# Patient Record
Sex: FEMALE | Race: BLACK OR AFRICAN AMERICAN | Employment: OTHER | URBAN - METROPOLITAN AREA
[De-identification: names, ages, dates, MRNs, and addresses within clinical notes are randomized per-mention and may not be internally consistent; named-entity substitution may affect disease eponyms.]

---

## 2017-01-13 ENCOUNTER — HOSPITAL ENCOUNTER (OUTPATIENT)
Dept: LAB | Age: 72
Discharge: HOME | End: 2017-01-13
Attending: INTERNAL MEDICINE
Payer: MEDICARE

## 2017-01-13 DIAGNOSIS — Z79.899 ENCOUNTER FOR LONG-TERM (CURRENT) DRUG USE: ICD-10-CM

## 2017-01-13 DIAGNOSIS — M05.69 RHEUMATOID ARTHRITIS OF MULTIPLE SITES WITH INVOLVEMENT OF OTHER ORGANS AND SYSTEMS (HCC): ICD-10-CM

## 2017-01-13 LAB
ALBUMIN SERPL BCP-MCNC: 4 G/DL (ref 3.4–5)
ALBUMIN/GLOB SERPL: 1.1 {RATIO} (ref 0.8–1.7)
ALP SERPL-CCNC: 51 U/L (ref 45–117)
ALT SERPL-CCNC: 21 U/L (ref 13–56)
AST SERPL W P-5'-P-CCNC: 22 U/L (ref 15–37)
BASOPHILS # BLD AUTO: 0.1 K/UL (ref 0–0.06)
BASOPHILS # BLD: 1 % (ref 0–2)
BILIRUB DIRECT SERPL-MCNC: 0.1 MG/DL (ref 0–0.2)
BILIRUB SERPL-MCNC: 0.3 MG/DL (ref 0.2–1)
CREAT SERPL-MCNC: 0.62 MG/DL (ref 0.6–1.3)
CRP SERPL-MCNC: 1.7 MG/DL (ref 0–0.3)
DIFFERENTIAL METHOD BLD: ABNORMAL
EOSINOPHIL # BLD: 0.8 K/UL (ref 0–0.4)
EOSINOPHIL NFR BLD: 12 % (ref 0–5)
ERYTHROCYTE [DISTWIDTH] IN BLOOD BY AUTOMATED COUNT: 14.8 % (ref 11.6–14.5)
ERYTHROCYTE [SEDIMENTATION RATE] IN BLOOD: 47 MM/HR (ref 0–30)
GLOBULIN SER CALC-MCNC: 3.5 G/DL (ref 2–4)
HCT VFR BLD AUTO: 34 % (ref 35–45)
HGB BLD-MCNC: 10.5 G/DL (ref 12–16)
LYMPHOCYTES # BLD AUTO: 30 % (ref 21–52)
LYMPHOCYTES # BLD: 2 K/UL (ref 0.9–3.6)
MCH RBC QN AUTO: 21.8 PG (ref 24–34)
MCHC RBC AUTO-ENTMCNC: 30.9 G/DL (ref 31–37)
MCV RBC AUTO: 70.5 FL (ref 74–97)
MONOCYTES # BLD: 0.5 K/UL (ref 0.05–1.2)
MONOCYTES NFR BLD AUTO: 7 % (ref 3–10)
NEUTS SEG # BLD: 3.4 K/UL (ref 1.8–8)
NEUTS SEG NFR BLD AUTO: 50 % (ref 40–73)
PLATELET # BLD AUTO: 379 K/UL (ref 135–420)
PMV BLD AUTO: 9.4 FL (ref 9.2–11.8)
PROT SERPL-MCNC: 7.5 G/DL (ref 6.4–8.2)
RBC # BLD AUTO: 4.82 M/UL (ref 4.2–5.3)
WBC # BLD AUTO: 6.7 K/UL (ref 4.6–13.2)

## 2017-01-13 PROCEDURE — 80076 HEPATIC FUNCTION PANEL: CPT | Performed by: INTERNAL MEDICINE

## 2017-01-13 PROCEDURE — 86140 C-REACTIVE PROTEIN: CPT | Performed by: INTERNAL MEDICINE

## 2017-01-13 PROCEDURE — 85025 COMPLETE CBC W/AUTO DIFF WBC: CPT | Performed by: INTERNAL MEDICINE

## 2017-01-13 PROCEDURE — 85652 RBC SED RATE AUTOMATED: CPT | Performed by: INTERNAL MEDICINE

## 2017-01-13 PROCEDURE — 36415 COLL VENOUS BLD VENIPUNCTURE: CPT | Performed by: INTERNAL MEDICINE

## 2017-01-13 PROCEDURE — 82542 COL CHROMOTOGRAPHY QUAL/QUAN: CPT | Performed by: INTERNAL MEDICINE

## 2017-01-13 PROCEDURE — 82565 ASSAY OF CREATININE: CPT | Performed by: INTERNAL MEDICINE

## 2017-01-15 LAB
FAX TO INFO,FAXT: NORMAL
FAX TO NUMBER,FAXN: NORMAL

## 2017-01-18 LAB
INTERPRETATION:, 510752: NORMAL
REF LAB TEST METHOD: NORMAL
TPMT RBC-CCNC: 17.7 UNITS/ML RBC

## 2017-04-27 ENCOUNTER — HOSPITAL ENCOUNTER (OUTPATIENT)
Dept: LAB | Age: 72
Discharge: HOME | End: 2017-04-27
Attending: INTERNAL MEDICINE
Payer: MEDICARE

## 2017-04-27 LAB
ALBUMIN SERPL BCP-MCNC: 4.2 G/DL (ref 3.4–5)
ALBUMIN/GLOB SERPL: 1.2 {RATIO} (ref 0.8–1.7)
ALP SERPL-CCNC: 65 U/L (ref 45–117)
ALT SERPL-CCNC: 22 U/L (ref 13–56)
ANION GAP BLD CALC-SCNC: 8 MMOL/L (ref 3–18)
AST SERPL W P-5'-P-CCNC: 27 U/L (ref 15–37)
BASOPHILS # BLD AUTO: 0.1 K/UL (ref 0–0.06)
BASOPHILS # BLD: 1 % (ref 0–2)
BILIRUB SERPL-MCNC: 0.3 MG/DL (ref 0.2–1)
BUN SERPL-MCNC: 19 MG/DL (ref 7–18)
BUN/CREAT SERPL: 28 (ref 12–20)
CALCIUM SERPL-MCNC: 9.3 MG/DL (ref 8.5–10.1)
CHLORIDE SERPL-SCNC: 105 MMOL/L (ref 100–108)
CO2 SERPL-SCNC: 29 MMOL/L (ref 21–32)
CREAT SERPL-MCNC: 0.68 MG/DL (ref 0.6–1.3)
CRP SERPL-MCNC: 0.4 MG/DL (ref 0–0.3)
DIFFERENTIAL METHOD BLD: ABNORMAL
EOSINOPHIL # BLD: 1.1 K/UL (ref 0–0.4)
EOSINOPHIL NFR BLD: 12 % (ref 0–5)
ERYTHROCYTE [DISTWIDTH] IN BLOOD BY AUTOMATED COUNT: 15.4 % (ref 11.6–14.5)
ERYTHROCYTE [SEDIMENTATION RATE] IN BLOOD: 25 MM/HR (ref 0–30)
GLOBULIN SER CALC-MCNC: 3.6 G/DL (ref 2–4)
GLUCOSE SERPL-MCNC: 100 MG/DL (ref 74–99)
HCT VFR BLD AUTO: 35.1 % (ref 35–45)
HGB BLD-MCNC: 11.1 G/DL (ref 12–16)
LYMPHOCYTES # BLD AUTO: 20 % (ref 21–52)
LYMPHOCYTES # BLD: 1.8 K/UL (ref 0.9–3.6)
MCH RBC QN AUTO: 22.3 PG (ref 24–34)
MCHC RBC AUTO-ENTMCNC: 31.6 G/DL (ref 31–37)
MCV RBC AUTO: 70.6 FL (ref 74–97)
MONOCYTES # BLD: 0.5 K/UL (ref 0.05–1.2)
MONOCYTES NFR BLD AUTO: 5 % (ref 3–10)
NEUTS SEG # BLD: 5.8 K/UL (ref 1.8–8)
NEUTS SEG NFR BLD AUTO: 62 % (ref 40–73)
PLATELET # BLD AUTO: 351 K/UL (ref 135–420)
PMV BLD AUTO: 9.6 FL (ref 9.2–11.8)
POTASSIUM SERPL-SCNC: 4.3 MMOL/L (ref 3.5–5.5)
PROT SERPL-MCNC: 7.8 G/DL (ref 6.4–8.2)
RBC # BLD AUTO: 4.97 M/UL (ref 4.2–5.3)
SODIUM SERPL-SCNC: 142 MMOL/L (ref 136–145)
WBC # BLD AUTO: 9.3 K/UL (ref 4.6–13.2)

## 2017-04-27 PROCEDURE — 80053 COMPREHEN METABOLIC PANEL: CPT | Performed by: INTERNAL MEDICINE

## 2017-04-27 PROCEDURE — 85652 RBC SED RATE AUTOMATED: CPT | Performed by: INTERNAL MEDICINE

## 2017-04-27 PROCEDURE — 85025 COMPLETE CBC W/AUTO DIFF WBC: CPT | Performed by: INTERNAL MEDICINE

## 2017-04-27 PROCEDURE — 36415 COLL VENOUS BLD VENIPUNCTURE: CPT | Performed by: INTERNAL MEDICINE

## 2017-04-27 PROCEDURE — 86140 C-REACTIVE PROTEIN: CPT | Performed by: INTERNAL MEDICINE

## 2017-04-28 LAB
FAX TO INFO,FAXT: NORMAL
FAX TO NUMBER,FAXN: NORMAL

## 2017-05-04 ENCOUNTER — HOSPITAL ENCOUNTER (OUTPATIENT)
Dept: LAB | Age: 72
Discharge: HOME | End: 2017-05-04
Payer: MEDICARE

## 2017-05-04 PROCEDURE — 88304 TISSUE EXAM BY PATHOLOGIST: CPT | Performed by: PODIATRIST

## 2017-05-04 PROCEDURE — 88305 TISSUE EXAM BY PATHOLOGIST: CPT | Performed by: PODIATRIST

## 2017-05-04 PROCEDURE — 88311 DECALCIFY TISSUE: CPT | Performed by: PODIATRIST

## 2017-07-18 ENCOUNTER — HOSPITAL ENCOUNTER (OUTPATIENT)
Dept: LAB | Age: 72
Discharge: HOME | End: 2017-07-18
Payer: MEDICARE

## 2017-07-18 LAB
ALBUMIN SERPL BCP-MCNC: 3.9 G/DL (ref 3.4–5)
ALBUMIN/GLOB SERPL: 1 {RATIO} (ref 0.8–1.7)
ALP SERPL-CCNC: 49 U/L (ref 45–117)
ALT SERPL-CCNC: 19 U/L (ref 13–56)
AST SERPL W P-5'-P-CCNC: 21 U/L (ref 15–37)
BASOPHILS # BLD AUTO: 0.1 K/UL (ref 0–0.06)
BASOPHILS # BLD: 1 % (ref 0–2)
BILIRUB DIRECT SERPL-MCNC: 0.1 MG/DL (ref 0–0.2)
BILIRUB SERPL-MCNC: 0.4 MG/DL (ref 0.2–1)
CREAT SERPL-MCNC: 0.62 MG/DL (ref 0.6–1.3)
CRP SERPL-MCNC: 0.8 MG/DL (ref 0–0.3)
DIFFERENTIAL METHOD BLD: ABNORMAL
EOSINOPHIL # BLD: 0.8 K/UL (ref 0–0.4)
EOSINOPHIL NFR BLD: 12 % (ref 0–5)
ERYTHROCYTE [DISTWIDTH] IN BLOOD BY AUTOMATED COUNT: 15.9 % (ref 11.6–14.5)
ERYTHROCYTE [SEDIMENTATION RATE] IN BLOOD: 20 MM/HR (ref 0–30)
GLOBULIN SER CALC-MCNC: 3.8 G/DL (ref 2–4)
HCT VFR BLD AUTO: 33.1 % (ref 35–45)
HGB BLD-MCNC: 10.4 G/DL (ref 12–16)
LYMPHOCYTES # BLD AUTO: 31 % (ref 21–52)
LYMPHOCYTES # BLD: 2.1 K/UL (ref 0.9–3.6)
MCH RBC QN AUTO: 21.7 PG (ref 24–34)
MCHC RBC AUTO-ENTMCNC: 31.4 G/DL (ref 31–37)
MCV RBC AUTO: 69 FL (ref 74–97)
MONOCYTES # BLD: 0.4 K/UL (ref 0.05–1.2)
MONOCYTES NFR BLD AUTO: 5 % (ref 3–10)
NEUTS SEG # BLD: 3.4 K/UL (ref 1.8–8)
NEUTS SEG NFR BLD AUTO: 51 % (ref 40–73)
PLATELET # BLD AUTO: 377 K/UL (ref 135–420)
PMV BLD AUTO: 9.4 FL (ref 9.2–11.8)
PROT SERPL-MCNC: 7.7 G/DL (ref 6.4–8.2)
RBC # BLD AUTO: 4.8 M/UL (ref 4.2–5.3)
WBC # BLD AUTO: 6.8 K/UL (ref 4.6–13.2)

## 2017-07-18 PROCEDURE — 80076 HEPATIC FUNCTION PANEL: CPT | Performed by: INTERNAL MEDICINE

## 2017-07-18 PROCEDURE — 85652 RBC SED RATE AUTOMATED: CPT | Performed by: INTERNAL MEDICINE

## 2017-07-18 PROCEDURE — 82565 ASSAY OF CREATININE: CPT | Performed by: INTERNAL MEDICINE

## 2017-07-18 PROCEDURE — 36415 COLL VENOUS BLD VENIPUNCTURE: CPT | Performed by: INTERNAL MEDICINE

## 2017-07-18 PROCEDURE — 86140 C-REACTIVE PROTEIN: CPT | Performed by: INTERNAL MEDICINE

## 2017-07-18 PROCEDURE — 85025 COMPLETE CBC W/AUTO DIFF WBC: CPT | Performed by: INTERNAL MEDICINE

## 2017-07-19 LAB
FAX TO INFO,FAXT: NORMAL
FAX TO NUMBER,FAXN: NORMAL

## 2018-01-05 ENCOUNTER — HOSPITAL ENCOUNTER (EMERGENCY)
Age: 73
Discharge: HOME | End: 2018-01-05
Attending: EMERGENCY MEDICINE
Payer: MEDICARE

## 2018-01-05 ENCOUNTER — APPOINTMENT (OUTPATIENT)
Dept: GENERAL RADIOLOGY | Age: 73
End: 2018-01-05
Attending: EMERGENCY MEDICINE
Payer: MEDICARE

## 2018-01-05 VITALS
TEMPERATURE: 98.2 F | HEART RATE: 86 BPM | WEIGHT: 150 LBS | RESPIRATION RATE: 16 BRPM | BODY MASS INDEX: 23.54 KG/M2 | DIASTOLIC BLOOD PRESSURE: 78 MMHG | HEIGHT: 67 IN | OXYGEN SATURATION: 97 % | SYSTOLIC BLOOD PRESSURE: 164 MMHG

## 2018-01-05 DIAGNOSIS — J20.9 ACUTE BRONCHITIS WITH BRONCHOSPASM: Primary | ICD-10-CM

## 2018-01-05 PROCEDURE — 71046 X-RAY EXAM CHEST 2 VIEWS: CPT

## 2018-01-05 PROCEDURE — 99281 EMR DPT VST MAYX REQ PHY/QHP: CPT

## 2018-01-05 RX ORDER — CODEINE PHOSPHATE AND GUAIFENESIN 10; 100 MG/5ML; MG/5ML
5 SOLUTION ORAL
Qty: 160 ML | Refills: 0 | Status: SHIPPED | OUTPATIENT
Start: 2018-01-05 | End: 2018-03-24

## 2018-01-05 RX ORDER — TRAMADOL HYDROCHLORIDE 50 MG/1
50 TABLET ORAL
COMMUNITY
End: 2018-01-05

## 2018-01-05 RX ORDER — ALBUTEROL SULFATE 90 UG/1
2 AEROSOL, METERED RESPIRATORY (INHALATION)
Qty: 1 INHALER | Refills: 1 | Status: SHIPPED | OUTPATIENT
Start: 2018-01-05 | End: 2018-11-25

## 2018-01-05 RX ORDER — DOXYCYCLINE HYCLATE 100 MG
100 TABLET ORAL 2 TIMES DAILY
Qty: 20 TAB | Refills: 0 | Status: SHIPPED | OUTPATIENT
Start: 2018-01-05 | End: 2018-01-15

## 2018-01-05 NOTE — Clinical Note
Do not take the antibiotic directly with a multivitamin (wait 30 minutes). Do not lie down within 2 hours of taking the antibiotic.

## 2018-01-05 NOTE — ED PROVIDER NOTES
EMERGENCY DEPARTMENT HISTORY AND PHYSICAL EXAM    Date: 1/5/2018  Patient Name: Yohan Kelley    History of Presenting Illness     Chief Complaint   Patient presents with    Cough         History Provided By: Patient    Chief Complaint: cough  Duration: 1.5 week   Timing:  Recurrent and worsening  Location: respiratory  Quality: productive  Severity: N/A  Associated Symptoms: rhinorrhea and congestions    Additional History (Context):   2:19 PM   Yohan Kelley is a 74 y.o. female with PMHX of bronchitis and pneumonia who presents to the emergency department C/O recurrent and worsening productive cough onset originally 3 weeks ago, was treated, and is recurrent 1.5 weeks ago. Associated sxs include rhinorrhea, and congestions. No relief with home meds. Pt denies fever, chills, ear pain, myalgias, and any other sxs or complaints. PCP: Edward Pollard MD    Current Outpatient Prescriptions   Medication Sig Dispense Refill    doxycycline (VIBRA-TABS) 100 mg tablet Take 1 Tab by mouth two (2) times a day for 10 days. 20 Tab 0    albuterol (PROVENTIL HFA, VENTOLIN HFA, PROAIR HFA) 90 mcg/actuation inhaler Take 2 Puffs by inhalation every four (4) hours as needed for Wheezing or Shortness of Breath. 1 Inhaler 1    inhalational spacing device 1 Each by Does Not Apply route as needed. Please dispense one adult spacer to be used with albuterol HFA. 1 Device 0    guaiFENesin-codeine (ROBITUSSIN AC) 100-10 mg/5 mL solution Take 5 mL by mouth three (3) times daily as needed for Cough. Max Daily Amount: 15 mL. Please do not drive while taking this medication. 160 mL 0    LEFLUNOMIDE PO Take  by mouth.  atorvastatin (LIPITOR) 10 mg tablet Take  by mouth daily.  AMLODIPINE BESYLATE, BULK, by Does Not Apply route.  clopidogrel (PLAVIX) 75 mg tablet Take  by mouth daily.  ergocalciferol, vitamin d2, 1,000 unit cap Take  by mouth.  folic acid (FOLVITE) 1 mg tablet Take  by mouth daily.  multivitamin (ONE A DAY) tablet Take 1 Tab by mouth daily.  losartan (COZAAR) 50 mg tablet Take 50 mg by mouth daily. Past History     Past Medical History:  Past Medical History:   Diagnosis Date    Autoimmune [facility] [address]     RA    Diabetes ([facility] [address] [facility]     diet controlled    Diabetes mellitus ([facility] [address] [facility]     Hypertension     Macromastia     Menopause     Rheumatoid arthritis(714.0)     TIA (transient ischemic attack)     Ulcer of foot ([facility] [address]        Past Surgical History:  Past Surgical History:   Procedure Laterality Date    HX BREAST REDUCTION  9/2012    HX GYN      HX ORTHOPAEDIC  2009    Left hand surgery    HX OTHER SURGICAL      left shoulder abcess drained    HX PARTIAL HYSTERECTOMY         Family History:  History reviewed. No pertinent family history. Social History:  Social History   Substance Use Topics    Smoking status: Former Smoker     Quit date: 9/17/1997    Smokeless tobacco: Never Used    Alcohol use No       Allergies:  No Known Allergies      Review of Systems   Review of Systems   Constitutional: Negative for chills and fever. HENT: Positive for congestion and rhinorrhea. Negative for sore throat. Respiratory: Positive for cough (productive). Negative for shortness of breath. Cardiovascular: Negative for chest pain. Gastrointestinal: Negative for nausea and vomiting. Musculoskeletal: Negative for myalgias. Skin: Negative for rash. Neurological: Negative for headaches. Hematological: Negative for adenopathy. All other systems reviewed and are negative. Physical Exam     Vitals:    01/05/18 1216   BP: 164/78   Pulse: 86   Resp: 16   Temp: 98.2 °F (36.8 °C)   SpO2: 97%   Weight: 68 kg (150 lb)   Height: 5' 7\" (1.702 m)     Physical Exam   Constitutional: She is oriented to person, place, and time. She appears well-developed and well-nourished. No distress. Geriatric female in NAD. Alert. No resp distress or acc muscle ise.     HENT:   Head: Normocephalic and atraumatic. Right Ear: External ear normal. No swelling or tenderness. Tympanic membrane is not perforated, not erythematous and not bulging. Left Ear: External ear normal. No swelling or tenderness. Tympanic membrane is not perforated, not erythematous and not bulging. Nose: Nose normal. No mucosal edema or rhinorrhea. Right sinus exhibits no maxillary sinus tenderness and no frontal sinus tenderness. Left sinus exhibits no maxillary sinus tenderness and no frontal sinus tenderness. Mouth/Throat: Uvula is midline, oropharynx is clear and moist and mucous membranes are normal. No oral lesions. No trismus in the jaw. No dental abscesses or uvula swelling. No posterior oropharyngeal edema, posterior oropharyngeal erythema or tonsillar abscesses. Eyes: Conjunctivae are normal. Right eye exhibits no discharge. Left eye exhibits no discharge. Neck: Normal range of motion. Cardiovascular: Normal rate, regular rhythm, normal heart sounds and intact distal pulses. Exam reveals no gallop and no friction rub. No murmur heard. Pulmonary/Chest: Effort normal and breath sounds normal. No accessory muscle usage. No tachypnea. No respiratory distress. She has no decreased breath sounds. She has no wheezes. She has no rhonchi. She has no rales. Abdominal: Soft. Musculoskeletal: Normal range of motion. She exhibits no edema. Lymphadenopathy:     She has no cervical adenopathy. Neurological: She is alert and oriented to person, place, and time. Skin: Skin is warm and dry. No rash noted. She is not diaphoretic. No erythema. Psychiatric: She has a normal mood and affect. Judgment normal.   Nursing note and vitals reviewed. Diagnostic Study Results     Labs -   No results found for this or any previous visit (from the past 12 hour(s)).     Radiologic Studies -   XR CHEST PA LAT   Final Result        CT Results  (Last 48 hours)    None        CXR Results  (Last 48 hours) 01/05/18 1249  XR CHEST PA LAT Final result    Impression:  Impression:   Mild perihilar bronchial cuffing, most commonly seen with reactive airway   disease, bronchitis or viral infection. No lobar pneumonia. Narrative:  Chest, two views       Indication: Persistent cough and congestion       Comparison: 7/12/2014       Findings:  Upright PA and lateral views of the chest were obtained. The   cardiomediastinal silhouette is within normal limits. The pulmonary vasculature   is unremarkable. Mild perihilar bronchial cuffing. Right infrahilar atelectasis. Remaining lung parenchyma appears otherwise adequately aerated. No pleural   effusion nor pneumothorax. No acute osseous abnormality. Degenerative changes   of the thoracic spine. Medications given in the ED-  Medications - No data to display      Medical Decision Making   I am the first provider for this patient. I reviewed the vital signs, available nursing notes, past medical history, past surgical history, family history and social history. Vital Signs-Reviewed the patient's vital signs. Pulse Oximetry Analysis - 97% on room air     Cardiac Monitor:  Rate: 86 bpm  Rhythm: NSR      Records Reviewed: Nursing Notes and Old Medical Records      Procedures:  Procedures     MDM: URI, strep, sinusitis, mono, influenza, PNA, bronchitis, asthma/RAD, allergic. Doubt cardiac. ED Course:   2:19 PM    Initial assessment performed. The patients presenting problems have been discussed, and they are in agreement with the care plan formulated and outlined with them. I have encouraged them to ask questions as they arise throughout their visit. Diagnosis and Disposition     Afebrile. Lungs CTAB. No resp distress or acc muscle use. Will tx with doxy. Albuterol HFA. FU with PCP. Reasons to RTED discussed with pt. All questions answered. Pt feels comfortable going home at this time.  Pt expressed understanding and she agrees with plan.      DISCHARGE NOTE:  2:34 PM   Pierre Ralph's  results have been reviewed with her. She has been counseled regarding her diagnosis, treatment, and plan. She verbally conveys understanding and agreement of the signs, symptoms, diagnosis, treatment and prognosis and additionally agrees to follow up as discussed. She also agrees with the care-plan and conveys that all of her questions have been answered. I have also provided discharge instructions for her that include: educational information regarding their diagnosis and treatment, and list of reasons why they would want to return to the ED prior to their follow-up appointment, should her condition change. She has been provided with education for proper emergency department utilization. CLINICAL IMPRESSION:    1. Acute bronchitis with bronchospasm        PLAN:  1. D/C Home  2. Discharge Medication List as of 1/5/2018  2:25 PM      START taking these medications    Details   doxycycline (VIBRA-TABS) 100 mg tablet Take 1 Tab by mouth two (2) times a day for 10 days. , Normal, Disp-20 Tab, R-0      albuterol (PROVENTIL HFA, VENTOLIN HFA, PROAIR HFA) 90 mcg/actuation inhaler Take 2 Puffs by inhalation every four (4) hours as needed for Wheezing or Shortness of Breath., Normal, Disp-1 Inhaler, R-1      inhalational spacing device 1 Each by Does Not Apply route as needed. Please dispense one adult spacer to be used with albuterol HFA., Normal, Disp-1 Device, R-0      guaiFENesin-codeine (ROBITUSSIN AC) 100-10 mg/5 mL solution Take 5 mL by mouth three (3) times daily as needed for Cough. Max Daily Amount: 15 mL. Please do not drive while taking this medication. , Print, Disp-160 mL, R-0         CONTINUE these medications which have NOT CHANGED    Details   LEFLUNOMIDE PO Take  by mouth.  , Historical Med      atorvastatin (LIPITOR) 10 mg tablet Take  by mouth daily.   , Historical Med      AMLODIPINE BESYLATE, BULK, by Does Not Apply route.  , Historical Med clopidogrel (PLAVIX) 75 mg tablet Take  by mouth daily. , Historical Med      ergocalciferol, vitamin d2, 1,000 unit cap Take  by mouth.  , Historical Med      folic acid (FOLVITE) 1 mg tablet Take  by mouth daily. , Historical Med      multivitamin (ONE A DAY) tablet Take 1 Tab by mouth daily. , Historical Med      losartan (COZAAR) 50 mg tablet Take 50 mg by mouth daily. , Historical Med         STOP taking these medications       traMADol (ULTRAM) 50 mg tablet Comments:   Reason for Stopping:             3.   Follow-up Information     Follow up With Details Comments Contact Info    Dino Proctor MD Schedule an appointment as soon as possible for a visit in 2 days ED Follow-up [address]      [facility] Go to As needed, If symptoms worsen [address] [address]  [phone]        _______________________________    Attestations: This note is prepared by Castillo Lozano, acting as Scribe for Corene Olszewski, PA-C. Corene Olszewski, PA-C :  The scribe's documentation has been prepared under my direction and personally reviewed by me in its entirety.   I confirm that the note above accurately reflects all work, treatment, procedures, and medical decision making performed by me.  _______________________________

## 2018-01-05 NOTE — DISCHARGE INSTRUCTIONS
Bronchitis: Care Instructions  Your Care Instructions    Bronchitis is inflammation of the bronchial tubes, which carry air to the lungs. The tubes swell and produce mucus, or phlegm. The mucus and inflamed bronchial tubes make you cough. You may have trouble breathing. Most cases of bronchitis are caused by viruses like those that cause colds. Antibiotics usually do not help and they may be harmful. Bronchitis usually develops rapidly and lasts about 2 to 3 weeks in otherwise healthy people. Follow-up care is a key part of your treatment and safety. Be sure to make and go to all appointments, and call your doctor if you are having problems. It's also a good idea to know your test results and keep a list of the medicines you take. How can you care for yourself at home? · Take all medicines exactly as prescribed. Call your doctor if you think you are having a problem with your medicine. · Get some extra rest.  · Take an over-the-counter pain medicine, such as acetaminophen (Tylenol), ibuprofen (Advil, Motrin), or naproxen (Aleve) to reduce fever and relieve body aches. Read and follow all instructions on the label. · Do not take two or more pain medicines at the same time unless the doctor told you to. Many pain medicines have acetaminophen, which is Tylenol. Too much acetaminophen (Tylenol) can be harmful. · Take an over-the-counter cough medicine that contains dextromethorphan to help quiet a dry, hacking cough so that you can sleep. Avoid cough medicines that have more than one active ingredient. Read and follow all instructions on the label. · Breathe moist air from a humidifier, hot shower, or sink filled with hot water. The heat and moisture will thin mucus so you can cough it out. · Do not smoke. Smoking can make bronchitis worse. If you need help quitting, talk to your doctor about stop-smoking programs and medicines. These can increase your chances of quitting for good.   When should you call for help? Call 911 anytime you think you may need emergency care. For example, call if:  ? · You have severe trouble breathing. ?Call your doctor now or seek immediate medical care if:  ? · You have new or worse trouble breathing. ? · You cough up dark brown or bloody mucus (sputum). ? · You have a new or higher fever. ? · You have a new rash. ? Watch closely for changes in your health, and be sure to contact your doctor if:  ? · You cough more deeply or more often, especially if you notice more mucus or a change in the color of your mucus. ? · You are not getting better as expected. Where can you learn more? Go to http://sarina-ryan.info/. Enter H333 in the search box to learn more about \"Bronchitis: Care Instructions. \"  Current as of: May 12, 2017  Content Version: 11.4  © 6818-6367 [employer], [employer]. Care instructions adapted under license by [employer] (which disclaims liability or warranty for this information). If you have questions about a medical condition or this instruction, always ask your healthcare professional. [place] [address] any warranty or liability for your use of this information.

## 2018-03-24 ENCOUNTER — APPOINTMENT (OUTPATIENT)
Dept: GENERAL RADIOLOGY | Age: 73
End: 2018-03-24
Attending: INTERNAL MEDICINE
Payer: MEDICARE

## 2018-03-24 ENCOUNTER — HOSPITAL ENCOUNTER (EMERGENCY)
Age: 73
Discharge: HOME | End: 2018-03-24
Attending: INTERNAL MEDICINE
Payer: MEDICARE

## 2018-03-24 VITALS
TEMPERATURE: 98.1 F | HEART RATE: 81 BPM | WEIGHT: 150 LBS | HEIGHT: 66 IN | BODY MASS INDEX: 24.11 KG/M2 | OXYGEN SATURATION: 99 % | DIASTOLIC BLOOD PRESSURE: 66 MMHG | SYSTOLIC BLOOD PRESSURE: 162 MMHG | RESPIRATION RATE: 18 BRPM

## 2018-03-24 DIAGNOSIS — J20.9 ACUTE BRONCHITIS, UNSPECIFIED ORGANISM: Primary | ICD-10-CM

## 2018-03-24 PROCEDURE — 71046 X-RAY EXAM CHEST 2 VIEWS: CPT

## 2018-03-24 PROCEDURE — 99282 EMERGENCY DEPT VISIT SF MDM: CPT

## 2018-03-24 RX ORDER — CODEINE PHOSPHATE AND GUAIFENESIN 10; 100 MG/5ML; MG/5ML
5 SOLUTION ORAL
Qty: 120 ML | Refills: 0 | Status: SHIPPED | OUTPATIENT
Start: 2018-03-24 | End: 2018-11-25

## 2018-03-24 RX ORDER — AZITHROMYCIN 250 MG/1
TABLET, FILM COATED ORAL
Qty: 6 TAB | Refills: 0 | Status: SHIPPED | OUTPATIENT
Start: 2018-03-24 | End: 2018-11-25

## 2018-03-24 RX ORDER — PREDNISONE 20 MG/1
40 TABLET ORAL DAILY
Qty: 10 TAB | Refills: 0 | Status: SHIPPED | OUTPATIENT
Start: 2018-03-24 | End: 2018-03-29

## 2018-03-24 NOTE — ED TRIAGE NOTES
Triage: pt here in ED visiting a family member and states that since she was already here, she might as well get checked out. Pt reports chest congestion and cough x 10 days. Pt states that OTC medications are moderately effective. Sepsis Screening completed    (  )Patient meets SIRS criteria. (x  )Patient does not meet SIRS criteria.       SIRS Criteria is achieved when two or more of the following are present   Temperature < 96.8°F (36°C) or > 100.9°F (38.3°C)   Heart Rate > 90 beats per minute   Respiratory Rate > 20 breaths per minute   WBC count > 12,000 or <4,000 or > 10% bands

## 2018-03-24 NOTE — ED PROVIDER NOTES
EMERGENCY DEPARTMENT HISTORY AND PHYSICAL EXAM    Date: 3/24/2018  Patient Name: Aroldo Grajeda    History of Presenting Illness     Chief Complaint   Patient presents with    Chest Congestion    Cough         History Provided By: Patient    Chief Complaint: Cough  Duration: 10 days  Timing:  Worsening  Location: Chest  Severity: Moderate  Associated Symptoms: chest congestion and chest tightness with coughing    Additional History (Context):   2:58 PM   Aroldo Grajeda is a 74 y.o. female with PMHX HTN, RA, and TIA presents to the emergency department C/O gradually worsening cough productive of yellow phlegm, onset 10 days ago. Associated sxs include chest congestion and chest tightness with coughing. Pt states she had a cold 3 weeks ago, which improved with OTC medications. Pt was seen here on 1/05/18 for similar sxs, dx'ed with bronchitis, and rx'ed albuterol and doxycycline. Pt denies fever, chills, and any other sxs or complaints. PCP: Betty Albrecht MD    Current Outpatient Prescriptions   Medication Sig Dispense Refill    azithromycin (ZITHROMAX) 250 mg tablet Take 2 tablets today then 1 tablet daily x 4 days 6 Tab 0    guaiFENesin-codeine (ROBITUSSIN AC) 100-10 mg/5 mL solution Take 5 mL by mouth nightly as needed for Cough. Max Daily Amount: 5 mL. 120 mL 0    predniSONE (DELTASONE) 20 mg tablet Take 2 Tabs by mouth daily for 5 doses. With Breakfast 10 Tab 0    LEFLUNOMIDE PO Take  by mouth.  atorvastatin (LIPITOR) 10 mg tablet Take  by mouth daily.  AMLODIPINE BESYLATE, BULK, by Does Not Apply route.  clopidogrel (PLAVIX) 75 mg tablet Take  by mouth daily.  ergocalciferol, vitamin d2, 1,000 unit cap Take  by mouth.  folic acid (FOLVITE) 1 mg tablet Take  by mouth daily.  multivitamin (ONE A DAY) tablet Take 1 Tab by mouth daily.  losartan (COZAAR) 50 mg tablet Take 50 mg by mouth daily.         albuterol (PROVENTIL HFA, VENTOLIN HFA, PROAIR HFA) 90 mcg/actuation inhaler Take 2 Puffs by inhalation every four (4) hours as needed for Wheezing or Shortness of Breath. 1 Inhaler 1    inhalational spacing device 1 Each by Does Not Apply route as needed. Please dispense one adult spacer to be used with albuterol HFA. 1 Device 0       Past History     Past Medical History:  Past Medical History:   Diagnosis Date    Autoimmune disease ([address]     RA    Diabetes ([facility] [address]     diet controlled    Diabetes mellitus ([facility] [address]     Hypertension     Macromastia     Menopause     Rheumatoid arthritis(714.0)     TIA (transient ischemic attack)     Ulcer of foot ([address]        Past Surgical History:  Past Surgical History:   Procedure Laterality Date    HX BREAST REDUCTION  9/2012    HX GYN      HX ORTHOPAEDIC  2009    Left hand surgery    HX OTHER SURGICAL      left shoulder abcess drained    HX PARTIAL HYSTERECTOMY         Family History:  History reviewed. No pertinent family history. Social History:  Social History   Substance Use Topics    Smoking status: Former Smoker     Quit date: 9/17/1997    Smokeless tobacco: Never Used    Alcohol use No       Allergies:  No Known Allergies      Review of Systems   Review of Systems   Constitutional: Negative for chills and fever. HENT: Positive for congestion (chest). Respiratory: Positive for cough (productive of yellow phlegm). All other systems reviewed and are negative. Physical Exam     Vitals:    03/24/18 1408   BP: 162/66   Pulse: 81   Resp: 18   Temp: 98.1 °F (36.7 °C)   SpO2: 99%   Weight: 68 kg (150 lb)   Height: 5' 6\" (1.676 m)     Physical Exam   Constitutional: She is oriented to person, place, and time. She appears well-developed and well-nourished. HENT:   Head: Normocephalic and atraumatic. Right Ear: External ear normal.   Left Ear: External ear normal.   Nose: Mucosal edema present. Right sinus exhibits maxillary sinus tenderness.    Mouth/Throat: Oropharynx is clear and moist. She has dentures (upper and lower). Nose: Bilateral middle turbinates with edema, erythema, off-white mucous. Eyes: Conjunctivae and EOM are normal. Pupils are equal, round, and reactive to light. Right eye exhibits no discharge. Left eye exhibits no discharge. No scleral icterus. Neck: Normal range of motion. Neck supple. No JVD present. No tracheal deviation present. Cardiovascular: Normal rate, regular rhythm, normal heart sounds and intact distal pulses. Pulmonary/Chest: Effort normal and breath sounds normal. No respiratory distress. Crackles bilateral lower lobes   Abdominal: Soft. Bowel sounds are normal. She exhibits no distension. There is no tenderness. No HSM   Musculoskeletal: Normal range of motion. Neurological: She is alert and oriented to person, place, and time. She has normal reflexes. No focal motor weakness. Skin: Skin is warm and dry. No rash noted. Psychiatric: She has a normal mood and affect. Her behavior is normal.   Nursing note and vitals reviewed. Diagnostic Study Results     Labs -   No results found for this or any previous visit (from the past 12 hour(s)). Radiologic Studies -     3:05 PM  RADIOLOGY FINDINGS  Chest X-ray shows right lower lobe slight haziness. Pending review by Radiologist  Recorded by SHERRON Bettencourt, as dictated by Leonetta Mcardle, MD     XR CHEST PA LAT    (Results Pending)     CT Results  (Last 48 hours)    None        CXR Results  (Last 48 hours)    None          MEDICATIONS GIVEN:  Medications - No data to display     Medical Decision Making   I am the first provider for this patient. I reviewed the vital signs, available nursing notes, past medical history, past surgical history, family history and social history. Vital Signs-Reviewed the patient's vital signs.     Pulse Oximetry Analysis - 99% on RA     Records Reviewed: Nursing Notes    Provider Notes (Medical Decision Making):   DDX: bronchitis, pna    Procedures:  Procedures    ED Course:   2:58 PM Initial assessment performed. The patients presenting problems have been discussed, and they are in agreement with the care plan formulated and outlined with them. I have encouraged them to ask questions as they arise throughout their visit. Diagnosis and Disposition       DISCHARGE NOTE:  3:05 PM  Jayda Ralph's  results have been reviewed with her. She has been counseled regarding her diagnosis, treatment, and plan. She verbally conveys understanding and agreement of the signs, symptoms, diagnosis, treatment and prognosis and additionally agrees to follow up as discussed. She also agrees with the care-plan and conveys that all of her questions have been answered. I have also provided discharge instructions for her that include: educational information regarding their diagnosis and treatment, and list of reasons why they would want to return to the ED prior to their follow-up appointment, should her condition change. She has been provided with education for proper emergency department utilization. CLINICAL IMPRESSION:    1. Acute bronchitis, unspecified organism        PLAN:  1. D/C Home  2. Current Discharge Medication List      START taking these medications    Details   azithromycin (ZITHROMAX) 250 mg tablet Take 2 tablets today then 1 tablet daily x 4 days  Qty: 6 Tab, Refills: 0      predniSONE (DELTASONE) 20 mg tablet Take 2 Tabs by mouth daily for 5 doses. With Breakfast  Qty: 10 Tab, Refills: 0         CONTINUE these medications which have CHANGED    Details   guaiFENesin-codeine (ROBITUSSIN AC) 100-10 mg/5 mL solution Take 5 mL by mouth nightly as needed for Cough. Max Daily Amount: 5 mL. Qty: 120 mL, Refills: 0    Associated Diagnoses: Acute bronchitis, unspecified organism           3.    Follow-up Information     Follow up With Details Comments Contact Info    Molly Horner MD Schedule an appointment as soon as possible for a visit in 2 days  [address]      [facility]  As needed, if symptoms worsen [address] [facility] [address]  [phone]          _______________________________   Attestations:     SCRIBE ATTESTATION:  This note is prepared by Malini Coleman, acting as Scribe for Malcolm Louie MD.    PROVIDER ATTESTATION:  Malcolm Louie MD: The scribe's documentation has been prepared under my direction and personally reviewed by me in its entirety.  I confirm that the note above accurately reflects all work, treatment, procedures, and medical decision making performed by me.   _______________________________

## 2018-03-24 NOTE — DISCHARGE INSTRUCTIONS
Bronchitis: Care Instructions  Your Care Instructions    Bronchitis is inflammation of the bronchial tubes, which carry air to the lungs. The tubes swell and produce mucus, or phlegm. The mucus and inflamed bronchial tubes make you cough. You may have trouble breathing. Most cases of bronchitis are caused by viruses like those that cause colds. Antibiotics usually do not help and they may be harmful. Bronchitis usually develops rapidly and lasts about 2 to 3 weeks in otherwise healthy people. Follow-up care is a key part of your treatment and safety. Be sure to make and go to all appointments, and call your doctor if you are having problems. It's also a good idea to know your test results and keep a list of the medicines you take. How can you care for yourself at home? · Take all medicines exactly as prescribed. Call your doctor if you think you are having a problem with your medicine. · Get some extra rest.  · Take an over-the-counter pain medicine, such as acetaminophen (Tylenol), ibuprofen (Advil, Motrin), or naproxen (Aleve) to reduce fever and relieve body aches. Read and follow all instructions on the label. · Do not take two or more pain medicines at the same time unless the doctor told you to. Many pain medicines have acetaminophen, which is Tylenol. Too much acetaminophen (Tylenol) can be harmful. · Take an over-the-counter cough medicine that contains dextromethorphan to help quiet a dry, hacking cough so that you can sleep. Avoid cough medicines that have more than one active ingredient. Read and follow all instructions on the label. · Breathe moist air from a humidifier, hot shower, or sink filled with hot water. The heat and moisture will thin mucus so you can cough it out. · Do not smoke. Smoking can make bronchitis worse. If you need help quitting, talk to your doctor about stop-smoking programs and medicines. These can increase your chances of quitting for good.   When should you call for help? Call 911 anytime you think you may need emergency care. For example, call if:  ? · You have severe trouble breathing. ?Call your doctor now or seek immediate medical care if:  ? · You have new or worse trouble breathing. ? · You cough up dark brown or bloody mucus (sputum). ? · You have a new or higher fever. ? · You have a new rash. ? Watch closely for changes in your health, and be sure to contact your doctor if:  ? · You cough more deeply or more often, especially if you notice more mucus or a change in the color of your mucus. ? · You are not getting better as expected. Where can you learn more? Go to http://sarina-ryan.info/. Enter H333 in the search box to learn more about \"Bronchitis: Care Instructions. \"  Current as of: May 12, 2017  Content Version: 11.4  © 6310-5785 [facility]. Care instructions adapted under license by [employer] (which disclaims liability or warranty for this information). If you have questions about a medical condition or this instruction, always ask your healthcare professional. [address] any warranty or liability for your use of this information.

## 2018-05-04 ENCOUNTER — HOSPITAL ENCOUNTER (OUTPATIENT)
Dept: LAB | Age: 73
Discharge: HOME | End: 2018-05-04
Payer: MEDICARE

## 2018-05-04 LAB
ALBUMIN SERPL-MCNC: 4.2 G/DL (ref 3.4–5)
ALBUMIN/GLOB SERPL: 1.3 {RATIO} (ref 0.8–1.7)
ALP SERPL-CCNC: 61 U/L (ref 45–117)
ALT SERPL-CCNC: 23 U/L (ref 13–56)
ANION GAP SERPL CALC-SCNC: 11 MMOL/L (ref 3–18)
AST SERPL-CCNC: 24 U/L (ref 15–37)
BASOPHILS # BLD: 0.1 K/UL (ref 0–0.06)
BASOPHILS NFR BLD: 1 % (ref 0–2)
BILIRUB SERPL-MCNC: 0.4 MG/DL (ref 0.2–1)
BUN SERPL-MCNC: 13 MG/DL (ref 7–18)
BUN/CREAT SERPL: 17 (ref 12–20)
CALCIUM SERPL-MCNC: 9.3 MG/DL (ref 8.5–10.1)
CHLORIDE SERPL-SCNC: 106 MMOL/L (ref 100–108)
CO2 SERPL-SCNC: 29 MMOL/L (ref 21–32)
CREAT SERPL-MCNC: 0.76 MG/DL (ref 0.6–1.3)
CRP SERPL-MCNC: 0.7 MG/DL (ref 0–0.3)
DIFFERENTIAL METHOD BLD: ABNORMAL
EOSINOPHIL # BLD: 0.7 K/UL (ref 0–0.4)
EOSINOPHIL NFR BLD: 7 % (ref 0–5)
ERYTHROCYTE [DISTWIDTH] IN BLOOD BY AUTOMATED COUNT: 15.2 % (ref 11.6–14.5)
ERYTHROCYTE [SEDIMENTATION RATE] IN BLOOD: 21 MM/HR (ref 0–30)
GLOBULIN SER CALC-MCNC: 3.2 G/DL (ref 2–4)
GLUCOSE SERPL-MCNC: 115 MG/DL (ref 74–99)
HCT VFR BLD AUTO: 34 % (ref 35–45)
HGB BLD-MCNC: 10.5 G/DL (ref 12–16)
LYMPHOCYTES # BLD: 2.1 K/UL (ref 0.9–3.6)
LYMPHOCYTES NFR BLD: 20 % (ref 21–52)
MCH RBC QN AUTO: 21.8 PG (ref 24–34)
MCHC RBC AUTO-ENTMCNC: 30.9 G/DL (ref 31–37)
MCV RBC AUTO: 70.5 FL (ref 74–97)
MONOCYTES # BLD: 0.5 K/UL (ref 0.05–1.2)
MONOCYTES NFR BLD: 5 % (ref 3–10)
NEUTS SEG # BLD: 6.9 K/UL (ref 1.8–8)
NEUTS SEG NFR BLD: 67 % (ref 40–73)
PLATELET # BLD AUTO: 316 K/UL (ref 135–420)
PMV BLD AUTO: 10.5 FL (ref 9.2–11.8)
POTASSIUM SERPL-SCNC: 4 MMOL/L (ref 3.5–5.5)
PROT SERPL-MCNC: 7.4 G/DL (ref 6.4–8.2)
RBC # BLD AUTO: 4.82 M/UL (ref 4.2–5.3)
SODIUM SERPL-SCNC: 146 MMOL/L (ref 136–145)
WBC # BLD AUTO: 10.1 K/UL (ref 4.6–13.2)

## 2018-05-04 PROCEDURE — 80053 COMPREHEN METABOLIC PANEL: CPT | Performed by: INTERNAL MEDICINE

## 2018-05-04 PROCEDURE — 85652 RBC SED RATE AUTOMATED: CPT | Performed by: INTERNAL MEDICINE

## 2018-05-04 PROCEDURE — 36415 COLL VENOUS BLD VENIPUNCTURE: CPT | Performed by: INTERNAL MEDICINE

## 2018-05-04 PROCEDURE — 85025 COMPLETE CBC W/AUTO DIFF WBC: CPT | Performed by: INTERNAL MEDICINE

## 2018-05-04 PROCEDURE — 86140 C-REACTIVE PROTEIN: CPT | Performed by: INTERNAL MEDICINE

## 2018-09-18 ENCOUNTER — HOSPITAL ENCOUNTER (OUTPATIENT)
Dept: ULTRASOUND IMAGING | Age: 73
Discharge: HOME | End: 2018-09-18
Attending: PAIN MEDICINE
Payer: MEDICARE

## 2018-09-18 DIAGNOSIS — I72.9 ANEURYSM OF UNSPECIFIED SITE (HCC): ICD-10-CM

## 2018-09-18 PROCEDURE — 76700 US EXAM ABDOM COMPLETE: CPT

## 2018-11-13 ENCOUNTER — HOSPITAL ENCOUNTER (OUTPATIENT)
Dept: LAB | Age: 73
Discharge: HOME | End: 2018-11-13
Payer: MEDICARE

## 2018-11-13 LAB
ALBUMIN SERPL-MCNC: 3.8 G/DL (ref 3.4–5)
ALBUMIN/GLOB SERPL: 1.1 {RATIO} (ref 0.8–1.7)
ALP SERPL-CCNC: 68 U/L (ref 45–117)
ALT SERPL-CCNC: 23 U/L (ref 13–56)
ANION GAP SERPL CALC-SCNC: 7 MMOL/L (ref 3–18)
AST SERPL-CCNC: 23 U/L (ref 15–37)
BASOPHILS # BLD: 0.1 K/UL (ref 0–0.1)
BASOPHILS NFR BLD: 1 % (ref 0–2)
BILIRUB SERPL-MCNC: 0.3 MG/DL (ref 0.2–1)
BUN SERPL-MCNC: 13 MG/DL (ref 7–18)
BUN/CREAT SERPL: 22
CALCIUM SERPL-MCNC: 8.8 MG/DL (ref 8.5–10.1)
CHLORIDE SERPL-SCNC: 103 MMOL/L (ref 100–108)
CO2 SERPL-SCNC: 31 MMOL/L (ref 21–32)
CREAT SERPL-MCNC: 0.59 MG/DL (ref 0.6–1.3)
CRP SERPL-MCNC: 0.6 MG/DL (ref 0–0.3)
DIFFERENTIAL METHOD BLD: ABNORMAL
EOSINOPHIL # BLD: 0.8 K/UL (ref 0–0.4)
EOSINOPHIL NFR BLD: 9 % (ref 0–5)
ERYTHROCYTE [DISTWIDTH] IN BLOOD BY AUTOMATED COUNT: 15.1 % (ref 11.6–14.5)
ERYTHROCYTE [SEDIMENTATION RATE] IN BLOOD: 31 MM/HR (ref 0–30)
GLOBULIN SER CALC-MCNC: 3.6 G/DL (ref 2–4)
GLUCOSE SERPL-MCNC: 102 MG/DL (ref 74–99)
HCT VFR BLD AUTO: 33.3 % (ref 35–45)
HGB BLD-MCNC: 10.2 G/DL (ref 12–16)
LYMPHOCYTES # BLD: 1.6 K/UL (ref 0.9–3.6)
LYMPHOCYTES NFR BLD: 18 % (ref 21–52)
MCH RBC QN AUTO: 21.7 PG (ref 24–34)
MCHC RBC AUTO-ENTMCNC: 30.6 G/DL (ref 31–37)
MCV RBC AUTO: 71 FL (ref 74–97)
MONOCYTES # BLD: 0.4 K/UL (ref 0.05–1.2)
MONOCYTES NFR BLD: 5 % (ref 3–10)
NEUTS SEG # BLD: 5.9 K/UL (ref 1.8–8)
NEUTS SEG NFR BLD: 67 % (ref 40–73)
PLATELET # BLD AUTO: 380 K/UL (ref 135–420)
PMV BLD AUTO: 9.4 FL (ref 9.2–11.8)
POTASSIUM SERPL-SCNC: 4.4 MMOL/L (ref 3.5–5.5)
PROT SERPL-MCNC: 7.4 G/DL (ref 6.4–8.2)
RBC # BLD AUTO: 4.69 M/UL (ref 4.2–5.3)
RBC MORPH BLD: ABNORMAL
SODIUM SERPL-SCNC: 141 MMOL/L (ref 136–145)
WBC # BLD AUTO: 8.8 K/UL (ref 4.6–13.2)

## 2018-11-13 PROCEDURE — 36415 COLL VENOUS BLD VENIPUNCTURE: CPT

## 2018-11-13 PROCEDURE — 85025 COMPLETE CBC W/AUTO DIFF WBC: CPT

## 2018-11-13 PROCEDURE — 86140 C-REACTIVE PROTEIN: CPT

## 2018-11-13 PROCEDURE — 85652 RBC SED RATE AUTOMATED: CPT

## 2018-11-13 PROCEDURE — 80053 COMPREHEN METABOLIC PANEL: CPT

## 2018-11-14 LAB
FAX TO INFO,FAXT: NORMAL
FAX TO NUMBER,FAXN: NORMAL

## 2018-11-25 ENCOUNTER — HOSPITAL ENCOUNTER (EMERGENCY)
Age: 73
Discharge: HOME | End: 2018-11-25
Attending: EMERGENCY MEDICINE
Payer: MEDICARE

## 2018-11-25 ENCOUNTER — APPOINTMENT (OUTPATIENT)
Dept: CT IMAGING | Age: 73
End: 2018-11-25
Attending: NURSE PRACTITIONER
Payer: MEDICARE

## 2018-11-25 VITALS
WEIGHT: 148 LBS | HEART RATE: 76 BPM | OXYGEN SATURATION: 100 % | BODY MASS INDEX: 23.78 KG/M2 | DIASTOLIC BLOOD PRESSURE: 68 MMHG | SYSTOLIC BLOOD PRESSURE: 158 MMHG | HEIGHT: 66 IN | RESPIRATION RATE: 12 BRPM | TEMPERATURE: 98.1 F

## 2018-11-25 DIAGNOSIS — R90.89 ABNORMAL CT OF BRAIN: ICD-10-CM

## 2018-11-25 DIAGNOSIS — S16.1XXA STRAIN OF NECK MUSCLE, INITIAL ENCOUNTER: ICD-10-CM

## 2018-11-25 DIAGNOSIS — R51.9 NONINTRACTABLE HEADACHE, UNSPECIFIED CHRONICITY PATTERN, UNSPECIFIED HEADACHE TYPE: ICD-10-CM
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
## 2018-11-25 PROCEDURE — 70450 CT HEAD/BRAIN W/O DYE: CPT

## 2018-11-25 PROCEDURE — 72125 CT NECK SPINE W/O DYE: CPT

## 2018-11-25 PROCEDURE — 74011250637 HC RX REV CODE- 250/637: Performed by: NURSE PRACTITIONER

## 2018-11-25 PROCEDURE — 99284 EMERGENCY DEPT VISIT MOD MDM: CPT

## 2018-11-25 RX ORDER — TRAMADOL HYDROCHLORIDE 50 MG/1
50 TABLET ORAL
Status: COMPLETED | OUTPATIENT
Start: 2018-11-25 | End: 2018-11-25

## 2018-11-25 RX ADMIN — TRAMADOL HYDROCHLORIDE 50 MG: 50 TABLET, FILM COATED ORAL at 13:40
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
## 2019-02-16 ENCOUNTER — APPOINTMENT (OUTPATIENT)
Dept: GENERAL RADIOLOGY | Age: 74
DRG: 195 | End: 2019-02-16
Attending: EMERGENCY MEDICINE
Payer: MEDICARE

## 2019-02-16 ENCOUNTER — HOSPITAL ENCOUNTER (INPATIENT)
Age: 74
LOS: 2 days | Discharge: HOME HEALTH | DRG: 195 | End: 2019-02-18
Attending: EMERGENCY MEDICINE | Admitting: INTERNAL MEDICINE
Payer: MEDICARE

## 2019-02-16 DIAGNOSIS — J10.1 INFLUENZA A: Primary | ICD-10-CM

## 2019-02-16 DIAGNOSIS — D72.825 BANDEMIA: ICD-10-CM

## 2019-02-16 DIAGNOSIS — R09.02 HYPOXIA: ICD-10-CM

## 2019-02-16 DIAGNOSIS — J18.9 PNEUMONIA OF RIGHT LOWER LOBE DUE TO INFECTIOUS ORGANISM: ICD-10-CM

## 2019-02-16 LAB
ALBUMIN SERPL-MCNC: 3 G/DL (ref 3.4–5)
ALBUMIN/GLOB SERPL: 0.7 {RATIO} (ref 0.8–1.7)
ALP SERPL-CCNC: 56 U/L (ref 45–117)
ALT SERPL-CCNC: 30 U/L (ref 13–56)
ANION GAP BLD CALC-SCNC: 14 MMOL/L (ref 10–20)
ANION GAP SERPL CALC-SCNC: 9 MMOL/L (ref 3–18)
APPEARANCE UR: ABNORMAL
AST SERPL-CCNC: 77 U/L (ref 15–37)
BACTERIA URNS QL MICRO: NEGATIVE /HPF
BASOPHILS # BLD: 0 K/UL (ref 0–0.1)
BASOPHILS NFR BLD: 0 % (ref 0–3)
BILIRUB SERPL-MCNC: 0.5 MG/DL (ref 0.2–1)
BILIRUB UR QL: NEGATIVE
BNP SERPL-MCNC: 363 PG/ML (ref 0–1800)
BUN BLD-MCNC: 23 MG/DL (ref 7–18)
BUN SERPL-MCNC: 23 MG/DL (ref 7–18)
BUN/CREAT SERPL: 29 (ref 12–20)
CA-I BLD-MCNC: 1.07 MMOL/L (ref 1.12–1.32)
CALCIUM SERPL-MCNC: 8.5 MG/DL (ref 8.5–10.1)
CHLORIDE BLD-SCNC: 98 MMOL/L (ref 100–108)
CHLORIDE SERPL-SCNC: 99 MMOL/L (ref 100–108)
CK MB CFR SERPL CALC: 0.2 % (ref 0–4)
CK MB SERPL-MCNC: 2 NG/ML (ref 5–25)
CK SERPL-CCNC: 1130 U/L (ref 26–192)
CO2 BLD-SCNC: 27 MMOL/L (ref 19–24)
CO2 SERPL-SCNC: 26 MMOL/L (ref 21–32)
COLOR UR: ABNORMAL
CREAT SERPL-MCNC: 0.8 MG/DL (ref 0.6–1.3)
CREAT UR-MCNC: 0.7 MG/DL (ref 0.6–1.3)
DIFFERENTIAL METHOD BLD: ABNORMAL
EOSINOPHIL # BLD: 0 K/UL (ref 0–0.4)
EOSINOPHIL NFR BLD: 0 % (ref 0–5)
EPITH CASTS URNS QL MICRO: ABNORMAL /LPF (ref 0–5)
ERYTHROCYTE [DISTWIDTH] IN BLOOD BY AUTOMATED COUNT: 15.1 % (ref 11.6–14.5)
FLUAV AG NPH QL IA: POSITIVE
FLUBV AG NOSE QL IA: NEGATIVE
GLOBULIN SER CALC-MCNC: 4.5 G/DL (ref 2–4)
GLUCOSE BLD STRIP.AUTO-MCNC: 101 MG/DL (ref 70–110)
GLUCOSE BLD STRIP.AUTO-MCNC: 102 MG/DL (ref 70–110)
GLUCOSE BLD STRIP.AUTO-MCNC: 117 MG/DL (ref 74–106)
GLUCOSE BLD STRIP.AUTO-MCNC: 133 MG/DL (ref 70–110)
GLUCOSE BLD STRIP.AUTO-MCNC: 94 MG/DL (ref 70–110)
GLUCOSE SERPL-MCNC: 113 MG/DL (ref 74–99)
GLUCOSE UR STRIP.AUTO-MCNC: NEGATIVE MG/DL
GRAN CASTS URNS QL MICRO: ABNORMAL /LPF
HCT VFR BLD AUTO: 30.7 % (ref 35–45)
HCT VFR BLD CALC: 34 % (ref 36–49)
HGB BLD-MCNC: 11.6 G/DL (ref 12–16)
HGB BLD-MCNC: 9.7 G/DL (ref 12–16)
HGB UR QL STRIP: ABNORMAL
KETONES UR QL STRIP.AUTO: NEGATIVE MG/DL
LACTATE BLD-SCNC: 1.26 MMOL/L (ref 0.4–2)
LEUKOCYTE ESTERASE UR QL STRIP.AUTO: NEGATIVE
LYMPHOCYTES # BLD: 3.5 K/UL (ref 0.8–3.5)
LYMPHOCYTES NFR BLD: 28 % (ref 20–51)
MAGNESIUM SERPL-MCNC: 2.1 MG/DL (ref 1.6–2.6)
MCH RBC QN AUTO: 21.3 PG (ref 24–34)
MCHC RBC AUTO-ENTMCNC: 31.6 G/DL (ref 31–37)
MCV RBC AUTO: 67.5 FL (ref 74–97)
MONOCYTES # BLD: 1.3 K/UL (ref 0–1)
MONOCYTES NFR BLD: 10 % (ref 2–9)
MUCOUS THREADS URNS QL MICRO: ABNORMAL /LPF
NEUTS BAND NFR BLD MANUAL: 13 % (ref 0–5)
NEUTS SEG # BLD: 6.2 K/UL (ref 1.8–8)
NEUTS SEG NFR BLD: 49 % (ref 42–75)
NITRITE UR QL STRIP.AUTO: NEGATIVE
PH UR STRIP: 5 [PH] (ref 5–8)
PLATELET # BLD AUTO: 238 K/UL (ref 135–420)
POTASSIUM BLD-SCNC: 4.3 MMOL/L (ref 3.5–5.5)
POTASSIUM SERPL-SCNC: 4.2 MMOL/L (ref 3.5–5.5)
PROT SERPL-MCNC: 7.5 G/DL (ref 6.4–8.2)
PROT UR STRIP-MCNC: >1000 MG/DL
RBC # BLD AUTO: 4.55 M/UL (ref 4.2–5.3)
RBC #/AREA URNS HPF: ABNORMAL /HPF (ref 0–5)
RBC MORPH BLD: ABNORMAL
SODIUM BLD-SCNC: 134 MMOL/L (ref 136–145)
SODIUM SERPL-SCNC: 134 MMOL/L (ref 136–145)
SP GR UR REFRACTOMETRY: 1.02 (ref 1–1.03)
TROPONIN I SERPL-MCNC: 0.02 NG/ML (ref 0–0.04)
UROBILINOGEN UR QL STRIP.AUTO: 1 EU/DL (ref 0.2–1)
WBC # BLD AUTO: 12.6 K/UL (ref 4.6–13.2)
WBC URNS QL MICRO: ABNORMAL /HPF (ref 0–5)

## 2019-02-16 PROCEDURE — 65660000000 HC RM CCU STEPDOWN

## 2019-02-16 PROCEDURE — 82550 ASSAY OF CK (CPK): CPT

## 2019-02-16 PROCEDURE — 83735 ASSAY OF MAGNESIUM: CPT

## 2019-02-16 PROCEDURE — 87040 BLOOD CULTURE FOR BACTERIA: CPT

## 2019-02-16 PROCEDURE — 83605 ASSAY OF LACTIC ACID: CPT

## 2019-02-16 PROCEDURE — 74011250636 HC RX REV CODE- 250/636: Performed by: INTERNAL MEDICINE

## 2019-02-16 PROCEDURE — 85025 COMPLETE CBC W/AUTO DIFF WBC: CPT

## 2019-02-16 PROCEDURE — 71045 X-RAY EXAM CHEST 1 VIEW: CPT

## 2019-02-16 PROCEDURE — 73630 X-RAY EXAM OF FOOT: CPT

## 2019-02-16 PROCEDURE — 73620 X-RAY EXAM OF FOOT: CPT

## 2019-02-16 PROCEDURE — 83880 ASSAY OF NATRIURETIC PEPTIDE: CPT

## 2019-02-16 PROCEDURE — 96375 TX/PRO/DX INJ NEW DRUG ADDON: CPT

## 2019-02-16 PROCEDURE — 87804 INFLUENZA ASSAY W/OPTIC: CPT

## 2019-02-16 PROCEDURE — 96374 THER/PROPH/DIAG INJ IV PUSH: CPT

## 2019-02-16 PROCEDURE — 77010033678 HC OXYGEN DAILY

## 2019-02-16 PROCEDURE — 74011250637 HC RX REV CODE- 250/637: Performed by: INTERNAL MEDICINE

## 2019-02-16 PROCEDURE — 93005 ELECTROCARDIOGRAM TRACING: CPT

## 2019-02-16 PROCEDURE — 74011250637 HC RX REV CODE- 250/637: Performed by: PHYSICIAN ASSISTANT

## 2019-02-16 PROCEDURE — 74011000250 HC RX REV CODE- 250: Performed by: EMERGENCY MEDICINE

## 2019-02-16 PROCEDURE — 77030011943

## 2019-02-16 PROCEDURE — 99285 EMERGENCY DEPT VISIT HI MDM: CPT

## 2019-02-16 PROCEDURE — 74011250637 HC RX REV CODE- 250/637: Performed by: HOSPITALIST

## 2019-02-16 PROCEDURE — 80047 BASIC METABLC PNL IONIZED CA: CPT

## 2019-02-16 PROCEDURE — 81001 URINALYSIS AUTO W/SCOPE: CPT

## 2019-02-16 PROCEDURE — 80053 COMPREHEN METABOLIC PANEL: CPT

## 2019-02-16 PROCEDURE — 74011250636 HC RX REV CODE- 250/636: Performed by: EMERGENCY MEDICINE

## 2019-02-16 PROCEDURE — 74011250637 HC RX REV CODE- 250/637: Performed by: EMERGENCY MEDICINE

## 2019-02-16 PROCEDURE — 82962 GLUCOSE BLOOD TEST: CPT

## 2019-02-16 RX ORDER — SODIUM CHLORIDE 0.9 % (FLUSH) 0.9 %
5-10 SYRINGE (ML) INJECTION AS NEEDED
Status: DISCONTINUED | OUTPATIENT
Start: 2019-02-16 | End: 2019-02-18 | Stop reason: HOSPADM

## 2019-02-16 RX ORDER — ACETAMINOPHEN 325 MG/1
650 TABLET ORAL
Status: COMPLETED | OUTPATIENT
Start: 2019-02-16 | End: 2019-02-16

## 2019-02-16 RX ORDER — AMLODIPINE BESYLATE 5 MG/1
5 TABLET ORAL DAILY
Status: DISCONTINUED | OUTPATIENT
Start: 2019-02-17 | End: 2019-02-18 | Stop reason: HOSPADM

## 2019-02-16 RX ORDER — ATORVASTATIN CALCIUM 10 MG/1
10 TABLET, FILM COATED ORAL DAILY
Status: DISCONTINUED | OUTPATIENT
Start: 2019-02-16 | End: 2019-02-18 | Stop reason: HOSPADM

## 2019-02-16 RX ORDER — ENOXAPARIN SODIUM 100 MG/ML
40 INJECTION SUBCUTANEOUS EVERY 24 HOURS
Status: DISCONTINUED | OUTPATIENT
Start: 2019-02-16 | End: 2019-02-18 | Stop reason: HOSPADM

## 2019-02-16 RX ORDER — SODIUM CHLORIDE 0.9 % (FLUSH) 0.9 %
5-40 SYRINGE (ML) INJECTION AS NEEDED
Status: DISCONTINUED | OUTPATIENT
Start: 2019-02-16 | End: 2019-02-18 | Stop reason: HOSPADM

## 2019-02-16 RX ORDER — ACETAMINOPHEN 325 MG/1
650 TABLET ORAL
Status: DISCONTINUED | OUTPATIENT
Start: 2019-02-16 | End: 2019-02-18 | Stop reason: HOSPADM

## 2019-02-16 RX ORDER — LOSARTAN POTASSIUM 50 MG/1
50 TABLET ORAL DAILY
Status: DISCONTINUED | OUTPATIENT
Start: 2019-02-16 | End: 2019-02-18 | Stop reason: HOSPADM

## 2019-02-16 RX ORDER — AMLODIPINE BESYLATE 5 MG/1
10 TABLET ORAL DAILY
Status: DISCONTINUED | OUTPATIENT
Start: 2019-02-16 | End: 2019-02-16

## 2019-02-16 RX ORDER — CLOPIDOGREL BISULFATE 75 MG/1
75 TABLET ORAL DAILY
Status: DISCONTINUED | OUTPATIENT
Start: 2019-02-16 | End: 2019-02-18 | Stop reason: HOSPADM

## 2019-02-16 RX ORDER — OSELTAMIVIR PHOSPHATE 75 MG/1
75 CAPSULE ORAL
Status: COMPLETED | OUTPATIENT
Start: 2019-02-16 | End: 2019-02-16

## 2019-02-16 RX ORDER — SODIUM CHLORIDE 0.9 % (FLUSH) 0.9 %
5-40 SYRINGE (ML) INJECTION EVERY 8 HOURS
Status: DISCONTINUED | OUTPATIENT
Start: 2019-02-16 | End: 2019-02-18 | Stop reason: HOSPADM

## 2019-02-16 RX ADMIN — SODIUM CHLORIDE 1000 MG: 900 INJECTION, SOLUTION INTRAVENOUS at 03:05

## 2019-02-16 RX ADMIN — OSELTAMIVIR PHOSPHATE 75 MG: 75 CAPSULE ORAL at 02:35

## 2019-02-16 RX ADMIN — ATORVASTATIN CALCIUM 10 MG: 10 TABLET, FILM COATED ORAL at 08:41

## 2019-02-16 RX ADMIN — AMLODIPINE BESYLATE 10 MG: 5 TABLET ORAL at 08:41

## 2019-02-16 RX ADMIN — Medication 10 ML: at 21:47

## 2019-02-16 RX ADMIN — WATER 2 G: 1 INJECTION INTRAMUSCULAR; INTRAVENOUS; SUBCUTANEOUS at 02:41

## 2019-02-16 RX ADMIN — CEFEPIME 2 G: 2 INJECTION, POWDER, FOR SOLUTION INTRAVENOUS at 01:52

## 2019-02-16 RX ADMIN — LOSARTAN POTASSIUM 50 MG: 50 TABLET ORAL at 08:41

## 2019-02-16 RX ADMIN — BENZOCAINE AND MENTHOL 1 LOZENGE: 15; 3.6 LOZENGE ORAL at 21:45

## 2019-02-16 RX ADMIN — Medication 10 ML: at 05:44

## 2019-02-16 RX ADMIN — Medication 10 ML: at 13:48

## 2019-02-16 RX ADMIN — ENOXAPARIN SODIUM 40 MG: 40 INJECTION SUBCUTANEOUS at 05:43

## 2019-02-16 RX ADMIN — CLOPIDOGREL BISULFATE 75 MG: 75 TABLET ORAL at 08:41

## 2019-02-16 RX ADMIN — ACETAMINOPHEN 650 MG: 325 TABLET ORAL at 02:35

## 2019-02-16 RX ADMIN — ACETAMINOPHEN 650 MG: 325 TABLET ORAL at 09:28

## 2019-02-16 RX ADMIN — SODIUM CHLORIDE 1887 ML: 900 INJECTION, SOLUTION INTRAVENOUS at 01:52

## 2019-02-16 RX ADMIN — SODIUM CHLORIDE 500 MG: 900 INJECTION, SOLUTION INTRAVENOUS at 02:44

## 2019-02-16 NOTE — ED TRIAGE NOTES
Pt arrives to ED with c\o increased weakness in BLE, confusion, fall last night, pt sts \"I just have a bad cold\", pt family members at bedside sts pt has had coughnasal congestion, pt denies any pain

## 2019-02-16 NOTE — H&P
History & Physical 
Patient: Boom Moreland MRN: [id number]  CSN: [id number] YOB: 1945  Age: 74 y.o. Sex: female DOA: 2019 Primary Care Provider:  Herminia Calles MD 
 
 
Assessment/Plan Active Problems: 
  Rheumatoid arthritis ([facility] [address] () Overview: RA Influenza A (2019) Hypoxia (2019) Bandemia (2019) She is immunosupressed and has flu A and pneumonia. I am worried she may develop sepsis although she Does not meet criteria currently for severe spesis Will treat with Ceftriaxone and zithromax  And watch closely. Blood count looks good Will continue home medicines for TIA and blood pressure but hold immunosupressive drug while sick CC: Shortness of breath HPI:  
 
Boom Moreland is a 74 y.o. female who came to the ER with increasing shortness of breath and chest discomfort for past 5 days  . She had a cold 1 months ago but was slightly improving on antibiotics. DShe didn't have a fever or productive sputum Past Medical History:  
Diagnosis Date  Autoimmune disease ([address] RA  Diabetes ([facility] [address] diet controlled  Diabetes mellitus ([address]  Hypertension  Macromastia  Menopause  Rheumatoid arthritis(714.0)  TIA (transient ischemic attack)  Ulcer of foot ([address] Past Surgical History:  
Procedure Laterality Date  HX BREAST REDUCTION  2012  HX GYN    
 HX ORTHOPAEDIC  2009 Left hand surgery  HX OTHER SURGICAL    
 left shoulder abcess drained  HX PARTIAL HYSTERECTOMY History reviewed. No pertinent family history. Social History Socioeconomic History  Marital status:  Spouse name: Not on file  Number of children: Not on file  Years of education: Not on file  Highest education level: Not on file Tobacco Use  Smoking status: Former Smoker Last attempt to quit: 1997 Years since quittin.4  Smokeless tobacco: Never Used Substance and Sexual Activity  Alcohol use: No  
 Drug use: No  
 
 
Prior to Admission medications Medication Sig Start Date End Date Taking? Authorizing Provider  
atorvastatin (LIPITOR) 10 mg tablet Take  by mouth daily. Macarena Chatterjee MD  
AMLODIPINE BESYLATE, BULK, by Does Not Apply route. Macarena Chatterjee MD  
clopidogrel (PLAVIX) 75 mg tablet Take  by mouth daily. Macarena Chatterjee MD  
losartan (COZAAR) 50 mg tablet Take 50 mg by mouth daily. Macarena Chatterjee MD  
 
 
No Known Allergies Review of Systems Gen: No fever, chills, malaise, weight loss/gain. Heent: No headache, rhinorrhea, epistaxis, ear pain, hearing loss, sinus pain, neck pain/stiffness, sore throat. Heart: No chest pain, palpitations, JIMENEZ, pnd, or orthopnea. Resp: No cough, hemoptysis, wheezing and shortness of breath. GI: No nausea, vomiting, diarrhea, constipation, melena or hematochezia. : No urinary obstruction, dysuria or hematuria. Derm: No rash, new skin lesion or pruritis. Musc/skeletal: no bone or joint complains. Vasc: No edema, cyanosis or claudication. Endo: No heat/cold intolerance, no polyuria,polydipsia or polyphagia. Neuro: No unilateral weakness, numbness, tingling. No seizures. Heme: No easy bruising or bleeding. Physical Exam:  
 
Physical Exam: 
Visit Vitals /45 (BP 1 Location: Right arm, BP Patient Position: At rest) Pulse 96 Temp (!) 100.5 °F (38.1 °C) Resp 20 Wt 64.6 kg (142 lb 8 oz) SpO2 95% BMI 23.00 kg/m² O2 Flow Rate (L/min): 4 l/min O2 Device: Nasal cannula Temp (24hrs), Av.9 °F (38.3 °C), Min:100 °F (37.8 °C), Max:101.5 °F (38.6 °C) 
  02/15 1901 -  0700 In: 240 [P.O.:240] Out: 0    No intake/output data recorded. General:  Awake, cooperative, no distress. Head:  Normocephalic, without obvious abnormality, atraumatic. Eyes:  Conjunctivae/corneas clear, sclera anicteric, PERRL, EOMs intact. Nose: Nares normal. No drainage or sinus tenderness. Throat: Lips, mucosa, and tongue normal. .  
Neck: Supple, symmetrical, trachea midline, no adenopathy. Lungs:   Clear to auscultation bilaterally. Heart:  Regular rate and rhythm, S1, S2 normal, no murmur, click, rub or gallop. Abdomen: Soft, non-tender. Bowel sounds normal. No masses,  No organomegaly. Extremities: Extremities normal, atraumatic, no cyanosis or edema. Pulses: 2+ and symmetric all extremities. Skin: Skin color, texture, turgor normal. No rashes or lesions Neurologic: CNII-XII intact. No focal motor or sensory deficit. Labs Reviewed: 
Recent Results (from the past 24 hour(s)) METABOLIC PANEL, COMPREHENSIVE Collection Time: 02/16/19  1:30 AM  
Result Value Ref Range Sodium 134 (L) 136 - 145 mmol/L Potassium 4.2 3.5 - 5.5 mmol/L Chloride 99 (L) 100 - 108 mmol/L  
 CO2 26 21 - 32 mmol/L Anion gap 9 3.0 - 18 mmol/L Glucose 113 (H) 74 - 99 mg/dL BUN 23 (H) 7.0 - 18 MG/DL Creatinine 0.80 0.6 - 1.3 MG/DL  
 BUN/Creatinine ratio 29 (H) 12 - 20 GFR est AA >60 >60 ml/min/1.73m2 GFR est non-AA >60 >60 ml/min/1.73m2 Calcium 8.5 8.5 - 10.1 MG/DL Bilirubin, total 0.5 0.2 - 1.0 MG/DL  
 ALT (SGPT) 30 13 - 56 U/L  
 AST (SGOT) 77 (H) 15 - 37 U/L Alk. phosphatase 56 45 - 117 U/L Protein, total 7.5 6.4 - 8.2 g/dL Albumin 3.0 (L) 3.4 - 5.0 g/dL Globulin 4.5 (H) 2.0 - 4.0 g/dL A-G Ratio 0.7 (L) 0.8 - 1.7    
CBC WITH AUTOMATED DIFF Collection Time: 02/16/19  1:30 AM  
Result Value Ref Range WBC 12.6 4.6 - 13.2 K/uL  
 RBC 4.55 4.20 - 5.30 M/uL HGB 9.7 (L) 12.0 - 16.0 g/dL HCT 30.7 (L) 35.0 - 45.0 % MCV 67.5 (L) 74.0 - 97.0 FL  
 MCH 21.3 (L) 24.0 - 34.0 PG  
 MCHC 31.6 31.0 - 37.0 g/dL  
 RDW 15.1 (H) 11.6 - 14.5 % PLATELET 460 937 - 620 K/uL NEUTROPHILS 49 42 - 75 % BAND NEUTROPHILS 13 (H) 0 - 5 % LYMPHOCYTES 28 20 - 51 % MONOCYTES 10 (H) 2 - 9 % EOSINOPHILS 0 0 - 5 % BASOPHILS 0 0 - 3 %  
 ABS. NEUTROPHILS 6.2 1.8 - 8.0 K/UL  
 ABS. LYMPHOCYTES 3.5 0.8 - 3.5 K/UL  
 ABS. MONOCYTES 1.3 (H) 0 - 1.0 K/UL  
 ABS. EOSINOPHILS 0.0 0.0 - 0.4 K/UL  
 ABS. BASOPHILS 0.0 0.0 - 0.1 K/UL  
 RBC COMMENTS ANISOCYTOSIS 1+ 
    
 RBC COMMENTS HYPOCHROMIA 1+ 
    
 RBC COMMENTS TEARDROP CELLS 1+ RBC COMMENTS OVALOCYTES 1+ 
    
 DF MANUAL    
NT-PRO BNP Collection Time: 02/16/19  1:30 AM  
Result Value Ref Range NT pro- 0 - 1,800 PG/ML  
MAGNESIUM Collection Time: 02/16/19  1:30 AM  
Result Value Ref Range Magnesium 2.1 1.6 - 2.6 mg/dL CARDIAC PANEL,(CK, CKMB & TROPONIN) Collection Time: 02/16/19  1:30 AM  
Result Value Ref Range CK 1,130 (H) 26 - 192 U/L  
 CK - MB 2.0 <3.6 ng/ml CK-MB Index 0.2 0.0 - 4.0 % Troponin-I, QT 0.02 0.0 - 0.045 NG/ML  
GLUCOSE, POC Collection Time: 02/16/19  1:32 AM  
Result Value Ref Range Glucose (POC) 133 (H) 70 - 110 mg/dL POC LACTIC ACID Collection Time: 02/16/19  1:40 AM  
Result Value Ref Range Lactic Acid (POC) 1.26 0.40 - 2.00 mmol/L  
POC CHEM8 Collection Time: 02/16/19  1:40 AM  
Result Value Ref Range CO2, POC 27 (H) 19 - 24 MMOL/L Glucose,  (H) 74 - 106 MG/DL  
 BUN, POC 23 (H) 7 - 18 MG/DL Creatinine, POC 0.7 0.6 - 1.3 MG/DL  
 GFRAA, POC >60 >60 ml/min/1.73m2 GFRNA, POC >60 >60 ml/min/1.73m2 Sodium,  (L) 136 - 145 MMOL/L Potassium, POC 4.3 3.5 - 5.5 MMOL/L Calcium, ionized (POC) 1.07 (L) 1.12 - 1.32 mmol/L Chloride, POC 98 (L) 100 - 108 MMOL/L Anion gap, POC 14 10 - 20 Hematocrit, POC 34 (L) 36 - 49 % Hemoglobin, POC 11.6 (L) 12 - 16 G/DL URINALYSIS W/ RFLX MICROSCOPIC Collection Time: 02/16/19  1:44 AM  
Result Value Ref Range Color DARK YELLOW Appearance CLOUDY Specific gravity 1.025 1.005 - 1.030    
 pH (UA) 5.0 5.0 - 8.0 Protein >1,000 (A) NEG mg/dL Glucose NEGATIVE  NEG mg/dL Ketone NEGATIVE  NEG mg/dL Bilirubin NEGATIVE  NEG Blood MODERATE (A) NEG Urobilinogen 1.0 0.2 - 1.0 EU/dL Nitrites NEGATIVE  NEG Leukocyte Esterase NEGATIVE  NEG    
URINE MICROSCOPIC ONLY Collection Time: 02/16/19  1:44 AM  
Result Value Ref Range WBC 2 to 3 0 - 5 /hpf  
 RBC 2 to 4 0 - 5 /hpf Epithelial cells 2 to 3 0 - 5 /lpf Bacteria NEGATIVE  NEG /hpf Mucus 1+ (A) NEG /lpf Granular cast 4 to 6 NEG /lpf INFLUENZA A & B AG (RAPID TEST) Collection Time: 02/16/19  1:56 AM  
Result Value Ref Range Influenza A Antigen POSITIVE (A) NEG Influenza B Antigen NEGATIVE  NEG Procedures/imaging: see electronic medical records for all procedures/Xrays and details which were not copied into this note but were reviewed prior to creation of Plan CC: Clara Diaz MD

## 2019-02-16 NOTE — PROGRESS NOTES
Chart reviewed as CM on call. Pt admitted for influenza. CM will follow for transition of care needs. Reason for Admission:   Per H&P, pt \"is a 74 y.o. female who came to the ER with increasing shortness of breath and chest discomfort for past 5 days  . She had a cold 1 months ago but was slightly improving on antibiotics. DShe didn't have a fever or productive sputum\" 
  
RRAT Score:     9 low Plan for utilizing home health:      TBD Likelihood of Readmission:  low Transition of Care Plan:    TBD Care Management Interventions Transition of Care Consult (CM Consult): Discharge Planning

## 2019-02-16 NOTE — ED PROVIDER NOTES
EMERGENCY DEPARTMENT HISTORY AND PHYSICAL EXAM 
 
Date: 2/16/2019 Patient Name: Areli Lewis History of Presenting Illness Chief Complaint Patient presents with  Extremity Weakness  Change in Status History Provided By: Patient & Family Chief Complaint: AMS and confusion Duration: ~1 Day Timing:  Gradual 
Location: generalized Associated Symptoms: generalized weakness, gait abnormalities, fever/chills, and neck pain; cough; urinary incontinence Additional History (Context):  
 
1:35 AM 
 
Areli Lewis is a 74 y.o. female with pertinent PMHx of DM, HTN, TIA, and RA presenting with family members to the ED c/o gradually worsening generalized AMS and confusion x yesterday morning. Pt's family states that the pt was last normal yesterday morning and \"seemed confused\" on the phone with her daughter late yesterday morning. Pt's family notes associated symptoms of generalized weakness, gait abnormalities, fever/chills, and neck pain. Pt has also had recent cough and cold sxs. She told family that she fell last night and they found her incontinent of urine today. Pt does not use baseline oxygen at home. Pt states that this is November 2018 and she is aware that she is in the hospital. Pt's family/the pt specifically denies any N/V, CP, SOB, dysuria, or abd pain. PCP: Ruben Quiroz MD 
Pt does not smoke tobacco (former smoker), drink EtOH excessively, or do illicit drugs. There are no other complaints, changes, or physical findings at this time. Current Facility-Administered Medications Medication Dose Route Frequency Provider Last Rate Last Dose  sodium chloride (NS) flush 5-10 mL  5-10 mL IntraVENous PRN Ginny Santana DO      
 cefepime (MAXIPIME) 2 g in sterile water (preservative free) 10 mL IV syringe  2 g IntraVENous Q8H Hollie Santana DO   2 g at 02/16/19 9062  oseltamivir (TAMIFLU) capsule 75 mg  75 mg Oral NOW Ginny Santana, DO      
 acetaminophen (TYLENOL) tablet 650 mg  650 mg Oral NOW Ginny Santana, DO      
 cefTRIAXone (ROCEPHIN) 2 g in sterile water (preservative free) 20 mL IV syringe  2 g IntraVENous Q24H Hollie Santana DO      
 azithromycin (ZITHROMAX) 500 mg in 0.9% sodium chloride 250 mL IVPB  500 mg IntraVENous Q24H Ginny Santana, DO      
 
Current Outpatient Medications Medication Sig Dispense Refill  LEFLUNOMIDE PO Take  by mouth.  atorvastatin (LIPITOR) 10 mg tablet Take  by mouth daily.  AMLODIPINE BESYLATE, BULK, by Does Not Apply route.  clopidogrel (PLAVIX) 75 mg tablet Take  by mouth daily.  ergocalciferol, vitamin d2, 1,000 unit cap Take  by mouth.  folic acid (FOLVITE) 1 mg tablet Take  by mouth daily.  multivitamin (ONE A DAY) tablet Take 1 Tab by mouth daily.  losartan (COZAAR) 50 mg tablet Take 50 mg by mouth daily. Past History Past Medical History: 
Past Medical History:  
Diagnosis Date  Autoimmune disease ([facility] RA  Diabetes ([facility] diet controlled  Diabetes mellitus ([facility]  Hypertension  Macromastia  Menopause  Rheumatoid arthritis(714.0)  TIA (transient ischemic attack)  Ulcer of foot ([facility] Past Surgical History: 
Past Surgical History:  
Procedure Laterality Date  HX BREAST REDUCTION  2012  HX GYN    
 HX ORTHOPAEDIC  2009 Left hand surgery  HX OTHER SURGICAL    
 left shoulder abcess drained  HX PARTIAL HYSTERECTOMY Family History: 
History reviewed. No pertinent family history. Social History: 
Social History Tobacco Use  Smoking status: Former Smoker Last attempt to quit: 1997 Years since quittin.4  Smokeless tobacco: Never Used Substance Use Topics  Alcohol use: No  
 Drug use:  No  
 
 
 Allergies: 
No Known Allergies Review of Systems Review of Systems Constitutional: Positive for chills and fever. HENT: Negative for ear pain, sinus pain and sore throat. Respiratory: Positive for cough. Negative for shortness of breath. Cardiovascular: Negative for chest pain and leg swelling. Gastrointestinal: Negative for abdominal pain, constipation, diarrhea, nausea and vomiting. Genitourinary: Negative for dysuria, hematuria, vaginal bleeding and vaginal discharge. Musculoskeletal: Positive for gait problem and neck pain. Neurological: Positive for weakness (generalized). Positive for urinary incontinence and AMS Psychiatric/Behavioral: Positive for confusion. All other systems reviewed and are negative. Physical Exam  
 
Vitals:  
 02/16/19 0130 02/16/19 8978 BP: 160/57 122/55 Pulse: (!) 114 (!) 109 Resp: 23 13 Temp: (!) 101.5 °F (38.6 °C) (!) 101.5 °F (38.6 °C) SpO2: (!) 80% 92% Weight: 62.9 kg (138 lb 11.2 oz) Physical Exam  
Nursing note and vitals reviewed. Constitutional: Elderly  Female. Awake, alert, but confused. Head: Normocephalic, Atraumatic Eyes: Pupils are equal, round, and reactive to light, EOMI, no scleral icterus, no conjunctival pallor ENT: moist mucous membranes, hearing intact Neck: Supple, non-tender Cardiovascular: Regular rhythm, no murmurs, rubs, or gallops. Tachycardic. Chest: Normal work of breathing and chest excursion bilaterally Lungs: Clear to ausculation bilaterally, no wheezes, no rhonchi Abdomen: Soft, non tender, non distended, normoactive bowel sounds Back: No evidence of trauma or deformity Extremities: No evidence of trauma or deformity, no LE edema Skin: Warm and dry, normal cap refill Neuro: Awake, alert, oriented x 3, but confused. Romberg negative. Moving all 4 extremities freely and symmetrically Psychiatric: Cooperative, appropriate mood Diagnostic Study Results Labs - Recent Results (from the past 12 hour(s)) METABOLIC PANEL, COMPREHENSIVE Collection Time: 02/16/19  1:30 AM  
Result Value Ref Range Sodium 134 (L) 136 - 145 mmol/L Potassium 4.2 3.5 - 5.5 mmol/L Chloride 99 (L) 100 - 108 mmol/L  
 CO2 26 21 - 32 mmol/L Anion gap 9 3.0 - 18 mmol/L Glucose 113 (H) 74 - 99 mg/dL BUN 23 (H) 7.0 - 18 MG/DL Creatinine 0.80 0.6 - 1.3 MG/DL  
 BUN/Creatinine ratio 29 (H) 12 - 20 GFR est AA >60 >60 ml/min/1.73m2 GFR est non-AA >60 >60 ml/min/1.73m2 Calcium 8.5 8.5 - 10.1 MG/DL Bilirubin, total 0.5 0.2 - 1.0 MG/DL  
 ALT (SGPT) 30 13 - 56 U/L  
 AST (SGOT) 77 (H) 15 - 37 U/L Alk. phosphatase 56 45 - 117 U/L Protein, total 7.5 6.4 - 8.2 g/dL Albumin 3.0 (L) 3.4 - 5.0 g/dL Globulin 4.5 (H) 2.0 - 4.0 g/dL A-G Ratio 0.7 (L) 0.8 - 1.7    
CBC WITH AUTOMATED DIFF Collection Time: 02/16/19  1:30 AM  
Result Value Ref Range WBC 12.6 4.6 - 13.2 K/uL  
 RBC 4.55 4.20 - 5.30 M/uL HGB 9.7 (L) 12.0 - 16.0 g/dL HCT 30.7 (L) 35.0 - 45.0 % MCV 67.5 (L) 74.0 - 97.0 FL  
 MCH 21.3 (L) 24.0 - 34.0 PG  
 MCHC 31.6 31.0 - 37.0 g/dL  
 RDW 15.1 (H) 11.6 - 14.5 % PLATELET 125 382 - 408 K/uL NEUTROPHILS 49 42 - 75 % BAND NEUTROPHILS 13 (H) 0 - 5 % LYMPHOCYTES 28 20 - 51 % MONOCYTES 10 (H) 2 - 9 % EOSINOPHILS 0 0 - 5 % BASOPHILS 0 0 - 3 %  
 ABS. NEUTROPHILS 6.2 1.8 - 8.0 K/UL  
 ABS. LYMPHOCYTES 3.5 0.8 - 3.5 K/UL  
 ABS. MONOCYTES 1.3 (H) 0 - 1.0 K/UL  
 ABS. EOSINOPHILS 0.0 0.0 - 0.4 K/UL  
 ABS. BASOPHILS 0.0 0.0 - 0.1 K/UL  
 RBC COMMENTS ANISOCYTOSIS 1+ 
    
 RBC COMMENTS HYPOCHROMIA 1+ 
    
 RBC COMMENTS TEARDROP CELLS 1+ RBC COMMENTS OVALOCYTES 1+ 
    
 DF MANUAL    
NT-PRO BNP Collection Time: 02/16/19  1:30 AM  
Result Value Ref Range NT pro- 0 - 1,800 PG/ML  
MAGNESIUM Collection Time: 02/16/19  1:30 AM  
Result Value Ref Range Magnesium 2.1 1.6 - 2.6 mg/dL GLUCOSE, POC Collection Time: 02/16/19  1:32 AM  
Result Value Ref Range Glucose (POC) 133 (H) 70 - 110 mg/dL POC LACTIC ACID Collection Time: 02/16/19  1:40 AM  
Result Value Ref Range Lactic Acid (POC) 1.26 0.40 - 2.00 mmol/L  
POC CHEM8 Collection Time: 02/16/19  1:40 AM  
Result Value Ref Range CO2, POC 27 (H) 19 - 24 MMOL/L Glucose,  (H) 74 - 106 MG/DL  
 BUN, POC 23 (H) 7 - 18 MG/DL Creatinine, POC 0.7 0.6 - 1.3 MG/DL  
 GFRAA, POC >60 >60 ml/min/1.73m2 GFRNA, POC >60 >60 ml/min/1.73m2 Sodium,  (L) 136 - 145 MMOL/L Potassium, POC 4.3 3.5 - 5.5 MMOL/L Calcium, ionized (POC) 1.07 (L) 1.12 - 1.32 mmol/L Chloride, POC 98 (L) 100 - 108 MMOL/L Anion gap, POC 14 10 - 20 Hematocrit, POC 34 (L) 36 - 49 % Hemoglobin, POC 11.6 (L) 12 - 16 G/DL URINALYSIS W/ RFLX MICROSCOPIC Collection Time: 02/16/19  1:44 AM  
Result Value Ref Range Color DARK YELLOW Appearance CLOUDY Specific gravity 1.025 1.005 - 1.030    
 pH (UA) 5.0 5.0 - 8.0 Protein >1,000 (A) NEG mg/dL Glucose NEGATIVE  NEG mg/dL Ketone NEGATIVE  NEG mg/dL Bilirubin NEGATIVE  NEG Blood MODERATE (A) NEG Urobilinogen 1.0 0.2 - 1.0 EU/dL Nitrites NEGATIVE  NEG Leukocyte Esterase NEGATIVE  NEG    
URINE MICROSCOPIC ONLY Collection Time: 02/16/19  1:44 AM  
Result Value Ref Range WBC 2 to 3 0 - 5 /hpf  
 RBC 2 to 4 0 - 5 /hpf Epithelial cells 2 to 3 0 - 5 /lpf Bacteria NEGATIVE  NEG /hpf Mucus 1+ (A) NEG /lpf Granular cast 4 to 6 NEG /lpf INFLUENZA A & B AG (RAPID TEST) Collection Time: 02/16/19  1:56 AM  
Result Value Ref Range Influenza A Antigen POSITIVE (A) NEG Influenza B Antigen NEGATIVE  NEG Radiologic Studies -  
XR CHEST PORT    (Results Pending) CT HEAD WO CONT    (Results Pending) XR FOOT RT AP/LAT    (Results Pending) CT Results  (Last 48 hours) None CXR Results  (Last 48 hours) None  
  
 
 
2:34 AM 
RADIOLOGY FINDINGS Chest X-ray shows right lower lobe infiltrate Pending review by Radiologist 
Recorded by Stacey Capps ED Scribe, as dictated by General Norman DO. Medical Decision Making I am the first provider for this patient. I reviewed the vital signs, available nursing notes, past medical history, past surgical history, family history and social history. Vital Signs-Reviewed the patient's vital signs. Pulse Oximetry Analysis - 92% on 5lpm via NC Cardiac Monitor: 
Rate: 108 bpm 
Rhythm: sinus tachycardia EKG interpretation: (Preliminary) NSR at 98 bpm. QTc is 454 ms. No MARCELA. 
EKG read by General Norman DO at 2:31 AM  
 
Records Reviewed: Nursing Notes and Old Medical Records Provider Notes (Medical Decision Making): 74 y.o. female presenting with AMS and confusion. On exam she is awake and A&O, but slow to answer and appearing confused. She has a nonfocal exam with negative Romberg and no obvious facial droop or slurring of speech. She is noted to be tachycardic and febrile as well as hypoxic. Meeting SIRS criteria. Will initiate sepsis work up, start IV fluids, tx for UTI due to  hx of urinary incontinence PROCEDURES: 
Procedures MEDICATIONS GIVEN IN THE ED: 
Medications  
sodium chloride (NS) flush 5-10 mL (not administered)  
cefepime (MAXIPIME) 2 g in sterile water (preservative free) 10 mL IV syringe (2 g IntraVENous Given 2/16/19 0152) oseltamivir (TAMIFLU) capsule 75 mg (not administered)  
acetaminophen (TYLENOL) tablet 650 mg (not administered)  
cefTRIAXone (ROCEPHIN) 2 g in sterile water (preservative free) 20 mL IV syringe (not administered) azithromycin (ZITHROMAX) 500 mg in 0.9% sodium chloride 250 mL IVPB (not administered)  
sodium chloride 0.9 % bolus infusion 1,887 mL (1,887 mL IntraVENous New Bag 2/16/19 0152) ED COURSE:  
1:35 AM  
Initial assessment performed.  
 
PROGRESS NOTE: 
2:41 AM 
 Flu +. CXR concerning for RLL PNA. Will cover for MRSA. Labwork showing bandemia. Lactate WNL. UA reassuring. Hgb 9.7. Pt and/or family have been updated on their results. Pt and/or pt's family are aware of the plan of care and are in agreement. Written by Keyona Yanes, ED Scribe, as dictated by Jacy Irwin DO.   
 
CONSULT NOTE:  
2:51 AM 
Jacy Irwin DO spoke with Jeffery Wu MD, Specialty: Hospitalist 
Discussed pt's hx, disposition, and available diagnostic and imaging results. Reviewed care plans. Consultant will admit the pt to [address] [place] [address]. Written by Tien Wall, ED Scribe, as dictated by Jacy Irwin DO. 
  
Diagnosis and Disposition  
  
Critical Care Time: 2:51 AM 
I have spent 35 minutes of critical care time involved in lab review, consultations with specialist, family decision-making, and documentation. During this entire length of time I was immediately available to the patient. 
  
Critical Care: The reason for providing this level of medical care for this critically ill patient was due a critical illness that impaired one or more vital organ systems such that there was a high probability of imminent or life threatening deterioration in the patients condition. This care involved high complexity decision making to assess, manipulate, and support vital system functions, to treat this degree vital organ system failure and to prevent further life threatening deterioration of the patients condition.  
  
Core Measures: 
For Hospitalized Patients: 
  
1. Hospitalization Decision Time: The decision to hospitalize the patient was made by Jacy Irwin DO at 1:35 AM on 2/16/2019 
  
2.  Aspirin: Aspirin was not given because the patient did not present with a stroke at the time of their Emergency Department evaluation 
  
2:51 AM  Patient is being admitted to the hospital by Jeffery Wu MD. The results of their tests and reasons for their admission have been discussed with them and/or available family. They convey agreement and understanding for the need to be admitted and for their admission diagnosis.   
  
CONDITIONS ON ADMISSION: 
Sepsis is not present at the time of admission. Deep Vein Thrombosis is not present at the time of admission. Thrombosis is not present at the time of admission. Urinary Tract Infection is not present at the time of admission. Pneumonia is present at the time of admission. MRSA is not present at the time of admission. Wound infection is not present at the time of admission. Pressure Ulcer is not present at the time of admission.   
  
CLINICAL IMPRESSION: 
1. Influenza A 2. Hypoxia 3. Bandemia 4. Pneumonia of right lower lobe due to infectious organism [facility])   
  
  
PLAN: 
1. ADMIT TO TELE 
_______________________________ 
  
Attestations: This note is prepared by Veronica Pierre, acting as Scribe for General Norman DO. 
  
General Norman DO:  The scribe's documentation has been prepared under my direction and personally reviewed by me in its entirety.   I confirm that the note above accurately reflects all work, treatment, procedures, and medical decision making performed by me. 
_______________________________

## 2019-02-16 NOTE — ROUTINE PROCESS
TRANSFER - OUT REPORT: 
 
Verbal report given to Camilla Eisenberg RN(name) on [facility] Stable  being transferred to [facility](unit) for routine progression of care Report consisted of patients Situation, Background, Assessment and  
Recommendations(SBAR). Information from the following report(s) SBAR, ED Summary and MAR was reviewed with the receiving nurse. Lines:  
Peripheral IV 02/16/19 Left Forearm (Active) Site Assessment Clean, dry, & intact 2/16/2019  1:40 AM  
Phlebitis Assessment 0 2/16/2019  1:40 AM  
Infiltration Assessment 0 2/16/2019  1:40 AM  
Dressing Status Clean, dry, & intact 2/16/2019  1:40 AM  
Dressing Type Transparent 2/16/2019  1:40 AM  
Hub Color/Line Status Pink 2/16/2019  1:40 AM  
Action Taken Blood drawn 2/16/2019  1:40 AM  
  
 
Opportunity for questions and clarification was provided. Patient transported with: 
 Monitor O2 @ 4 liters Tech

## 2019-02-16 NOTE — PROGRESS NOTES
Problem: Falls - Risk of 
Goal: *Absence of Falls Document Newton Bustos Fall Risk and appropriate interventions in the flowsheet. Outcome: Progressing Towards Goal 
Fall Risk Interventions: 
  
 
  
 
Medication Interventions: Teach patient to arise slowly

## 2019-02-16 NOTE — ROUTINE PROCESS
Report received from PATIENCE THORPE [facility] rn. Care assumed at this time.

## 2019-02-16 NOTE — PROGRESS NOTES
Patient sleepin gcomfortably. Feeling a little better Visit Vitals /45 (BP 1 Location: Right arm, BP Patient Position: At rest) Pulse 96 Temp (!) 100.5 °F (38.1 °C) Resp 20 Wt 64.6 kg (142 lb 8 oz) SpO2 95% BMI 23.00 kg/m² Christiano Rod MD

## 2019-02-16 NOTE — PROGRESS NOTES
0755-Bag of Vancomycin, in room on IV pole, full, not infusing, was previously documented as being given, restarted 755. 
 
0800 - Patient in bed at this time. A/O x 3. IV to left wrist  intact and patent. Pedal pulses palpable. Lungs with rhonchi bilateral. Bowel sounds present to all quadrants. Patient able to get to 1000 on the incentive spirometer. Chest Pain 8/10, pt said its her whole chest are, area of lungs not specifically at the heart. Menu given. Also had sputum in tissue was brown and thick consistency. Nasal cannula 4L/min humidification added. Pt encouraged to drink water. Approximately 915-pts reported daughter called for check up- told she was \"fine\" and that she could call and speak to pt directly. 929-Pain 8/10. PRN Tylenol 650mg pain medication administered at this time. Patient has been educated on side effects. 1345-Family called said pt wasn't answering phone. Pt was found asleep and 02 was off, 02stat was in 80s, 02 reapplied at 4L/min and 02 stats adriana to 94%. Pt called daughter. Heart rhythm reported to be Sinus 1445-Required documents completed, pt said she takes 5mg of Norvasc not 10. 
 
1450-Spoke to MD Magalie Nava that pt reported she takes 5mg of Norvasc instead of 10 and can her order be updated, said order could be updated and inquired about current BP readings.

## 2019-02-17 PROBLEM — J09.X1 INFLUENZA A WITH PNEUMONIA: Status: ACTIVE | Noted: 2019-02-17

## 2019-02-17 PROBLEM — J96.01 ACUTE RESPIRATORY FAILURE WITH HYPOXIA (HCC): Status: ACTIVE | Noted: 2019-02-17

## 2019-02-17 LAB — GLUCOSE BLD STRIP.AUTO-MCNC: 86 MG/DL (ref 70–110)

## 2019-02-17 PROCEDURE — 82962 GLUCOSE BLOOD TEST: CPT

## 2019-02-17 PROCEDURE — 74011250637 HC RX REV CODE- 250/637: Performed by: FAMILY MEDICINE

## 2019-02-17 PROCEDURE — 74011250637 HC RX REV CODE- 250/637: Performed by: INTERNAL MEDICINE

## 2019-02-17 PROCEDURE — 74011250637 HC RX REV CODE- 250/637: Performed by: HOSPITALIST

## 2019-02-17 PROCEDURE — 74011250636 HC RX REV CODE- 250/636: Performed by: INTERNAL MEDICINE

## 2019-02-17 PROCEDURE — 77010033678 HC OXYGEN DAILY

## 2019-02-17 PROCEDURE — 74011250636 HC RX REV CODE- 250/636: Performed by: EMERGENCY MEDICINE

## 2019-02-17 PROCEDURE — 65660000000 HC RM CCU STEPDOWN

## 2019-02-17 PROCEDURE — 74011000250 HC RX REV CODE- 250: Performed by: INTERNAL MEDICINE

## 2019-02-17 RX ORDER — SENNOSIDES 8.6 MG/1
1 TABLET ORAL DAILY
Status: DISCONTINUED | OUTPATIENT
Start: 2019-02-17 | End: 2019-02-18 | Stop reason: HOSPADM

## 2019-02-17 RX ADMIN — BENZOCAINE AND MENTHOL 1 LOZENGE: 15; 3.6 LOZENGE ORAL at 04:09

## 2019-02-17 RX ADMIN — SODIUM CHLORIDE 500 MG: 900 INJECTION, SOLUTION INTRAVENOUS at 03:01

## 2019-02-17 RX ADMIN — Medication 10 ML: at 14:47

## 2019-02-17 RX ADMIN — CLOPIDOGREL BISULFATE 75 MG: 75 TABLET ORAL at 08:48

## 2019-02-17 RX ADMIN — AMLODIPINE BESYLATE 5 MG: 5 TABLET ORAL at 08:48

## 2019-02-17 RX ADMIN — BENZOCAINE AND MENTHOL 1 LOZENGE: 15; 3.6 LOZENGE ORAL at 22:34

## 2019-02-17 RX ADMIN — SENNOSIDES 8.6 MG: 8.6 TABLET, FILM COATED ORAL at 15:28

## 2019-02-17 RX ADMIN — ACETAMINOPHEN 650 MG: 325 TABLET ORAL at 22:37

## 2019-02-17 RX ADMIN — ENOXAPARIN SODIUM 40 MG: 40 INJECTION SUBCUTANEOUS at 04:02

## 2019-02-17 RX ADMIN — LOSARTAN POTASSIUM 50 MG: 50 TABLET ORAL at 08:48

## 2019-02-17 RX ADMIN — ACETAMINOPHEN 650 MG: 325 TABLET ORAL at 08:48

## 2019-02-17 RX ADMIN — ATORVASTATIN CALCIUM 10 MG: 10 TABLET, FILM COATED ORAL at 08:48

## 2019-02-17 RX ADMIN — WATER 2 G: 1 INJECTION INTRAMUSCULAR; INTRAVENOUS; SUBCUTANEOUS at 04:00

## 2019-02-17 RX ADMIN — Medication 10 ML: at 06:11

## 2019-02-17 NOTE — PROGRESS NOTES
Hospitalist Progress Note Patient: Micheline Antonio MRN: [id number]  CSN: [id number] YOB: 1945  Age: 74 y.o. Sex: female DOA: 2/16/2019 LOS:  LOS: 1 day Assessment/Plan Principal Problem: 
  Influenza A (2/16/2019) Active Problems: 
  Rheumatoid arthritis ([address] () Overview: RA Hypoxia (2/16/2019) Bandemia (2/16/2019) Influenza A with pneumonia (2/17/2019) Great toe amputation status, right ([address] (2/17/2019) Acute respiratory failure with hypoxia ([address] (2/17/2019) Acute respiratory failure with hypoxia: Improving. Taper NC O2 from 4 L to 2 L today. Immunosupressed Pt with flu A and pneumonia: On Ceftriaxone and zithromax. F/U Blood count and blood Cx HTN: Controlled with losartan and norvasc Hx of TIA: on statin and Plavix 
  
RAt hold immunosupressive drug while sick S/P R. Toe amputation:  
 
Full code Supportive care Dispo: Likely home in 2 days 
  
CC: Shortness of breath, cough, RA Subjective: Pt was seen and examined with the nurse in the morning round. Feeling better. Less cough/sob. On NC O2 4 l/min Review of systems General: No fevers or chills. Cardiovascular: No chest pain or pressure. No palpitations. Pulmonary: + cough, SOB Gastrointestinal: No nausea, vomiting. Objective:  
  
Visit Vitals /52 (BP 1 Location: Left arm, BP Patient Position: At rest) Pulse 79 Temp 97.9 °F (36.6 °C) Resp 18 Wt 65.3 kg (143 lb 15.4 oz) SpO2 95% BMI 23.24 kg/m² Physical Exam: 
 
Gen: NAD, on NC O2 Heent:  MMM, NC, AT. Cor: s1s2 RRR. No MRG. PMI mid 5th intercostal space. Resp:  Coarse BS with rales to LL. Abd:  NT ND.  BS positive. No rebound or guarding. No masses. Ext: No edema. + RA Intake and Output: 
Current Shift:  No intake/output data recorded. Last three shifts:  02/15 1901 - 02/17 0700 In: 660 [P.O.:660] Out: 150 [Urine:150] Labs: Results: Chemistry Recent Labs  
  02/16/19 0130 * * K 4.2 CL 99* CO2 26 BUN 23* CREA 0.80 CA 8.5 AGAP 9  
BUCR 29* AP 56  
TP 7.5 ALB 3.0*  
GLOB 4.5* AGRAT 0.7* CBC w/Diff Recent Labs  
  02/16/19 0130 WBC 12.6 RBC 4.55 HGB 9.7* HCT 30.7*  GRANS 49 LYMPH 28 EOS 0 Cardiac Enzymes Recent Labs  
  02/16/19 0130 CPK 1,130* CKND1 0.2 Coagulation No results for input(s): PTP, INR, APTT in the last 72 hours. No lab exists for component: INREXT, INREXT Lipid Panel No results found for: CHOL, CHOLPOCT, CHOLX, CHLST, CHOLV, 164264, HDL, LDL, LDLC, DLDLP, 012117, VLDLC, VLDL, TGLX, TRIGL, TRIGP, TGLPOCT, CHHD, CHHDX  
BNP No results for input(s): BNPP in the last 72 hours. Liver Enzymes Recent Labs  
  02/16/19 0130 TP 7.5 ALB 3.0* AP 56 SGOT 77* Thyroid Studies No results found for: T4, T3U, TSH, TSHEXT, TSHEXT Procedures/imaging: see electronic medical records for all procedures/Xrays and details which were not copied into this note but were reviewed prior to creation of Plan Medications Reviewed Tu Fang MD

## 2019-02-17 NOTE — PROGRESS NOTES
0730:Received verbal bedside report from off going nurse PEGGY Fields R.N.. Patient care received. Patient alert and oriented x 4. Patient resting in bed denies pain. Patient stable. Call light with in reach bed in lowest position. 1121: Patient taken down from 4 liters to 2 liter Nasal canula.

## 2019-02-17 NOTE — PROGRESS NOTES
Problem: Falls - Risk of 
Goal: *Absence of Falls Document Ashley Ledbetter Fall Risk and appropriate interventions in the flowsheet. Outcome: Progressing Towards Goal 
Fall Risk Interventions: 
Mobility Interventions: Bed/chair exit alarm, PT Consult for mobility concerns, PT Consult for assist device competence, Strengthening exercises (ROM-active/passive), Utilize walker, cane, or other assistive device Medication Interventions: Evaluate medications/consider consulting pharmacy, Patient to call before getting OOB, Teach patient to arise slowly Elimination Interventions: Call light in reach, Patient to call for help with toileting needs, Toilet paper/wipes in reach, Toileting schedule/hourly rounds

## 2019-02-17 NOTE — PROGRESS NOTES
Received report from [facility]. Pt had no issues through out the night. Medication given as prescribed. No other issues on this shift. Bedside and Verbal shift change report given to Nola (oncoming nurse) by Aracelis Friedman (offgoing nurse). Report included the following information SBAR, Kardex, ED Summary, MAR and Cardiac Rhythm sr.

## 2019-02-17 NOTE — PROGRESS NOTES
Problem: Falls - Risk of 
Goal: *Absence of Falls Document Christal Tracey Fall Risk and appropriate interventions in the flowsheet. Outcome: Progressing Towards Goal 
Fall Risk Interventions: 
Mobility Interventions: Assess mobility with egress test, Communicate number of staff needed for ambulation/transfer, Patient to call before getting OOB, PT Consult for mobility concerns, Utilize walker, cane, or other assistive device Medication Interventions: Patient to call before getting OOB, Teach patient to arise slowly Elimination Interventions: Call light in reach, Patient to call for help with toileting needs

## 2019-02-17 NOTE — ROUTINE PROCESS
Bedside and Verbal shift change report given to [facility] by Miguelangel Rosales RN. Report included the following information SBAR, Kardex, Intake/Output and MAR.

## 2019-02-18 ENCOUNTER — HOME HEALTH ADMISSION (OUTPATIENT)
Dept: HOME HEALTH SERVICES | Facility: HOME HEALTH | Age: 74
End: 2019-02-18

## 2019-02-18 VITALS
DIASTOLIC BLOOD PRESSURE: 61 MMHG | HEIGHT: 66 IN | SYSTOLIC BLOOD PRESSURE: 129 MMHG | RESPIRATION RATE: 14 BRPM | WEIGHT: 138.89 LBS | BODY MASS INDEX: 22.32 KG/M2 | HEART RATE: 85 BPM | TEMPERATURE: 98 F | OXYGEN SATURATION: 100 %

## 2019-02-18 LAB — GLUCOSE BLD STRIP.AUTO-MCNC: 101 MG/DL (ref 70–110)

## 2019-02-18 PROCEDURE — 74011250637 HC RX REV CODE- 250/637: Performed by: PHYSICIAN ASSISTANT

## 2019-02-18 PROCEDURE — 74011250636 HC RX REV CODE- 250/636: Performed by: EMERGENCY MEDICINE

## 2019-02-18 PROCEDURE — 74011250637 HC RX REV CODE- 250/637: Performed by: INTERNAL MEDICINE

## 2019-02-18 PROCEDURE — 82962 GLUCOSE BLOOD TEST: CPT

## 2019-02-18 PROCEDURE — 74011250636 HC RX REV CODE- 250/636: Performed by: INTERNAL MEDICINE

## 2019-02-18 PROCEDURE — 74011000250 HC RX REV CODE- 250: Performed by: INTERNAL MEDICINE

## 2019-02-18 PROCEDURE — 74011250637 HC RX REV CODE- 250/637: Performed by: HOSPITALIST

## 2019-02-18 PROCEDURE — 74011250637 HC RX REV CODE- 250/637: Performed by: FAMILY MEDICINE

## 2019-02-18 RX ORDER — AMOXICILLIN AND CLAVULANATE POTASSIUM 875; 125 MG/1; MG/1
1 TABLET, FILM COATED ORAL 2 TIMES DAILY
Qty: 14 TAB | Refills: 0 | Status: SHIPPED | OUTPATIENT
Start: 2019-02-18 | End: 2019-03-15

## 2019-02-18 RX ADMIN — Medication 10 ML: at 03:13

## 2019-02-18 RX ADMIN — ENOXAPARIN SODIUM 40 MG: 40 INJECTION SUBCUTANEOUS at 05:43

## 2019-02-18 RX ADMIN — LOSARTAN POTASSIUM 50 MG: 50 TABLET ORAL at 09:07

## 2019-02-18 RX ADMIN — BENZOCAINE AND MENTHOL 1 LOZENGE: 15; 3.6 LOZENGE ORAL at 03:10

## 2019-02-18 RX ADMIN — WATER 2 G: 1 INJECTION INTRAMUSCULAR; INTRAVENOUS; SUBCUTANEOUS at 03:10

## 2019-02-18 RX ADMIN — BENZOCAINE AND MENTHOL 1 LOZENGE: 15; 3.6 LOZENGE ORAL at 05:43

## 2019-02-18 RX ADMIN — SENNOSIDES 8.6 MG: 8.6 TABLET, FILM COATED ORAL at 09:07

## 2019-02-18 RX ADMIN — ATORVASTATIN CALCIUM 10 MG: 10 TABLET, FILM COATED ORAL at 09:07

## 2019-02-18 RX ADMIN — ACETAMINOPHEN 650 MG: 325 TABLET ORAL at 05:44

## 2019-02-18 RX ADMIN — Medication 10 ML: at 00:31

## 2019-02-18 RX ADMIN — AMLODIPINE BESYLATE 5 MG: 5 TABLET ORAL at 09:07

## 2019-02-18 RX ADMIN — SODIUM CHLORIDE 500 MG: 900 INJECTION, SOLUTION INTRAVENOUS at 02:01

## 2019-02-18 RX ADMIN — BENZOCAINE AND MENTHOL 1 LOZENGE: 15; 3.6 LOZENGE ORAL at 00:29

## 2019-02-18 RX ADMIN — CLOPIDOGREL BISULFATE 75 MG: 75 TABLET ORAL at 09:07

## 2019-02-18 RX ADMIN — Medication 10 ML: at 06:00

## 2019-02-18 NOTE — PROGRESS NOTES
Transition of care: anticipate home today Met with patient at bedside. Patient states she lives with her son states she wants to go home. . She reports she is independent. She is off of oxygen. Recommend H2. Will place order. States her son will pick her up she denies other needs from cm she has pcp and has medicare for insurance. Care Management Interventions PCP Verified by CM: Yes Transition of Care Consult (CM Consult): Home Health([facility]) [address]: Yes Current Support Network: Relative's Home Confirm Follow Up Transport: Family Plan discussed with Pt/Family/Caregiver: Yes Freedom of Choice Offered: Yes Discharge Location Discharge Placement: Home with home health([facility])

## 2019-02-18 NOTE — DISCHARGE INSTRUCTIONS
Patient Education        Pneumonia: Care Instructions  Your Care Instructions    Pneumonia is an infection of the lungs. Most cases are caused by infections from bacteria or viruses. Pneumonia may be mild or very severe. If it is caused by bacteria, you will be treated with antibiotics. It may take a few weeks to a few months to recover fully from pneumonia, depending on how sick you were and whether your overall health is good. Follow-up care is a key part of your treatment and safety. Be sure to make and go to all appointments, and call your doctor if you are having problems. It's also a good idea to know your test results and keep a list of the medicines you take. How can you care for yourself at home? · Take your antibiotics exactly as directed. Do not stop taking the medicine just because you are feeling better. You need to take the full course of antibiotics. · Take your medicines exactly as prescribed. Call your doctor if you think you are having a problem with your medicine. · Get plenty of rest and sleep. You may feel weak and tired for a while, but your energy level will improve with time. · To prevent dehydration, drink plenty of fluids, enough so that your urine is light yellow or clear like water. Choose water and other caffeine-free clear liquids until you feel better. If you have kidney, heart, or liver disease and have to limit fluids, talk with your doctor before you increase the amount of fluids you drink. · Take care of your cough so you can rest. A cough that brings up mucus from your lungs is common with pneumonia. It is one way your body gets rid of the infection. But if coughing keeps you from resting or causes severe fatigue and chest-wall pain, talk to your doctor. He or she may suggest that you take a medicine to reduce the cough. · Use a vaporizer or humidifier to add moisture to your bedroom. Follow the directions for cleaning the machine.   · Do not smoke or allow others to smoke around you. Smoke will make your cough last longer. If you need help quitting, talk to your doctor about stop-smoking programs and medicines. These can increase your chances of quitting for good. · Take an over-the-counter pain medicine, such as acetaminophen (Tylenol), ibuprofen (Advil, Motrin), or naproxen (Aleve). Read and follow all instructions on the label. · Do not take two or more pain medicines at the same time unless the doctor told you to. Many pain medicines have acetaminophen, which is Tylenol. Too much acetaminophen (Tylenol) can be harmful. · If you were given a spirometer to measure how well your lungs are working, use it as instructed. This can help your doctor tell how your recovery is going. · To prevent pneumonia in the future, talk to your doctor about getting a flu vaccine (once a year) and a pneumococcal vaccine (one time only for most people). When should you call for help? Call 911 anytime you think you may need emergency care. For example, call if:    · You have severe trouble breathing.    Call your doctor now or seek immediate medical care if:    · You cough up dark brown or bloody mucus (sputum).     · You have new or worse trouble breathing.     · You are dizzy or lightheaded, or you feel like you may faint.    Watch closely for changes in your health, and be sure to contact your doctor if:    · You have a new or higher fever.     · You are coughing more deeply or more often.     · You are not getting better after 2 days (48 hours).     · You do not get better as expected. Where can you learn more? Go to http://sarina-ryan.info/. Enter [id number] in the search box to learn more about \"Pneumonia: Care Instructions. \"  Current as of: September 5, 2018  Content Version: 11.9  © 7906-6352 [employer]. Care instructions adapted under license by [employer] (which disclaims liability or warranty for this information).  If you have questions about a medical condition or this instruction, always ask your healthcare professional. [place] [address] any warranty or liability for your use of this information. Patient Education        Influenza (Flu): Care Instructions  Your Care Instructions    Influenza (flu) is an infection in the lungs and breathing passages. It is caused by the influenza virus. There are different strains, or types, of the flu virus from year to year. Unlike the common cold, the flu comes on suddenly and the symptoms, such as a cough, congestion, fever, chills, fatigue, aches, and pains, are more severe. These symptoms may last up to 10 days. Although the flu can make you feel very sick, it usually doesn't cause serious health problems. Home treatment is usually all you need for flu symptoms. But your doctor may prescribe antiviral medicine to prevent other health problems, such as pneumonia, from developing. Older people and those who have a long-term health condition, such as lung disease, are most at risk for having pneumonia or other health problems. Follow-up care is a key part of your treatment and safety. Be sure to make and go to all appointments, and call your doctor if you are having problems. It's also a good idea to know your test results and keep a list of the medicines you take. How can you care for yourself at home? · Get plenty of rest.  · Drink plenty of fluids, enough so that your urine is light yellow or clear like water. If you have kidney, heart, or liver disease and have to limit fluids, talk with your doctor before you increase the amount of fluids you drink. · Take an over-the-counter pain medicine if needed, such as acetaminophen (Tylenol), ibuprofen (Advil, Motrin), or naproxen (Aleve), to relieve fever, headache, and muscle aches. Read and follow all instructions on the label. No one younger than 20 should take aspirin. It has been linked to Reye syndrome, a serious illness.   · Do not smoke. Smoking can make the flu worse. If you need help quitting, talk to your doctor about stop-smoking programs and medicines. These can increase your chances of quitting for good. · Breathe moist air from a hot shower or from a sink filled with hot water to help clear a stuffy nose. · Before you use cough and cold medicines, check the label. These medicines may not be safe for young children or for people with certain health problems. · If the skin around your nose and lips becomes sore, put some petroleum jelly on the area. · To ease coughing:  ? Drink fluids to soothe a scratchy throat. ? Suck on cough drops or plain hard candy. ? Take an over-the-counter cough medicine that contains dextromethorphan to help you get some sleep. Read and follow all instructions on the label. ? Raise your head at night with an extra pillow. This may help you rest if coughing keeps you awake. · Take any prescribed medicine exactly as directed. Call your doctor if you think you are having a problem with your medicine. To avoid spreading the flu  · Wash your hands regularly, and keep your hands away from your face. · Stay home from school, work, and other public places until you are feeling better and your fever has been gone for at least 24 hours. The fever needs to have gone away on its own without the help of medicine. · Ask people living with you to talk to their doctors about preventing the flu. They may get antiviral medicine to keep from getting the flu from you. · To prevent the flu in the future, get a flu vaccine every fall. Encourage people living with you to get the vaccine. · Cover your mouth when you cough or sneeze. When should you call for help? Call 911 anytime you think you may need emergency care.  For example, call if:    · You have severe trouble breathing.    Call your doctor now or seek immediate medical care if:    · You have new or worse trouble breathing.     · You seem to be getting much sicker.     · You feel very sleepy or confused.     · You have a new or higher fever.     · You get a new rash.    Watch closely for changes in your health, and be sure to contact your doctor if:    · You begin to get better and then get worse.     · You are not getting better after 1 week. Where can you learn more? Go to http://sarina-ryan.info/. Enter L959 in the search box to learn more about \"Influenza (Flu): Care Instructions. \"  Current as of: September 5, 2018  Content Version: 11.9  © 6534-0006 [employer]. Care instructions adapted under license by [employer] (which disclaims liability or warranty for this information). If you have questions about a medical condition or this instruction, always ask your healthcare professional. [address] any warranty or liability for your use of this information.

## 2019-02-18 NOTE — PROGRESS NOTES
1920 Pt received from offgoing nurse without any signs or symptoms of distress. Pt vitals are stable and within normal limits. Pt bed in low position with wheels locked and call bell within reach. Purposeful rounding completed. Pt resting quietly. No further needs voiced at this time. 2014 Assessment completed and documented in flow sheet. Pt denies any further needs at this time. Pt in NAD with bed in low position, wheels locked and call bell within reach. Purposeful rounding completed. Pt resting quietly. No further needs voiced at this time. 2237 Pain medication administered as per PRN order for c/o pain. See flow sheets for follow up documentation. Purposeful rounding completed. Pt resting quietly. No further needs voiced at this time.

## 2019-02-18 NOTE — CDMP QUERY
A diagnosis of Acute Respiratory failure w/ hypoxia is documented on 2/17/19. Please provide additional information to support this diagnosis of indicate if this has been ruled out. For your reference:  
Acute Respiratory Failure indicators include: - Respirations <12 or >25 - [employer] - Use of accessory muscles of respiration  
- Inability to speak in full sentences - Cyanosis AND 
- Pulse ox <90% RA or <95% on O2  
- pH <7.35 or >7.45  
- pO2 < 60 mm Hg (or 10mm below COPD patient's baseline) - pCO2 >50mm Hg (or 10mm above COPD patient's baseline) The medical record reflects the following: 
 
Risk: RA, HTN, DM Clinical Indicators:  
ER exam: Respiratory: Positive for cough. Negative for shortness of breath. VS: T 101.5 P 114 RR 23 O2 sat 80% on RA improved to No ABGs obtained H&P exam:   Clear to auscultation bilaterally. Surjit Hernandez She is immunosuppressed and has flu A and pneumonia. Hypoxia 2/17/19 Progress notes: Acute respiratory failure with hypoxia: Improving. Taper NC O2 from 4 L to 2 L today Treatment: O2 @ 6L, wean as tolerated Please clarify if this patient is being treated/managed for: 
 
=>Acute hypoxic respiratory failure as evidenced by (please provide clinical support) =>Acute respiratory distress with hypoxia 
=>Other Explanation of clinical findings =>Unable to Determine (no explanation of clinical findings) Please clarify and document your clinical opinion in the progress notes and discharge summary including the definitive and/or presumptive diagnosis, (suspected or probable), related to the above clinical findings. Please include clinical findings supporting your diagnosis. If you DECLINE this query or would like to communicate with [facility], please utilize the \"[facility] message box\" at the TOP of the Progress Note on the right. Thank you, Leilani Cutler RN, BSN, [address] [place] [address] [phone]

## 2019-02-18 NOTE — ROUTINE PROCESS
Dual AVS reviewed with Corrinne Haus, RN. All medications reviewed individually with patient. Opportunities for questions and concerns provided. Patient discharged via (mode of transport ie. Car, ambulance or air transport) car. Patient's arm band appropriately discarded.

## 2019-02-18 NOTE — PROGRESS NOTES
INITIAL NUTRITION ASSESSMENT  
RECOMMENDATIONS/PLAN:  
Continue w/ POC Monitor labs/lytes, PO intakes, skin integrity, wt, fluid status, BM 
 
REASON FOR ASSESSMENT:  
 
[]  RN Referral:  
 [x] MST score >/=2 Malnutrition Screening Tool (MST): 
Recently Lost Weight Without Trying: Yes If Yes, How Much Weight Loss: 14 - 23 lbs Eating Poorly Due to Decreased Appetite: No 
MST Score: 2  
  
[] ICU admission NUTRITION ASSESSMENT:  
Client History: 74 yrs old Female admitted with influenza A, acute resp w/ hypoxia, bandemia, s/p great toe amputation PMHx: RA, DM, HTN, macromastia, menopause, TIA, ulcer of foot [employer] Food Preferences: None Identified FOOD/NUTRITION HISTORY Diet History: unable to obtain at this time Food Allergies:  [x] NKFA Pertinent PTA Medications:  [x] Reviewed NUTRITION INTAKE Diet Order:  Regular Average PO Intake:      
Patient Vitals for the past 100 hrs: 
 % Diet Eaten 02/16/19 1819 60 % 02/16/19 1608 10 % 02/16/19 0347 0 % Pertinent Medications:  [x] Reviewed; Electrolyte Replacement Protocol: []K  []Mg  []PO4 Insulin:  [] SSI  [] Pre-meal   []  Basal   [] Drip  [] None Pt expected to meet estimated nutrient needs through next review:          [x]  Yes     [] No;  ANTHROPOMETRICS Height:    5'6     Weight: 63 kg (138 lb 14.2 oz) BMI:  22.4 kg/m^2  -  normal weight (18.5%-24.9% BMI) Weight change: wt has been up and down in last months averaging around where pt is now Comparison to Reference Standards: IBW: 130 lbs      %IBW: 106%      AdjBW: n/a NUTRITION-FOCUSED PHYSICAL ASSESSMENT Skin: No PU. GI: No BM 
 
BIOCHEMICAL DATA & MEDICAL TESTS Pertinent Labs:  [x] Reviewed; Na-134 NUTRITION PRESCRIPTION Calories: 1992 kcal/day based on [place] x 1.8 Protein:  g/day based on 1.5-2.0 g/kg CHO: 249 g/day based on 50% of total energy Fluid: 1992 ml/day based on 1 kcal/ml NUTRITION DIAGNOSES:  
1. At risk for inadequate oral intake related to poor appetite as evidence by average PO intakes in last 100 hours. NUTRITION INTERVENTIONS:  
INTERVENTIONS:        GOALS: 
1. Other: Continue w/ POC 1. Encourage PO intake >50% at most meals by next review 7 days LEARNING NEEDS (Diet, Supplementation, Food/Nutrient-Drug Interaction):  
[x] None Identified 
[] Inpatient education provided/documented   
[] Identified and patient:  [] Declined     [] Was not appropriate/indicated NUTRITION MONITORING /EVALUATION:  
Follow PO intake Monitor wt Monitor renal labs, electrolytes, fluid status 
 
[] Participated in Interdisciplinary Rounds 
[x] 372 [facility] Reviewed/Documented DISCHARGE NUTRITION RECOMMENDATIONS ADDRESSED:  
  
  [x] To be determined closer to discharge NUTRITION RISK:     []  At risk                     [x]  Not currently at risk Will follow-up per policy. Dana Blunt 9

## 2019-02-18 NOTE — PROGRESS NOTES
Shift summary: Pt awake most of shift watching TV. No distress noted, up ad faraz to bathroom to void. no complaint of pain except when coughing. Throat lozenges administered as needed prn. Tylenol given near end of shift for very mild chest pain secondary to coughing. Shift uneventful

## 2019-02-18 NOTE — PROGRESS NOTES
Problem: Falls - Risk of 
Goal: *Absence of Falls Document [place] Rank Fall Risk and appropriate interventions in the flowsheet. Outcome: Progressing Towards Goal 
Fall Risk Interventions: 
Mobility Interventions: Communicate number of staff needed for ambulation/transfer, Patient to call before getting OOB, PT Consult for mobility concerns, PT Consult for assist device competence, Strengthening exercises (ROM-active/passive) Medication Interventions: Bed/chair exit alarm, Patient to call before getting OOB, Teach patient to arise slowly Elimination Interventions: Call light in reach, Patient to call for help with toileting needs

## 2019-02-18 NOTE — PROGRESS NOTES
0755:Received verbal bedside report from off going nurse ALESHIA Almazan R.N. Patient care received. Patient alert and oriented x 4. Patient resting in bed denies pain. Patient stable. Call light with in reach bed in lowest position.

## 2019-02-18 NOTE — ROUTINE PROCESS
Bedside and Verbal shift change report given to CARMINA Laird R.N. (oncoming nurse) by ASCENCION Downs R.N. (offgoing nurse). Report included the following information SBAR, Kardex, Intake/Output, MAR, Accordion, Recent Results and Med Rec Status.

## 2019-02-18 NOTE — ROUTINE PROCESS
Bedside and Verbal shift change report given to Letty Hernandez RN (oncoming nurse) by Misha Shah RN 
 (offgoing nurse). Report included the following information SBAR, Kardex, Intake/Output, MAR and Recent Results and Cardiac Rhythm - NSR .

## 2019-02-18 NOTE — PROGRESS NOTES
Problem: Falls - Risk of 
Goal: *Absence of Falls Document Teresa Walter Fall Risk and appropriate interventions in the flowsheet. Outcome: Progressing Towards Goal 
Fall Risk Interventions: 
Mobility Interventions: Patient to call before getting OOB Medication Interventions: Patient to call before getting OOB Elimination Interventions: Patient to call for help with toileting needs, Call light in reach

## 2019-02-18 NOTE — DISCHARGE SUMMARY
Discharge Summary    Patient: Renae Gage MRN: [id number]  CSN: [id number]    YOB: 1945  Age: 74 y.o. Sex: female    DOA: 2/16/2019 LOS:  LOS: 2 days   Discharge Date:      Primary Care Provider:  Sommer Rojas MD    Admission Diagnoses: Bandemia [R03.705]  Influenza A [J10.1]  Hypoxia [R09.02]    Discharge Diagnoses:    Problem List as of 2/18/2019 Date Reviewed: 2/17/2019          Codes Class Noted - Resolved    Influenza A with pneumonia ICD-10-CM: J09. X1  ICD-9-CM: 487.0  2/17/2019 - Present        Great toe amputation status, right ([facility] ICD-10-CM: Z89.411  ICD-9-CM: V49.71  2/17/2019 - Present        Acute respiratory failure with hypoxia ([facility]) ICD-10-CM: J96.01  ICD-9-CM: 518.81  2/17/2019 - Present        * (Principal) Influenza A ICD-10-CM: J10.1  ICD-9-CM: 487.1  2/16/2019 - Present        Hypoxia ICD-10-CM: R09.02  ICD-9-CM: 799.02  2/16/2019 - Present        Bandemia ICD-10-CM: H21.480  ICD-9-CM: 288.66  2/16/2019 - Present        Rheumatoid arthritis ([facility] ICD-10-CM: M06.9  ICD-9-CM: 714.0  Unknown - Present    Overview Signed 9/21/2012  7:31 AM by Lloyd Aguilar MD     RA             Hypertension ICD-10-CM: I10  ICD-9-CM: 401.9  Unknown - Present        Diabetes mellitus ([facility] ICD-10-CM: E11.9  ICD-9-CM: 250.00  Unknown - Present        RESOLVED: Macromastia ICD-10-CM: N62  ICD-9-CM: 611.1  Unknown - 9/21/2012              Discharge Medications:     Current Discharge Medication List      START taking these medications    Details   amoxicillin-clavulanate (AUGMENTIN) 875-125 mg per tablet Take 1 Tab by mouth two (2) times a day. Qty: 14 Tab, Refills: 0         CONTINUE these medications which have NOT CHANGED    Details   atorvastatin (LIPITOR) 10 mg tablet Take  by mouth daily. AMLODIPINE BESYLATE, BULK, by Does Not Apply route. clopidogrel (PLAVIX) 75 mg tablet Take  by mouth daily. losartan (COZAAR) 50 mg tablet Take 50 mg by mouth daily. STOP taking these medications       LEFLUNOMIDE PO Comments:   Reason for Stopping:         ergocalciferol, vitamin d2, 1,000 unit cap Comments:   Reason for Stopping:         folic acid (FOLVITE) 1 mg tablet Comments:   Reason for Stopping:         multivitamin (ONE A DAY) tablet Comments:   Reason for Stopping:               Discharge Condition: Stable    Procedures : None    Consults: None      PHYSICAL EXAM    Visit Vitals  /61 (BP 1 Location: Left arm, BP Patient Position: Sitting)   Pulse 85   Temp 98 °F (36.7 °C)   Resp 14   Ht 5' 5.98\" (1.676 m)   Wt 63 kg (138 lb 14.2 oz)   SpO2 100%   BMI 22.43 kg/m²     General: Elderly AA female. Awake, cooperative, no acute distress    HEENT: NC, Atraumatic. PERRLA, EOMI. Anicteric sclerae. Lungs:  Coarse rhonchi bilaterally. No focal wheezing. Heart:  Regular  rhythm,  No murmur, No Rubs, No Gallops  Abdomen: Soft, Non distended, Non tender. +Bowel sounds,   Extremities: No c/c/e  Psych:   Not anxious or agitated. Neurologic:  No acute neurological deficits. Admission HPI : Saloni Ryan is a 74 y.o. female who came to the ER with increasing shortness of breath and chest discomfort for past 5 days  . She had a cold 1 months ago but was slightly improving on antibiotics. DShe didn't have a fever or productive sputum    Hospital Course : This patient was admitted to medical services on February 16, 2019 for influenza and pneumonia. Other admission diagnoses include rheumatoid arthritis. The patient was admitted to the telemetry unit for further care. She was placed on IV Zithromax and IV Rocephin. She was given Tamiflu as well. She remained on IV abx throughout the duration of her hospitalization. She initially required supplemental oxygen via nasal cannula, but this was weaned, and prior to discharge she is on room air breathing without difficulty.  Over the course of her hospitalization, the patient reports subjective improvement in her symptoms. She states she is coughing still, but it is nonproductive. She will be given a continued course of antibiotics, Augmentin 875mg BID x 7 days to complete a course of 10 total days of antibiotics. She was advised to follow up closely with her PCP should she not continue to improve. As for her rheumatoid arthritis, she was advised to follow up with her PCP to resume her usual medications after this acute illness. Activity: Activity as tolerated    Diet: Regular Diet    Follow-up: PCP    Disposition: Home    Minutes spent on discharge: 31       Labs: Results:       Chemistry Recent Labs     02/16/19 0130   *   *   K 4.2   CL 99*   CO2 26   BUN 23*   CREA 0.80   CA 8.5   AGAP 9   BUCR 29*   AP 56   TP 7.5   ALB 3.0*   GLOB 4.5*   AGRAT 0.7*      CBC w/Diff Recent Labs     02/16/19 0130   WBC 12.6   RBC 4.55   HGB 9.7*   HCT 30.7*      GRANS 49   LYMPH 28   EOS 0      Cardiac Enzymes Recent Labs     02/16/19 0130   CPK 1,130*   CKND1 0.2      Coagulation No results for input(s): PTP, INR, APTT in the last 72 hours. No lab exists for component: INREXT    Lipid Panel No results found for: CHOL, CHOLPOCT, CHOLX, CHLST, CHOLV, 684570, HDL, LDL, LDLC, DLDLP, 824223, VLDLC, VLDL, TGLX, TRIGL, TRIGP, TGLPOCT, CHHD, CHHDX   BNP No results for input(s): BNPP in the last 72 hours. Liver Enzymes Recent Labs     02/16/19 0130   TP 7.5   ALB 3.0*   AP 56   SGOT 77*      Thyroid Studies No results found for: T4, T3U, TSH, TSHEXT         Significant Diagnostic Studies: Xr Foot Rt Ap/lat    Result Date: 2/16/2019  INDICATION: Injury, pain. COMPARISON EXAMINATIONS: December 18, 2014 EXAM: Three views of the right foot FINDINGS: Since the previous study, there has been amputation of the distal phalanx of the great toe. No cortical erosion or osseous abnormality is seen. A small calcaneal spur is present. There are no fractures or dislocations.  The soft tissues are unremarkable in appearance. IMPRESSION: First distal phalanx amputation. No significant radiographic abnormality    Xr Chest Port    Result Date: 2/16/2019  EXAM: AP radiograph of the chest INDICATION: Chest pain, hypoxia COMPARISON: March 24, 2018, January 5, 2018 _______________ FINDINGS: Normal heart size and mediastinal contour. There are opacities in the lung bases. No pneumothorax or pleural effusion. No acute osseous abnormalities. _______________     IMPRESSION: Bilateral lower lobe opacities favoring pneumonia. Bibasilar atelectasis can also have this appearance, therefore clinical correlation is recommended. No results found for this or any previous visit.         CC: Herminia Calles MD

## 2019-02-19 ENCOUNTER — HOME CARE VISIT (OUTPATIENT)
Dept: SCHEDULING | Facility: HOME HEALTH | Age: 74
End: 2019-02-19

## 2019-02-19 PROCEDURE — G0299 HHS/HOSPICE OF RN EA 15 MIN: HCPCS

## 2019-02-20 ENCOUNTER — PATIENT OUTREACH (OUTPATIENT)
Dept: CASE MANAGEMENT | Age: 74
End: 2019-02-20

## 2019-02-20 NOTE — PROGRESS NOTES
Hospital Discharge Follow-Up      Date/Time:  2019 11:08 AM    Patient was admitted to [facility] on 19 and discharged on 19 for Influenza A, pneumonia. The physician discharge summary was available at the time of outreach. Patient was contacted within 2 business days of discharge. Inpatient RRAT score: 12  Was this a readmission? no   Patient stated reason for the readmission: NA    Nurse Navigator (NN) contacted the patient by telephone to perform post hospital discharge assessment. Verified name and  with patient as identifiers. Provided introduction to self, and explanation of the Nurse Navigator role. Reviewed discharge instructions and red flags with patient who verbalized understanding. Patient given an opportunity to ask questions and does not have any further questions or concerns at this time. The patient agrees to contact the PCP office for questions related to their healthcare. NN provided contact information for future reference. Disease Specific:   N/A    Summary of patient's top problems:  1. Influenza A/pneumonia - patient reports that she continues to have occasional non-productive cough. States she had pneumonia vaccine \"about 8 or 9 years ago\", states she has not had flu vaccine \"in several years because I was always winding up sick and in bed for several days afterwards\". NN instructed patient to discuss receiving vaccines when she attends PCP appointment, verbalizes understanding. Home Health orders at discharge: [address] [place] [address] 114: [facility]  Date of initial visit: 19    Durable Medical Equipment ordered/company: NA  Durable Medical Equipment received: NA    Barriers to care? None noted at this time.      Advance Care Planning:   Does patient have an Advance Directive:  not on file     Medication(s):   New Medications at Discharge: yes  Changed Medications at Discharge: no  Discontinued Medications at Discharge: yes - vit D, multivitamin, folvite,leflunomide    Medication reconciliation was performed with patient, who verbalizes understanding of administration of home medications. States she started Augmentin as ordered. Questioned why other medications were discontinued and states that prior to hospitalization she was on Losartan 100 mg. NN instructed patient to take medications and discharge instructions with her to appointment and discuss with PCP, verbalized understanding. There were no barriers to obtaining medications identified at this time. Referral to Pharm D needed: no     Current Outpatient Medications   Medication Sig    amoxicillin-clavulanate (AUGMENTIN) 875-125 mg per tablet Take 1 Tab by mouth two (2) times a day.  atorvastatin (LIPITOR) 10 mg tablet Take  by mouth daily.  AMLODIPINE BESYLATE, BULK, by Does Not Apply route.  clopidogrel (PLAVIX) 75 mg tablet Take  by mouth daily.  losartan (COZAAR) 50 mg tablet Take 50 mg by mouth daily. No current facility-administered medications for this visit. There are no discontinued medications. [facility] follow up appointment(s): No future appointments.    Non-[facility] follow up appointment(s): Dr. Sarah Royal on 2/20/19  [facility]:  out of service area

## 2019-02-22 LAB
BACTERIA SPEC CULT: NORMAL
BACTERIA SPEC CULT: NORMAL
SERVICE CMNT-IMP: NORMAL
SERVICE CMNT-IMP: NORMAL

## 2019-02-24 LAB
ATRIAL RATE: 98 BPM
CALCULATED P AXIS, ECG09: -14 DEGREES
CALCULATED R AXIS, ECG10: -10 DEGREES
CALCULATED T AXIS, ECG11: 20 DEGREES
DIAGNOSIS, 93000: NORMAL
P-R INTERVAL, ECG05: 154 MS
Q-T INTERVAL, ECG07: 356 MS
QRS DURATION, ECG06: 82 MS
QTC CALCULATION (BEZET), ECG08: 454 MS
VENTRICULAR RATE, ECG03: 98 BPM

## 2019-03-15 RX ORDER — LANOLIN ALCOHOL/MO/W.PET/CERES
325 CREAM (GRAM) TOPICAL
COMMUNITY
End: 2022-02-04

## 2019-03-15 NOTE — PROGRESS NOTES
PT aware of NPO status. Will take meds with sip of water  PT aware of need to hold anticoagulants per protocol. Told to hold plavix day prior to procedure by [facility]. PT aware of potential for sedation administration and need for  at discharge. PT aware of arrival time pre procedure. Will arrive at 1100 for 1300 procedure. Pt states no questions at this time.

## 2019-03-18 ENCOUNTER — HOSPITAL ENCOUNTER (OUTPATIENT)
Dept: VASCULAR SURGERY | Age: 74
Discharge: HOME | End: 2019-03-18
Attending: PODIATRIST
Payer: MEDICARE

## 2019-03-18 DIAGNOSIS — I70.203 ATHEROSCLEROSIS OF ARTERY OF BOTH LOWER EXTREMITIES (HCC): ICD-10-CM

## 2019-03-18 PROCEDURE — 93923 UPR/LXTR ART STDY 3+ LVLS: CPT

## 2019-03-19 LAB
LEFT ARM BP: 130 MMHG
LEFT TBI: 0.53
LEFT TOE PRESSURE: 72 MMHG
RIGHT ARM BP: 137 MMHG
RIGHT TBI: 0.15
RIGHT TOE PRESSURE: 20 MMHG

## 2019-03-27 ENCOUNTER — HOSPITAL ENCOUNTER (OUTPATIENT)
Dept: LAB | Age: 74
Discharge: HOME | End: 2019-03-27
Payer: MEDICARE

## 2019-03-27 LAB
ERYTHROCYTE [DISTWIDTH] IN BLOOD BY AUTOMATED COUNT: 16.7 % (ref 11.6–14.5)
HCT VFR BLD AUTO: 30.9 % (ref 35–45)
HGB BLD-MCNC: 9.6 G/DL (ref 12–16)
MCH RBC QN AUTO: 21.1 PG (ref 24–34)
MCHC RBC AUTO-ENTMCNC: 31.1 G/DL (ref 31–37)
MCV RBC AUTO: 67.9 FL (ref 74–97)
PLATELET # BLD AUTO: 437 K/UL (ref 135–420)
PMV BLD AUTO: 9.2 FL (ref 9.2–11.8)
RBC # BLD AUTO: 4.55 M/UL (ref 4.2–5.3)
WBC # BLD AUTO: 11.1 K/UL (ref 4.6–13.2)

## 2019-03-27 PROCEDURE — 36415 COLL VENOUS BLD VENIPUNCTURE: CPT

## 2019-03-27 PROCEDURE — 85027 COMPLETE CBC AUTOMATED: CPT

## 2019-03-28 LAB
FAX TO INFO,FAXT: NORMAL
FAX TO NUMBER,FAXN: NORMAL

## 2019-04-03 ENCOUNTER — HOSPITAL ENCOUNTER (OUTPATIENT)
Dept: VASCULAR SURGERY | Age: 74
Discharge: HOME | End: 2019-04-03
Attending: SURGERY
Payer: MEDICARE

## 2019-04-03 DIAGNOSIS — I70.90 OCCLUSIVE DISEASE, ARTERIAL: ICD-10-CM

## 2019-04-03 PROCEDURE — 93880 EXTRACRANIAL BILAT STUDY: CPT

## 2019-04-03 PROCEDURE — 93978 VASCULAR STUDY: CPT

## 2019-04-04 LAB
ABDOMINAL DIST AORTA VEL: 113 CM/S
ABDOMINAL LT COM ILIAC AP: 1.1 CM
ABDOMINAL LT COM ILIAC TRANS: 0.84 CM
ABDOMINAL MID AORTA VEL: 92.9 CM/S
ABDOMINAL PROX AORTA VEL: 54.7 CM/S
ABDOMINAL RT COM ILIAC AP: 1.03 CM
ABDOMINAL RT COM ILIAC TRANS: 0.95 CM
DIST AORTIC AP: 2.24 CM
DIST AORTIC TRANS: 2.62 CM
LEFT CCA DIST DIAS: 13.9 CM/S
LEFT CCA DIST SYS: 99.5 CM/S
LEFT CCA PROX DIAS: 18.7 CM/S
LEFT CCA PROX SYS: 109.2 CM/S
LEFT COM ILIAC MID VEL: 205.4 CM/S
LEFT COM ILIAC PROX VEL: 309.4 CM/S
LEFT ECA DIAS: 3.96 CM/S
LEFT ECA SYS: 65.5 CM/S
LEFT EXT ILIAC DIST VEL: 228.3 CM/S
LEFT EXT ILIAC PROX VEL: 131.1 CM/S
LEFT ICA DIST DIAS: 15 CM/S
LEFT ICA DIST SYS: 61.1 CM/S
LEFT ICA MID DIAS: 36.5 CM/S
LEFT ICA MID SYS: 131.8 CM/S
LEFT ICA PROX DIAS: 8.3 CM/S
LEFT ICA PROX SYS: 58.1 CM/S
LEFT ICA/CCA SYS: 1.32
LEFT SUBCLAVIAN DIAS: 0 CM/S
LEFT SUBCLAVIAN SYS: 235.8 CM/S
LEFT VERTEBRAL DIAS: 14.95 CM/S
LEFT VERTEBRAL SYS: 74.3 CM/S
MID AORTIC AP: 2.04 CM
MID AORTIC TRANS: 2.59 CM
PROX AORTIC AP: 2.34 CM
PROX AORTIC TRANS: 2.45 CM
RIGHT CCA DIST DIAS: 16.9 CM/S
RIGHT CCA DIST SYS: 107.6 CM/S
RIGHT CCA MID DIAS: 17.74 CM/S
RIGHT CCA MID SYS: 98.6 CM/S
RIGHT CCA PROX DIAS: 17.7 CM/S
RIGHT CCA PROX SYS: 85.6 CM/S
RIGHT COM ILIAC MID VEL: 152.6 CM/S
RIGHT COM ILIAC PROX VEL: 133.1 CM/S
RIGHT ECA DIAS: 18.1 CM/S
RIGHT ECA SYS: 250.9 CM/S
RIGHT EXT ILIAC DIST VEL RATIO: 1
RIGHT EXT ILIAC DIST VEL: 330.7 CM/S
RIGHT EXT ILIAC MID VEL: 318.7 CM/S
RIGHT ICA DIST DIAS: 23.7 CM/S
RIGHT ICA DIST SYS: 80.9 CM/S
RIGHT ICA MID DIAS: 36.7 CM/S
RIGHT ICA MID SYS: 135.2 CM/S
RIGHT ICA PROX DIAS: 37.5 CM/S
RIGHT ICA PROX SYS: 160.4 CM/S
RIGHT ICA/CCA SYS: 1.5
RIGHT SUBCLAVIAN DIAS: 0 CM/S
RIGHT SUBCLAVIAN SYS: 305.3 CM/S
RIGHT VERTEBRAL DIAS: 12.74 CM/S
RIGHT VERTEBRAL SYS: 62.5 CM/S

## 2019-04-10 ENCOUNTER — HOSPITAL ENCOUNTER (OUTPATIENT)
Dept: INTERVENTIONAL RADIOLOGY/VASCULAR | Age: 74
Discharge: HOME | End: 2019-04-10
Attending: SURGERY | Admitting: SURGERY
Payer: MEDICARE

## 2019-04-10 VITALS
DIASTOLIC BLOOD PRESSURE: 50 MMHG | RESPIRATION RATE: 20 BRPM | SYSTOLIC BLOOD PRESSURE: 139 MMHG | BODY MASS INDEX: 22.87 KG/M2 | WEIGHT: 142.31 LBS | HEART RATE: 79 BPM | HEIGHT: 66 IN | OXYGEN SATURATION: 99 % | TEMPERATURE: 97.7 F

## 2019-04-10 DIAGNOSIS — I73.9 PAD (PERIPHERAL ARTERY DISEASE) (HCC): ICD-10-CM

## 2019-04-10 LAB
ANION GAP SERPL CALC-SCNC: 7 MMOL/L (ref 3–18)
APTT PPP: 26.9 SEC (ref 23–36.4)
BASOPHILS # BLD: 0.1 K/UL (ref 0–0.1)
BASOPHILS NFR BLD: 1 % (ref 0–2)
BUN SERPL-MCNC: 12 MG/DL (ref 7–18)
BUN/CREAT SERPL: 24 (ref 12–20)
CALCIUM SERPL-MCNC: 9.5 MG/DL (ref 8.5–10.1)
CHLORIDE SERPL-SCNC: 104 MMOL/L (ref 100–108)
CO2 SERPL-SCNC: 29 MMOL/L (ref 21–32)
CREAT SERPL-MCNC: 0.51 MG/DL (ref 0.6–1.3)
DIFFERENTIAL METHOD BLD: ABNORMAL
EOSINOPHIL # BLD: 0.5 K/UL (ref 0–0.4)
EOSINOPHIL NFR BLD: 6 % (ref 0–5)
ERYTHROCYTE [DISTWIDTH] IN BLOOD BY AUTOMATED COUNT: 16.6 % (ref 11.6–14.5)
GLUCOSE SERPL-MCNC: 101 MG/DL (ref 74–99)
HCT VFR BLD AUTO: 30.8 % (ref 35–45)
HGB BLD-MCNC: 9.5 G/DL (ref 12–16)
INR PPP: 0.9 (ref 0.8–1.2)
LYMPHOCYTES # BLD: 2.1 K/UL (ref 0.9–3.6)
LYMPHOCYTES NFR BLD: 24 % (ref 21–52)
MCH RBC QN AUTO: 20.8 PG (ref 24–34)
MCHC RBC AUTO-ENTMCNC: 30.8 G/DL (ref 31–37)
MCV RBC AUTO: 67.5 FL (ref 74–97)
MONOCYTES # BLD: 0.4 K/UL (ref 0.05–1.2)
MONOCYTES NFR BLD: 5 % (ref 3–10)
NEUTS SEG # BLD: 5.7 K/UL (ref 1.8–8)
NEUTS SEG NFR BLD: 64 % (ref 40–73)
PLATELET # BLD AUTO: 358 K/UL (ref 135–420)
PMV BLD AUTO: 8.5 FL (ref 9.2–11.8)
POTASSIUM SERPL-SCNC: 3.9 MMOL/L (ref 3.5–5.5)
PROTHROMBIN TIME: 12.3 SEC (ref 11.5–15.2)
RBC # BLD AUTO: 4.56 M/UL (ref 4.2–5.3)
SODIUM SERPL-SCNC: 140 MMOL/L (ref 136–145)
WBC # BLD AUTO: 8.7 K/UL (ref 4.6–13.2)

## 2019-04-10 PROCEDURE — 85025 COMPLETE CBC W/AUTO DIFF WBC: CPT

## 2019-04-10 PROCEDURE — 74011250636 HC RX REV CODE- 250/636: Performed by: SURGERY

## 2019-04-10 PROCEDURE — 75716 ARTERY X-RAYS ARMS/LEGS: CPT

## 2019-04-10 PROCEDURE — 74011636320 HC RX REV CODE- 636/320: Performed by: SURGERY

## 2019-04-10 PROCEDURE — 99153 MOD SED SAME PHYS/QHP EA: CPT

## 2019-04-10 PROCEDURE — 85610 PROTHROMBIN TIME: CPT

## 2019-04-10 PROCEDURE — 76937 US GUIDE VASCULAR ACCESS: CPT

## 2019-04-10 PROCEDURE — 85730 THROMBOPLASTIN TIME PARTIAL: CPT

## 2019-04-10 PROCEDURE — 74011000250 HC RX REV CODE- 250

## 2019-04-10 PROCEDURE — 74011250636 HC RX REV CODE- 250/636

## 2019-04-10 PROCEDURE — 99152 MOD SED SAME PHYS/QHP 5/>YRS: CPT

## 2019-04-10 PROCEDURE — 80048 BASIC METABOLIC PNL TOTAL CA: CPT

## 2019-04-10 RX ORDER — LABETALOL HCL 20 MG/4 ML
10 SYRINGE (ML) INTRAVENOUS ONCE
Status: COMPLETED | OUTPATIENT
Start: 2019-04-10 | End: 2019-04-10

## 2019-04-10 RX ORDER — FENTANYL CITRATE 50 UG/ML
INJECTION, SOLUTION INTRAMUSCULAR; INTRAVENOUS
Status: COMPLETED
Start: 2019-04-10 | End: 2019-04-10

## 2019-04-10 RX ORDER — NALOXONE HYDROCHLORIDE 0.4 MG/ML
0.1 INJECTION, SOLUTION INTRAMUSCULAR; INTRAVENOUS; SUBCUTANEOUS AS NEEDED
Status: DISCONTINUED | OUTPATIENT
Start: 2019-04-10 | End: 2019-04-10 | Stop reason: HOSPADM

## 2019-04-10 RX ORDER — LIDOCAINE HYDROCHLORIDE 10 MG/ML
INJECTION INFILTRATION; PERINEURAL
Status: COMPLETED
Start: 2019-04-10 | End: 2019-04-10

## 2019-04-10 RX ORDER — FENTANYL CITRATE 50 UG/ML
25-200 INJECTION, SOLUTION INTRAMUSCULAR; INTRAVENOUS
Status: DISCONTINUED | OUTPATIENT
Start: 2019-04-10 | End: 2019-04-10 | Stop reason: HOSPADM

## 2019-04-10 RX ORDER — LABETALOL HCL 20 MG/4 ML
SYRINGE (ML) INTRAVENOUS
Status: COMPLETED
Start: 2019-04-10 | End: 2019-04-10

## 2019-04-10 RX ORDER — HEPARIN SODIUM 1000 [USP'U]/ML
INJECTION, SOLUTION INTRAVENOUS; SUBCUTANEOUS
Status: COMPLETED
Start: 2019-04-10 | End: 2019-04-10

## 2019-04-10 RX ORDER — ASCORBIC ACID 250 MG
TABLET ORAL DAILY
COMMUNITY

## 2019-04-10 RX ORDER — FLUMAZENIL 0.1 MG/ML
INJECTION INTRAVENOUS
Status: DISCONTINUED
Start: 2019-04-10 | End: 2019-04-10 | Stop reason: WASHOUT

## 2019-04-10 RX ORDER — THERA TABS 400 MCG
1 TAB ORAL DAILY
COMMUNITY
End: 2021-01-09

## 2019-04-10 RX ORDER — CEFAZOLIN SODIUM 2 G/50ML
2 SOLUTION INTRAVENOUS ONCE
Status: COMPLETED | OUTPATIENT
Start: 2019-04-10 | End: 2019-04-10

## 2019-04-10 RX ORDER — HEPARIN SODIUM 200 [USP'U]/100ML
INJECTION, SOLUTION INTRAVENOUS
Status: COMPLETED
Start: 2019-04-10 | End: 2019-04-10

## 2019-04-10 RX ORDER — FLUMAZENIL 0.1 MG/ML
0.2 INJECTION INTRAVENOUS
Status: DISCONTINUED | OUTPATIENT
Start: 2019-04-10 | End: 2019-04-10 | Stop reason: HOSPADM

## 2019-04-10 RX ORDER — HEPARIN SODIUM 200 [USP'U]/100ML
1000 INJECTION, SOLUTION INTRAVENOUS
Status: COMPLETED | OUTPATIENT
Start: 2019-04-10 | End: 2019-04-10

## 2019-04-10 RX ORDER — NITROGLYCERIN 10 MG/100ML
0-20 INJECTION INTRAVENOUS
Status: DISCONTINUED | OUTPATIENT
Start: 2019-04-10 | End: 2019-04-10 | Stop reason: HOSPADM

## 2019-04-10 RX ORDER — HEPARIN SODIUM 1000 [USP'U]/ML
10000 INJECTION, SOLUTION INTRAVENOUS; SUBCUTANEOUS
Status: DISCONTINUED | OUTPATIENT
Start: 2019-04-10 | End: 2019-04-10 | Stop reason: HOSPADM

## 2019-04-10 RX ORDER — MIDAZOLAM HYDROCHLORIDE 1 MG/ML
INJECTION, SOLUTION INTRAMUSCULAR; INTRAVENOUS
Status: COMPLETED
Start: 2019-04-10 | End: 2019-04-10

## 2019-04-10 RX ORDER — NALOXONE HYDROCHLORIDE 0.4 MG/ML
INJECTION, SOLUTION INTRAMUSCULAR; INTRAVENOUS; SUBCUTANEOUS
Status: DISCONTINUED
Start: 2019-04-10 | End: 2019-04-10 | Stop reason: WASHOUT

## 2019-04-10 RX ORDER — HYDRALAZINE HYDROCHLORIDE 20 MG/ML
20 INJECTION INTRAMUSCULAR; INTRAVENOUS ONCE
Status: COMPLETED | OUTPATIENT
Start: 2019-04-10 | End: 2019-04-10

## 2019-04-10 RX ORDER — HYDRALAZINE HYDROCHLORIDE 20 MG/ML
INJECTION INTRAMUSCULAR; INTRAVENOUS
Status: COMPLETED
Start: 2019-04-10 | End: 2019-04-10

## 2019-04-10 RX ORDER — SODIUM CHLORIDE 9 MG/ML
25 INJECTION, SOLUTION INTRAVENOUS CONTINUOUS
Status: DISCONTINUED | OUTPATIENT
Start: 2019-04-10 | End: 2019-04-10 | Stop reason: HOSPADM

## 2019-04-10 RX ORDER — MIDAZOLAM HYDROCHLORIDE 1 MG/ML
.5-4 INJECTION, SOLUTION INTRAMUSCULAR; INTRAVENOUS
Status: DISCONTINUED | OUTPATIENT
Start: 2019-04-10 | End: 2019-04-10 | Stop reason: HOSPADM

## 2019-04-10 RX ORDER — LIDOCAINE HYDROCHLORIDE 10 MG/ML
1-20 INJECTION INFILTRATION; PERINEURAL
Status: COMPLETED | OUTPATIENT
Start: 2019-04-10 | End: 2019-04-10

## 2019-04-10 RX ORDER — NITROGLYCERIN 10 MG/100ML
INJECTION INTRAVENOUS
Status: COMPLETED
Start: 2019-04-10 | End: 2019-04-10

## 2019-04-10 RX ADMIN — FENTANYL CITRATE 25 MCG: 50 INJECTION, SOLUTION INTRAMUSCULAR; INTRAVENOUS at 14:33

## 2019-04-10 RX ADMIN — MIDAZOLAM HYDROCHLORIDE 0.5 MG: 1 INJECTION, SOLUTION INTRAMUSCULAR; INTRAVENOUS at 15:07

## 2019-04-10 RX ADMIN — FENTANYL CITRATE 25 MCG: 50 INJECTION, SOLUTION INTRAMUSCULAR; INTRAVENOUS at 14:49

## 2019-04-10 RX ADMIN — FENTANYL CITRATE 25 MCG: 50 INJECTION, SOLUTION INTRAMUSCULAR; INTRAVENOUS at 14:55

## 2019-04-10 RX ADMIN — CEFAZOLIN SODIUM 2 G: 2 SOLUTION INTRAVENOUS at 14:00

## 2019-04-10 RX ADMIN — MIDAZOLAM HYDROCHLORIDE 0.5 MG: 1 INJECTION, SOLUTION INTRAMUSCULAR; INTRAVENOUS at 14:23

## 2019-04-10 RX ADMIN — HEPARIN SODIUM 2000 UNITS: 200 INJECTION, SOLUTION INTRAVENOUS at 14:45

## 2019-04-10 RX ADMIN — HYDRALAZINE HYDROCHLORIDE 20 MG: 20 INJECTION INTRAMUSCULAR; INTRAVENOUS at 14:57

## 2019-04-10 RX ADMIN — MIDAZOLAM HYDROCHLORIDE 0.5 MG: 1 INJECTION, SOLUTION INTRAMUSCULAR; INTRAVENOUS at 14:33

## 2019-04-10 RX ADMIN — LIDOCAINE HYDROCHLORIDE 2 ML: 10 INJECTION INFILTRATION; PERINEURAL at 15:58

## 2019-04-10 RX ADMIN — MIDAZOLAM HYDROCHLORIDE 0.5 MG: 1 INJECTION, SOLUTION INTRAMUSCULAR; INTRAVENOUS at 14:55

## 2019-04-10 RX ADMIN — FENTANYL CITRATE 50 MCG: 50 INJECTION, SOLUTION INTRAMUSCULAR; INTRAVENOUS at 14:13

## 2019-04-10 RX ADMIN — MIDAZOLAM HYDROCHLORIDE 1 MG: 1 INJECTION, SOLUTION INTRAMUSCULAR; INTRAVENOUS at 15:36

## 2019-04-10 RX ADMIN — LIDOCAINE HYDROCHLORIDE 2 ML: 10 INJECTION, SOLUTION INFILTRATION; PERINEURAL at 15:58

## 2019-04-10 RX ADMIN — HEPARIN SODIUM 6000 UNITS: 1000 INJECTION, SOLUTION INTRAVENOUS; SUBCUTANEOUS at 14:23

## 2019-04-10 RX ADMIN — MIDAZOLAM HYDROCHLORIDE 0.5 MG: 1 INJECTION, SOLUTION INTRAMUSCULAR; INTRAVENOUS at 14:39

## 2019-04-10 RX ADMIN — FENTANYL CITRATE 25 MCG: 50 INJECTION, SOLUTION INTRAMUSCULAR; INTRAVENOUS at 14:23

## 2019-04-10 RX ADMIN — NITROGLYCERIN 100 MCG/MIN: 10 INJECTION INTRAVENOUS at 15:39

## 2019-04-10 RX ADMIN — FENTANYL CITRATE 50 MCG: 50 INJECTION, SOLUTION INTRAMUSCULAR; INTRAVENOUS at 15:12

## 2019-04-10 RX ADMIN — LABETALOL HYDROCHLORIDE 10 MG: 5 INJECTION, SOLUTION INTRAVENOUS at 14:30

## 2019-04-10 RX ADMIN — IOPAMIDOL 280 ML: 510 INJECTION, SOLUTION INTRAVASCULAR at 15:58

## 2019-04-10 RX ADMIN — LABETALOL HYDROCHLORIDE 10 MG: 5 INJECTION, SOLUTION INTRAVENOUS at 14:40

## 2019-04-10 RX ADMIN — FENTANYL CITRATE 25 MCG: 50 INJECTION, SOLUTION INTRAMUSCULAR; INTRAVENOUS at 14:39

## 2019-04-10 RX ADMIN — MIDAZOLAM HYDROCHLORIDE 0.5 MG: 1 INJECTION, SOLUTION INTRAMUSCULAR; INTRAVENOUS at 15:31

## 2019-04-10 RX ADMIN — SODIUM CHLORIDE 25 ML/HR: 900 INJECTION, SOLUTION INTRAVENOUS at 11:22

## 2019-04-10 RX ADMIN — FENTANYL CITRATE 25 MCG: 50 INJECTION, SOLUTION INTRAMUSCULAR; INTRAVENOUS at 14:37

## 2019-04-10 RX ADMIN — MIDAZOLAM HYDROCHLORIDE 0.5 MG: 1 INJECTION, SOLUTION INTRAMUSCULAR; INTRAVENOUS at 14:49

## 2019-04-10 RX ADMIN — MIDAZOLAM HYDROCHLORIDE 1 MG: 1 INJECTION, SOLUTION INTRAMUSCULAR; INTRAVENOUS at 15:20

## 2019-04-10 RX ADMIN — MIDAZOLAM HYDROCHLORIDE 0.5 MG: 1 INJECTION, SOLUTION INTRAMUSCULAR; INTRAVENOUS at 14:37

## 2019-04-10 RX ADMIN — Medication 10 MG: at 14:30

## 2019-04-10 RX ADMIN — SODIUM CHLORIDE 25 ML/HR: 900 INJECTION, SOLUTION INTRAVENOUS at 15:30

## 2019-04-10 RX ADMIN — MIDAZOLAM HYDROCHLORIDE 1 MG: 1 INJECTION, SOLUTION INTRAMUSCULAR; INTRAVENOUS at 14:13

## 2019-04-10 RX ADMIN — FENTANYL CITRATE 25 MCG: 50 INJECTION, SOLUTION INTRAMUSCULAR; INTRAVENOUS at 15:22

## 2019-04-10 RX ADMIN — FENTANYL CITRATE 50 MCG: 50 INJECTION, SOLUTION INTRAMUSCULAR; INTRAVENOUS at 15:36

## 2019-04-10 RX ADMIN — HEPARIN SODIUM 6000 UNITS: 1000 INJECTION INTRAVENOUS; SUBCUTANEOUS at 14:23

## 2019-04-10 RX ADMIN — FENTANYL CITRATE 25 MCG: 50 INJECTION, SOLUTION INTRAMUSCULAR; INTRAVENOUS at 15:07

## 2019-04-10 NOTE — H&P
ASSESSMENT:  As detailed in the progress note below, the patient was assessed for:  (I65.23) Bilateral carotid artery stenosis - Plan: PVL CAROTID ARTERY BILATERAL     (I71.4) AAA (abdominal aortic aneurysm) without rupture (HCC) - Plan: PVL AORTA ANEURYSM DUPLEX     (I73.9) PAD (peripheral artery disease) ([facility])     PLAN:  Discussed evaluation, treatment and usual course. Patient verbalizes understanding and agrees with plan of care. All questions were answered. Orders:        Orders Placed This Encounter   Procedures    PVL CAROTID ARTERY BILATERAL    PVL AORTA ANEURYSM DUPLEX         Follow AAA and Carotids - CTAs reviewed - 70% right ICA stenosis noted which is c/w PVL - continue duplex monitoring at current time  AAA < 5 cm and will watch that as well for now - infrarenal and amenable to stent graft placement if needed in the future  Has new tip gangrene right foot 2nd digit and will need angiogram. Has non palpable pedal pulse        SUBJECTIVE:       Chief Complaint   Patient presents with    CAROTID ARTERY STENOSIS       Follow up with CTA   HPI     Came to review CTA - 70% right ICA stenosis and AAA (infrarenal < 3 cm in max diameter  2nd toe right foot with tip gangrene  CTA showed:  Changes from left carotid endarterectomy without evidence of significant stenosis of the left carotid bifurcation/ICA. 2. Diffuse heavy burden of atherosclerotic calcification of the right carotid system. Multifocal luminal encroachment of the right mid and distal common carotid artery, especially distally with greater than 50% stenosis. In this region there is severe burden of calcification encroaching on the origin of the right ICA with approximately 70% stenosis (severe). The plaque continues distal to this for approximately 2 cm with less pronounce stenosis. 3. Multifocal high-grade stenoses of the diminutive right vertebral artery, with possible short segment occlusions including intracranially.    4. No significant stenosis of the dominant left vertebral artery except for at least mild stenosis at its origin. 5. No significant focal stenosis of the anterior circulation intracranially. 6. Fetal type posterior cerebral arteries noted. 7. Poorly evaluated origins of left common carotid artery left subclavian artery secondary to streak artifact. Atherosclerotic disease of the right subclavian artery diffusely. This vessel is not well evaluated lateral to the rib level. 8. Encephalomalacia in the high left cerebral hemisphere.      Vascular:   1. Calcified and noncalcified atheromatous plaque throughout the abdominal aorta with ectasia of the infrarenal abdominal aorta measuring up to 2.8 cm.       2. At least two areas of suspected penetrating atheromatous ulcer involving the infrarenal abdominal aorta without evidence of rupture.       3. Extensive calcifications involving the bilateral iliofemoral vessels which still remain patent.           Abdomen/Pelvis:    1. No abdominopelvic lymphadenopathy by CT size criteria.       2. Status post hysterectomy.            She has no claudication or rest pain otherwise. She is asymptomatic  is on Plavix. Neurologically she has no symptoms.     2nd toe right foot tip gangrene - with non palpable pedal pulse would benefit from angiogram                           Old records reviewed.         Past Medical History:   Diagnosis Date    Arthritis, rheumatoid ([facility] [address]      Blood transfusion 9/09    Diabetes       diet controlled    Elevated cholesterol      Gastric ulcer \"years ago\"     \"with bleed, from ibuprofen use\"    Heart murmur      Hx-TIA (transient ischemic attack)      Hypertension      Joint replacement       left fingers    Loose, teeth       Upper and lower dentures    MRSA (methicillin resistant Staphylococcus aureus) 9/09     left shoulder blade-treated and no further problems    Osteopenia      Piercing       ears    Pulmonary fibrosis (HCC)       Pulmonary Fibrosis due to RA med states patient    Rheumatoid arthritis(714.0)      Toe ulcer ([address] 5/5/2017     RIGHT GREAT TOE    Unspecified deficiency anemia      Wears dentures              Past Surgical History:   Procedure Laterality Date    ABDOMINOPLASTY   1999    AMPUTATION Right 5/5/2017     RIGHT GREAT TOE    BREAST REDUCTION   9/21/2012    CAROTID ENDARTERECTOMY   2/4/2013     Procedure:Left CAROTID ENDARTERECTOMY; Surgeon:ILA PALOMARES; Location[facility];  Service:Vascular; Laterality:Left; left carotid endarterectomy  cpt 83701    CATARACT REMOVAL WITH IOL INSERTION Right 4/10/2017     Procedure: EXTRACAPSULAR CATARACT EXTRACTION WITH INSERTION INTRAOCULAR PROSTHESIS NON-COMPLEX;  Surgeon: Sean Godfrey MD    CATARACT REMOVAL WITH IOL INSERTION Left 6/5/2017     Procedure: EXTRACAPSULAR CATARACT EXTRACTION WITH INSERTION INTRAOCULAR PROSTHESIS NON-COMPLEX;  Surgeon: Sean Godfrey MD    EXTRACTION TEETH        HYSTERECTOMY         age 30    JOINT REPLACEMENT         joints on left hand    ORTHOPEDIC SURGERY   9/09     left shoulder blade drained    ROTATOR CUFF REPAIR Right 2004     right     TONSILLECTOMY                Family History   Problem Relation Age of Onset    Hypertension Mother      Diabetes Mother      Other Family History Father           osteoarthritis      Social History               Socioeconomic History    Marital status: Single       Spouse name: Not on file    Number of children: Not on file    Years of education: Not on file    Highest education level: Not on file   Occupational History    Not on file   Social Needs    Financial resource strain: Not on file    Food insecurity:       Worry: Not on file       Inability: Not on file    Transportation needs:       Medical: Not on file       Non-medical: Not on file   Tobacco Use    Smoking status: Former Smoker       Packs/day: 1.00       Years: 20.00       Pack years: 20.00       Types: Cigarettes       Last attempt to quit: 1995       Years since quittin.1    Smokeless tobacco: Never Used   Substance and Sexual Activity    Alcohol use: No    Drug use: No    Sexual activity: Not on file   Lifestyle    Physical activity:       Days per week: Not on file       Minutes per session: Not on file    Stress: Not on file   Relationships    Social connections:       Talks on phone: Not on file       Gets together: Not on file       Attends Shinto service: Not on file       Active member of club or organization: Not on file       Attends meetings of clubs or organizations: Not on file       Relationship status: Not on file    Intimate partner violence:       Fear of current or ex partner: Not on file       Emotionally abused: Not on file       Physically abused: Not on file       Forced sexual activity: Not on file   Other Topics Concern    Not on file   Social History Narrative    Not on file                Current Outpatient Medications   Medication Sig Dispense Refill    amlodipine (NORVASC) 10 mg PO TABS Take 10 mg by Mouth Once a Day.        atorvastatin (LIPITOR) 20 mg PO TABS Take 20 mg by Mouth Every Night at Bedtime.        clobetasol (TEMOVATE) 0.05 % Top OINT WILLIE BID PRN UP TO 2 WEEKS THEN TK AT LEAST 1 WEEK BREAK   2    clopidogrel (PLAVIX) 75 mg PO TABS Take 75 mg by Mouth Once a Day.        ergocalciferol (ERGOCALCIFEROL) 50,000 unit PO CAPS Take 23919 Units by Mouth Every 7 Days. Monday        ferrous sulfate (FEOSOL) 325 mg (65 mg Iron) PO TABS TK 1 T PO BID OES   2    HYDROxychloroquine (PLAQUENIL) 200 mg PO TABS TK 2 TS PO QD   0    Losartan 100 mg PO TABS Take 100 mg by Mouth Once a Day.        MULTIVITAMIN & MINERAL FORMULA PO Take 1 Tab by Mouth Once a Day.          traMADol (ULTRAM) 50 mg PO TABS TK 2 TS PO TID PRN   2      No current facility-administered medications for this visit.             Allergies   Allergen Reactions    Enbrel [Etanercept] other/intolerance       \"SOME TYPE OF NODULE OR GROWTH ON MY LUNG\"    Seasonal cough         Review of Systems   Constitutional: Negative for fever, chills and weight loss. HENT: Negative for hearing loss and nosebleeds. Eyes:        No sudden loss of vision. Respiratory: Negative for cough, hemoptysis, shortness of breath and wheezing. Cardiovascular: Negative for chest pain, palpitations, claudication and leg swelling. No rest pain. Gastrointestinal: Negative for nausea, vomiting, abdominal pain, blood in stool and melena. Musculoskeletal: Positive for joint pain. Negative for back pain. Skin: Negative for itching and rash. No cellulitis, ulcers, or sores. Neurological: Negative for speech change, focal weakness and headaches. Endo/Heme/Allergies: Does not bruise/bleed easily. Psychiatric/Behavioral: Negative for depression. The patient is not nervous/anxious.          OBJECTIVE:  /70   Ht 5' 6\" (1.676 m)   Wt 64.4 kg (142 lb)   BMI 22.92 kg/m²  BMI: 23.49     Physical Exam   Nursing note and vitals reviewed. Constitutional: She is oriented to person, place, and time and well-developed, well-nourished, and in no distress. HENT:   Head: Normocephalic. Mouth/Throat: Mucous membranes are normal.   Eyes: Conjunctivae and lids are normal.   Neck: Normal range of motion. Neck supple. Normal carotid pulses and no JVD present. Carotid bruit is present. Cardiovascular: Normal rate, regular rhythm, normal heart sounds and intact distal pulses. Pulses:       Carotid pulses are 2+ on the right side, and 2+ on the left side with bruit. Radial pulses are 2+ on the right side, and 2+ on the left side. Femoral pulses are 2+ on the right side, and 2+ on the left side. Popliteal pulses are 1+ on the right side, and 2+ on the left side. Dorsalis pedis pulses are 1+ on the right side, and 2+ on the left side.         Posterior tibial pulses are 0 on the right side, and 2+ on the left side. Normal PMI, no palpable thrills; lifts or palpable S3 or S4   Pulmonary/Chest: Effort normal and breath sounds normal. She has no wheezes. She has no rales. Abdominal: Soft. She exhibits no distension. There is no hepatosplenomegaly. There is no tenderness. There is no guarding. Musculoskeletal: Normal range of motion. She exhibits no edema and no tenderness. Right shoulder: She exhibits no swelling. Lymphadenopathy:     She has no cervical adenopathy. Neurological: She is alert and oriented to person, place, and time. She displays no weakness, facial symmetry and normal speech. Gait normal. Gait normal.   Skin: Skin is warm and dry. No lesion and no rash noted. No cyanosis or erythema. No pallor.    Psychiatric: Mood and affect normal.                   Ever Abhishek CONTRERAS [facility]  [facility]  [phone]/  [phone]  Pager [phone]

## 2019-04-10 NOTE — DISCHARGE INSTRUCTIONS
Patient Education        Angiogram: What to Expect at Home  Your Recovery  An angiogram is an X-ray test that uses dye and a camera to take pictures of the blood flow in an artery or a vein. The doctor inserted a thin, flexible tube (catheter) into a blood vessel in your groin. In some cases, the catheter is placed in a blood vessel in the arm. An angiogram is done for many reasons. For example, you may have an angiogram to find the source of bleeding, such as an ulcer. Or it may be done to look for blocked blood vessels in your lungs. After an angiogram, your groin or arm may have a bruise and feel sore for a day or two. You can do light activities around the house but nothing strenuous for several days. Your doctor may give you specific instructions on when you can do your normal activities again, such as driving and going back to work. This care sheet gives you a general idea about how long it will take for you to recover. But each person recovers at a different pace. Follow the steps below to feel better as quickly as possible. How can you care for yourself at home? Activity    · Do not do strenuous exercise and do not lift, pull, or push anything heavy until your doctor says it is okay. This may be for a day or two. You can walk around the house and do light activity, such as cooking.     · You may shower 24 to 48 hours after the procedure, if your doctor okays it. Pat the incision dry. Do not take a bath for 1 week, or until your doctor tells you it is okay.     · If the catheter was placed in your groin, try not to walk up stairs for the first couple of days.     · If the catheter was placed in your arm near your wrist, do not bend your wrist deeply for the first couple of days. Be careful using your hand to get into and out of a chair or bed.     · If your doctor recommends it, get more exercise. Walking is a good choice. Bit by bit, increase the amount you walk every day.  Try for at least 30 minutes on most days of the week. Diet    · Drink plenty of fluids to help your body flush out the dye. If you have kidney, heart, or liver disease and have to limit fluids, talk with your doctor before you increase the amount of fluids you drink.     · You can eat your normal diet. If your stomach is upset, try bland, low-fat foods like plain rice, broiled chicken, toast, and yogurt. Medicines    · Be safe with medicines. Read and follow all instructions on the label. ? If the doctor gave you a prescription medicine for pain, take it as prescribed. ? If you are not taking a prescription pain medicine, ask your doctor if you can take an over-the-counter medicine.     · If you take blood thinners, such as warfarin (Coumadin), clopidogrel (Plavix), or aspirin, be sure to talk to your doctor. He or she will tell you if and when to start taking those medicines again. Make sure that you understand exactly what your doctor wants you to do.     · Your doctor will tell you if and when you can restart your medicines. He or she will also give you instructions about taking any new medicines.    Care of the catheter site    · You will have a dressing over the cut (incision). A dressing helps the incision heal and protects it. Your doctor will tell you how to take care of this.     · Put ice or a cold pack on the area for 10 to 20 minutes at a time to help with soreness or swelling. Put a thin cloth between the ice and your skin. Follow-up care is a key part of your treatment and safety. Be sure to make and go to all appointments, and call your doctor if you are having problems. It's also a good idea to know your test results and keep a list of the medicines you take. When should you call for help? Call 911 anytime you think you may need emergency care.  For example, call if:    · You passed out (lost consciousness).     · You have severe trouble breathing.     · You have sudden chest pain and shortness of breath, or you cough up blood.    Call your doctor now or seek immediate medical care if:    · You are bleeding from the area where the catheter was put in your artery.     · You have a fast-growing, painful lump at the catheter site.     · You have signs of infection, such as:  ? Increased pain, swelling, warmth, or redness. ? Red streaks leading from the incision. ? Pus draining from the incision. ? A fever.    Watch closely for any changes in your health, and be sure to contact your doctor if:    · You don't get better as expected. Where can you learn more? Go to http://sarina-ryan.info/. Enter V157 in the search box to learn more about \"Angiogram: What to Expect at Home. \"  Current as of: June 25, 2018  Content Version: 11.9  © 2696-2168 [employer]. Care instructions adapted under license by [employer] (which disclaims liability or warranty for this information). If you have questions about a medical condition or this instruction, always ask your healthcare professional. [place] [address] any warranty or liability for your use of this information. DISCHARGE SUMMARY from Nurse    PATIENT INSTRUCTIONS:    After general anesthesia or intravenous sedation, for 24 hours or while taking prescription Narcotics:  · Limit your activities  · Do not drive and operate hazardous machinery  · Do not make important personal or business decisions  · Do  not drink alcoholic beverages  · If you have not urinated within 8 hours after discharge, please contact your surgeon on call.     Report the following to your surgeon:  · Excessive pain, swelling, redness or odor of or around the surgical area  · Temperature over 100.5  · Nausea and vomiting lasting longer than 4 hours or if unable to take medications  · Any signs of decreased circulation or nerve impairment to extremity: change in color, persistent  numbness, tingling, coldness or increase pain  · Any questions    What to do at Home:  Recommended activity: Activity as tolerated,     . *  Please give a list of your current medications to your Primary Care Provider. *  Please update this list whenever your medications are discontinued, doses are      changed, or new medications (including over-the-counter products) are added. *  Please carry medication information at all times in case of emergency situations. These are general instructions for a healthy lifestyle:    No smoking/ No tobacco products/ Avoid exposure to second hand smoke  Surgeon General's Warning:  Quitting smoking now greatly reduces serious risk to your health. Obesity, smoking, and sedentary lifestyle greatly increases your risk for illness    A healthy diet, regular physical exercise & weight monitoring are important for maintaining a healthy lifestyle    You may be retaining fluid if you have a history of heart failure or if you experience any of the following symptoms:  Weight gain of 3 pounds or more overnight or 5 pounds in a week, increased swelling in our hands or feet or shortness of breath while lying flat in bed. Please call your doctor as soon as you notice any of these symptoms; do not wait until your next office visit. Recognize signs and symptoms of STROKE:    F-face looks uneven    A-arms unable to move or move unevenly    S-speech slurred or non-existent    T-time-call 911 as soon as signs and symptoms begin-DO NOT go       Back to bed or wait to see if you get better-TIME IS BRAIN. Warning Signs of HEART ATTACK     Call 911 if you have these symptoms:   Chest discomfort. Most heart attacks involve discomfort in the center of the chest that lasts more than a few minutes, or that goes away and comes back. It can feel like uncomfortable pressure, squeezing, fullness, or pain.  Discomfort in other areas of the upper body. Symptoms can include pain or discomfort in one or both arms, the back, neck, jaw, or stomach.    Shortness of breath with or without chest discomfort.  Other signs may include breaking out in a cold sweat, nausea, or lightheadedness. Don't wait more than five minutes to call 911 - MINUTES MATTER! Fast action can save your life. Calling 911 is almost always the fastest way to get lifesaving treatment. Emergency Medical Services staff can begin treatment when they arrive -- up to an hour sooner than if someone gets to the hospital by car. The discharge information has been reviewed with the patient and caregiver. The patient and caregiver verbalized understanding. Discharge medications reviewed with the patient and caregiver and appropriate educational materials and side effects teaching were provided.       Patient armband removed and shredded    ___________________________________________________________________________________________________________________________________

## 2019-04-10 NOTE — PROGRESS NOTES
Pt is all prepped and ready for procedure. 1400 Pt back top care unit S/P angiogram. Dressing to lt groin with Tegaderm dry and intact. Pt awake and alert. Offered no complains. Bedrest reinforced with lt leg precautions. 1800 Discharge instructions reviewed with pt and son and they verbalized all understandings. Pt up to bathroom and tolerated well. Pt escorted to car and left with son instable condition with no complaints.

## 2019-04-10 NOTE — PROGRESS NOTES
TRANSFER - OUT REPORT:    Verbal report given to Ofelia Gaona Mc 201, RN(name) on Jo Jones  being transferred to Care Unit(unit) for routine progression of care       Report consisted of patients Situation, Background, Assessment and   Recommendations(SBAR). Information from the following report(s) SBAR, Kardex and MAR was reviewed with the receiving nurse. Lines:   Peripheral IV 04/10/19 Anterior;Right Forearm (Active)   Site Assessment Clean, dry, & intact 4/10/2019 11:21 AM   Phlebitis Assessment 0 4/10/2019 11:21 AM   Infiltration Assessment 0 4/10/2019 11:21 AM   Dressing Status Clean, dry, & intact 4/10/2019 11:21 AM   Dressing Type Tape;Transparent 4/10/2019 11:21 AM   Hub Color/Line Status Flushed; Infusing;Blue;Capped 4/10/2019 11:21 AM   Action Taken Open ports on tubing capped 4/10/2019 11:21 AM   Alcohol Cap Used Yes 4/10/2019 11:21 AM        Opportunity for questions and clarification was provided. Pt's bathed/linens changed x 3 RNs upon arrival to care unit; pt voided on table during procedure.     Patient transported with:   Registered Nurse

## 2019-04-10 NOTE — PROCEDURES
[facility]  PROCEDURE NOTE  [address]         Date: 4/10/2019,4:07 PM  Case Tracking Events     Event Time In    Procedure Start 04/08/2019 1108    Procedure End 04/08/2019 1509          Pre-Op Dx : PAD with non healing toe ulcer right foot    Post-Op Dx: same    Procedure : Aortoiliac angiogram with bilateral Lower extremity runoff, Atherectomy and angioplasty right superficial femoral artery with selective catheterization right anterior tibial artery with angioplasty right anterior tibial artery, ultrasound guided sheath access left common femoral artery    Surgeon : Robbie Velasco    Assistant: None    Anesthesia : Moderate Sedation and lidocaine    Findings : The aortoiliac segment was patent. The bilateral common femoral arteries and profunda femoris arteries were patent. On the right the superficial femoral artery had diffuse disease throughout and a significant area of stenosis in its distal portion. It was difficult to pass and atherectomy device through this. The popliteal artery vessels had moderate calcification but were otherwise patent on the right leg. There was three-vessel runoff. The anterior tibial artery was a dominant runoff and patent all the way to the ankle with an area of severe stenosis just above the ankle mortise. The dorsalis pedis artery was otherwise patent and gave off digital vessels. The posterior tibial artery had disease more distally throughout its length but gave off a lateral and medial plantar artery. The peroneal artery was patent but small. On the left the common femoral artery, profunda femoris artery and superficial femoral artery were patent with diffuse disease throughout the superficial femoral artery. The popliteal artery was patent throughout its length with moderate degree of calcification.   There was three-vessel runoff with a smaller posterior tibial artery with disease distally and an anterior tibial artery on the left that was patent all the way down to the foot with reasonable pedal vessel filling. I could not cross the distal anterior tibial artery lesion with a balloon. I injected nitroglycerin distally to dilate the pedal vessels. There was no distal embolization during the atherectomy procedure. Comp: none    EBL : Minimal    Contrast : approximately 100 cc ml. Visipaque    Access : Left femoral artery    Implants : NO     Specimens: None    Blood Product Administered : NO     Sheath Size : 7 Fr    Heparin : YES 7 thousand units    Closure Device : YES Mynx    US Guidance : YES     Special Devices : Silverhawk    Sedation :    Moderate sedation was administered. The patient was constantly monitored by Ricardo Miller  from 156 pm  until 400 pm  hours. Fentanyl 300 mcg, Versed 6 mg. INDICATION FOR PROCEDURE:  49-year-old -American female with a nonhealing right second toe amputation site distally. There was tip gangrene. She is brought for angiogram due to nonpalpable pedal pulses and poor circulation and healing. Risks, benefits and potential complications of the procedure were explained to the patient prior to proceeding and she signed a consent allowing me to proceed. TECHNICAL DETAILS OF PROCEDURE:    The patient was correctly identified brought to the interventional suite and placed in supine position. Timeout was taken and marked sedation is administered. Left groin was prepped and draped in a sterile fashion. 5 Thai sheath was inserted into the left common femoral artery with ultrasound guidance and Seldinger technique after numbing the skin. Universal flush catheter was passed into the aorta over a Bentson wire and an aortoiliac angiogram was performed demonstrating a patent aortoiliac segment with patent bilateral renal arteries and superior mesenteric artery.   The catheter was then pulled down to the aortic bifurcation and a bilateral lower extremity runoff was performed with the findings as described above. With intention to treat I passed a wire through my universal flush catheter into the right common femoral artery over the bifurcation. I then upsized to a 7 Western Shabana sheath. A Glidewire and catheter were passed together through the superficial femoral artery to the below-the-knee popliteal artery. The wire was removed and tibial vessel angiogram was performed. I then passed a 7 mm spider filter on a 0.14 wire into the below-the-knee popliteal artery. I removed the catheter. I attempted to do [facility] atherectomy using a 7 Western Shabana L as device but was having difficulty passing it beyond a certain point in the superficial femoral artery. Angiogram demonstrated severe disease here. I then used a 5 mm chocolate balloon to angioplasty and multiple overlapping inflations the entire superficial femoral artery. Each time I held inflation for 2 minutes each time up to 6 xiomara of pressure. After doing this there was improved luminal caliber and decent flow through the superficial femoral artery. I then repassed a smaller [place] atherectomy device which was successful in removing some plaque in the more diseased portions of the superficial femoral artery. After doing this I angioplastied the entire superficial femoral artery with a 5 mm x 150 mm drug-coated balloon. This was an impact Admiral drug-coated balloon. I held inflation for 3 minutes up to 10 xiomara of pressure. After I removed the balloon there was excellent flow through the entire femoral-popliteal segment. I then removed my filter through my trailblazer catheter. And had some debris within it. I did another tibial vessel angiogram and showed with magnified views the distal anterior tibial artery occlusion. I passed a 0.14 wire through an angled trailblazer catheter as far distally as I could and was able to actually cross with the wire the area of near occlusion in the distal anterior tibial artery.   I removed the catheter. I tried to pass a 1.5 mm balloon beyond it but I was unsuccessful. I still angioplastied proximal to this area for 2 minutes up to 4 xiomara of pressure. I deflated the balloon and removed it and then I did another angiogram still showing the area of severe stenosis. I tried to pass a 2 mm balloon and it would not pass. I tried to pass a 0.14 compatible catheter and it would not pass. At this point I simply stopped. I injected nitroglycerin through a catheter in the more proximal anterior tibial artery. A total of 400 mcg of nitroglycerin were injected repeated by another 400 mcg. There was decent dilation of the arteries in the distal anterior tibial artery and dorsalis pedis artery as well as the foot. At this point I removed all catheters balloons and wires pulled back my sheath and exchanged for a short 7 Western Shabana sheath which I then removed and closed the arteriotomy with a minx  closure device. Stasis was good. A sterile dressing was applied. The patient tolerated the procedure well and went to the recovery in stable condition. I was present and scrubbed throughout.       [address] [place] [address] [facility]  [phone]  Pager [phone]

## 2019-06-03 ENCOUNTER — HOSPITAL ENCOUNTER (OUTPATIENT)
Dept: GENERAL RADIOLOGY | Age: 74
Discharge: HOME | End: 2019-06-03
Payer: MEDICARE

## 2019-06-03 DIAGNOSIS — R63.4 LOSS OF WEIGHT: ICD-10-CM

## 2019-06-03 PROCEDURE — 71046 X-RAY EXAM CHEST 2 VIEWS: CPT

## 2019-06-06 ENCOUNTER — HOSPITAL ENCOUNTER (OUTPATIENT)
Dept: PREADMISSION TESTING | Age: 74
Discharge: HOME | End: 2019-06-06
Payer: MEDICARE

## 2019-06-06 DIAGNOSIS — Z01.818 PREOPERATIVE EXAMINATION, UNSPECIFIED: ICD-10-CM

## 2019-06-06 LAB
ANION GAP SERPL CALC-SCNC: 5 MMOL/L (ref 3–18)
ATRIAL RATE: 75 BPM
BUN SERPL-MCNC: 21 MG/DL (ref 7–18)
BUN/CREAT SERPL: 35 (ref 12–20)
CALCIUM SERPL-MCNC: 9.1 MG/DL (ref 8.5–10.1)
CALCULATED P AXIS, ECG09: 30 DEGREES
CALCULATED R AXIS, ECG10: 36 DEGREES
CALCULATED T AXIS, ECG11: 41 DEGREES
CHLORIDE SERPL-SCNC: 105 MMOL/L (ref 100–108)
CO2 SERPL-SCNC: 31 MMOL/L (ref 21–32)
CREAT SERPL-MCNC: 0.6 MG/DL (ref 0.6–1.3)
DIAGNOSIS, 93000: NORMAL
GLUCOSE SERPL-MCNC: 80 MG/DL (ref 74–99)
HCT VFR BLD AUTO: 30.6 % (ref 35–45)
HGB BLD-MCNC: 9.3 G/DL (ref 12–16)
P-R INTERVAL, ECG05: 144 MS
POTASSIUM SERPL-SCNC: 4.2 MMOL/L (ref 3.5–5.5)
Q-T INTERVAL, ECG07: 380 MS
QRS DURATION, ECG06: 86 MS
QTC CALCULATION (BEZET), ECG08: 424 MS
SODIUM SERPL-SCNC: 141 MMOL/L (ref 136–145)
VENTRICULAR RATE, ECG03: 75 BPM

## 2019-06-06 PROCEDURE — 36415 COLL VENOUS BLD VENIPUNCTURE: CPT

## 2019-06-06 PROCEDURE — 85018 HEMOGLOBIN: CPT

## 2019-06-06 PROCEDURE — 80048 BASIC METABOLIC PNL TOTAL CA: CPT

## 2019-06-06 PROCEDURE — 93005 ELECTROCARDIOGRAM TRACING: CPT

## 2019-06-10 LAB
FAX TO INFO,FAXT: NORMAL
FAX TO NUMBER,FAXN: NORMAL

## 2019-06-20 ENCOUNTER — HOSPITAL ENCOUNTER (OUTPATIENT)
Dept: LAB | Age: 74
Discharge: HOME | End: 2019-06-20
Payer: MEDICARE

## 2019-06-20 PROCEDURE — 88311 DECALCIFY TISSUE: CPT

## 2019-06-20 PROCEDURE — 88307 TISSUE EXAM BY PATHOLOGIST: CPT

## 2019-06-20 PROCEDURE — 88305 TISSUE EXAM BY PATHOLOGIST: CPT

## 2019-08-28 ENCOUNTER — HOSPITAL ENCOUNTER (OUTPATIENT)
Dept: LAB | Age: 74
Discharge: HOME | End: 2019-08-28
Attending: PODIATRIST
Payer: MEDICARE

## 2019-08-28 LAB
ANION GAP SERPL CALC-SCNC: 6 MMOL/L (ref 3–18)
BUN SERPL-MCNC: 16 MG/DL (ref 7–18)
BUN/CREAT SERPL: 28 (ref 12–20)
CALCIUM SERPL-MCNC: 8.8 MG/DL (ref 8.5–10.1)
CHLORIDE SERPL-SCNC: 105 MMOL/L (ref 100–111)
CO2 SERPL-SCNC: 29 MMOL/L (ref 21–32)
CREAT SERPL-MCNC: 0.58 MG/DL (ref 0.6–1.3)
GLUCOSE SERPL-MCNC: 107 MG/DL (ref 74–99)
HCT VFR BLD AUTO: 28.5 % (ref 35–45)
HGB BLD-MCNC: 8.8 G/DL (ref 12–16)
POTASSIUM SERPL-SCNC: 4 MMOL/L (ref 3.5–5.5)
SODIUM SERPL-SCNC: 140 MMOL/L (ref 136–145)

## 2019-08-28 PROCEDURE — 85018 HEMOGLOBIN: CPT

## 2019-08-28 PROCEDURE — 36415 COLL VENOUS BLD VENIPUNCTURE: CPT

## 2019-08-28 PROCEDURE — 80048 BASIC METABOLIC PNL TOTAL CA: CPT

## 2019-10-12 ENCOUNTER — HOSPITAL ENCOUNTER (EMERGENCY)
Age: 74
Discharge: HOME | End: 2019-10-12
Attending: EMERGENCY MEDICINE
Payer: MEDICARE

## 2019-10-12 VITALS
SYSTOLIC BLOOD PRESSURE: 146 MMHG | HEIGHT: 66 IN | WEIGHT: 143 LBS | TEMPERATURE: 97.9 F | DIASTOLIC BLOOD PRESSURE: 56 MMHG | OXYGEN SATURATION: 99 % | RESPIRATION RATE: 20 BRPM | HEART RATE: 64 BPM | BODY MASS INDEX: 22.98 KG/M2

## 2019-10-12 DIAGNOSIS — R42 LIGHT HEADED: Primary | ICD-10-CM

## 2019-10-12 LAB
ALBUMIN SERPL-MCNC: 3.8 G/DL (ref 3.4–5)
ALBUMIN/GLOB SERPL: 1 {RATIO} (ref 0.8–1.7)
ALP SERPL-CCNC: 92 U/L (ref 45–117)
ALT SERPL-CCNC: 17 U/L (ref 13–56)
ANION GAP SERPL CALC-SCNC: 5 MMOL/L (ref 3–18)
AST SERPL-CCNC: 20 U/L (ref 10–38)
BASOPHILS # BLD: 0 K/UL (ref 0–0.1)
BASOPHILS NFR BLD: 0 % (ref 0–2)
BILIRUB SERPL-MCNC: 0.3 MG/DL (ref 0.2–1)
BUN SERPL-MCNC: 22 MG/DL (ref 7–18)
BUN/CREAT SERPL: 32 (ref 12–20)
CALCIUM SERPL-MCNC: 9.3 MG/DL (ref 8.5–10.1)
CHLORIDE SERPL-SCNC: 102 MMOL/L (ref 100–111)
CK MB CFR SERPL CALC: 0.9 % (ref 0–4)
CK MB SERPL-MCNC: 1.8 NG/ML (ref 5–25)
CK SERPL-CCNC: 191 U/L (ref 26–192)
CO2 SERPL-SCNC: 32 MMOL/L (ref 21–32)
CREAT SERPL-MCNC: 0.69 MG/DL (ref 0.6–1.3)
DIFFERENTIAL METHOD BLD: ABNORMAL
EOSINOPHIL # BLD: 0.6 K/UL (ref 0–0.4)
EOSINOPHIL NFR BLD: 8 % (ref 0–5)
ERYTHROCYTE [DISTWIDTH] IN BLOOD BY AUTOMATED COUNT: 16.4 % (ref 11.6–14.5)
GLOBULIN SER CALC-MCNC: 3.8 G/DL (ref 2–4)
GLUCOSE SERPL-MCNC: 83 MG/DL (ref 74–99)
HCT VFR BLD AUTO: 32.1 % (ref 35–45)
HGB BLD-MCNC: 9.8 G/DL (ref 12–16)
LYMPHOCYTES # BLD: 2.4 K/UL (ref 0.9–3.6)
LYMPHOCYTES NFR BLD: 29 % (ref 21–52)
MAGNESIUM SERPL-MCNC: 2.3 MG/DL (ref 1.6–2.6)
MCH RBC QN AUTO: 21.5 PG (ref 24–34)
MCHC RBC AUTO-ENTMCNC: 30.5 G/DL (ref 31–37)
MCV RBC AUTO: 70.5 FL (ref 74–97)
MONOCYTES # BLD: 0.7 K/UL (ref 0.05–1.2)
MONOCYTES NFR BLD: 9 % (ref 3–10)
NEUTS SEG # BLD: 4.4 K/UL (ref 1.8–8)
NEUTS SEG NFR BLD: 54 % (ref 40–73)
PLATELET # BLD AUTO: 370 K/UL (ref 135–420)
PMV BLD AUTO: 9.7 FL (ref 9.2–11.8)
POTASSIUM SERPL-SCNC: 4 MMOL/L (ref 3.5–5.5)
PROT SERPL-MCNC: 7.6 G/DL (ref 6.4–8.2)
RBC # BLD AUTO: 4.55 M/UL (ref 4.2–5.3)
RBC MORPH BLD: ABNORMAL
SODIUM SERPL-SCNC: 139 MMOL/L (ref 136–145)
TROPONIN I SERPL-MCNC: <0.02 NG/ML (ref 0–0.04)
WBC # BLD AUTO: 8.1 K/UL (ref 4.6–13.2)

## 2019-10-12 PROCEDURE — 36415 COLL VENOUS BLD VENIPUNCTURE: CPT

## 2019-10-12 PROCEDURE — 99283 EMERGENCY DEPT VISIT LOW MDM: CPT

## 2019-10-12 PROCEDURE — 82550 ASSAY OF CK (CPK): CPT

## 2019-10-12 PROCEDURE — 93005 ELECTROCARDIOGRAM TRACING: CPT

## 2019-10-12 PROCEDURE — 80053 COMPREHEN METABOLIC PANEL: CPT

## 2019-10-12 PROCEDURE — 85025 COMPLETE CBC W/AUTO DIFF WBC: CPT

## 2019-10-12 PROCEDURE — 83735 ASSAY OF MAGNESIUM: CPT

## 2019-10-12 NOTE — DISCHARGE INSTRUCTIONS
Patient Education        Lightheadedness or Faintness: Care Instructions  Your Care Instructions  Lightheadedness is a feeling that you are about to faint or \"pass out. \" You do not feel as if you or your surroundings are moving. It is different from vertigo, which is the feeling that you or things around you are spinning or tilting. Lightheadedness usually goes away or gets better when you lie down. If lightheadedness gets worse, it can lead to a fainting spell. It is common to feel lightheaded from time to time. Lightheadedness usually is not caused by a serious problem. It often is caused by a short-lasting drop in blood pressure and blood flow to your head that occurs when you get up too quickly from a seated or lying position. Follow-up care is a key part of your treatment and safety. Be sure to make and go to all appointments, and call your doctor if you are having problems. It's also a good idea to know your test results and keep a list of the medicines you take. How can you care for yourself at home? · Lie down for 1 or 2 minutes when you feel lightheaded. After lying down, sit up slowly and remain sitting for 1 to 2 minutes before slowly standing up. · Avoid movements, positions, or activities that have made you lightheaded in the past.  · Get plenty of rest, especially if you have a cold or flu, which can cause lightheadedness. · Make sure you drink plenty of fluids, especially if you have a fever or have been sweating. · Do not drive or put yourself and others in danger while you feel lightheaded. When should you call for help? Call 911 anytime you think you may need emergency care. For example, call if:    · You have symptoms of a stroke. These may include:  ? Sudden numbness, tingling, weakness, or loss of movement in your face, arm, or leg, especially on only one side of your body. ? Sudden vision changes. ? Sudden trouble speaking.   ? Sudden confusion or trouble understanding simple statements. ? Sudden problems with walking or balance. ? A sudden, severe headache that is different from past headaches.     · You have symptoms of a heart attack. These may include:  ? Chest pain or pressure, or a strange feeling in the chest.  ? Sweating. ? Shortness of breath. ? Nausea or vomiting. ? Pain, pressure, or a strange feeling in the back, neck, jaw, or upper belly or in one or both shoulders or arms. ? Lightheadedness or sudden weakness. ? A fast or irregular heartbeat. After you call 911, the  may tell you to chew 1 adult-strength or 2 to 4 low-dose aspirin. Wait for an ambulance. Do not try to drive yourself.    Watch closely for changes in your health, and be sure to contact your doctor if:    · Your lightheadedness gets worse or does not get better with home care. Where can you learn more? Go to http://sarina-ryan.info/. Enter N940 in the search box to learn more about \"Lightheadedness or Faintness: Care Instructions. \"  Current as of: June 26, 2019  Content Version: 12.2  © 7482-6059 [employer]. Care instructions adapted under license by [email or web address] (which disclaims liability or warranty for this information). If you have questions about a medical condition or this instruction, always ask your healthcare professional. [address] any warranty or liability for your use of this information.

## 2019-10-12 NOTE — ED TRIAGE NOTES
Pt arrives ambulatory alert and oriented reports she checks her blood pressure at home and hers was low. Pt denies any symptoms.

## 2019-10-12 NOTE — ED PROVIDER NOTES
EMERGENCY DEPARTMENT HISTORY AND PHYSICAL EXAM    Date: 10/12/2019  Patient Name: Daniel Vogel    History of Presenting Illness     Chief Complaint   Patient presents with    Other         History Provided By: Patient    2:56 PM  Daniel Vogel is a 66 y.o. female with PMHX of hypertension, rheumatoid arthritis, TIA on Plavix who presents to the emergency department C/O feeling unsteady on her feet. Patient states her doctor added HCTZ to her high blood pressure regimen last week for tighter control. She states today her blood pressures have been lower than normal when she measured them at home with a systolic of 842 at one point. She states she has felt unsteady on her feet all day and is concerned that she might of actually taken 2 of her new blood pressure medications. She denies any chest pain, headache, shortness of breath, focal numbness or weakness, other complaints. PCP: Scott Mullen MD    Current Outpatient Medications   Medication Sig Dispense Refill    ferrous sulfate (IRON) 325 mg (65 mg iron) tablet Take 325 mg by mouth Daily (before breakfast).  atorvastatin (LIPITOR) 10 mg tablet Take  by mouth daily.  AMLODIPINE BESYLATE, BULK, by Does Not Apply route.  clopidogrel (PLAVIX) 75 mg tablet Take  by mouth daily.  ascorbic acid, vitamin C, (VITAMIN C) 250 mg tablet Take  by mouth.  therapeutic multivitamin (THERAGRAN) tablet Take 1 Tab by mouth daily.  niacin/vit B2/vits A,C,E/min (VIT A-VIT Q8-N2-P-E-MINERALS PO) Take  by mouth. Indications: Vit B3      TURMERIC-HERBAL COMPLEX NO.278 PO Take  by mouth.  losartan (COZAAR) 50 mg tablet Take 50 mg by mouth daily.            Past History     Past Medical History:  Past Medical History:   Diagnosis Date    Autoimmune disease ([address]     RA    Diabetes ([facility] [address]     diet controlled    Diabetes mellitus ([facility]     Hypertension     Macromastia     Menopause     Rheumatoid arthritis(714[address]0)     TIA (transient ischemic attack)     Ulcer of foot [facility])        Past Surgical History:  Past Surgical History:   Procedure Laterality Date    HX BREAST REDUCTION  2012    HX GYN      HX ORTHOPAEDIC  2009    Left hand surgery    HX OTHER SURGICAL      left shoulder abcess drained    HX PARTIAL HYSTERECTOMY         Family History:  History reviewed. No pertinent family history. Social History:  Social History     Tobacco Use    Smoking status: Former Smoker     Last attempt to quit: 1997     Years since quittin.0    Smokeless tobacco: Never Used   Substance Use Topics    Alcohol use: No    Drug use: No       Allergies:  No Known Allergies      Review of Systems   Review of Systems   Constitutional: Negative for fever. Respiratory: Negative for shortness of breath. Cardiovascular: Negative for chest pain. Gastrointestinal: Negative for abdominal pain. Neurological: Positive for dizziness. All other systems reviewed and are negative.         Physical Exam     Vitals:    10/12/19 1548 10/12/19 1549 10/12/19 1550 10/12/19 1600   BP: 129/49 136/45 157/49 146/56   Pulse: 62 60 75 64   Resp: 20 20 20    Temp:       SpO2:       Weight:       Height:         Physical Exam    Nursing notes and vital signs reviewed    Constitutional: Non toxic appearing, elderly, no acute distress  Head: Normocephalic, Atraumatic  Eyes: Pupils are equal, round, and reactive to light, EOMI  Neck: Supple  Cardiovascular: Regular rate and rhythm, no murmurs, rubs, or gallops  Chest: Normal work of breathing and chest excursion bilaterally  Lungs: Clear to ausculation bilaterally  Back: No evidence of trauma or deformity  Extremities: No evidence of trauma or deformity, no LE edema  Skin: Warm and dry, normal cap refill  Neuro: Alert and appropriate, CN intact, normal speech, strength and sensation full and symmetric bilaterally, normal coordination  Psychiatric: Normal mood and affect      Diagnostic Study Results Labs -     Recent Results (from the past 12 hour(s))   EKG, 12 LEAD, INITIAL    Collection Time: 10/12/19  3:29 PM   Result Value Ref Range    Ventricular Rate 65 BPM    Atrial Rate 65 BPM    P-R Interval 168 ms    QRS Duration 88 ms    Q-T Interval 402 ms    QTC Calculation (Bezet) 418 ms    Calculated P Axis -12 degrees    Calculated R Axis 10 degrees    Calculated T Axis 31 degrees    Diagnosis       Normal sinus rhythm  Normal ECG  When compared with ECG of 06-JUN-2019 13:04,  No significant change was found     CBC WITH AUTOMATED DIFF    Collection Time: 10/12/19  4:20 PM   Result Value Ref Range    WBC 8.1 4.6 - 13.2 K/uL    RBC 4.55 4.20 - 5.30 M/uL    HGB 9.8 (L) 12.0 - 16.0 g/dL    HCT 32.1 (L) 35.0 - 45.0 %    MCV 70.5 (L) 74.0 - 97.0 FL    MCH 21.5 (L) 24.0 - 34.0 PG    MCHC 30.5 (L) 31.0 - 37.0 g/dL    RDW 16.4 (H) 11.6 - 14.5 %    PLATELET 863 503 - 448 K/uL    MPV 9.7 9.2 - 11.8 FL    NEUTROPHILS 54 40 - 73 %    LYMPHOCYTES 29 21 - 52 %    MONOCYTES 9 3 - 10 %    EOSINOPHILS 8 (H) 0 - 5 %    BASOPHILS 0 0 - 2 %    ABS. NEUTROPHILS 4.4 1.8 - 8.0 K/UL    ABS. LYMPHOCYTES 2.4 0.9 - 3.6 K/UL    ABS. MONOCYTES 0.7 0.05 - 1.2 K/UL    ABS. EOSINOPHILS 0.6 (H) 0.0 - 0.4 K/UL    ABS.  BASOPHILS 0.0 0.0 - 0.1 K/UL    RBC COMMENTS ANISOCYTOSIS  1+        RBC COMMENTS MICROCYTOSIS  1+        RBC COMMENTS HYPOCHROMIA  1+        RBC COMMENTS POIKILOCYTOSIS  2+        RBC COMMENTS OVALOCYTES  1+        RBC COMMENTS TARGET CELLS  OCCASIONAL        DF AUTOMATED     METABOLIC PANEL, COMPREHENSIVE    Collection Time: 10/12/19  4:20 PM   Result Value Ref Range    Sodium 139 136 - 145 mmol/L    Potassium 4.0 3.5 - 5.5 mmol/L    Chloride 102 100 - 111 mmol/L    CO2 32 21 - 32 mmol/L    Anion gap 5 3.0 - 18 mmol/L    Glucose 83 74 - 99 mg/dL    BUN 22 (H) 7.0 - 18 MG/DL    Creatinine 0.69 0.6 - 1.3 MG/DL    BUN/Creatinine ratio 32 (H) 12 - 20      GFR est AA >60 >60 ml/min/1.73m2    GFR est non-AA >60 >60 ml/min/1.73m2 Calcium 9.3 8.5 - 10.1 MG/DL    Bilirubin, total 0.3 0.2 - 1.0 MG/DL    ALT (SGPT) 17 13 - 56 U/L    AST (SGOT) 20 10 - 38 U/L    Alk. phosphatase 92 45 - 117 U/L    Protein, total 7.6 6.4 - 8.2 g/dL    Albumin 3.8 3.4 - 5.0 g/dL    Globulin 3.8 2.0 - 4.0 g/dL    A-G Ratio 1.0 0.8 - 1.7     MAGNESIUM    Collection Time: 10/12/19  4:20 PM   Result Value Ref Range    Magnesium 2.3 1.6 - 2.6 mg/dL   CARDIAC PANEL,(CK, CKMB & TROPONIN)    Collection Time: 10/12/19  4:20 PM   Result Value Ref Range     26 - 192 U/L    CK - MB 1.8 <3.6 ng/ml    CK-MB Index 0.9 0.0 - 4.0 %    Troponin-I, QT <0.02 0.0 - 0.045 NG/ML       Radiologic Studies -   No orders to display     CT Results  (Last 48 hours)    None        CXR Results  (Last 48 hours)    None          Medications given in the ED-  Medications - No data to display      Medical Decision Making   I am the first provider for this patient. I reviewed the vital signs, available nursing notes, past medical history, past surgical history, family history and social history. Vital Signs-Reviewed the patient's vital signs. Pulse Oximetry Analysis -100 % on room air    Cardiac Monitor:  Rate: 62 bpm  Rhythm: Normal sinus    EKG interpretation: (Preliminary)  EKG read by Dr. Mary Beth Sosa at 3:46 PM  Normal sinus rhythm at a rate of 65 bpm, IA interval 160 ms, QRS duration 80 ms    Records Reviewed: Nursing Notes, Old Medical Records and Previous electrocardiograms    Provider Notes (Medical Decision Making): Mary Graff is a 66 y.o. female presenting for lightheadedness and concern that she may have accidentally taken 2 of her blood pressure medications as she had a low blood pressure reading at home. Patient is hemodynamically stable here with out acute abnormalities on exam or labs. Plan for discharge with early primary care follow-up and return precautions. Patient understands and agrees with this plan.     Procedures:  Procedures    ED Course:   5:38 PM  Updated on all results and plan. All questions answered. Diagnosis and Disposition     Critical Care: None    DISCHARGE NOTE:    Román Ralph's  results have been reviewed with her. She has been counseled regarding her diagnosis, treatment, and plan. She verbally conveys understanding and agreement of the signs, symptoms, diagnosis, treatment and prognosis and additionally agrees to follow up as discussed. She also agrees with the care-plan and conveys that all of her questions have been answered. I have also provided discharge instructions for her that include: educational information regarding their diagnosis and treatment, and list of reasons why they would want to return to the ED prior to their follow-up appointment, should her condition change. She has been provided with education for proper emergency department utilization. CLINICAL IMPRESSION:    1. Light headed        PLAN:  1. D/C Home  2. Current Discharge Medication List        3. Follow-up Information     Follow up With Specialties Details Why Contact Info    Christiano Guevara MD [facility] Schedule an appointment as soon as possible for a visit  [address] [facility] [id number] [phone]      [facility] EMERGENCY DEPT Emergency Medicine  If symptoms worsen [address] [id number] [phone]        _______________________________      Please note that this dictation was completed with [employer], the computer voice recognition software. Quite often unanticipated grammatical, syntax, homophones, and other interpretive errors are inadvertently transcribed by the computer software. Please disregard these errors. Please excuse any errors that have escaped final proofreading.

## 2019-10-13 LAB
ATRIAL RATE: 65 BPM
CALCULATED P AXIS, ECG09: -12 DEGREES
CALCULATED R AXIS, ECG10: 10 DEGREES
CALCULATED T AXIS, ECG11: 31 DEGREES
DIAGNOSIS, 93000: NORMAL
P-R INTERVAL, ECG05: 168 MS
Q-T INTERVAL, ECG07: 402 MS
QRS DURATION, ECG06: 88 MS
QTC CALCULATION (BEZET), ECG08: 418 MS
VENTRICULAR RATE, ECG03: 65 BPM

## 2019-11-27 ENCOUNTER — HOSPITAL ENCOUNTER (OUTPATIENT)
Dept: LAB | Age: 74
Discharge: HOME | End: 2019-11-27
Payer: MEDICARE

## 2019-11-27 LAB
ANION GAP SERPL CALC-SCNC: 5 MMOL/L (ref 3–18)
BASOPHILS # BLD: 0.1 K/UL (ref 0–0.1)
BASOPHILS NFR BLD: 1 % (ref 0–2)
BUN SERPL-MCNC: 21 MG/DL (ref 7–18)
BUN/CREAT SERPL: 28 (ref 12–20)
CALCIUM SERPL-MCNC: 9.4 MG/DL (ref 8.5–10.1)
CHLORIDE SERPL-SCNC: 106 MMOL/L (ref 100–111)
CO2 SERPL-SCNC: 30 MMOL/L (ref 21–32)
CREAT SERPL-MCNC: 0.74 MG/DL (ref 0.6–1.3)
CRP SERPL-MCNC: <0.3 MG/DL (ref 0–0.3)
DIFFERENTIAL METHOD BLD: ABNORMAL
EOSINOPHIL # BLD: 0.4 K/UL (ref 0–0.4)
EOSINOPHIL NFR BLD: 5 % (ref 0–5)
ERYTHROCYTE [DISTWIDTH] IN BLOOD BY AUTOMATED COUNT: 15.9 % (ref 11.6–14.5)
GLUCOSE SERPL-MCNC: 101 MG/DL (ref 74–99)
HCT VFR BLD AUTO: 34.4 % (ref 35–45)
HGB BLD-MCNC: 10.8 G/DL (ref 12–16)
LYMPHOCYTES # BLD: 2.2 K/UL (ref 0.9–3.6)
LYMPHOCYTES NFR BLD: 28 % (ref 21–52)
MCH RBC QN AUTO: 22.1 PG (ref 24–34)
MCHC RBC AUTO-ENTMCNC: 31.4 G/DL (ref 31–37)
MCV RBC AUTO: 70.3 FL (ref 74–97)
MONOCYTES # BLD: 0.4 K/UL (ref 0.05–1.2)
MONOCYTES NFR BLD: 6 % (ref 3–10)
NEUTS SEG # BLD: 4.7 K/UL (ref 1.8–8)
NEUTS SEG NFR BLD: 60 % (ref 40–73)
PLATELET # BLD AUTO: 302 K/UL (ref 135–420)
PMV BLD AUTO: 9.6 FL (ref 9.2–11.8)
POTASSIUM SERPL-SCNC: 3.9 MMOL/L (ref 3.5–5.5)
RBC # BLD AUTO: 4.89 M/UL (ref 4.2–5.3)
SODIUM SERPL-SCNC: 141 MMOL/L (ref 136–145)
WBC # BLD AUTO: 7.8 K/UL (ref 4.6–13.2)

## 2019-11-27 PROCEDURE — 80048 BASIC METABOLIC PNL TOTAL CA: CPT

## 2019-11-27 PROCEDURE — 86140 C-REACTIVE PROTEIN: CPT

## 2019-11-27 PROCEDURE — 36415 COLL VENOUS BLD VENIPUNCTURE: CPT

## 2019-11-27 PROCEDURE — 85025 COMPLETE CBC W/AUTO DIFF WBC: CPT

## 2019-11-29 ENCOUNTER — HOSPITAL ENCOUNTER (OUTPATIENT)
Dept: LAB | Age: 74
Discharge: HOME | End: 2019-11-29
Payer: MEDICARE

## 2019-11-29 LAB
ERYTHROCYTE [SEDIMENTATION RATE] IN BLOOD: 18 MM/HR (ref 0–30)
FAX TO INFO,FAXT: NORMAL
FAX TO NUMBER,FAXN: NORMAL

## 2019-11-29 PROCEDURE — 85652 RBC SED RATE AUTOMATED: CPT

## 2019-11-29 PROCEDURE — 36415 COLL VENOUS BLD VENIPUNCTURE: CPT

## 2020-11-30 NOTE — PROGRESS NOTES
[facility] CARDIOLOGY CLINIC  Formerly [facility], [address]  PHONE: [phone]  FAX: [phone]  Stephanie Rodriges NP       STANDING CHF ORDERS FOR NURSING HOME PATIENTS    We are following: Ramses Mohan  : 1931 for Congestive Heart Failure.  Our goal is to closely monitor this patient's heart failure condition, and to prevent hospital readmissions within 30 days after discharge.      Please send medication list and weight/vitals log to each appointment. Encourage family members to attend all appointments if possible.    PLAN OF CARE  1. Daily weights. Please weigh in AM after morning urination and before fluids or food is consumed. Keep a record of weights. Use the same scale daily. Patient's goal weight will be determined.     2.   Daily blood pressures and heart rate monitoring.    3.   Assess daily for shortness of breath, lower extremity edema and unusual increased girth. Call office or fax assessment.        If patient is above goal weight or displays any congestive heart failure symptoms, please fax reports of weight, blood pressure, heart and respiratory rate and auscultation of lung sounds as soon as symptoms are noted. Fax this full (CHF) assessment to [phone].   Please note:  · Cough that is not related to other illness or side-effect from an ace inhibitor  · Fatigue or an increase in fatigue from patient's baseline  · Abdominal distention and/or an increase from patient's baseline  · Lower leg/foot/pedal edema and/or change in baseline of edema    4.  Restrict fluids to:64 ounces     5.  Pressure stockings during the daytime (Jobst stockings, thigh-high preferred).20-30 mmHg    6.  Low Sodium Diet. Dietary consult at nursing home if available. Less than 2 grams.  Please advise family members of dietary restrictions as well.    7.  Exercise on a daily basis at least once or twice a day, if possible, according to age and physical ability.    8.  Notify Nursing Home provider  TRANSFER - IN REPORT: 
 
Verbal report received from [address] [place] [address] on Arelen Sanabria  being received from Emergency Dept. for routine progression of care Report consisted of patients Situation, Background, Assessment and  
Recommendations(SBAR). Information from the following report(s) SBAR, Kardex, ED Summary, Procedure Summary, Intake/Output, MAR, Recent Results and Cardiac Rhythm Sinus Tach was reviewed with the receiving nurse. Opportunity for questions and clarification was provided. Assessment completed upon patients arrival to unit and care assumed. 0345: Assumed patient care from [address] [place] [address]. Patient is alert and oriented to person, place, time and situation. Respiratory status is stable on 4L via nasal cannula. Vital signs are stable. MEWS score is a one. Patient denies any pain, discomfort, nausea vomiting dizziness or anxiety. White board and fall card is updated. Bed is locked and in lowest position. Call bell, water and personal belongings are within reach. Patient has no questions, comments or concerns after bedside shift report. 0700: Patient had an uneventful shift. Respiratory status, vital signs and MEWS score remained stable. Patient was resting quietly with no signs of distress noted. Bed locked and in lowest position. Call bell water and personal belongings were within reach. Patient had no questions, comments or concerns after bedside shift report.  Bedside report given to [facility] R.NRandi 
 
 
 
 
 
 
 
 
 
 that patient is being seen and followed for Heart Failure by Stephanie GALO.        Other Orders: Please, fax full current (CHF) assessment every Monday morning by 10:00 AM along with CURRENT PHYSICIAN ORDERS and MEDICATION LIST with each fax.   Please fax all assessment information that has been gather from the last faxed assessment (from the last date the assessment was sent to the current assessment date).       PLEASE INFORM THE CLINIC IF THE PATIENT IS DISCHARGED FROM YOUR FACILITY    Provider: Stephanie Rodriges NP  Date: 11/30/20

## 2020-12-24 ENCOUNTER — HOSPITAL ENCOUNTER (INPATIENT)
Age: 75
LOS: 1 days | Discharge: HOME | DRG: 812 | End: 2020-12-25
Attending: EMERGENCY MEDICINE | Admitting: HOSPITALIST
Payer: MEDICARE

## 2020-12-24 ENCOUNTER — APPOINTMENT (OUTPATIENT)
Dept: GENERAL RADIOLOGY | Age: 75
DRG: 812 | End: 2020-12-24
Attending: PHYSICIAN ASSISTANT
Payer: MEDICARE

## 2020-12-24 DIAGNOSIS — D64.9 SEVERE ANEMIA: Primary | ICD-10-CM

## 2020-12-24 PROBLEM — I73.9 PVD (PERIPHERAL VASCULAR DISEASE) (HCC): Status: ACTIVE | Noted: 2020-12-24

## 2020-12-24 LAB
ALBUMIN SERPL-MCNC: 2.9 G/DL (ref 3.4–5)
ALBUMIN/GLOB SERPL: 0.6 {RATIO} (ref 0.8–1.7)
ALP SERPL-CCNC: 67 U/L (ref 45–117)
ALT SERPL-CCNC: 16 U/L (ref 13–56)
ANION GAP SERPL CALC-SCNC: 6 MMOL/L (ref 3–18)
AST SERPL-CCNC: 35 U/L (ref 10–38)
BASOPHILS # BLD: 0.1 K/UL (ref 0–0.1)
BASOPHILS NFR BLD: 1 % (ref 0–3)
BILIRUB SERPL-MCNC: 0.2 MG/DL (ref 0.2–1)
BUN SERPL-MCNC: 10 MG/DL (ref 7–18)
BUN/CREAT SERPL: 18 (ref 12–20)
CALCIUM SERPL-MCNC: 9.1 MG/DL (ref 8.5–10.1)
CHLORIDE SERPL-SCNC: 106 MMOL/L (ref 100–111)
CK MB CFR SERPL CALC: 0.7 % (ref 0–4)
CK MB SERPL-MCNC: 1.6 NG/ML (ref 5–25)
CK SERPL-CCNC: 241 U/L (ref 26–192)
CO2 SERPL-SCNC: 28 MMOL/L (ref 21–32)
CREAT SERPL-MCNC: 0.57 MG/DL (ref 0.6–1.3)
DIFFERENTIAL METHOD BLD: ABNORMAL
EOSINOPHIL # BLD: 1 K/UL (ref 0–0.4)
EOSINOPHIL NFR BLD: 10 % (ref 0–5)
ERYTHROCYTE [DISTWIDTH] IN BLOOD BY AUTOMATED COUNT: 22.5 % (ref 11.6–14.5)
FERRITIN SERPL-MCNC: 7 NG/ML (ref 8–388)
FOLATE SERPL-MCNC: >20 NG/ML (ref 3.1–17.5)
GLOBULIN SER CALC-MCNC: 4.6 G/DL (ref 2–4)
GLUCOSE BLD STRIP.AUTO-MCNC: 137 MG/DL (ref 70–110)
GLUCOSE SERPL-MCNC: 106 MG/DL (ref 74–99)
HCT VFR BLD AUTO: 18.4 % (ref 35–45)
HEMOCCULT STL QL: NEGATIVE
HGB BLD-MCNC: 5.3 G/DL (ref 12–16)
HISTORY CHECKED?,CKHIST: NORMAL
IRON SATN MFR SERPL: 4 % (ref 20–50)
IRON SERPL-MCNC: 16 UG/DL (ref 50–175)
LYMPHOCYTES # BLD: 1.5 K/UL (ref 0.8–3.5)
LYMPHOCYTES NFR BLD: 16 % (ref 20–51)
MCH RBC QN AUTO: 16 PG (ref 24–34)
MCHC RBC AUTO-ENTMCNC: 28.8 G/DL (ref 31–37)
MCV RBC AUTO: 55.6 FL (ref 74–97)
MONOCYTES # BLD: 0.3 K/UL (ref 0–1)
MONOCYTES NFR BLD: 3 % (ref 2–9)
NEUTS SEG # BLD: 6.7 K/UL (ref 1.8–8)
NEUTS SEG NFR BLD: 70 % (ref 42–75)
NRBC BLD-RTO: 2 PER 100 WBC
PLATELET # BLD AUTO: 454 K/UL (ref 135–420)
PMV BLD AUTO: 8.7 FL (ref 9.2–11.8)
POTASSIUM SERPL-SCNC: 4.1 MMOL/L (ref 3.5–5.5)
PROT SERPL-MCNC: 7.5 G/DL (ref 6.4–8.2)
RBC # BLD AUTO: 3.31 M/UL (ref 4.2–5.3)
RBC MORPH BLD: ABNORMAL
RETICS/RBC NFR AUTO: 0.8 % (ref 0.5–2.3)
SODIUM SERPL-SCNC: 140 MMOL/L (ref 136–145)
TIBC SERPL-MCNC: 403 UG/DL (ref 250–450)
TROPONIN I SERPL-MCNC: <0.02 NG/ML (ref 0–0.04)
VIT B12 SERPL-MCNC: 1205 PG/ML (ref 211–911)
WBC # BLD AUTO: 9.6 K/UL (ref 4.6–13.2)

## 2020-12-24 PROCEDURE — 86920 COMPATIBILITY TEST SPIN: CPT

## 2020-12-24 PROCEDURE — 93005 ELECTROCARDIOGRAM TRACING: CPT

## 2020-12-24 PROCEDURE — 74011250637 HC RX REV CODE- 250/637: Performed by: HOSPITALIST

## 2020-12-24 PROCEDURE — 80053 COMPREHEN METABOLIC PANEL: CPT

## 2020-12-24 PROCEDURE — P9016 RBC LEUKOCYTES REDUCED: HCPCS

## 2020-12-24 PROCEDURE — 85045 AUTOMATED RETICULOCYTE COUNT: CPT

## 2020-12-24 PROCEDURE — 82550 ASSAY OF CK (CPK): CPT

## 2020-12-24 PROCEDURE — 65270000029 HC RM PRIVATE

## 2020-12-24 PROCEDURE — 82272 OCCULT BLD FECES 1-3 TESTS: CPT

## 2020-12-24 PROCEDURE — 85025 COMPLETE CBC W/AUTO DIFF WBC: CPT

## 2020-12-24 PROCEDURE — 82607 VITAMIN B-12: CPT

## 2020-12-24 PROCEDURE — 83540 ASSAY OF IRON: CPT

## 2020-12-24 PROCEDURE — 99285 EMERGENCY DEPT VISIT HI MDM: CPT

## 2020-12-24 PROCEDURE — 82728 ASSAY OF FERRITIN: CPT

## 2020-12-24 PROCEDURE — 30233N1 TRANSFUSION OF NONAUTOLOGOUS RED BLOOD CELLS INTO PERIPHERAL VEIN, PERCUTANEOUS APPROACH: ICD-10-PCS | Performed by: HOSPITALIST

## 2020-12-24 PROCEDURE — 71045 X-RAY EXAM CHEST 1 VIEW: CPT

## 2020-12-24 PROCEDURE — 82962 GLUCOSE BLOOD TEST: CPT

## 2020-12-24 PROCEDURE — 36430 TRANSFUSION BLD/BLD COMPNT: CPT

## 2020-12-24 PROCEDURE — 86900 BLOOD TYPING SEROLOGIC ABO: CPT

## 2020-12-24 RX ORDER — INSULIN LISPRO 100 [IU]/ML
INJECTION, SOLUTION INTRAVENOUS; SUBCUTANEOUS
Status: DISCONTINUED | OUTPATIENT
Start: 2020-12-24 | End: 2020-12-25 | Stop reason: HOSPADM

## 2020-12-24 RX ORDER — LANOLIN ALCOHOL/MO/W.PET/CERES
325 CREAM (GRAM) TOPICAL
Status: CANCELLED | OUTPATIENT
Start: 2020-12-25

## 2020-12-24 RX ORDER — DEXTROSE MONOHYDRATE 100 MG/ML
125-250 INJECTION, SOLUTION INTRAVENOUS AS NEEDED
Status: DISCONTINUED | OUTPATIENT
Start: 2020-12-24 | End: 2020-12-25 | Stop reason: HOSPADM

## 2020-12-24 RX ORDER — MAGNESIUM SULFATE 100 %
16 CRYSTALS MISCELLANEOUS AS NEEDED
Status: DISCONTINUED | OUTPATIENT
Start: 2020-12-24 | End: 2020-12-25 | Stop reason: HOSPADM

## 2020-12-24 RX ORDER — TRAMADOL HYDROCHLORIDE 50 MG/1
50 TABLET ORAL
Status: DISCONTINUED | OUTPATIENT
Start: 2020-12-24 | End: 2020-12-25 | Stop reason: HOSPADM

## 2020-12-24 RX ORDER — SODIUM CHLORIDE 9 MG/ML
250 INJECTION, SOLUTION INTRAVENOUS AS NEEDED
Status: DISCONTINUED | OUTPATIENT
Start: 2020-12-24 | End: 2020-12-25 | Stop reason: HOSPADM

## 2020-12-24 RX ORDER — ATORVASTATIN CALCIUM 10 MG/1
10 TABLET, FILM COATED ORAL DAILY
Status: CANCELLED | OUTPATIENT
Start: 2020-12-25

## 2020-12-24 RX ORDER — LOSARTAN POTASSIUM 50 MG/1
50 TABLET ORAL DAILY
Status: CANCELLED | OUTPATIENT
Start: 2020-12-25

## 2020-12-24 RX ADMIN — TRAMADOL HYDROCHLORIDE 50 MG: 50 TABLET, COATED ORAL at 19:17

## 2020-12-24 NOTE — ED NOTES
2nd attempt to call report to receiving RN made. Advised receiving RN on another call and would return call to ED to get report.

## 2020-12-24 NOTE — PROGRESS NOTES
Patient presents compliant to current plan of care as directed.    Jose Zendejas  12/24/2020  3:36 PM

## 2020-12-24 NOTE — ED TRIAGE NOTES
Pt w/ c/o low HGB of 5 per PCP after blood work done yesterday and called this morning to come to ED. Pt endorses generalized weakness w/ some dizziness but denies SOB or chest pain. Pt denies any bloody stool or urine.

## 2020-12-24 NOTE — PROGRESS NOTES
Patient will be assisted with compliance to current plan of care as directed.    Jose Zendejas  12/24/2020  3:30 PM

## 2020-12-24 NOTE — ED PROVIDER NOTES
EMERGENCY DEPARTMENT HISTORY AND PHYSICAL EXAM    Date: 12/24/2020  Patient Name: Panfilo Colon    History of Presenting Illness     Chief Complaint   Patient presents with    Abnormal Lab Results         History Provided By: Patient    11:47 AM  Panfilo Colon is a 66 y.o. female with PMHX of hypertension, diabetes, rheumatoid arthritis, hyperlipidemia, peripheral vascular disease on Plavix who presents to the emergency department C/O hemoglobin of 5. Patient states she had her 3-month routine follow-up appointment with PCP Dr. Patrick Villalba yesterday and was called today to go to the ER for a hemoglobin of 5. She has been experiencing generalized fatigue, and feeling sluggish, son states she has been a little more forgetful than usual.  Patient reports history of anemia, but never that low, has received a blood transfusion many years ago for unknown reason. She is also under the care of vascular surgeon Dr. Stephanie Bowers and podiatrist Dr. Favian Khan, history of right first and second toe amputations 1 to 2 years ago. Last was seen by podiatry 1 to 2 weeks ago. Pt denies chest pain, shortness of breath, abdominal pain, bloody or black stools, and any other sxs or complaints. PCP: Carlos Enrique Stanford MD    Current Facility-Administered Medications   Medication Dose Route Frequency Provider Last Rate Last Admin    0.9% sodium chloride infusion 250 mL  250 mL IntraVENous PRN KIMBERLY Brand         Current Outpatient Medications   Medication Sig Dispense Refill    ascorbic acid, vitamin C, (VITAMIN C) 250 mg tablet Take  by mouth.  therapeutic multivitamin (THERAGRAN) tablet Take 1 Tab by mouth daily.  niacin/vit B2/vits A,C,E/min (VIT A-VIT F7-F7-T-E-MINERALS PO) Take  by mouth. Indications: Vit B3      TURMERIC-HERBAL COMPLEX NO.278 PO Take  by mouth.  ferrous sulfate (IRON) 325 mg (65 mg iron) tablet Take 325 mg by mouth Daily (before breakfast).       atorvastatin (LIPITOR) 10 mg tablet Take  by mouth daily.  AMLODIPINE BESYLATE, BULK, by Does Not Apply route.  clopidogrel (PLAVIX) 75 mg tablet Take  by mouth daily.  losartan (COZAAR) 50 mg tablet Take 50 mg by mouth daily. Past History     Past Medical History:  Past Medical History:   Diagnosis Date    [facility]immune disease ([facility] [address] [address]     RA    Diabetes ([facility] [address]     diet controlled    Diabetes mellitus ([facility]     Hypertension     Macromastia     Menopause     Rheumatoid arthritis(714.0)     TIA (transient ischemic attack)     Ulcer of foot ([facility] [address] [facility]        Past Surgical History:  Past Surgical History:   Procedure Laterality Date    HX BREAST REDUCTION  2012    HX GYN      HX ORTHOPAEDIC  2009    Left hand surgery    HX OTHER SURGICAL      left shoulder abcess drained    HX PARTIAL HYSTERECTOMY         Family History:  History reviewed. No pertinent family history. Social History:  Social History     Tobacco Use    Smoking status: Former Smoker     Quit date: 1997     Years since quittin.2    Smokeless tobacco: Never Used   Substance Use Topics    Alcohol use: No    Drug use: No       Allergies:  No Known Allergies      Review of Systems   Review of Systems   Constitutional: Positive for fatigue. Respiratory: Negative for shortness of breath. Cardiovascular: Negative for chest pain. Gastrointestinal: Negative for abdominal pain and blood in stool. Neurological: Positive for weakness (generalized). Psychiatric/Behavioral: Positive for confusion (forgetful). All other systems reviewed and are negative. Physical Exam     Vitals:    20 1343 20 1345 20 1350 20 1355   BP:  (!) 161/54 (!) 151/55 (!) 156/54   Pulse:  83 88 85   Resp:  10 13 11   Temp:       SpO2: 100% 100% 100% 100%   Weight:       Height:         Physical Exam    Vital signs and nursing notes reviewed. CONSTITUTIONAL: Alert. Well-appearing; well-nourished; in no apparent distress.   HEAD: Normocephalic; atraumatic. EYES: PERRL; Conjunctiva clear. CV: Normal S1, S2; no murmurs, rubs, or gallops. No chest wall tenderness. RESPIRATORY: Normal chest excursion with respiration; breath sounds clear and equal bilaterally; no wheezes, rhonchi, or rales. GI: Normal bowel sounds; non-distended; non-tender; no guarding or rigidity; no palpable organomegaly. No CVA tenderness. BACK:  No evidence of trauma or deformity. Non-tender to palpation. FROM without difficulty. EXT: Normal ROM in all four extremities; RLE: s/p healed 1st and 2nd toe amputations; 3rd toe is necrotic, 4th and 5th toes mildly swollen and TTP; no palpable DP pulse. SKIN: Normal for age and race; warm; dry; good turgor; no apparent lesions or exudate. NEURO: A & O x3. Cranial nerves 2-12 intact. Motor 5/5 bilaterally. Sensation intact. PSYCH:  Mood and affect appropriate. Diagnostic Study Results     Labs -     Recent Results (from the past 12 hour(s))   CBC WITH AUTOMATED DIFF    Collection Time: 12/24/20 11:40 AM   Result Value Ref Range    WBC 9.6 4.6 - 13.2 K/uL    RBC 3.31 (L) 4.20 - 5.30 M/uL    HGB 5.3 (LL) 12.0 - 16.0 g/dL    HCT 18.4 (L) 35.0 - 45.0 %    MCV 55.6 (L) 74.0 - 97.0 FL    MCH 16.0 (L) 24.0 - 34.0 PG    MCHC 28.8 (L) 31.0 - 37.0 g/dL    RDW 22.5 (H) 11.6 - 14.5 %    PLATELET 640 (H) 014 - 420 K/uL    MPV 8.7 (L) 9.2 - 11.8 FL    NEUTROPHILS 70 42 - 75 %    LYMPHOCYTES 16 (L) 20 - 51 %    MONOCYTES 3 2 - 9 %    EOSINOPHILS 10 (H) 0 - 5 %    BASOPHILS 1 0 - 3 %    NRBC 2.0 (H) 0  WBC    ABS. NEUTROPHILS 6.7 1.8 - 8.0 K/UL    ABS. LYMPHOCYTES 1.5 0.8 - 3.5 K/UL    ABS. MONOCYTES 0.3 0 - 1.0 K/UL    ABS. EOSINOPHILS 1.0 (H) 0.0 - 0.4 K/UL    ABS.  BASOPHILS 0.1 0.0 - 0.1 K/UL    RBC COMMENTS ANISOCYTOSIS  3+        RBC COMMENTS MICROCYTOSIS  2+        RBC COMMENTS MACROCYTOSIS  1+        RBC COMMENTS HYPOCHROMIA  2+        RBC COMMENTS POLYCHROMASIA  SLIGHT  POIKILOCYTOSIS  2+        RBC COMMENTS TARGET CELLS  3+        RBC COMMENTS OVALOCYTES  1+        RBC COMMENTS SCHISTOCYTES  FEW  TEARDROP CELLS  OCCASIONAL        DF MANUAL     METABOLIC PANEL, COMPREHENSIVE    Collection Time: 12/24/20 11:40 AM   Result Value Ref Range    Sodium 140 136 - 145 mmol/L    Potassium 4.1 3.5 - 5.5 mmol/L    Chloride 106 100 - 111 mmol/L    CO2 28 21 - 32 mmol/L    Anion gap 6 3.0 - 18 mmol/L    Glucose 106 (H) 74 - 99 mg/dL    BUN 10 7.0 - 18 MG/DL    Creatinine 0.57 (L) 0.6 - 1.3 MG/DL    BUN/Creatinine ratio 18 12 - 20      GFR est AA >60 >60 ml/min/1.73m2    GFR est non-AA >60 >60 ml/min/1.73m2    Calcium 9.1 8.5 - 10.1 MG/DL    Bilirubin, total 0.2 0.2 - 1.0 MG/DL    ALT (SGPT) 16 13 - 56 U/L    AST (SGOT) 35 10 - 38 U/L    Alk.  phosphatase 67 45 - 117 U/L    Protein, total 7.5 6.4 - 8.2 g/dL    Albumin 2.9 (L) 3.4 - 5.0 g/dL    Globulin 4.6 (H) 2.0 - 4.0 g/dL    A-G Ratio 0.6 (L) 0.8 - 1.7     TYPE & SCREEN    Collection Time: 12/24/20 11:40 AM   Result Value Ref Range    Crossmatch Expiration 12/27/2020,2359     ABO/Rh(D) A POSITIVE     Antibody screen NEG     CALLED TO: GRACE BORJA, ED, ON 12/24/20 AT 1257 BY TSH     Unit number [id number]     Blood component type  LR     Unit division 00     Status of unit ISSUED     Crossmatch result Compatible    CARDIAC PANEL,(CK, CKMB & TROPONIN)    Collection Time: 12/24/20 11:40 AM   Result Value Ref Range    CK - MB 1.6 <3.6 ng/ml    CK-MB Index 0.7 0.0 - 4.0 %     (H) 26 - 192 U/L    Troponin-I, QT <0.02 0.0 - 0.045 NG/ML   RETICULOCYTE COUNT    Collection Time: 12/24/20 11:40 AM   Result Value Ref Range    Reticulocyte count 0.8 0.5 - 2.3 %   EKG, 12 LEAD, INITIAL    Collection Time: 12/24/20 11:41 AM   Result Value Ref Range    Ventricular Rate 95 BPM    Atrial Rate 95 BPM    P-R Interval 160 ms    QRS Duration 90 ms    Q-T Interval 368 ms    QTC Calculation (Bezet) 462 ms    Calculated P Axis 65 degrees    Calculated R Axis 51 degrees    Calculated T Axis 49 degrees Diagnosis       Normal sinus rhythm  Septal infarct , age undetermined  Abnormal ECG  When compared with ECG of 12-OCT-2019 15:29,  Septal infarct is now present     OCCULT BLOOD, STOOL    Collection Time: 12/24/20 12:30 PM   Result Value Ref Range    Occult blood, stool Negative NEG     RBC, ALLOCATE    Collection Time: 12/24/20 12:45 PM   Result Value Ref Range    HISTORY CHECKED? Historical check performed        Radiologic Studies -   XR CHEST PORT   Final Result   Impression:      No plain film evidence of acute cardiopulmonary disease, allowing for technique. CT Results  (Last 48 hours)    None        CXR Results  (Last 48 hours)               12/24/20 1157  XR CHEST PORT Final result    Impression:  Impression:       No plain film evidence of acute cardiopulmonary disease, allowing for technique. Narrative:  HISTORY:    -Provided with order: anemia, fatigue   -Additional: None       Technique : AP PORTABLE CHEST       Comparison : 06/03/19        FINDINGS:       HEART AND MEDIASTINUM: Aortic vascular calcification. Otherwise unremarkable. LUNGS AND PLEURAL SPACES: No consolidation, congestive heart failure, pleural   effusion or pneumothorax. BONY THORAX AND SOFT TISSUES: No acute osseous abnormality. Medications given in the ED-  Medications   0.9% sodium chloride infusion 250 mL (has no administration in time range)         Medical Decision Making   I am the first provider for this patient. I reviewed the vital signs, available nursing notes, past medical history, past surgical history, family history and social history. Vital Signs-Reviewed the patient's vital signs.     Pulse Oximetry Analysis - 100% on RA     Cardiac Monitor:  Rate: 88 bpm  Rhythm: NSR    EKG interpretation: (Preliminary)  Normal sinus rhythm, rate 95, no STEMI or acute changes    Records Reviewed: Nursing Notes, Old Medical Records and Previous Laboratory Studies      Procedures:  Procedures    ED Course:  11:47 AM   Initial assessment performed. The patients presenting problems have been discussed, and they are in agreement with the care plan formulated and outlined with them. I have encouraged them to ask questions as they arise throughout their visit. 12:25 PM progress note  Patient's hemoglobin is 5.3. Risk and benefits for blood transfusion discussed with patient and son at bedside. Patient gives both verbal and written consent for blood transfusion. Rectal exam performed, scant brownish-yellow stool. Hemoccult card sent to lab. Patient states she ate beet chips and had an episode of dark red stool 3 days ago, no history of GI bleed. 1230 PM consult note  Case discussed with ED attending Dr. Justin Trujillo, recommends blood transfusion and admission to hospitalist.    1:15 PM Consult Note  Case discussed with hospitalist, Dr. Holly Ryan, who will admit to medical floor. Diagnosis and Disposition       12:33 PM  I have spent 32 minutes of critical care time involved in lab review, consultations with specialist, family decision-making, and documentation. During this entire length of time I was immediately available to the patient. Critical Care: The reason for providing this level of medical care for this critically ill patient was due a critical illness that impaired one or more vital organ systems such that there was a high probability of imminent or life threatening deterioration in the patients condition. This care involved high complexity decision making to assess, manipulate, and support vital system functions, to treat this degreee vital organ system failure and to prevent further life threatening deterioration of the patients condition. ADMISSION NOTE:  13:15 PM  Patient is being admitted to the hospital by Dr. Holly Ryan.  The results of their tests and reasons for their admission have been discussed with them and/or available family. They convey agreement and understanding for the need to be admitted and for their admission diagnosis. CONDITIONS ON ADMISSION:  Deep Vein Thrombosis is not present at the time of admission. Thrombosis is not present at the time of admission. Urinary Tract Infection is not present at the time of admission. Pneumonia is not present at the time of admission. MRSA is not present at the time of admission. Wound infection is not present at the time of admission. Pressure Ulcer is not present at the time of admission. CLINICAL IMPRESSION:    1. Severe anemia        PLAN:    1. Admit to hospitalist        Please note that this dictation was completed with [employer], the computer voice recognition software. Quite often unanticipated grammatical, syntax, homophones, and other interpretive errors are inadvertently transcribed by the computer software. Please disregard these errors. Please excuse any errors that have escaped final proofreading.

## 2020-12-24 NOTE — H&P
History & Physical    Patient: Ismael Cortes MRN: [id number]  CSN: [id number]    YOB: 1945  Age: 66 y.o. Sex: female      DOA: 12/24/2020  Primary Care Provider:  Yanira Obregon MD      Assessment/Plan     Patient Active Problem List   Diagnosis Code    Rheumatoid arthritis ([facility] M06.9    Hypertension I10    Diabetes mellitus ([facility] E11.9    Great toe amputation status, right [id number]    Severe anemia D64.9    PVD (peripheral vascular disease) ([facility]) I73.9       Admit to medical floor    Severe anemia -  Blood transfusion ordered in ER  Will work up anemia by getting the following labs-   1. Iron levels   2. TIBC   3. Iron saturation   4. Serum ferritin   5. Vitamin B12   6. Folate level   7. Hemoccult stools done in ER negative. Severe PVD -  Continue with plavix  Followed by [facility]    Right second toe ulcer -  Local wound care, followed by podiatry as outpatient. DM -   start on SSI    HTN -  Continue with home meds    RA -      SCDs        Estimated length of stay : 1-2 days    CC: anemia       HPI:     Ismael Cortes is a 66 y.o. female with diabetes, hypertension, rheumatoid arthritis, peripheral vascular disease, carotid artery stenosis, right great toe amputation presented to ER after advice from PCP. Patient reports that she was seen by her PCP yesterday and blood work done showed low hemoglobin and her PCP advised her to go to ER. She reports that she has been feeling fatigued for about 2 months. Denies any other symptoms including shortness of breath, dizziness, chest pain, dark or maroon stools. She reports that she had low hemoglobin in the past and she received blood transfusion about 2 years ago. She is not sure why she was anemic. She reports that she was on iron supplements and stopped taking the supplements about 3 months ago  Due to constipation. She has history of significant peripheral vascular disease, followed by Dr. Tisha Gauthier and Dr. Dianelys Rawls.   She is also followed by podiatry by Dr. Carleen Cole. In ER she is noted to have hemoglobin and hematocrit of 5.3/18. 4. Past Medical History:   Diagnosis Date    Autoimmune disease ([facility]     RA    Diabetes ([facility]     diet controlled    Diabetes mellitus ([facility]     Hypertension     Macromastia     Menopause     Rheumatoid arthritis(714.0)     TIA (transient ischemic attack)     Ulcer of foot ([facility]        Past Surgical History:   Procedure Laterality Date    HX BREAST REDUCTION  2012    HX GYN      HX ORTHOPAEDIC  2009    Left hand surgery    HX OTHER SURGICAL      left shoulder abcess drained    HX PARTIAL HYSTERECTOMY         History reviewed. No pertinent family history. Social History     Socioeconomic History    Marital status:      Spouse name: Not on file    Number of children: Not on file    Years of education: Not on file    Highest education level: Not on file   Tobacco Use    Smoking status: Former Smoker     Quit date: 1997     Years since quittin.2    Smokeless tobacco: Never Used   Substance and Sexual Activity    Alcohol use: No    Drug use: No       Prior to Admission medications    Medication Sig Start Date End Date Taking? Authorizing Provider   ascorbic acid, vitamin C, (VITAMIN C) 250 mg tablet Take  by mouth daily. Yes Provider, Historical   therapeutic multivitamin (THERAGRAN) tablet Take 1 Tab by mouth daily. Yes Provider, Historical   TURMERIC-HERBAL COMPLEX NO.278 PO Take  by mouth. Yes Provider, Historical   atorvastatin (LIPITOR) 10 mg tablet Take  by mouth daily. Yes Other, MD Macarena   AMLODIPINE BESYLATE, BULK, Take 5 mg by mouth daily. Yes Other, MD Macarena   clopidogrel (PLAVIX) 75 mg tablet Take  by mouth daily. Yes Other, MD Macarena   losartan (COZAAR) 50 mg tablet Take 50 mg by mouth daily. Yes Other, MD Macarena   niacin/vit B2/vits A,C,E/min (VIT A-VIT E4-B2-F-E-MINERALS PO) Take 50 mg by mouth daily.  Indications: Vit B3    Provider, Historical   ferrous sulfate (IRON) 325 mg (65 mg iron) tablet Take 325 mg by mouth Daily (before breakfast). Provider, Historical       No Known Allergies    Review of Systems  Gen: No fever, chills, malaise, weight loss/gain. Heent: No headache, rhinorrhea, epistaxis, ear pain, hearing loss, sinus pain, neck pain/stiffness, sore throat. Heart: No chest pain, palpitations, JIMENEZ, pnd, or orthopnea. Resp: No cough, hemoptysis, wheezing and shortness of breath. GI: No nausea, vomiting, diarrhea, constipation, melena or hematochezia. : No urinary obstruction, dysuria or hematuria. Derm: No rash, new skin lesion or pruritis. Musc/skeletal: see above. Vasc: No edema, cyanosis or claudication. Endo: No heat/cold intolerance, no polyuria,polydipsia or polyphagia. Neuro: No unilateral weakness, numbness, tingling. No seizures. Heme: No easy bruising or bleeding. Physical Exam:     Physical Exam:  Visit Vitals  BP (!) 167/53 (BP 1 Location: Right arm, BP Patient Position: At rest)   Pulse 79   Temp 98.7 °F (37.1 °C)   Resp 16   Ht 5' 5.98\" (1.676 m)   Wt 28.4 kg (62 lb 9.6 oz)   SpO2 100%   BMI 10.11 kg/m²           Temp (24hrs), Av.1 °F (36.7 °C), Min:97.3 °F (36.3 °C), Max:98.7 °F (37.1 °C)    No intake/output data recorded. No intake/output data recorded. General:  Awake, cooperative, no distress. Head:  Normocephalic, without obvious abnormality, atraumatic. Eyes:  Conjunctivae/corneas clear, sclera anicteric, PERRL, EOMs intact. Nose: Nares normal. No drainage or sinus tenderness. Throat: Lips, mucosa, and tongue normal.    Neck: Supple, symmetrical, trachea midline, no adenopathy. Lungs:   Clear to auscultation bilaterally. Heart:   S1, S2, no murmur, click, rub or gallop. Abdomen: Soft, non-tender. Bowel sounds normal. No masses,  No organomegaly.    Extremities: Edema bilaterally, right great toe amputation, left hand RA changes in MCP joints   Pulses: Not able to palpate distal pulses. Skin: Skin color pink, turgor normal. Right second toe ulcer   Neurologic: CNII-XII intact. No focal motor or sensory deficit. Labs Reviewed:    CMP:   Lab Results   Component Value Date/Time     12/24/2020 11:40 AM    K 4.1 12/24/2020 11:40 AM     12/24/2020 11:40 AM    CO2 28 12/24/2020 11:40 AM    AGAP 6 12/24/2020 11:40 AM     (H) 12/24/2020 11:40 AM    BUN 10 12/24/2020 11:40 AM    CREA 0.57 (L) 12/24/2020 11:40 AM    GFRAA >60 12/24/2020 11:40 AM    GFRNA >60 12/24/2020 11:40 AM    CA 9.1 12/24/2020 11:40 AM    ALB 2.9 (L) 12/24/2020 11:40 AM    TP 7.5 12/24/2020 11:40 AM    GLOB 4.6 (H) 12/24/2020 11:40 AM    AGRAT 0.6 (L) 12/24/2020 11:40 AM    ALT 16 12/24/2020 11:40 AM     CBC:   Lab Results   Component Value Date/Time    WBC 9.6 12/24/2020 11:40 AM    HGB 5.3 (LL) 12/24/2020 11:40 AM    HCT 18.4 (L) 12/24/2020 11:40 AM     (H) 12/24/2020 11:40 AM         Procedures/imaging: see electronic medical records for all procedures/Xrays and details which were not copied into this note but were reviewed prior to creation of Plan    Please note that this dictation was completed with [email or web address], the [email or web address] voice recognition software. Quite often unanticipated grammatical, syntax, homophones, and other interpretive errors are inadvertently transcribed by the computer software. Please disregard these errors. Please excuse any errors that have escaped final proofreading.         CC: Charo Ruth MD

## 2020-12-24 NOTE — ED NOTES
TRANSFER - OUT REPORT:    Verbal report given to Brodnax on Nikhil Factor  being transferred to [facility] [id number] for routine progression of care       Report consisted of patients Situation, Background, Assessment and   Recommendations(SBAR). Information from the following report(s) SBAR, ED Summary and MAR was reviewed with the receiving nurse. Lines:   Peripheral IV 12/24/20 Right Forearm (Active)   Site Assessment Clean, dry, & intact 12/24/20 1140   Hub Color/Line Status Pink;Flushed;Patent 12/24/20 1140        Opportunity for questions and clarification was provided.       Patient transported with:   Registered Nurse

## 2020-12-24 NOTE — ED NOTES
1st attempt to call report to receiving RN made. Placed in hold for 5 minutes then line picked up and call ended. Attempted to return call to receiving unit phone rang continuously w/no answer.

## 2020-12-24 NOTE — PROGRESS NOTES
12/24/20 1441   Vital Signs   Temp 98 °F (36.7 °C)   Temp Source Oral   Pulse (Heart Rate) 83   Heart Rate Source Monitor   Resp Rate 16   O2 Sat (%) 100 %   BP (!) 168/65   MAP (Calculated) 99   BP 1 Method Automatic   BP 1 Location Right arm   BP Patient Position At rest   Pain 1   Pain Scale 1 Numeric (0 - 10)   Pain Intensity 1 0   Patient Stated Pain Goal 0   Height/Weight   Height 5' 5.98\" (1.676 m)   Weight 28.4 kg (62 lb 9.6 oz)   Weight Source Bed   BSA (calculated - sq m) 1.15 sq meters   BMI (calculated) 10.1       S: Patient admission to unit. B: ED report was received from ED nurse Lilly Beebe who verbalized that patient was being admitted by primary care physician for hemoglobin level of  5.3 & Hematocrit level of 18.4. Nurse Raj Samuels verbalized that patient was receiving 1 units PRBCs in ED for low hemoglobin and hematocrit levels. She verbalized that patient presented awake, alert and responsive and with right foot 1st and 2nd metatarsal amputation open to air. Nurse Raj Samuels stated that patient recently had an amputation prior to coming to facility. Nurse Raj Samuels stated that consent for blood transfusion was received prior to starting transfusion in ED at 1317 pm. Additionally, she verbalized that patient could ambulate without difficulty and with stand by assistance of 1 person. She also stated that her last bowel movement was 12/23/2020. Understanding and acceptance of handoff report was verbalized. A: Patient presented to unit awake, alert and responsive with the following most recent vital signs listed above. IV blood transfusion handoff was conducted with ED Nurse Lilly Beebe in which patient's rate was verified at 150 cc/hr. Patient's admission profile and assessment were completed as directed and floor nurse Soraya Garcia RN assisted with completing skin dual assessment.  Patient presented with redness and swelling at incision site of patient's right foot where the 1st and 2nd metatarsals were amputated. Wound consult was inputted. On call admission provider Dr. Az Scott was paged about patient's presentation to floor and necessity of admission orders around 1500 pm. Patient was placed on bilateral lower extremity sequential compression devices and patient's bed alarm was activated and bed side rails were advanced prior to leaving room. R: will continue with prescribed plan of care as directed.    Jose Zendejas  12/24/2020  3:45 PM

## 2020-12-25 VITALS
WEIGHT: 132.9 LBS | BODY MASS INDEX: 21.36 KG/M2 | DIASTOLIC BLOOD PRESSURE: 79 MMHG | TEMPERATURE: 98.5 F | SYSTOLIC BLOOD PRESSURE: 185 MMHG | HEIGHT: 66 IN | OXYGEN SATURATION: 100 % | RESPIRATION RATE: 18 BRPM | HEART RATE: 98 BPM

## 2020-12-25 LAB
ABO + RH BLD: NORMAL
ANION GAP SERPL CALC-SCNC: 5 MMOL/L (ref 3–18)
BLD PROD TYP BPU: NORMAL
BLOOD GROUP ANTIBODIES SERPL: NORMAL
BPU ID: NORMAL
BUN SERPL-MCNC: 8 MG/DL (ref 7–18)
BUN/CREAT SERPL: 25 (ref 12–20)
CALCIUM SERPL-MCNC: 8.8 MG/DL (ref 8.5–10.1)
CALLED TO:,BCALL1: NORMAL
CHLORIDE SERPL-SCNC: 105 MMOL/L (ref 100–111)
CO2 SERPL-SCNC: 29 MMOL/L (ref 21–32)
CREAT SERPL-MCNC: 0.32 MG/DL (ref 0.6–1.3)
CROSSMATCH RESULT,%XM: NORMAL
ERYTHROCYTE [DISTWIDTH] IN BLOOD BY AUTOMATED COUNT: 28.4 % (ref 11.6–14.5)
GLUCOSE BLD STRIP.AUTO-MCNC: 108 MG/DL (ref 70–110)
GLUCOSE BLD STRIP.AUTO-MCNC: 139 MG/DL (ref 70–110)
GLUCOSE SERPL-MCNC: 77 MG/DL (ref 74–99)
HCT VFR BLD AUTO: 23.1 % (ref 35–45)
HGB BLD-MCNC: 7.2 G/DL (ref 12–16)
INR PPP: 1.1 (ref 0.8–1.2)
MCH RBC QN AUTO: 18.7 PG (ref 24–34)
MCHC RBC AUTO-ENTMCNC: 31.2 G/DL (ref 31–37)
MCV RBC AUTO: 59.8 FL (ref 74–97)
PLATELET # BLD AUTO: 413 K/UL (ref 135–420)
POTASSIUM SERPL-SCNC: 4 MMOL/L (ref 3.5–5.5)
PROTHROMBIN TIME: 14.3 SEC (ref 11.5–15.2)
RBC # BLD AUTO: 3.86 M/UL (ref 4.2–5.3)
SODIUM SERPL-SCNC: 139 MMOL/L (ref 136–145)
SPECIMEN EXP DATE BLD: NORMAL
STATUS OF UNIT,%ST: NORMAL
UNIT DIVISION, %UDIV: 0
WBC # BLD AUTO: 8.9 K/UL (ref 4.6–13.2)

## 2020-12-25 PROCEDURE — 82962 GLUCOSE BLOOD TEST: CPT

## 2020-12-25 PROCEDURE — 80048 BASIC METABOLIC PNL TOTAL CA: CPT

## 2020-12-25 PROCEDURE — 85027 COMPLETE CBC AUTOMATED: CPT

## 2020-12-25 PROCEDURE — 85610 PROTHROMBIN TIME: CPT

## 2020-12-25 PROCEDURE — 74011250637 HC RX REV CODE- 250/637: Performed by: HOSPITALIST

## 2020-12-25 PROCEDURE — 36415 COLL VENOUS BLD VENIPUNCTURE: CPT

## 2020-12-25 PROCEDURE — 74011250636 HC RX REV CODE- 250/636: Performed by: HOSPITALIST

## 2020-12-25 RX ADMIN — TRAMADOL HYDROCHLORIDE 50 MG: 50 TABLET, COATED ORAL at 01:30

## 2020-12-25 RX ADMIN — IRON SUCROSE 200 MG: 20 INJECTION, SOLUTION INTRAVENOUS at 13:25

## 2020-12-25 RX ADMIN — TRAMADOL HYDROCHLORIDE 50 MG: 50 TABLET, COATED ORAL at 11:15

## 2020-12-25 NOTE — PROGRESS NOTES
Bedside and Verbal shift change report given to Renetta Pedroza  RN  (oncoming nurse) by Jesse LEMOS, RN  (offgoing nurse). Report included the following information SBAR, Kardex and MAR. SHIFT UPDATES: Patient presented as admission from ED around 1441 pm for a diagnosis of severe anemia due to HGB: 5.3 & HCT: 18.4. Patient presented to unit receiving 1 unit PRBCs which was verified at a rate of 150 cc/hr. Patient signed blood transfusion consent prior to coming to floor in ED. Patient verbalized that she had advanced directive but was not sure of its location. Patient was inquired as to whether her family could retrieve information and patient verbalized that her family did not the location of the documentation either. Nightshift RN staff will be notified to inform dayshift RN Staff on 12/25/2020 to notify case management or  services to complete Advanced Directive information with patient. Patient had right foot wound with 1st and 2nd metatarsal amputation with redness and swelling so wound consult was inputted for analysis. At [facility], Dr. Sanjeev Padilla was notified of patient's severe left foot pain.  verbalized that medication would ordered. Tramadol 50 mg every 6 hours PRN for severe pain was ordered and given as directed. ABNORMAL LAB:   Results for Karmen Ordoñez (MRN [id number]) as of 12/24/2020 15:49   Ref.  Range 12/24/2020 11:40   WBC Latest Ref Range: 4.6 - 13.2 K/uL 9.6   NRBC Latest Ref Range: 0  WBC 2.0 (H)   RBC Latest Ref Range: 4.20 - 5.30 M/uL 3.31 (L)   HGB Latest Ref Range: 12.0 - 16.0 g/dL 5.3 (LL)   HCT Latest Ref Range: 35.0 - 45.0 % 18.4 (L)   MCV Latest Ref Range: 74.0 - 97.0 FL 55.6 (L)   MCH Latest Ref Range: 24.0 - 34.0 PG 16.0 (L)   MCHC Latest Ref Range: 31.0 - 37.0 g/dL 28.8 (L)   RDW Latest Ref Range: 11.6 - 14.5 % 22.5 (H)   PLATELET Latest Ref Range: 135 - 420 K/uL 454 (H)   MPV Latest Ref Range: 9.2 - 11.8 FL 8.7 (L)   NEUTROPHILS Latest Ref Range: 42 - 75 % 70   LYMPHOCYTES Latest Ref Range: 20 - 51 % 16 (L)   MONOCYTES Latest Ref Range: 2 - 9 % 3   EOSINOPHILS Latest Ref Range: 0 - 5 % 10 (H)   BASOPHILS Latest Ref Range: 0 - 3 % 1   DF Latest Units:   MANUAL   ABS. NEUTROPHILS Latest Ref Range: 1.8 - 8.0 K/UL 6.7   ABS. LYMPHOCYTES Latest Ref Range: 0.8 - 3.5 K/UL 1.5   ABS. MONOCYTES Latest Ref Range: 0 - 1.0 K/UL 0.3   ABS. EOSINOPHILS Latest Ref Range: 0.0 - 0.4 K/UL 1.0 (H)   ABS. BASOPHILS Latest Ref Range: 0.0 - 0.1 K/UL 0.1   Reticulocyte count Latest Ref Range: 0.5 - 2.3 % 0.8   Results for Sara Del Valle (MRN [id number]) as of 12/24/2020 15:49   Ref. Range 12/24/2020 11:40   Sodium Latest Ref Range: 136 - 145 mmol/L 140   Potassium Latest Ref Range: 3.5 - 5.5 mmol/L 4.1   Chloride Latest Ref Range: 100 - 111 mmol/L 106   CO2 Latest Ref Range: 21 - 32 mmol/L 28   Anion gap Latest Ref Range: 3.0 - 18 mmol/L 6   Glucose Latest Ref Range: 74 - 99 mg/dL 106 (H)   BUN Latest Ref Range: 7.0 - 18 MG/DL 10   Creatinine Latest Ref Range: 0.6 - 1.3 MG/DL 0.57 (L)   BUN/Creatinine ratio Latest Ref Range: 12 - 20   18   Calcium Latest Ref Range: 8.5 - 10.1 MG/DL 9.1   GFR est non-AA Latest Ref Range: >60 ml/min/1.73m2 >60   GFR est AA Latest Ref Range: >60 ml/min/1.73m2 >60   Bilirubin, total Latest Ref Range: 0.2 - 1.0 MG/DL 0.2   Protein, total Latest Ref Range: 6.4 - 8.2 g/dL 7.5   Albumin Latest Ref Range: 3.4 - 5.0 g/dL 2.9 (L)   Globulin Latest Ref Range: 2.0 - 4.0 g/dL 4.6 (H)   A-G Ratio Latest Ref Range: 0.8 - 1.7   0.6 (L)   ALT Latest Ref Range: 13 - 56 U/L 16   AST Latest Ref Range: 10 - 38 U/L 35   Alk. phosphatase Latest Ref Range: 45 - 117 U/L 67   CK Latest Ref Range: 26 - 192 U/L 241 (H)   CK-MB Index Latest Ref Range: 0.0 - 4.0 % 0.7   CK - MB Latest Ref Range: <3.6 ng/ml 1.6   Troponin-I, Qt. Latest Ref Range: 0.0 - 0.045 NG/ML <0.02     Wounds? Right foot 1st and 2nd metatarsals (Redness and Swelling.  Wound consult inputted)     Central Lines? None. Last BM:  12/23/2020. Pending Labs for AM Draw: At 4 am on 12/25/2020 CBC with diff and BMP. Discharge plan:  Patient was admitted today and presented without any case management evaluation for discharge/transition needs. Nightshift RN staff will be notified to inform dayshift RN staff on 12/25/2020 of pending case management assessment for discharge needs/location determination.          Jose Zendejas  12/24/2020  7:34 PM

## 2020-12-25 NOTE — DISCHARGE SUMMARY
Discharge Summary    Patient: Kusum Horton MRN: [id number]  CSN: [id number]    YOB: 1945  Age: 66 y.o. Sex: female    DOA: 12/24/2020 LOS:  LOS: 1 day   Discharge Date: 12/25/2020     Primary Care Provider:  Kaylee Hua MD    Admission Diagnoses: Severe anemia [D64.9]    Discharge Diagnoses:    Problem List as of 12/25/2020 Date Reviewed: 2/17/2019          Codes Class Noted - Resolved    Severe anemia ICD-10-CM: D64.9  ICD-9-CM: 285.9  12/24/2020 - Present        PVD (peripheral vascular disease) ([facility] ICD-10-CM: I73.9  ICD-9-CM: 443.9  12/24/2020 - Present        Great toe amputation status, right ICD-10-CM: [id number]  ICD-9-CM: 895.0  2/17/2019 - Present        Rheumatoid arthritis ([facility] ICD-10-CM: M06.9  ICD-9-CM: 714.0  Unknown - Present    Overview Signed 9/21/2012  7:31 AM by Lili Mead MD     RA             Hypertension ICD-10-CM: I10  ICD-9-CM: 401.9  Unknown - Present        Diabetes mellitus ([facility] ICD-10-CM: E11.9  ICD-9-CM: 250.00  Unknown - Present        RESOLVED: Macromastia ICD-10-CM: N62  ICD-9-CM: 611.1  Unknown - 9/21/2012              Discharge Medications:     Discharge Medication List as of 12/25/2020  1:29 PM      CONTINUE these medications which have NOT CHANGED    Details   ascorbic acid, vitamin C, (VITAMIN C) 250 mg tablet Take  by mouth daily. , Historical Med      therapeutic multivitamin (THERAGRAN) tablet Take 1 Tab by mouth daily. , Historical Med      TURMERIC-HERBAL COMPLEX NO.278 PO Take  by mouth., Historical Med      atorvastatin (LIPITOR) 10 mg tablet Take  by mouth daily. , Historical Med      AMLODIPINE BESYLATE, BULK, Take 5 mg by mouth daily. , Historical Med      clopidogrel (PLAVIX) 75 mg tablet Take  by mouth daily. , Historical Med      losartan (COZAAR) 50 mg tablet Take 50 mg by mouth daily. , Historical Med      niacin/vit B2/vits A,C,E/min (VIT A-VIT U8-N9-G-E-MINERALS PO) Take 50 mg by mouth daily.  Indications: Vit B3, Historical Med      ferrous sulfate (IRON) 325 mg (65 mg iron) tablet Take 325 mg by mouth Daily (before breakfast). , Historical Med             Discharge Condition: Good    Procedures : None    Consults: None      PHYSICAL EXAM   Visit Vitals  BP (!) 185/79 (BP 1 Location: Right arm, BP Patient Position: At rest)   Pulse 98   Temp 98.5 °F (36.9 °C)   Resp 18   Ht 5' 5.98\" (1.676 m)   Wt 60.3 kg (132 lb 14.4 oz)   SpO2 100%   BMI 21.46 kg/m²     General: Awake, cooperative, no acute distress    HEENT: NC, Atraumatic. PERRLA, EOMI. Anicteric sclerae. Lungs:  CTA Bilaterally. No Wheezing/Rhonchi/Rales. Heart:  Regular  rhythm,  No murmur, No Rubs, No Gallops  Abdomen: Soft, Non distended, Non tender. +Bowel sounds,   Extremities: No c/c/e  Psych:   Not anxious or agitated. Neurologic:  No acute neurological deficits. Admission HPI :   Ivory Rosas is a 66 y.o. female with diabetes, hypertension, rheumatoid arthritis, peripheral vascular disease, carotid artery stenosis, right great toe amputation presented to ER after advice from PCP. Patient reports that she was seen by her PCP yesterday and blood work done showed low hemoglobin and her PCP advised her to go to ER. She reports that she has been feeling fatigued for about 2 months. Denies any other symptoms including shortness of breath, dizziness, chest pain, dark or maroon stools. She reports that she had low hemoglobin in the past and she received blood transfusion about 2 years ago. She is not sure why she was anemic. She had colonoscopy about 3 years ago and was told it was negative. She reports that she was on iron supplements and stopped taking the supplements about 3 months ago  Due to constipation. She has history of significant peripheral vascular disease, followed by Dr. Lou Field and Dr. Sandra Hurst. She is also followed by podiatry by Dr. Rola Curtis. In ER she is noted to have hemoglobin and hematocrit of 5.3/18. 4. her stool for occult blood negative. Hospital Course :   Ms. Jd Middleton was admitted to medical floor. She did not had any acute events during hospitalization. Severe iron deficiency anemia -  Her iron level very low with low ferritin. She received blood transfusion with improvement in her H/H. She also received IV venofer. She is advised to be compliant on her iron supplements and take fiber supplements with it. Have also advised to follow up with PCP to see if she can get scheduled IV iron transfusion if she is not able to take oral secondary to constipation. Activity: Activity as tolerated    Diet: Regular Diet    Follow-up: PCP    Disposition: home    Minutes spent on discharge: 40       Labs: Results:       Chemistry Recent Labs     12/25/20  0000 12/24/20  1140   GLU 77 106*    140   K 4.0 4.1    106   CO2 29 28   BUN 8 10   CREA 0.32* 0.57*   CA 8.8 9.1   AGAP 5 6   BUCR 25* 18   AP  --  67   TP  --  7.5   ALB  --  2.9*   GLOB  --  4.6*   AGRAT  --  0.6*      CBC w/Diff Recent Labs     12/25/20  0000 12/24/20  1140   WBC 8.9 9.6   RBC 3.86* 3.31*   HGB 7.2* 5.3*   HCT 23.1* 18.4*    454*   GRANS  --  70   LYMPH  --  16*   EOS  --  10*      Cardiac Enzymes Recent Labs     12/24/20  1140   *   CKND1 0.7      Coagulation Recent Labs     12/25/20  0000   PTP 14.3   INR 1.1       Lipid Panel No results found for: CHOL, CHOLPOCT, CHOLX, CHLST, CHOLV, 236500, HDL, HDLP, LDL, LDLC, DLDLP, 654029, VLDLC, VLDL, TGLX, TRIGL, TRIGP, TGLPOCT, CHHD, CHHDX   BNP No results for input(s): BNPP in the last 72 hours.    Liver Enzymes Recent Labs     12/24/20  1140   TP 7.5   ALB 2.9*   AP 67      Thyroid Studies No results found for: T4, T3U, TSH, TSHEXT         Significant Diagnostic Studies: Xr Chest Port    Result Date: 12/24/2020  HISTORY: -Provided with order: anemia, fatigue -Additional: None Technique : AP PORTABLE CHEST Comparison : 06/03/19 FINDINGS: HEART AND MEDIASTINUM: Aortic vascular calcification. Otherwise unremarkable. LUNGS AND PLEURAL SPACES: No consolidation, congestive heart failure, pleural effusion or pneumothorax. BONY THORAX AND SOFT TISSUES: No acute osseous abnormality. Impression: No plain film evidence of acute cardiopulmonary disease, allowing for technique. No results found for this or any previous visit. Please note that this dictation was completed with [employer], the computer voice recognition software. Quite often unanticipated grammatical, syntax, homophones, and other interpretive errors are inadvertently transcribed by the computer software. Please disregard these errors. Please excuse any errors that have escaped final proofreading.      CC: [place] MD

## 2020-12-25 NOTE — ROUTINE PROCESS
1927 Bedside and Verbal shift change report given to Oswaldo Humphries RN (oncoming nurse) by A. Shelvia Severin RN (offgoing nurse). Report included the following information SBAR, Kardex, Intake/Output, MAR and Recent Results. 0000 H&H to follow up on blood administration resulted at 7.2.  
 
 
0131 Pt C/O 10/10 right foot pain unrelieved by repositioning, PRN Tramadol administered will follow up on effectiveness. Pt right foot observed to be red, shiny with taut skin around 3-5th metatarsal. Pt currently has a consult in for wound care. 0226 Pt pain re-accessed, pt observed resting in bed with chest rise and fall noted and RR greater than than 10.  
 
0638 Pt slept through remainder of shift currently denies any pain or distress at this time 
 
0700 Bedside and Verbal shift change report given to Virginia Krause RN (oncoming nurse) by Oswaldo Humphries RN (offgoing nurse). Report included the following information SBAR, Kardex, Intake/Output, MAR and Recent Results.

## 2020-12-25 NOTE — PROGRESS NOTES
Problem: Falls - Risk of  Goal: *Absence of Falls  Description: Document Dandre Camargo Fall Risk and appropriate interventions in the flowsheet.   Outcome: Progressing Towards Goal  Note: Fall Risk Interventions:  Mobility Interventions: Assess mobility with egress test, Patient to call before getting OOB

## 2020-12-25 NOTE — PROGRESS NOTES
Chart reviewed as CM on call. Pt admitted for anemia. Provider please consider PT/OT eval orders to assist with safe dc planning. CM will follow for transition of care needs. Reason for Admission:   Per H&P pt is \"is a 66 y.o. female with diabetes, hypertension, rheumatoid arthritis, peripheral vascular disease, carotid artery stenosis, right great toe amputation presented to ER after advice from PCP. Patient reports that she was seen by her PCP yesterday and blood work done showed low hemoglobin and her PCP advised her to go to ER. She reports that she has been feeling fatigued for about 2 months. Denies any other symptoms including shortness of breath, dizziness, chest pain, dark or maroon stools. She reports that she had low hemoglobin in the past and she received blood transfusion about 2 years ago. She is not sure why she was anemic. She reports that she was on iron supplements and stopped taking the supplements about 3 months ago  Due to constipation. She has history of significant peripheral vascular disease, followed by Dr. Noelle Harris and Dr. Francine Chaidez. She is also followed by podiatry by Dr. Daniella John. \"                   RUR Score:   10%                  Plan for utilizing home health:   TBD Provider if [place] needed please place order       PCP: First and Last name:  Listed as MD Anuj Mcmillan   Name of Practice:    Are you a current patient: Yes/No:    Approximate date of last visit:    Can you participate in a virtual visit with your PCP:                     Current Advanced Directive/Advance Care Plan: on file                         Transition of Care Plan:      TBD                Care Management Interventions  Mode of Transport at Discharge: ALS  Transition of Care Consult (CM Consult): Discharge Planning

## 2020-12-25 NOTE — PROGRESS NOTES
0700- Received report which included SBAR, Kardex and MAR. Report received from [place] [id number]- Reviewed discharge instructions with patient. Patient verbalized understanding of instructions and follow up appt.

## 2020-12-26 LAB
ATRIAL RATE: 95 BPM
CALCULATED P AXIS, ECG09: 65 DEGREES
CALCULATED R AXIS, ECG10: 51 DEGREES
CALCULATED T AXIS, ECG11: 49 DEGREES
DIAGNOSIS, 93000: NORMAL
P-R INTERVAL, ECG05: 160 MS
Q-T INTERVAL, ECG07: 368 MS
QRS DURATION, ECG06: 90 MS
QTC CALCULATION (BEZET), ECG08: 462 MS
VENTRICULAR RATE, ECG03: 95 BPM

## 2021-01-09 ENCOUNTER — HOSPITAL ENCOUNTER (EMERGENCY)
Age: 76
Discharge: LEFT BEFORE DISCHARGE | End: 2021-01-09
Attending: STUDENT IN AN ORGANIZED HEALTH CARE EDUCATION/TRAINING PROGRAM
Payer: MEDICARE

## 2021-01-09 VITALS
WEIGHT: 140 LBS | RESPIRATION RATE: 18 BRPM | TEMPERATURE: 97.3 F | HEART RATE: 92 BPM | HEIGHT: 66 IN | DIASTOLIC BLOOD PRESSURE: 56 MMHG | OXYGEN SATURATION: 100 % | SYSTOLIC BLOOD PRESSURE: 140 MMHG | BODY MASS INDEX: 22.5 KG/M2

## 2021-01-09 PROCEDURE — 75810000275 HC EMERGENCY DEPT VISIT NO LEVEL OF CARE

## 2021-01-09 RX ORDER — OXYBUTYNIN CHLORIDE 5 MG/1
5 TABLET ORAL 2 TIMES DAILY
COMMUNITY

## 2021-01-09 RX ORDER — TRAMADOL HYDROCHLORIDE 50 MG/1
50 TABLET ORAL
COMMUNITY
End: 2021-02-13

## 2021-01-09 RX ORDER — PREGABALIN 75 MG/1
CAPSULE ORAL
COMMUNITY

## 2021-01-09 RX ORDER — ATORVASTATIN CALCIUM 20 MG/1
TABLET, FILM COATED ORAL
COMMUNITY
End: 2021-02-13

## 2021-01-09 RX ORDER — CETIRIZINE HCL 10 MG
TABLET ORAL
COMMUNITY

## 2021-01-09 RX ORDER — POLYETHYLENE GLYCOL, POTASSIUM CHLORIDE, PROPYLENE GLYCOL, SODIUM CHLORIDE 15%-0.17%
0.25 GEL NASAL
COMMUNITY
End: 2021-02-13

## 2021-01-09 RX ORDER — LEFLUNOMIDE 10 MG/1
TABLET ORAL
COMMUNITY

## 2021-01-09 RX ORDER — GLUCOSAMINE SULFATE 1500 MG
POWDER IN PACKET (EA) ORAL
COMMUNITY

## 2021-01-09 RX ORDER — BUDESONIDE AND FORMOTEROL FUMARATE DIHYDRATE 160; 4.5 UG/1; UG/1
1 AEROSOL RESPIRATORY (INHALATION) AS NEEDED
COMMUNITY

## 2021-01-09 RX ORDER — CLOPIDOGREL BISULFATE 75 MG/1
75 TABLET ORAL DAILY
COMMUNITY
End: 2022-02-04

## 2021-01-09 RX ORDER — AMLODIPINE BESYLATE 5 MG/1
5 TABLET ORAL
COMMUNITY

## 2021-01-09 RX ORDER — IPRATROPIUM BROMIDE 42 UG/1
SPRAY, METERED NASAL AS NEEDED
COMMUNITY

## 2021-01-09 RX ORDER — HYDROXYCHLOROQUINE SULFATE 200 MG/1
200 TABLET, FILM COATED ORAL DAILY
COMMUNITY

## 2021-01-09 RX ORDER — FOLIC ACID 1 MG/1
TABLET ORAL
COMMUNITY

## 2021-01-09 RX ORDER — FLUTICASONE PROPIONATE 50 MCG
SPRAY, SUSPENSION (ML) NASAL
COMMUNITY
End: 2021-02-13

## 2021-01-09 RX ORDER — HYDROCHLOROTHIAZIDE 25 MG/1
TABLET ORAL
COMMUNITY

## 2021-01-09 RX ORDER — GABAPENTIN 100 MG/1
300 CAPSULE ORAL 3 TIMES DAILY
COMMUNITY
Start: 2020-12-08

## 2021-01-09 RX ORDER — NYSTATIN 100000 [USP'U]/ML
SUSPENSION ORAL
COMMUNITY
End: 2021-03-15 | Stop reason: CLARIF

## 2021-01-09 RX ORDER — LOSARTAN POTASSIUM 100 MG/1
100 TABLET ORAL
COMMUNITY

## 2021-01-09 NOTE — ED NOTES
Attempted to locate pt for room placement. Pt not in lobby, registration states  did not want to wait any longer.  This nurse not notified that pt was leaving

## 2021-01-09 NOTE — ED TRIAGE NOTES
Pt presents to ED with intermittent shooting pain from post surgical right foot that radiates up her leg.  When the pain strikes pt feels \"off balance, like I better hold still\"

## 2021-01-19 ENCOUNTER — HOSPITAL ENCOUNTER (EMERGENCY)
Age: 76
Discharge: HOME | End: 2021-01-19
Attending: EMERGENCY MEDICINE
Payer: MEDICARE

## 2021-01-19 ENCOUNTER — APPOINTMENT (OUTPATIENT)
Dept: GENERAL RADIOLOGY | Age: 76
End: 2021-01-19
Attending: EMERGENCY MEDICINE
Payer: MEDICARE

## 2021-01-19 VITALS
SYSTOLIC BLOOD PRESSURE: 156 MMHG | HEIGHT: 66 IN | OXYGEN SATURATION: 98 % | BODY MASS INDEX: 22.5 KG/M2 | WEIGHT: 140 LBS | DIASTOLIC BLOOD PRESSURE: 59 MMHG | TEMPERATURE: 97.3 F | RESPIRATION RATE: 16 BRPM | HEART RATE: 90 BPM

## 2021-01-19 DIAGNOSIS — E08.621 DIABETIC ULCER OF HEEL ASSOCIATED WITH DIABETES MELLITUS DUE TO UNDERLYING CONDITION, LIMITED TO BREAKDOWN OF SKIN, UNSPECIFIED LATERALITY (HCC): Primary | ICD-10-CM

## 2021-01-19 DIAGNOSIS — L97.401 DIABETIC ULCER OF HEEL ASSOCIATED WITH DIABETES MELLITUS DUE TO UNDERLYING CONDITION, LIMITED TO BREAKDOWN OF SKIN, UNSPECIFIED LATERALITY (HCC): Primary | ICD-10-CM

## 2021-01-19 LAB
ANION GAP SERPL CALC-SCNC: 4 MMOL/L (ref 3–18)
BASOPHILS # BLD: 0 K/UL (ref 0–0.1)
BASOPHILS NFR BLD: 0 % (ref 0–2)
BUN SERPL-MCNC: 13 MG/DL (ref 7–18)
BUN/CREAT SERPL: 24 (ref 12–20)
CALCIUM SERPL-MCNC: 9.5 MG/DL (ref 8.5–10.1)
CHLORIDE SERPL-SCNC: 110 MMOL/L (ref 100–111)
CO2 SERPL-SCNC: 28 MMOL/L (ref 21–32)
CREAT SERPL-MCNC: 0.54 MG/DL (ref 0.6–1.3)
DIFFERENTIAL METHOD BLD: ABNORMAL
EOSINOPHIL # BLD: 0.3 K/UL (ref 0–0.4)
EOSINOPHIL NFR BLD: 3 % (ref 0–5)
ERYTHROCYTE [DISTWIDTH] IN BLOOD BY AUTOMATED COUNT: 29.8 % (ref 11.6–14.5)
GLUCOSE SERPL-MCNC: 104 MG/DL (ref 74–99)
HCT VFR BLD AUTO: 26.9 % (ref 35–45)
HGB BLD-MCNC: 7.8 G/DL (ref 12–16)
LYMPHOCYTES # BLD: 1.2 K/UL (ref 0.9–3.6)
LYMPHOCYTES NFR BLD: 12 % (ref 21–52)
MCH RBC QN AUTO: 19.2 PG (ref 24–34)
MCHC RBC AUTO-ENTMCNC: 29 G/DL (ref 31–37)
MCV RBC AUTO: 66.3 FL (ref 74–97)
MONOCYTES # BLD: 0.5 K/UL (ref 0.05–1.2)
MONOCYTES NFR BLD: 5 % (ref 3–10)
NEUTS SEG # BLD: 7.7 K/UL (ref 1.8–8)
NEUTS SEG NFR BLD: 80 % (ref 40–73)
PLATELET # BLD AUTO: 548 K/UL (ref 135–420)
PLATELET COMMENTS,PCOM: ABNORMAL
PMV BLD AUTO: 8.5 FL (ref 9.2–11.8)
POTASSIUM SERPL-SCNC: 3.8 MMOL/L (ref 3.5–5.5)
RBC # BLD AUTO: 4.06 M/UL (ref 4.2–5.3)
RBC MORPH BLD: ABNORMAL
SODIUM SERPL-SCNC: 142 MMOL/L (ref 136–145)
WBC # BLD AUTO: 9.7 K/UL (ref 4.6–13.2)

## 2021-01-19 PROCEDURE — 87040 BLOOD CULTURE FOR BACTERIA: CPT

## 2021-01-19 PROCEDURE — 73630 X-RAY EXAM OF FOOT: CPT

## 2021-01-19 PROCEDURE — 74011250637 HC RX REV CODE- 250/637: Performed by: EMERGENCY MEDICINE

## 2021-01-19 PROCEDURE — 85025 COMPLETE CBC W/AUTO DIFF WBC: CPT

## 2021-01-19 PROCEDURE — 80048 BASIC METABOLIC PNL TOTAL CA: CPT

## 2021-01-19 PROCEDURE — 99283 EMERGENCY DEPT VISIT LOW MDM: CPT

## 2021-01-19 RX ORDER — AMOXICILLIN AND CLAVULANATE POTASSIUM 875; 125 MG/1; MG/1
1 TABLET, FILM COATED ORAL 2 TIMES DAILY
Qty: 14 TAB | Refills: 0 | Status: SHIPPED | OUTPATIENT
Start: 2021-01-19 | End: 2021-01-26

## 2021-01-19 RX ORDER — HYDROCODONE BITARTRATE AND ACETAMINOPHEN 5; 325 MG/1; MG/1
1 TABLET ORAL
Status: COMPLETED | OUTPATIENT
Start: 2021-01-19 | End: 2021-01-19

## 2021-01-19 RX ADMIN — HYDROCODONE BITARTRATE AND ACETAMINOPHEN 1 TABLET: 5; 325 TABLET ORAL at 14:57

## 2021-01-19 NOTE — ED PROVIDER NOTES
EMERGENCY DEPARTMENT HISTORY AND PHYSICAL EXAM    Date: 1/19/2021  Patient Name: Adithya Jackson    History of Presenting Illness     Chief Complaint   Patient presents with    Foot Pain     right         History Provided By: Patient    2:45 PM  Adithya Jackson is a 66 y.o. female with PMHX of diabetes, hypertension, chronic foot ulcers, peripheral vascular disease who presents to the emergency department C/O worsening right heel pain where her diabetic ulcer is located. Patient reports she has had the ulcer for some time but over the last few days she has had increasing pain in this area. She denies any fever, chest pain, shortness of breath, abdominal pain, injuries, drainage from the site. She is followed by Dr. Madie Horta with podiatry who she last saw a month ago and is scheduled to see this week. Pain is worse with palpation or ambulation. No relieving factors identified. PCP: Taya High MD    Current Outpatient Medications   Medication Sig Dispense Refill    amoxicillin-clavulanate (Augmentin) 875-125 mg per tablet Take 1 Tab by mouth two (2) times a day for 7 days. 14 Tab 0    multivitamin with minerals (MULTIVITAMIN & MINERAL FORMULA PO) Take 1 Tab by mouth daily.  peg-potas chl-prop gly-sod chl (Rhinase) 15-0.17-20-0.22 % gel 0.25 g.  budesonide-formoteroL (SYMBICORT) 160-4.5 mcg/actuation HFAA budesonide-formoterol  mcg-4.5 mcg/actuation aerosol inhaler      cetirizine (ZYRTEC) 10 mg tablet cetirizine 10 mg tablet   TK 1 T PO QD UTD FOR 30 DAYS      cholecalciferol (VITAMIN D3) 25 mcg (1,000 unit) cap Take  by mouth.  fluticasone propionate (FLONASE) 50 mcg/actuation nasal spray fluticasone propionate 50 mcg/actuation nasal spray,suspension      folic acid (FOLVITE) 1 mg tablet Take  by mouth.       gabapentin (NEURONTIN) 100 mg capsule gabapentin 100 mg capsule   TAKE 3 CAPSULES BY MOUTH THREE TIMES DAILY      hydroCHLOROthiazide (HYDRODIURIL) 25 mg tablet hydrochlorothiazide 25 mg tablet      hydrOXYchloroQUINE (PLAQUENIL) 200 mg tablet hydroxychloroquine 200 mg tablet      ipratropium (ATROVENT) 42 mcg (0.06 %) nasal spray ipratropium bromide 42 mcg (0.06 %) nasal spray      leflunomide (ARAVA) 10 mg tablet Take  by mouth.  nystatin (MYCOSTATIN) 100,000 unit/mL suspension nystatin 100,000 unit/mL oral suspension   SAS 5 ML PO QID      oxybutynin (DITROPAN) 5 mg tablet oxybutynin chloride 5 mg tablet      pregabalin (LYRICA) 75 mg capsule pregabalin 75 mg capsule      sodium chloride 2.65 % spra [place] [facility] 2.65 % nasal spray aerosol   By nasal inhalation apply two sprays to each nostril three times per day as directed.  traMADoL (ULTRAM) 50 mg tablet Take 50 mg by mouth every six (6) hours as needed.  amLODIPine (NORVASC) 5 mg tablet amlodipine 5 mg tablet      atorvastatin (LIPITOR) 20 mg tablet atorvastatin 20 mg tablet      clopidogreL (PLAVIX) 75 mg tab Take 75 mg by mouth daily.  losartan (COZAAR) 100 mg tablet losartan 100 mg tablet      ascorbic acid, vitamin C, (VITAMIN C) 250 mg tablet Take  by mouth daily.  niacin/vit B2/vits A,C,E/min (VIT A-VIT G7-E0-X-E-MINERALS PO) Take 50 mg by mouth daily. Indications: Vit B3      TURMERIC-HERBAL COMPLEX NO.278 PO Take  by mouth.  ferrous sulfate (IRON) 325 mg (65 mg iron) tablet Take 325 mg by mouth Daily (before breakfast).          Past History     Past Medical History:  Past Medical History:   Diagnosis Date    Autoimmune disease ([address]     RA    Diabetes ([facility] [address]     diet controlled    Diabetes mellitus ([address]     Hypertension     Macromastia     Menopause     Rheumatoid arthritis(714.0)     TIA (transient ischemic attack)     Ulcer of foot ([address]        Past Surgical History:  Past Surgical History:   Procedure Laterality Date    HX BREAST REDUCTION  9/2012    HX GYN      HX ORTHOPAEDIC  2009    Left hand surgery    HX OTHER SURGICAL      left shoulder abcess drained    HX PARTIAL HYSTERECTOMY         Family History:  History reviewed. No pertinent family history. Social History:  Social History     Tobacco Use    Smoking status: Former Smoker     Quit date: 1997     Years since quittin.3    Smokeless tobacco: Never Used   Substance Use Topics    Alcohol use: No    Drug use: No       Allergies:  No Known Allergies      Review of Systems   Review of Systems   Constitutional: Negative for fever. Respiratory: Negative for shortness of breath. Cardiovascular: Negative for chest pain. Gastrointestinal: Negative for abdominal pain. Musculoskeletal: Positive for arthralgias. Skin: Positive for wound. All other systems reviewed and are negative.         Physical Exam     Vitals:    21 1445   BP: (!) 156/59   Pulse: 90   Resp: 16   Temp: 97.3 °F (36.3 °C)   SpO2: 98%   Weight: 63.5 kg (140 lb)   Height: 5' 6\" (1.676 m)     Physical Exam    Nursing notes and vital signs reviewed    Constitutional: Non toxic appearing, moderate distress  Head: Normocephalic, Atraumatic  Eyes: EOMI  Neck: Supple  Cardiovascular: Regular rate and rhythm, no murmurs, rubs, or gallops  Chest: Normal work of breathing and chest excursion bilaterally  Lungs: Clear to ausculation bilaterally  Back: No evidence of trauma or deformity  Extremities: Wound over the plantar surface of the right heel with a necrotic center that is diffusely tender to palpation, no active drainage, slight erythema and edema of the whole right foot, prior toe amputation sites are clean dry and intact, one remaining toe is also necrotic but nontender  Skin: Warm and dry, normal cap refill  Neuro: Alert and appropriate  Psychiatric: Normal mood and affect      Diagnostic Study Results     Labs -     Recent Results (from the past 12 hour(s))   CBC WITH AUTOMATED DIFF    Collection Time: 21  3:00 PM   Result Value Ref Range    WBC 9.7 4.6 - 13.2 K/uL    RBC 4.06 (L) 4.20 - 5.30 M/uL HGB 7.8 (L) 12.0 - 16.0 g/dL    HCT 26.9 (L) 35.0 - 45.0 %    MCV 66.3 (L) 74.0 - 97.0 FL    MCH 19.2 (L) 24.0 - 34.0 PG    MCHC 29.0 (L) 31.0 - 37.0 g/dL    RDW 29.8 (H) 11.6 - 14.5 %    PLATELET 974 (H) 197 - 420 K/uL    MPV 8.5 (L) 9.2 - 11.8 FL    NEUTROPHILS 80 (H) 40 - 73 %    LYMPHOCYTES 12 (L) 21 - 52 %    MONOCYTES 5 3 - 10 %    EOSINOPHILS 3 0 - 5 %    BASOPHILS 0 0 - 2 %    ABS. NEUTROPHILS 7.7 1.8 - 8.0 K/UL    ABS. LYMPHOCYTES 1.2 0.9 - 3.6 K/UL    ABS. MONOCYTES 0.5 0.05 - 1.2 K/UL    ABS. EOSINOPHILS 0.3 0.0 - 0.4 K/UL    ABS. BASOPHILS 0.0 0.0 - 0.1 K/UL    PLATELET COMMENTS Increased Platelets      RBC COMMENTS ANISOCYTOSIS  2+        RBC COMMENTS MICROCYTOSIS  1+        RBC COMMENTS HYPOCHROMIA  2+        RBC COMMENTS TARGET CELLS  2+        DF AUTOMATED     METABOLIC PANEL, BASIC    Collection Time: 01/19/21  3:00 PM   Result Value Ref Range    Sodium 142 136 - 145 mmol/L    Potassium 3.8 3.5 - 5.5 mmol/L    Chloride 110 100 - 111 mmol/L    CO2 28 21 - 32 mmol/L    Anion gap 4 3.0 - 18 mmol/L    Glucose 104 (H) 74 - 99 mg/dL    BUN 13 7.0 - 18 MG/DL    Creatinine 0.54 (L) 0.6 - 1.3 MG/DL    BUN/Creatinine ratio 24 (H) 12 - 20      GFR est AA >60 >60 ml/min/1.73m2    GFR est non-AA >60 >60 ml/min/1.73m2    Calcium 9.5 8.5 - 10.1 MG/DL       Radiologic Studies -   XR FOOT RT MIN 3 V   Final Result      1. Postsurgical changes in keeping with amputation of the first and second   digits. There are erosive changes present at the second metatarsal head   suggestive of acute osteomyelitis. This could be confirmed with MRI. CT Results  (Last 48 hours)    None        CXR Results  (Last 48 hours)    None          Medications given in the ED-  Medications   HYDROcodone-acetaminophen (NORCO) 5-325 mg per tablet 1 Tab (1 Tab Oral Given 1/19/21 9324)         Medical Decision Making   I am the first provider for this patient.     I reviewed the vital signs, available nursing notes, past medical history, past surgical history, family history and social history. Vital Signs-Reviewed the patient's vital signs. Pulse Oximetry Analysis - 98% on room air, not hypoxic     Records Reviewed: Nursing Notes and Old Medical Records    Provider Notes (Medical Decision Making): Zandra Malagon is a 66 y.o. female presenting for worsening pain at site of chronic diabetic foot ulcer. Patient nontoxic-appearing and afebrile. White count normal and anemia is at her baseline. X-ray with questionable acute osteo though not in the area where patient's pain predominates. Discussed with patient's podiatrist and will start oral antibiotics with plan for follow-up in podiatry office tomorrow for recheck. Patient understands and agrees with this plan. Provided with strict return precautions. Procedures:  Procedures    ED Course:   CONSULT NOTE:   4:16 PM  Dr. Jimi Martinez spoke with Dr. Tiffanie Dietrich   Specialty: Podiatry  Discussed pt's hx, disposition, and available diagnostic and imaging results over the telephone. Reviewed care plans. Recommends discharge on Augmentin and follow-up in his office tomorrow at the [place] office before 1 PM.     4:19 PM  Updated patient on all results and plan. All questions answered. Diagnosis and Disposition     Critical Care: None    DISCHARGE NOTE:    Oanh Ralph's  results have been reviewed with her. She has been counseled regarding her diagnosis, treatment, and plan. She verbally conveys understanding and agreement of the signs, symptoms, diagnosis, treatment and prognosis and additionally agrees to follow up as discussed. She also agrees with the care-plan and conveys that all of her questions have been answered. I have also provided discharge instructions for her that include: educational information regarding their diagnosis and treatment, and list of reasons why they would want to return to the ED prior to their follow-up appointment, should her condition change.  She has been provided with education for proper emergency department utilization. CLINICAL IMPRESSION:    1. Diabetic ulcer of heel associated with diabetes mellitus due to underlying condition, limited to breakdown of skin, unspecified laterality ([facility] [address]        PLAN:  1. D/C Home  2. Current Discharge Medication List      START taking these medications    Details   amoxicillin-clavulanate (Augmentin) 875-125 mg per tablet Take 1 Tab by mouth two (2) times a day for 7 days. Qty: 14 Tab, Refills: 0           3. Follow-up Information     Follow up With Specialties Details Why Contact Info    Siobhan Singh MD Podiatry  Tomorrow before 1pm [address] [facility] [address]  [phone]      [facility] EMERGENCY DEPT Emergency Medicine  If symptoms worsen [address] [address]  [phone]        _______________________________      Please note that this dictation was completed with [employer], the computer voice recognition software. Quite often unanticipated grammatical, syntax, homophones, and other interpretive errors are inadvertently transcribed by the computer software. Please disregard these errors. Please excuse any errors that have escaped final proofreading.

## 2021-01-19 NOTE — ED TRIAGE NOTES
Pt in for c/o right foot pain that has been going on for 2-3 mos but has worsened over the past couple of days

## 2021-02-12 ENCOUNTER — APPOINTMENT (OUTPATIENT)
Dept: CT IMAGING | Age: 76
DRG: 065 | End: 2021-02-12
Attending: PHYSICIAN ASSISTANT
Payer: MEDICARE

## 2021-02-12 ENCOUNTER — APPOINTMENT (OUTPATIENT)
Dept: GENERAL RADIOLOGY | Age: 76
DRG: 065 | End: 2021-02-12
Attending: PHYSICIAN ASSISTANT
Payer: MEDICARE

## 2021-02-12 ENCOUNTER — HOSPITAL ENCOUNTER (INPATIENT)
Age: 76
LOS: 1 days | Discharge: HOME | DRG: 065 | End: 2021-02-13
Attending: EMERGENCY MEDICINE | Admitting: FAMILY MEDICINE
Payer: MEDICARE

## 2021-02-12 DIAGNOSIS — I63.9 CEREBROVASCULAR ACCIDENT (CVA), UNSPECIFIED MECHANISM (HCC): Primary | ICD-10-CM

## 2021-02-12 PROBLEM — D75.839 THROMBOCYTOSIS: Status: ACTIVE | Noted: 2021-02-12

## 2021-02-12 PROBLEM — Z91.81 HISTORY OF RECENT FALL: Status: ACTIVE | Noted: 2021-02-12

## 2021-02-12 PROBLEM — R68.89 FORGETFULNESS: Status: ACTIVE | Noted: 2021-02-12

## 2021-02-12 PROBLEM — Z91.14 HISTORY OF MEDICATION NONCOMPLIANCE: Status: ACTIVE | Noted: 2021-02-12

## 2021-02-12 LAB
ALBUMIN SERPL-MCNC: 3.2 G/DL (ref 3.4–5)
ALBUMIN/GLOB SERPL: 0.6 {RATIO} (ref 0.8–1.7)
ALP SERPL-CCNC: 94 U/L (ref 45–117)
ALT SERPL-CCNC: 16 U/L (ref 13–56)
AMPHET UR QL SCN: NEGATIVE
ANION GAP SERPL CALC-SCNC: 4 MMOL/L (ref 3–18)
APPEARANCE UR: CLEAR
APTT PPP: 31.7 SEC (ref 23–36.4)
AST SERPL-CCNC: 20 U/L (ref 10–38)
BARBITURATES UR QL SCN: NEGATIVE
BASOPHILS # BLD: 0.1 K/UL (ref 0–0.1)
BASOPHILS NFR BLD: 1 % (ref 0–2)
BENZODIAZ UR QL: NEGATIVE
BILIRUB SERPL-MCNC: 0.2 MG/DL (ref 0.2–1)
BILIRUB UR QL: NEGATIVE
BUN SERPL-MCNC: 12 MG/DL (ref 7–18)
BUN/CREAT SERPL: 22 (ref 12–20)
CALCIUM SERPL-MCNC: 9.8 MG/DL (ref 8.5–10.1)
CANNABINOIDS UR QL SCN: NEGATIVE
CHLORIDE SERPL-SCNC: 103 MMOL/L (ref 100–111)
CK MB CFR SERPL CALC: 1.4 % (ref 0–4)
CK MB CFR SERPL CALC: 1.8 % (ref 0–4)
CK MB SERPL-MCNC: 1.2 NG/ML (ref 5–25)
CK MB SERPL-MCNC: 1.5 NG/ML (ref 5–25)
CK SERPL-CCNC: 85 U/L (ref 26–192)
CK SERPL-CCNC: 88 U/L (ref 26–192)
CO2 SERPL-SCNC: 32 MMOL/L (ref 21–32)
COCAINE UR QL SCN: NEGATIVE
COLOR UR: YELLOW
CREAT SERPL-MCNC: 0.54 MG/DL (ref 0.6–1.3)
DIFFERENTIAL METHOD BLD: ABNORMAL
EOSINOPHIL # BLD: 0.9 K/UL (ref 0–0.4)
EOSINOPHIL NFR BLD: 8 % (ref 0–5)
ERYTHROCYTE [DISTWIDTH] IN BLOOD BY AUTOMATED COUNT: 26.7 % (ref 11.6–14.5)
GLOBULIN SER CALC-MCNC: 5.1 G/DL (ref 2–4)
GLUCOSE SERPL-MCNC: 81 MG/DL (ref 74–99)
GLUCOSE UR STRIP.AUTO-MCNC: NEGATIVE MG/DL
HCT VFR BLD AUTO: 28.2 % (ref 35–45)
HDSCOM,HDSCOM: NORMAL
HGB BLD-MCNC: 8.3 G/DL (ref 12–16)
HGB UR QL STRIP: NEGATIVE
INR PPP: 1.2 (ref 0.8–1.2)
KETONES UR QL STRIP.AUTO: NEGATIVE MG/DL
LEUKOCYTE ESTERASE UR QL STRIP.AUTO: NEGATIVE
LYMPHOCYTES # BLD: 2 K/UL (ref 0.9–3.6)
LYMPHOCYTES NFR BLD: 18 % (ref 21–52)
MCH RBC QN AUTO: 19.3 PG (ref 24–34)
MCHC RBC AUTO-ENTMCNC: 29.4 G/DL (ref 31–37)
MCV RBC AUTO: 65.6 FL (ref 74–97)
METHADONE UR QL: NEGATIVE
MONOCYTES # BLD: 0.8 K/UL (ref 0.05–1.2)
MONOCYTES NFR BLD: 7 % (ref 3–10)
NEUTS SEG # BLD: 7.1 K/UL (ref 1.8–8)
NEUTS SEG NFR BLD: 66 % (ref 40–73)
NITRITE UR QL STRIP.AUTO: NEGATIVE
OPIATES UR QL: NEGATIVE
PCP UR QL: NEGATIVE
PH UR STRIP: 7.5 [PH] (ref 5–8)
PLATELET # BLD AUTO: 614 K/UL (ref 135–420)
PLATELET COMMENTS,PCOM: ABNORMAL
PMV BLD AUTO: 8.6 FL (ref 9.2–11.8)
POTASSIUM SERPL-SCNC: 4.1 MMOL/L (ref 3.5–5.5)
PROT SERPL-MCNC: 8.3 G/DL (ref 6.4–8.2)
PROT UR STRIP-MCNC: NEGATIVE MG/DL
PROTHROMBIN TIME: 14.6 SEC (ref 11.5–15.2)
RBC # BLD AUTO: 4.3 M/UL (ref 4.2–5.3)
RBC MORPH BLD: ABNORMAL
SODIUM SERPL-SCNC: 139 MMOL/L (ref 136–145)
SP GR UR REFRACTOMETRY: 1.02 (ref 1–1.03)
TROPONIN I SERPL-MCNC: <0.02 NG/ML (ref 0–0.04)
TROPONIN I SERPL-MCNC: <0.02 NG/ML (ref 0–0.04)
UROBILINOGEN UR QL STRIP.AUTO: 0.2 EU/DL (ref 0.2–1)
WBC # BLD AUTO: 10.9 K/UL (ref 4.6–13.2)

## 2021-02-12 PROCEDURE — 80307 DRUG TEST PRSMV CHEM ANLYZR: CPT

## 2021-02-12 PROCEDURE — 93005 ELECTROCARDIOGRAM TRACING: CPT

## 2021-02-12 PROCEDURE — 65660000000 HC RM CCU STEPDOWN

## 2021-02-12 PROCEDURE — 85025 COMPLETE CBC W/AUTO DIFF WBC: CPT

## 2021-02-12 PROCEDURE — 80053 COMPREHEN METABOLIC PANEL: CPT

## 2021-02-12 PROCEDURE — 81003 URINALYSIS AUTO W/O SCOPE: CPT

## 2021-02-12 PROCEDURE — 85610 PROTHROMBIN TIME: CPT

## 2021-02-12 PROCEDURE — 70496 CT ANGIOGRAPHY HEAD: CPT

## 2021-02-12 PROCEDURE — 83036 HEMOGLOBIN GLYCOSYLATED A1C: CPT

## 2021-02-12 PROCEDURE — 71045 X-RAY EXAM CHEST 1 VIEW: CPT

## 2021-02-12 PROCEDURE — 99285 EMERGENCY DEPT VISIT HI MDM: CPT

## 2021-02-12 PROCEDURE — 36415 COLL VENOUS BLD VENIPUNCTURE: CPT

## 2021-02-12 PROCEDURE — 82553 CREATINE MB FRACTION: CPT

## 2021-02-12 PROCEDURE — 74011250637 HC RX REV CODE- 250/637: Performed by: FAMILY MEDICINE

## 2021-02-12 PROCEDURE — 74011000636 HC RX REV CODE- 636: Performed by: EMERGENCY MEDICINE

## 2021-02-12 PROCEDURE — 85730 THROMBOPLASTIN TIME PARTIAL: CPT

## 2021-02-12 RX ORDER — LEFLUNOMIDE 10 MG/1
10 TABLET ORAL DAILY
Status: DISCONTINUED | OUTPATIENT
Start: 2021-02-13 | End: 2021-02-13 | Stop reason: HOSPADM

## 2021-02-12 RX ORDER — ASPIRIN 325 MG
325 TABLET ORAL ONCE
Status: COMPLETED | OUTPATIENT
Start: 2021-02-12 | End: 2021-02-12

## 2021-02-12 RX ORDER — MELATONIN
1000 DAILY
Status: DISCONTINUED | OUTPATIENT
Start: 2021-02-13 | End: 2021-02-13 | Stop reason: HOSPADM

## 2021-02-12 RX ORDER — GUAIFENESIN 100 MG/5ML
81 LIQUID (ML) ORAL DAILY
Status: DISCONTINUED | OUTPATIENT
Start: 2021-02-13 | End: 2021-02-13 | Stop reason: HOSPADM

## 2021-02-12 RX ORDER — GABAPENTIN 300 MG/1
300 CAPSULE ORAL 3 TIMES DAILY
Status: DISCONTINUED | OUTPATIENT
Start: 2021-02-12 | End: 2021-02-13 | Stop reason: HOSPADM

## 2021-02-12 RX ORDER — MAGNESIUM SULFATE 100 %
16 CRYSTALS MISCELLANEOUS AS NEEDED
Status: DISCONTINUED | OUTPATIENT
Start: 2021-02-12 | End: 2021-02-13 | Stop reason: HOSPADM

## 2021-02-12 RX ORDER — FAMOTIDINE 20 MG/1
20 TABLET, FILM COATED ORAL
Status: DISCONTINUED | OUTPATIENT
Start: 2021-02-12 | End: 2021-02-13 | Stop reason: HOSPADM

## 2021-02-12 RX ORDER — TRAMADOL HYDROCHLORIDE 50 MG/1
50 TABLET ORAL
Status: DISCONTINUED | OUTPATIENT
Start: 2021-02-12 | End: 2021-02-13 | Stop reason: HOSPADM

## 2021-02-12 RX ORDER — DEXTROSE MONOHYDRATE 100 MG/ML
125-250 INJECTION, SOLUTION INTRAVENOUS AS NEEDED
Status: DISCONTINUED | OUTPATIENT
Start: 2021-02-12 | End: 2021-02-13 | Stop reason: HOSPADM

## 2021-02-12 RX ORDER — CEPHALEXIN 500 MG/1
500 CAPSULE ORAL 3 TIMES DAILY
COMMUNITY
End: 2021-02-13

## 2021-02-12 RX ORDER — ATORVASTATIN CALCIUM 20 MG/1
20 TABLET, FILM COATED ORAL
Status: DISCONTINUED | OUTPATIENT
Start: 2021-02-12 | End: 2021-02-13 | Stop reason: HOSPADM

## 2021-02-12 RX ORDER — LANOLIN ALCOHOL/MO/W.PET/CERES
325 CREAM (GRAM) TOPICAL
Status: DISCONTINUED | OUTPATIENT
Start: 2021-02-13 | End: 2021-02-13 | Stop reason: HOSPADM

## 2021-02-12 RX ORDER — INSULIN LISPRO 100 [IU]/ML
INJECTION, SOLUTION INTRAVENOUS; SUBCUTANEOUS
Status: DISCONTINUED | OUTPATIENT
Start: 2021-02-13 | End: 2021-02-13 | Stop reason: HOSPADM

## 2021-02-12 RX ORDER — CLOPIDOGREL BISULFATE 75 MG/1
75 TABLET ORAL DAILY
Status: DISCONTINUED | OUTPATIENT
Start: 2021-02-12 | End: 2021-02-13 | Stop reason: HOSPADM

## 2021-02-12 RX ORDER — HYDROXYCHLOROQUINE SULFATE 200 MG/1
200 TABLET, FILM COATED ORAL
Status: DISCONTINUED | OUTPATIENT
Start: 2021-02-13 | End: 2021-02-13 | Stop reason: HOSPADM

## 2021-02-12 RX ORDER — CLOPIDOGREL BISULFATE 75 MG/1
75 TABLET ORAL DAILY
Status: DISCONTINUED | OUTPATIENT
Start: 2021-02-13 | End: 2021-02-12

## 2021-02-12 RX ORDER — FOLIC ACID 1 MG/1
1 TABLET ORAL DAILY
Status: DISCONTINUED | OUTPATIENT
Start: 2021-02-13 | End: 2021-02-13 | Stop reason: HOSPADM

## 2021-02-12 RX ORDER — ASCORBIC ACID 250 MG
250 TABLET ORAL DAILY
Status: DISCONTINUED | OUTPATIENT
Start: 2021-02-13 | End: 2021-02-13 | Stop reason: HOSPADM

## 2021-02-12 RX ADMIN — ASPIRIN 325 MG ORAL TABLET 325 MG: 325 PILL ORAL at 22:14

## 2021-02-12 RX ADMIN — IOPAMIDOL 80 ML: 755 INJECTION, SOLUTION INTRAVENOUS at 19:22

## 2021-02-12 RX ADMIN — TRAMADOL HYDROCHLORIDE 50 MG: 50 TABLET, FILM COATED ORAL at 22:14

## 2021-02-12 RX ADMIN — CLOPIDOGREL BISULFATE 75 MG: 75 TABLET ORAL at 22:14

## 2021-02-12 RX ADMIN — FAMOTIDINE 20 MG: 20 TABLET, FILM COATED ORAL at 22:24

## 2021-02-12 RX ADMIN — GABAPENTIN 300 MG: 300 CAPSULE ORAL at 22:13

## 2021-02-12 RX ADMIN — ATORVASTATIN CALCIUM 20 MG: 20 TABLET, FILM COATED ORAL at 22:14

## 2021-02-12 NOTE — ED PROVIDER NOTES
EMERGENCY DEPARTMENT HISTORY AND PHYSICAL EXAM    Date: 2/12/2021  Patient Name: Jhon Clarke    History of Presenting Illness     Chief Complaint   Patient presents with    Other     abnormal MRI     FACE-TO-FACE PROGRESS NOTE:  The patient presents with normal MRI that was obtained today. Patient with history of diabetes, hypertension who has had intermittent confusion over the last 2 weeks after a fall. My exam shows well-appearing elderly -American female with no focal neurological deficits. She is normotensive, afebrile. MRI today with acute/subacute infarct. Patient with no current residual deficits. Imp/plan: We will consult neurology for admission and stroke work-up. I have personally seen and examined the patient, reviewed the WILLIE's note and agree with findings and plan. Shirley Pearce,     History Provided By: Patient and son     Chief Complaint: abnormal MRI      Additional History (Context):   6:09 PM  Jhon Clarke is a 66 y.o. female with PMHX diabetes, hypertension, RA, CVA, ulcer of foot, anemia presents to the emergency department C/O abnormal MRI result. Practitioner from [facility] called ER to inform of patient. States patient had head injury on January 14 and had a normal CT at that time but has continued to have off-and-on confusion/forgetfullness since that time. She did have an MRI scheduled and performed today. Doctor on call was contacted and informed that there were 2 areas that appear consistent with acute infarct. She advised patient son to bring the patient to the ER for further evaluation. Patient does take Plavix. Patient denies any slurred speech, one-sided weakness, vision change, chest pain or shortness of breath. She did have surgery on the right foot, surgical debridement and toe amputation by Dr. Janeth Brenner recently. She has been taking antibiotics but unsure of the name.   Denies having stopped any    PCP: Jorge Guevara, MD    Current Facility-Administered Medications   Medication Dose Route Frequency Provider Last Rate Last Admin    atorvastatin (LIPITOR) tablet 20 mg  20 mg Oral QHS Kali Maya MD        [START ON 2021] clopidogreL (PLAVIX) tablet 75 mg  75 mg Oral DAILY Kali Maya MD           Past History     Past Medical History:  Past Medical History:   Diagnosis Date    Autoimmune disease ([address]     RA    Diabetes ([address]     diet controlled    Diabetes mellitus ([address]     Hypertension     Macromastia     Menopause     Rheumatoid arthritis(714.0)     TIA (transient ischemic attack)     Ulcer of foot ([address]        Past Surgical History:  Past Surgical History:   Procedure Laterality Date    HX BREAST REDUCTION  2012    HX GYN      HX ORTHOPAEDIC  2009    Left hand surgery    HX OTHER SURGICAL      left shoulder abcess drained    HX PARTIAL HYSTERECTOMY         Family History:  History reviewed. No pertinent family history. Social History:  Social History     Tobacco Use    Smoking status: Former Smoker     Quit date: 1997     Years since quittin.4    Smokeless tobacco: Never Used   Substance Use Topics    Alcohol use: No    Drug use: No       Allergies:  No Known Allergies    Review of Systems   Review of Systems   Constitutional: Negative for chills and fever. Respiratory: Negative for shortness of breath. Cardiovascular: Negative for chest pain. Gastrointestinal: Negative for abdominal pain, diarrhea, nausea and vomiting. Psychiatric/Behavioral: Positive for confusion. All other systems reviewed and are negative. Physical Exam     Vitals:    21 1909 21 1920 21 1930 21   BP:  (!) 151/61 (!) 151/55 (!) 161/64   Pulse:  81 81 78   Resp:  15 13 10   Temp:       SpO2: 100% 96% 99% 98%     Physical Exam  Vitals signs and nursing note reviewed. Constitutional:       Appearance: She is well-developed.       Comments: Alert, well-appearing, nontoxic, oriented x4, no acute distress   HENT:      Head: Normocephalic and atraumatic. Jaw: No trismus. Right Ear: Tympanic membrane, ear canal and external ear normal. No mastoid tenderness. No hemotympanum. Tympanic membrane is not perforated, erythematous, retracted or bulging. Left Ear: Tympanic membrane, ear canal and external ear normal. No mastoid tenderness. No hemotympanum. Tympanic membrane is not perforated, erythematous, retracted or bulging. Nose: Nose normal.      Mouth/Throat:      Pharynx: Uvula midline. No oropharyngeal exudate, posterior oropharyngeal erythema or uvula swelling. Tonsils: No tonsillar abscesses. Eyes:      Extraocular Movements: Extraocular movements intact. Pupils: Pupils are equal, round, and reactive to light. Neck:      Musculoskeletal: Normal range of motion and neck supple. Normal range of motion. No neck rigidity, spinous process tenderness or muscular tenderness. Meningeal: Brudzinski's sign and Kernig's sign absent. Comments: No meningismus   No lymphadenopathy   Cardiovascular:      Rate and Rhythm: Normal rate and regular rhythm. Heart sounds: Normal heart sounds. No murmur. Pulmonary:      Effort: Pulmonary effort is normal. No respiratory distress. Breath sounds: Normal breath sounds. No wheezing or rales. Abdominal:      General: Bowel sounds are normal.      Palpations: Abdomen is soft. Tenderness: There is no abdominal tenderness. Musculoskeletal: Normal range of motion. Skin:     General: Skin is warm and dry. Neurological:      Mental Status: She is alert and oriented to person, place, and time. GCS: GCS eye subscore is 4. GCS verbal subscore is 5. GCS motor subscore is 6. Cranial Nerves: No cranial nerve deficit. Sensory: No sensory deficit. Motor: No tremor, atrophy or abnormal muscle tone.       Coordination: Coordination normal.      Gait: Gait normal. Comments: No neuro deficit   No pronator drift  Normal finger nose finger  N/V intact distally   Strength 5/5 and equal in all extremities   No slurred speech   No facial droop    Psychiatric:         Judgment: Judgment normal.           Diagnostic Study Results     Labs:     Recent Results (from the past 12 hour(s))   EKG, 12 LEAD, INITIAL    Collection Time: 02/12/21  6:18 PM   Result Value Ref Range    Ventricular Rate 80 BPM    Atrial Rate 80 BPM    P-R Interval 146 ms    QRS Duration 90 ms    Q-T Interval 386 ms    QTC Calculation (Bezet) 445 ms    Calculated P Axis -25 degrees    Calculated R Axis 13 degrees    Calculated T Axis 45 degrees    Diagnosis       Normal sinus rhythm  Normal ECG  When compared with ECG of 24-DEC-2020 11:41,  Criteria for Septal infarct are no longer present     CBC WITH AUTOMATED DIFF    Collection Time: 02/12/21  6:25 PM   Result Value Ref Range    WBC 10.9 4.6 - 13.2 K/uL    RBC 4.30 4.20 - 5.30 M/uL    HGB 8.3 (L) 12.0 - 16.0 g/dL    HCT 28.2 (L) 35.0 - 45.0 %    MCV 65.6 (L) 74.0 - 97.0 FL    MCH 19.3 (L) 24.0 - 34.0 PG    MCHC 29.4 (L) 31.0 - 37.0 g/dL    RDW 26.7 (H) 11.6 - 14.5 %    PLATELET 681 (H) 899 - 420 K/uL    MPV 8.6 (L) 9.2 - 11.8 FL    NEUTROPHILS 66 40 - 73 %    LYMPHOCYTES 18 (L) 21 - 52 %    MONOCYTES 7 3 - 10 %    EOSINOPHILS 8 (H) 0 - 5 %    BASOPHILS 1 0 - 2 %    ABS. NEUTROPHILS 7.1 1.8 - 8.0 K/UL    ABS. LYMPHOCYTES 2.0 0.9 - 3.6 K/UL    ABS. MONOCYTES 0.8 0.05 - 1.2 K/UL    ABS. EOSINOPHILS 0.9 (H) 0.0 - 0.4 K/UL    ABS.  BASOPHILS 0.1 0.0 - 0.1 K/UL    PLATELET COMMENTS Increased Platelets      RBC COMMENTS ANISOCYTOSIS  3+        RBC COMMENTS MICROCYTOSIS  2+        RBC COMMENTS HYPOCHROMIA  3+        RBC COMMENTS POIKILOCYTOSIS  1+        RBC COMMENTS TARGET CELLS  1+        RBC COMMENTS SCHISTOCYTES  1+        RBC COMMENTS TEARDROP CELLS  1+        DF AUTOMATED     PROTHROMBIN TIME + INR    Collection Time: 02/12/21  6:25 PM   Result Value Ref Range Prothrombin time 14.6 11.5 - 15.2 sec    INR 1.2 0.8 - 1.2     PTT    Collection Time: 02/12/21  6:25 PM   Result Value Ref Range    aPTT 31.7 23.0 - 25.7 SEC   METABOLIC PANEL, COMPREHENSIVE    Collection Time: 02/12/21  6:25 PM   Result Value Ref Range    Sodium 139 136 - 145 mmol/L    Potassium 4.1 3.5 - 5.5 mmol/L    Chloride 103 100 - 111 mmol/L    CO2 32 21 - 32 mmol/L    Anion gap 4 3.0 - 18 mmol/L    Glucose 81 74 - 99 mg/dL    BUN 12 7.0 - 18 MG/DL    Creatinine 0.54 (L) 0.6 - 1.3 MG/DL    BUN/Creatinine ratio 22 (H) 12 - 20      GFR est AA >60 >60 ml/min/1.73m2    GFR est non-AA >60 >60 ml/min/1.73m2    Calcium 9.8 8.5 - 10.1 MG/DL    Bilirubin, total 0.2 0.2 - 1.0 MG/DL    ALT (SGPT) 16 13 - 56 U/L    AST (SGOT) 20 10 - 38 U/L    Alk. phosphatase 94 45 - 117 U/L    Protein, total 8.3 (H) 6.4 - 8.2 g/dL    Albumin 3.2 (L) 3.4 - 5.0 g/dL    Globulin 5.1 (H) 2.0 - 4.0 g/dL    A-G Ratio 0.6 (L) 0.8 - 1.7     CARDIAC PANEL,(CK, CKMB & TROPONIN)    Collection Time: 02/12/21  6:25 PM   Result Value Ref Range    CK - MB 1.2 <3.6 ng/ml    CK-MB Index 1.4 0.0 - 4.0 %    CK 88 26 - 192 U/L    Troponin-I, QT <0.02 0.0 - 0.045 NG/ML       Radiologic Studies:  XR CHEST PORT   Final Result      No acute cardiopulmonary process. CTA HEAD NECK W WO CONT    (Results Pending)     CT Results  (Last 48 hours)    None        CXR Results  (Last 48 hours)               02/12/21 1819  XR CHEST PORT Final result    Impression:      No acute cardiopulmonary process. Narrative:  CLINICAL HISTORY:  Shortness of breath       COMPARISONS:  Chest x-ray 12/24/2020       TECHNIQUE:  single frontal view of the chest       ------------------------------------------       FINDINGS:       Lungs:  No consolidation or pleural fluid. Mediastinum: No significant cardiomegaly. Atherosclerotic arterial calcification       Bones: No evidence of fracture or suspicious bone lesion. Mild thoracic   scoliosis.  Moderate degenerative disc disease.           ------------------------------------------             Medical Decision Making   I am the first provider for this patient. I reviewed the vital signs, available nursing notes, past medical history, past surgical history, family history and social history. Vital Signs: Reviewed the patient's vital signs. Pulse Oximetry Analysis: 100% on RA     Cardiac Monitor:  Rate: 76 bpm  Rhythm: NSR    EKG interpretation: (Preliminary)  6:09 PM   Normal sinus rhythm rate 80 bpm no STEMI  EKG read by Torin Kowalski DO at 622Pm     Records Reviewed: Nursing Notes and Old Medical Records    MRI brain w/ and w/o     MR HEAD W/WO CONTRAST (02/12/2021 2:44 PM EST)  MR HEAD W/WO CONTRAST (02/12/2021 2:44 PM EST)   Specimen         MR HEAD W/WO CONTRAST (02/12/2021 2:44 PM EST)   Impressions Performed At       1. Small punctate foci of restricted diffusion are identified in the anterior right frontal lobe and posterior right temporal lobe. These are suspicious for acute/subacute cortical infarcts. There is no evidence of hemorrhagic conversion.        2. No abnormal enhancement is identified. No acute intracranial hemorrhage is identified.        3. Mild diffuse cortical atrophy.        4. Small chronic cortical infarcts are identified in the superior left frontal lobe, superior left parietal lobe, and left occipital lobe. There is mild encephalomalacia and gliosis at these locations.        5. There is moderate-marked patchy periventricular and subcortical white matter disease, most suggestive of chronic microvascular ischemic changes. A demyelinating process is considered less likely. Similar changes are identified in the chari.        6. Additional chronic changes are detailed above.        I paged the on-call clinician for [place] Powellpahoe via telephone at 2/12/2021 4:48 PM.  (Dr. Angela Duong).         I communicated the acute findings to Pablo Mccain via telephone at 2/12/2021 4:52 PM  (Dr. Warnell Oppenheim).         Signed By: Skip Knapp MD on 2/12/2021 4:56 PM              Procedures:  Procedures    ED Course:   6:09 PM Initial assessment performed. The patients presenting problems have been discussed, and they are in agreement with the care plan formulated and outlined with them. I have encouraged them to ask questions as they arise throughout their visit. 6:51 PM  Case discussed with Dr. More Aguilar, recommends admission, CTA head and Neck and will see tomorrow       Core Measures:  For Hospitalized Patients:    1. Hospitalization Decision Time:  The decision to hospitalize the patient was made by Nolan Us PA-C   on 2/12/2021    2. Aspirin: Aspirin was not given because the patient sts she already took her ASA today     8:28 PM  Patient is being admitted to the hospital by Nani Hanley. The results of their tests and reasons for their admission have been discussed with them and/or available family. They convey agreement and understanding for the need to be admitted and for their admission diagnosis. CONDITIONS ON ADMISSION:  Sepsis is not present at the time of admission. Deep Vein Thrombosis is not present at the time of admission. Urinary Tract Infection is not present at the time of admission. Pneumonia is not present at the time of admission. MRSA is not present at the time of admission. Wound infection is not present at the time of admission. Pressure Ulcer is not present at the time of admission. CLINICAL IMPRESSION:    1. Cerebrovascular accident (CVA), unspecified mechanism [facility])        Patient presents after she received MRI results that showed acute versus subacute infarct. Patient has a history of CVA in the past and is supposed to be taking Plavix although patient son is unsure if she has been compliant.   Patient has no deficits on exam.  Case discussed with neurology who recommends CTA head and neck and admission      Diagnosis and Disposition CLINICAL IMPRESSION:    1. Cerebrovascular accident (CVA), unspecified mechanism ([address]        PLAN:  1. admit         Please note that this dictation was completed with Topio, the computer voice recognition software. Quite often unanticipated grammatical, syntax, homophones, and other interpretive errors are inadvertently transcribed by the computer software. Please disregard these errors. Please excuse any errors that have escaped final proofreading.

## 2021-02-12 NOTE — Clinical Note
Status[de-identified] INPATIENT [101]   Type of Bed: Telemetry [19]   Inpatient Hospitalization Certified Necessary for the Following Reasons: 3.  Patient receiving treatment that can only be provided in an inpatient setting (further clarification in H&P documentation)   Admitting Diagnosis: CVA (cerebral vascular accident) [facility]) [[id number]]   Admitting Physician: Bang Carpenter [[id number]]   Attending Physician: Bang Carpenter [[id number]]   Estimated Length of Stay: 2 Midnights   Discharge Plan[de-identified] Home with Office Follow-up

## 2021-02-13 VITALS
OXYGEN SATURATION: 100 % | SYSTOLIC BLOOD PRESSURE: 129 MMHG | HEART RATE: 76 BPM | DIASTOLIC BLOOD PRESSURE: 66 MMHG | TEMPERATURE: 98.4 F | RESPIRATION RATE: 12 BRPM

## 2021-02-13 LAB
ANION GAP SERPL CALC-SCNC: 7 MMOL/L (ref 3–18)
ATRIAL RATE: 80 BPM
BASOPHILS # BLD: 0.1 K/UL (ref 0–0.1)
BASOPHILS NFR BLD: 1 % (ref 0–2)
BUN SERPL-MCNC: 8 MG/DL (ref 7–18)
BUN/CREAT SERPL: 17 (ref 12–20)
CALCIUM SERPL-MCNC: 9 MG/DL (ref 8.5–10.1)
CALCULATED P AXIS, ECG09: -25 DEGREES
CALCULATED R AXIS, ECG10: 13 DEGREES
CALCULATED T AXIS, ECG11: 45 DEGREES
CHLORIDE SERPL-SCNC: 103 MMOL/L (ref 100–111)
CO2 SERPL-SCNC: 30 MMOL/L (ref 21–32)
CREAT SERPL-MCNC: 0.46 MG/DL (ref 0.6–1.3)
DIAGNOSIS, 93000: NORMAL
DIFFERENTIAL METHOD BLD: ABNORMAL
EOSINOPHIL # BLD: 0.8 K/UL (ref 0–0.4)
EOSINOPHIL NFR BLD: 9 % (ref 0–5)
ERYTHROCYTE [DISTWIDTH] IN BLOOD BY AUTOMATED COUNT: 26.7 % (ref 11.6–14.5)
GLUCOSE BLD STRIP.AUTO-MCNC: 135 MG/DL (ref 70–110)
GLUCOSE BLD STRIP.AUTO-MCNC: 136 MG/DL (ref 70–110)
GLUCOSE BLD STRIP.AUTO-MCNC: 83 MG/DL (ref 70–110)
GLUCOSE SERPL-MCNC: 61 MG/DL (ref 74–99)
HBA1C MFR BLD: 5.3 % (ref 4.2–5.6)
HCT VFR BLD AUTO: 26.3 % (ref 35–45)
HGB BLD-MCNC: 8.2 G/DL (ref 12–16)
LYMPHOCYTES # BLD: 1.8 K/UL (ref 0.9–3.6)
LYMPHOCYTES NFR BLD: 22 % (ref 21–52)
MCH RBC QN AUTO: 20.3 PG (ref 24–34)
MCHC RBC AUTO-ENTMCNC: 31.2 G/DL (ref 31–37)
MCV RBC AUTO: 65.1 FL (ref 74–97)
MONOCYTES # BLD: 0.7 K/UL (ref 0.05–1.2)
MONOCYTES NFR BLD: 9 % (ref 3–10)
NEUTS SEG # BLD: 4.9 K/UL (ref 1.8–8)
NEUTS SEG NFR BLD: 59 % (ref 40–73)
P-R INTERVAL, ECG05: 146 MS
PLATELET # BLD AUTO: 565 K/UL (ref 135–420)
PMV BLD AUTO: 8.8 FL (ref 9.2–11.8)
POTASSIUM SERPL-SCNC: 4.2 MMOL/L (ref 3.5–5.5)
Q-T INTERVAL, ECG07: 386 MS
QRS DURATION, ECG06: 90 MS
QTC CALCULATION (BEZET), ECG08: 445 MS
RBC # BLD AUTO: 4.04 M/UL (ref 4.2–5.3)
SODIUM SERPL-SCNC: 140 MMOL/L (ref 136–145)
VENTRICULAR RATE, ECG03: 80 BPM
WBC # BLD AUTO: 8.2 K/UL (ref 4.6–13.2)

## 2021-02-13 PROCEDURE — 97116 GAIT TRAINING THERAPY: CPT

## 2021-02-13 PROCEDURE — 80048 BASIC METABOLIC PNL TOTAL CA: CPT

## 2021-02-13 PROCEDURE — 77030013140 HC MSK NEB VYRM -A

## 2021-02-13 PROCEDURE — 85025 COMPLETE CBC W/AUTO DIFF WBC: CPT

## 2021-02-13 PROCEDURE — 94640 AIRWAY INHALATION TREATMENT: CPT

## 2021-02-13 PROCEDURE — 82962 GLUCOSE BLOOD TEST: CPT

## 2021-02-13 PROCEDURE — 97162 PT EVAL MOD COMPLEX 30 MIN: CPT

## 2021-02-13 PROCEDURE — 74011250637 HC RX REV CODE- 250/637: Performed by: FAMILY MEDICINE

## 2021-02-13 PROCEDURE — 74011000250 HC RX REV CODE- 250: Performed by: FAMILY MEDICINE

## 2021-02-13 PROCEDURE — 36415 COLL VENOUS BLD VENIPUNCTURE: CPT

## 2021-02-13 RX ORDER — ASPIRIN 325 MG
325 TABLET ORAL DAILY
Qty: 30 TAB | Refills: 0 | Status: SHIPPED | OUTPATIENT
Start: 2021-02-13 | End: 2021-03-15 | Stop reason: CLARIF

## 2021-02-13 RX ORDER — ACETAMINOPHEN 325 MG/1
650 TABLET ORAL
Status: DISCONTINUED | OUTPATIENT
Start: 2021-02-13 | End: 2021-02-13 | Stop reason: HOSPADM

## 2021-02-13 RX ORDER — ATORVASTATIN CALCIUM 20 MG/1
80 TABLET, FILM COATED ORAL
Qty: 30 TAB | Refills: 0 | Status: SHIPPED | OUTPATIENT
Start: 2021-02-13

## 2021-02-13 RX ORDER — ATORVASTATIN CALCIUM 20 MG/1
40 TABLET, FILM COATED ORAL
Qty: 30 TAB | Refills: 0 | Status: SHIPPED | OUTPATIENT
Start: 2021-02-13 | End: 2021-02-13

## 2021-02-13 RX ADMIN — Medication 1000 UNITS: at 09:00

## 2021-02-13 RX ADMIN — ASCORBIC ACID TAB 250 MG 250 MG: 250 TAB at 09:00

## 2021-02-13 RX ADMIN — GABAPENTIN 300 MG: 300 CAPSULE ORAL at 09:00

## 2021-02-13 RX ADMIN — ARFORMOTEROL TARTRATE: 15 SOLUTION RESPIRATORY (INHALATION) at 07:32

## 2021-02-13 RX ADMIN — ARFORMOTEROL TARTRATE: 15 SOLUTION RESPIRATORY (INHALATION) at 00:51

## 2021-02-13 RX ADMIN — TRAMADOL HYDROCHLORIDE 50 MG: 50 TABLET, FILM COATED ORAL at 09:00

## 2021-02-13 RX ADMIN — HYDROXYCHLOROQUINE SULFATE 200 MG: 200 TABLET, FILM COATED ENTERAL at 08:00

## 2021-02-13 RX ADMIN — LEFLUNOMIDE 10 MG: 10 TABLET ORAL at 08:58

## 2021-02-13 RX ADMIN — FOLIC ACID 1 MG: 1 TABLET ORAL at 09:00

## 2021-02-13 RX ADMIN — FERROUS SULFATE TAB 325 MG (65 MG ELEMENTAL FE) 325 MG: 325 (65 FE) TAB at 06:58

## 2021-02-13 RX ADMIN — ASPIRIN 81 MG: 81 TABLET, CHEWABLE ORAL at 09:00

## 2021-02-13 RX ADMIN — MULTIPLE VITAMINS W/ MINERALS TAB 1 TABLET: TAB at 09:00

## 2021-02-13 NOTE — H&P
History & Physical    Patient: Jhon Clarke MRN: [id number]  CSN: [id number]    YOB: 1945  Age: 66 y.o. Sex: female      DOA: 2/12/2021  Primary Care Provider:  Jorge Guevara MD      Assessment/Plan     Patient Active Problem List   Diagnosis Code    Rheumatoid arthritis ([facility] M06.9    Hypertension I10    Diabetes mellitus ([facility] E11.9    Great toe amputation status, right [id number]    Chronic anemia D64.9    PVD (peripheral vascular disease) ([facility]) I73.9    CVA (cerebral vascular accident) ([facility] I63.9    Thrombocytosis ([facility] D47.3    History of recent fall Z91.81    Forgetfulness R68.89    History of medication noncompliance      76-year-old female with rheumatoid arthritis, hypertension, diabetes mellitus, peripheral vascular disease admitted for CVA due to recent medication noncompliance as well as recent thrombocytosis. She has had a recent fall due to unsteadiness on her feet from her right toe amputation with some residual forgetfulness that prompted the brain MRI. CVA likely due to recent medication noncomplianceprobably precipitated by being off aspirin and Plavix for the past few weeks in the setting of thrombocytosos. She had a recent admission for severe anemia but did not have any active bleeding; occult blood of the stool was negative. She does not have a contraindication to restarting her anticoagulation. Telemetry   mg x 1  Resume daily aspirin and Plavix doses  Consult neurology  Echocardiogram    Forgetfulness with history of recent fallmay be the reason that she did not refill her medications. She has home health physical therapy but I would like the physical therapy team to evaluate her while she is admitted.   Physical therapy consult for evaluation    Peripheral vascular disease  Weekly wound wrapping of her right foot by podiatry    Chronic anemia  Watch H&H closely with restarting her anticoagulation    Thrombocytosisalso increase her risk for possible stroke  Trend platelets  Restart aspirin and Plavix    Diabetes mellitusthe patient does not take any diabetic medications and believes she is classified as a nondiabetic now. She is euglycemic on admission. Follow-up hemoglobin A1c  Sliding scale insulin    Hypertension  Hold antihypertensives in the setting of stroke    Rheumatoid arthritis  Continue home medications    DNR/DNII discussed this with the patient and she does not wish to have intubation or resuscitation efforts in the event of a code. Prophylaxis: Pepcid p.o., Lovenox SQ    Estimated length of stay : 2 nights    Maxine Chance MD  Nocturnist  ----------------------------------------------------------------------------------------------------------------------------------------------------------------------------------------------------------------  CC: Sent to the ED from her primary care clinic for stroke       HPI:     Cj Jackson is a 66 y.o. female who with rheumatoid arthritis, hypertension, diabetes mellitus, peripheral vascular disease who presents to the ED after being notified by her primary care clinic that the MRI of her brain showed a stroke. The patient reports a fall a few weeks ago due to being unsteady on her feet following a toe amputation. She hit her head on the nightstand. His son has reported increased forgetfulness and confusion since then. Her son lives with her. She had the MRI done as an outpatient as part of her work-up for forgetfulness. The patient reports being off both aspirin and Plavix for the last few weeks. She ran out of the medications and did not refill them. 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 Jamison Degree with weekly wraps. She denies chest pain, shortness of breath, nausea, vomiting, diarrhea, and fever.     Past Medical History:   Diagnosis Date    [facility]     RA    Diabetes ([facility]     diet controlled    Diabetes mellitus ([facility]     Hypertension     Macromastia     Menopause     Rheumatoid arthritis(714.0)     TIA (transient ischemic attack)     Ulcer of foot ([facility]        Past Surgical History:   Procedure Laterality Date    HX BREAST REDUCTION  2012    HX GYN      HX ORTHOPAEDIC  2009    Left hand surgery    HX OTHER SURGICAL      left shoulder abcess drained    HX PARTIAL HYSTERECTOMY         Family History   Problem Relation Age of Onset    Heart Disease Mother     Diabetes Mother     No Known Problems Father        Social History     Socioeconomic History    Marital status:      Spouse name: Not on file    Number of children: Not on file    Years of education: Not on file    Highest education level: Not on file   Tobacco Use    Smoking status: Former Smoker     Quit date: 1997     Years since quittin.4    Smokeless tobacco: Never Used   Substance and Sexual Activity    Alcohol use: No    Drug use: No   Other Topics Concern       Prior to Admission medications    Medication Sig Start Date End Date Taking? Authorizing Provider   multivitamin with minerals (MULTIVITAMIN & MINERAL FORMULA PO) Take 1 Tab by mouth daily. Yes Other, MD Macarena   cholecalciferol (VITAMIN D3) 25 mcg (1,000 unit) cap Take  by mouth. Yes Other, MD Macarena   folic acid (FOLVITE) 1 mg tablet Take  by mouth. Yes Other, MD Macarena   gabapentin (NEURONTIN) 100 mg capsule gabapentin 100 mg capsule   TAKE 3 CAPSULES BY MOUTH THREE TIMES DAILY 20  Yes Sen, MD Macarena   hydroCHLOROthiazide (HYDRODIURIL) 25 mg tablet hydrochlorothiazide 25 mg tablet   Yes Macarena Chatterjee MD   hydrOXYchloroQUINE (PLAQUENIL) 200 mg tablet 200 mg daily. Yes Other, MD Macarena   leflunomide (ARAVA) 10 mg tablet Take  by mouth. Yes Other, MD Macarena   oxybutynin (DITROPAN) 5 mg tablet 5 mg two (2) times a day. Yes Other, MD Macarena   traMADoL (ULTRAM) 50 mg tablet Take 50 mg by mouth every six (6) hours as needed.    Yes Sen, MD Macarena   amLODIPine (NORVASC) 5 mg tablet amlodipine 5 mg tablet   Yes Other, MD Macarena   atorvastatin (LIPITOR) 20 mg tablet atorvastatin 20 mg tablet   Yes Other, MD Macarena   clopidogreL (PLAVIX) 75 mg tab Take 75 mg by mouth daily. Yes Sen, MD Macarena   losartan (COZAAR) 100 mg tablet losartan 100 mg tablet   Yes Other, MD Macarena   ascorbic acid, vitamin C, (VITAMIN C) 250 mg tablet Take  by mouth daily. Yes Provider, Historical   ferrous sulfate (IRON) 325 mg (65 mg iron) tablet Take 325 mg by mouth Daily (before breakfast). Yes Provider, Historical   cephALEXin (KEFLEX) 500 mg capsule Take 500 mg by mouth three (3) times daily. Provider, Historical   peg-potas chl-prop gly-sod chl (Rhinase) 15-0.17-20-0.22 % gel 0.25 g. Other, MD Macarena   budesonide-formoteroL (SYMBICORT) 160-4.5 mcg/actuation HFAA budesonide-formoterol  mcg-4.5 mcg/actuation aerosol inhaler    Other, MD Macarena   cetirizine (ZYRTEC) 10 mg tablet cetirizine 10 mg tablet   TK 1 T PO QD UTD FOR 30 DAYS    OtherMacarena MD   fluticasone propionate (FLONASE) 50 mcg/actuation nasal spray fluticasone propionate 50 mcg/actuation nasal spray,suspension    OtherMacarena MD   ipratropium (ATROVENT) 42 mcg (0.06 %) nasal spray ipratropium bromide 42 mcg (0.06 %) nasal spray    Other, MD Macarena   nystatin (MYCOSTATIN) 100,000 unit/mL suspension nystatin 100,000 unit/mL oral suspension   SAS 5 ML PO QID    Other, MD Macarena   pregabalin (LYRICA) 75 mg capsule pregabalin 75 mg capsule    Macarena Chatterjee MD   sodium chloride 2.65 % spra [place] [facility] 2.65 % nasal spray aerosol   By nasal inhalation apply two sprays to each nostril three times per day as directed. Sen, MD Macarena   niacin/vit B2/vits A,C,E/min (VIT A-VIT X5-O7-W-E-MINERALS PO) Take 50 mg by mouth daily. Indications: Vit B3    Provider, Historical   TURMERIC-HERBAL COMPLEX NO.278 PO Take  by mouth.     Provider, Historical       No Known Allergies    Review of Systems  Gen: No fever, chills, malaise, weight loss/gain. Heent: No headache, rhinorrhea, sore throat. Heart: No chest pain, palpitations, JIMENEZ, pnd, or orthopnea. Resp: No cough, hemoptysis, wheezing and shortness of breath. GI: No nausea, vomiting, diarrhea, constipation, melena or hematochezia. : No urinary obstruction, dysuria or hematuria. Derm: No rash, new skin lesion or pruritis. Musc/skeletal: no bone or joint complaints. Vasc: No edema, cyanosis or claudication. Endo: No heat/cold intolerance, no polyuria,polydipsia or polyphagia. Neuro: No unilateral weakness, numbness, tingling. No seizures. Heme: No easy bruising or bleeding. Physical Exam:     Physical Exam:  Visit Vitals  BP (!) 145/54 (BP 1 Location: Left upper arm, BP Patient Position: Sitting)   Pulse 88   Temp 98.2 °F (36.8 °C)   Resp 16   SpO2 98%           Temp (24hrs), Av.9 °F (36.6 °C), Min:97.7 °F (36.5 °C), Max:98.2 °F (36.8 °C)    No intake/output data recorded. No intake/output data recorded. General:  Awake, cooperative, no distress, eating a snack. A&Ox3. Head:  Normocephalic, without obvious abnormality, atraumatic. Eyes:  Conjunctivae/corneas clear, sclera anicteric, PERRL, EOMs intact. Nose: Nares normal.    Throat: Lips, mucosa, and tongue normal.    Neck: Supple, symmetrical, trachea midline. Lungs:   Clear to auscultation bilaterally. Heart:  Regular rate and rhythm, S1, S2 normal, no murmur, click, rub or gallop. Abdomen: Soft, non-tender. Bowel sounds normal. No masses,  No organomegaly. Extremities: Extremities normal, atraumatic, no cyanosis or edema. Capillary refill normal.   Pulses: 2+ and symmetric all extremities. Skin: Skin color pink, turgor normal. No rashes or lesions   Neurologic: CNII-XII intact. No focal motor or sensory deficit. Labs Reviewed: All lab results for the last 24 hours reviewed.   Recent Results (from the past 24 hour(s))   EKG, 12 LEAD, INITIAL    Collection Time: 02/12/21  6:18 PM   Result Value Ref Range    Ventricular Rate 80 BPM    Atrial Rate 80 BPM    P-R Interval 146 ms    QRS Duration 90 ms    Q-T Interval 386 ms    QTC Calculation (Bezet) 445 ms    Calculated P Axis -25 degrees    Calculated R Axis 13 degrees    Calculated T Axis 45 degrees    Diagnosis       Normal sinus rhythm  Normal ECG  When compared with ECG of 24-DEC-2020 11:41,  Criteria for Septal infarct are no longer present     CBC WITH AUTOMATED DIFF    Collection Time: 02/12/21  6:25 PM   Result Value Ref Range    WBC 10.9 4.6 - 13.2 K/uL    RBC 4.30 4.20 - 5.30 M/uL    HGB 8.3 (L) 12.0 - 16.0 g/dL    HCT 28.2 (L) 35.0 - 45.0 %    MCV 65.6 (L) 74.0 - 97.0 FL    MCH 19.3 (L) 24.0 - 34.0 PG    MCHC 29.4 (L) 31.0 - 37.0 g/dL    RDW 26.7 (H) 11.6 - 14.5 %    PLATELET 193 (H) 515 - 420 K/uL    MPV 8.6 (L) 9.2 - 11.8 FL    NEUTROPHILS 66 40 - 73 %    LYMPHOCYTES 18 (L) 21 - 52 %    MONOCYTES 7 3 - 10 %    EOSINOPHILS 8 (H) 0 - 5 %    BASOPHILS 1 0 - 2 %    ABS. NEUTROPHILS 7.1 1.8 - 8.0 K/UL    ABS. LYMPHOCYTES 2.0 0.9 - 3.6 K/UL    ABS. MONOCYTES 0.8 0.05 - 1.2 K/UL    ABS. EOSINOPHILS 0.9 (H) 0.0 - 0.4 K/UL    ABS.  BASOPHILS 0.1 0.0 - 0.1 K/UL    PLATELET COMMENTS Increased Platelets      RBC COMMENTS ANISOCYTOSIS  3+        RBC COMMENTS MICROCYTOSIS  2+        RBC COMMENTS HYPOCHROMIA  3+        RBC COMMENTS POIKILOCYTOSIS  1+        RBC COMMENTS TARGET CELLS  1+        RBC COMMENTS SCHISTOCYTES  1+        RBC COMMENTS TEARDROP CELLS  1+        DF AUTOMATED     PROTHROMBIN TIME + INR    Collection Time: 02/12/21  6:25 PM   Result Value Ref Range    Prothrombin time 14.6 11.5 - 15.2 sec    INR 1.2 0.8 - 1.2     PTT    Collection Time: 02/12/21  6:25 PM   Result Value Ref Range    aPTT 31.7 23.0 - 79.2 SEC   METABOLIC PANEL, COMPREHENSIVE    Collection Time: 02/12/21  6:25 PM   Result Value Ref Range    Sodium 139 136 - 145 mmol/L    Potassium 4.1 3.5 - 5.5 mmol/L    Chloride 103 100 - 111 mmol/L    CO2 32 21 - 32 mmol/L    Anion gap 4 3.0 - 18 mmol/L    Glucose 81 74 - 99 mg/dL    BUN 12 7.0 - 18 MG/DL    Creatinine 0.54 (L) 0.6 - 1.3 MG/DL    BUN/Creatinine ratio 22 (H) 12 - 20      GFR est AA >60 >60 ml/min/1.73m2    GFR est non-AA >60 >60 ml/min/1.73m2    Calcium 9.8 8.5 - 10.1 MG/DL    Bilirubin, total 0.2 0.2 - 1.0 MG/DL    ALT (SGPT) 16 13 - 56 U/L    AST (SGOT) 20 10 - 38 U/L    Alk.  phosphatase 94 45 - 117 U/L    Protein, total 8.3 (H) 6.4 - 8.2 g/dL    Albumin 3.2 (L) 3.4 - 5.0 g/dL    Globulin 5.1 (H) 2.0 - 4.0 g/dL    A-G Ratio 0.6 (L) 0.8 - 1.7     CARDIAC PANEL,(CK, CKMB & TROPONIN)    Collection Time: 02/12/21  6:25 PM   Result Value Ref Range    CK - MB 1.2 <3.6 ng/ml    CK-MB Index 1.4 0.0 - 4.0 %    CK 88 26 - 192 U/L    Troponin-I, QT <0.02 0.0 - 0.045 NG/ML   URINALYSIS W/ RFLX MICROSCOPIC    Collection Time: 02/12/21  8:48 PM   Result Value Ref Range    Color YELLOW      Appearance CLEAR      Specific gravity 1.022 1.005 - 1.030      pH (UA) 7.5 5.0 - 8.0      Protein Negative NEG mg/dL    Glucose Negative NEG mg/dL    Ketone Negative NEG mg/dL    Bilirubin Negative NEG      Blood Negative NEG      Urobilinogen 0.2 0.2 - 1.0 EU/dL    Nitrites Negative NEG      Leukocyte Esterase Negative NEG     DRUG SCREEN, URINE    Collection Time: 02/12/21  8:48 PM   Result Value Ref Range    BENZODIAZEPINES Negative NEG      BARBITURATES Negative NEG      THC (TH-CANNABINOL) Negative NEG      OPIATES Negative NEG      PCP(PHENCYCLIDINE) Negative NEG      COCAINE Negative NEG      AMPHETAMINES Negative NEG      METHADONE Negative NEG      HDSCOM (NOTE)      Results  XR CHEST PORT (Accession [id number]) (Order [id number])  Allergies     No Known Allergies   Exam Information    Status Exam Begun  Exam Ended    Final [99] 2/12/2021 18:08 2/12/2021  6:19 PM [id number]  6:19 PM   Result Information    Status: Final result (Exam End: 2/12/2021 18:19) Provider Status: Open   Study Result    CLINICAL HISTORY:  Shortness of breath     COMPARISONS:  Chest x-ray 12/24/2020     TECHNIQUE:  single frontal view of the chest     ------------------------------------------     FINDINGS:     Lungs:  No consolidation or pleural fluid.     Mediastinum: No significant cardiomegaly. Atherosclerotic arterial calcification     Bones: No evidence of fracture or suspicious bone lesion. Mild thoracic  scoliosis. Moderate degenerative disc disease.        ------------------------------------------  IMPRESSION     No acute cardiopulmonary process. MR HEAD W/WO CONTRAST (02/12/2021 2:44 PM EST)  MR HEAD W/WO CONTRAST (02/12/2021 2:44 PM EST)   Specimen              MR HEAD W/WO CONTRAST (02/12/2021 2:44 PM EST)   Impressions Performed At       1. Small punctate foci of restricted diffusion are identified in the anterior right frontal lobe and posterior right temporal lobe. These are suspicious for acute/subacute cortical infarcts. There is no evidence of hemorrhagic conversion.        2. No abnormal enhancement is identified. No acute intracranial hemorrhage is identified.        3. Mild diffuse cortical atrophy.        4. Small chronic cortical infarcts are identified in the superior left frontal lobe, superior left parietal lobe, and left occipital lobe. There is mild encephalomalacia and gliosis at these locations.        5. There is moderate-marked patchy periventricular and subcortical white matter disease, most suggestive of chronic microvascular ischemic changes. A demyelinating process is considered less likely. Similar changes are identified in the chari.        6. Additional chronic changes are detailed above.        I paged the on-call clinician for [id number] via telephone at 2/12/2021 4:48 PM.  (Dr. Eileen Arriaza).         I communicated the acute findings to Katy Polo via telephone at 2/12/2021 4:52 PM  (Dr. Eileen Arriaza).         Signed By: Ryan Tony MD on 2/12/2021 4:56 PM          CTA of the head and neck is pending

## 2021-02-13 NOTE — PROGRESS NOTES
Hospitalist Progress Note    Patient: Candy Hodge MRN: [id number]  CSN: [id number]    YOB: 1945  Age: 66 y.o. Sex: female    DOA: 2/12/2021 LOS:  LOS: 1 day                Assessment and Plan:    Principal Problem:    CVA (cerebral vascular accident) ([facility] (2/12/2021)    Active Problems:    Rheumatoid arthritis ([facility] ()      Overview: RA      Hypertension ()      Diabetes mellitus ([facility] ()      Chronic anemia (12/24/2020)      PVD (peripheral vascular disease) ([facility] (12/24/2020)      Thrombocytosis ([facility] (2/12/2021)      History of recent fall (2/12/2021)      Forgetfulness (2/12/2021)      History of medication noncompliance (2/12/2021)        CVA: now on higher dose aspirin and Plavix and now on higher dose statin. She needs to be on higher dose statin  Can do outpatient echo and vascular surgery follow up      Htn: well controlled  RA:  PVD      Discussed with daughter and son at length        Chief complaint:  78-year-old female with rheumatoid arthritis, hypertension, diabetes mellitus, peripheral vascular disease admitted for CVA due to recent medication noncompliance as well as recent thrombocytosis. She has had a recent fall due to unsteadiness on her feet from her right toe amputation with some residual forgetfulness that prompted the brain MRI. Subjective:  She wants to go home/  No symptoms        Review of systems:    General: No fevers or chills. Cardiovascular: No chest pain or pressure. No palpitations. Pulmonary: No shortness of breath. Gastrointestinal: No nausea, vomiting. Objective:    Vital signs/Intake and Output:  Visit Vitals  /66   Pulse 76   Temp 98.4 °F (36.9 °C)   Resp 12   SpO2 100%     Current Shift:  No intake/output data recorded. Last three shifts:  02/11 1901 - 02/13 0700  In: -   Out: 500 [Urine:500]    Physical Exam:  General: NAD, AAOx3. Non-toxic. HEENT: NC/AT. PERRLA, EOMI.  MMM. Lungs: Nml inspection. CTA B/L.  No wheezing, rales or rhonchi. Heart:  S1S2 RRR,  PMI mid 5th IC space. No M/RG. Abdomen: Soft, NT/ND.  BS+. No peritoneal signs. Extremities: No C/C/E. Psych:   Nml affect. Neurologic:  2-12 intact. Strength 5/5 throughout. Sensation symmetrical.          Labs: Results:       Chemistry Recent Labs     02/13/21  0120 02/12/21  1825   GLU 61* 81    139   K 4.2 4.1    103   CO2 30 32   BUN 8 12   CREA 0.46* 0.54*   CA 9.0 9.8   AGAP 7 4   BUCR 17 22*   AP  --  94   TP  --  8.3*   ALB  --  3.2*   GLOB  --  5.1*   AGRAT  --  0.6*      CBC w/Diff Recent Labs     02/13/21  0120 02/12/21  1825   WBC 8.2 10.9   RBC 4.04* 4.30   HGB 8.2* 8.3*   HCT 26.3* 28.2*   * 614*   GRANS 59 66   LYMPH 22 18*   EOS 9* 8*      Cardiac Enzymes Recent Labs     02/12/21  2145 02/12/21  1825   CPK 85 88   CKND1 1.8 1.4      Coagulation Recent Labs     02/12/21  1825   PTP 14.6   INR 1.2   APTT 31.7       Lipid Panel No results found for: CHOL, CHOLPOCT, CHOLX, CHLST, CHOLV, 495331, HDL, HDLP, LDL, LDLC, DLDLP, 190833, VLDLC, VLDL, TGLX, TRIGL, TRIGP, TGLPOCT, CHHD, CHHDX   BNP No results for input(s): BNPP in the last 72 hours.    Liver Enzymes Recent Labs     02/12/21  1825   TP 8.3*   ALB 3.2*   AP 94      Thyroid Studies No results found for: T4, T3U, TSH, TSHEXT     Procedures/imaging: see electronic medical records for all procedures/Xrays and details which were not copied into this note but were reviewed prior to creation of Plan

## 2021-02-13 NOTE — DISCHARGE SUMMARY
Discharge Summary    Patient: Nikhil Valente MRN: [id number]  CSN: [id number]    YOB: 1945  Age: 66 y.o. Sex: female    DOA: 2/12/2021 LOS:  LOS: 1 day   Discharge Date:      Primary Care Provider:  Mitchell Mcdowell MD    Admission Diagnoses: CVA (cerebral vascular accident) [facility]) [I63.9]    Discharge Diagnoses:    Problem List as of 2/13/2021 Date Reviewed: 2/12/2021          Codes Class Noted - Resolved    * (Principal) CVA (cerebral vascular accident) ([facility] ICD-10-CM: I63.9  ICD-9-CM: 434.91  2/12/2021 - Present        Thrombocytosis ([facility] ICD-10-CM: D47.3  ICD-9-CM: 238.71  2/12/2021 - Present        History of recent fall ICD-10-CM: Z91.81  ICD-9-CM: V15.88  2/12/2021 - Present        Forgetfulness ICD-10-CM: R68.89  ICD-9-CM: 780.99  2/12/2021 - Present        History of medication noncompliance ICD-10-CM: Z91.14  ICD-9-CM: V15.81  2/12/2021 - Present        Chronic anemia ICD-10-CM: D64.9  ICD-9-CM: 285.9  12/24/2020 - Present        PVD (peripheral vascular disease) ([facility] ICD-10-CM: I73.9  ICD-9-CM: 443.9  12/24/2020 - Present        Great toe amputation status, right ICD-10-CM: [id number]  ICD-9-CM: 895.0  2/17/2019 - Present        Rheumatoid arthritis ([facility] ICD-10-CM: M06.9  ICD-9-CM: 714.0  Unknown - Present    Overview Signed 9/21/2012  7:31 AM by Pita Lombardo MD     RA             Hypertension ICD-10-CM: I10  ICD-9-CM: 401.9  Unknown - Present        Diabetes mellitus ([facility] ICD-10-CM: E11.9  ICD-9-CM: 250.00  Unknown - Present        RESOLVED: Macromastia ICD-10-CM: N62  ICD-9-CM: 611.1  Unknown - 9/21/2012              Discharge Medications:     Current Discharge Medication List      START taking these medications    Details   aspirin (ASPIRIN) 325 mg tablet Take 1 Tab by mouth daily. Qty: 30 Tab, Refills: 0         CONTINUE these medications which have CHANGED    Details   atorvastatin (LIPITOR) 20 mg tablet Take 4 Tabs by mouth nightly.   Qty: 30 Tab, Refills: 0 CONTINUE these medications which have NOT CHANGED    Details   multivitamin with minerals (MULTIVITAMIN & MINERAL FORMULA PO) Take 1 Tab by mouth daily. cholecalciferol (VITAMIN D3) 25 mcg (1,000 unit) cap Take  by mouth. folic acid (FOLVITE) 1 mg tablet Take  by mouth.      gabapentin (NEURONTIN) 100 mg capsule gabapentin 100 mg capsule   TAKE 3 CAPSULES BY MOUTH THREE TIMES DAILY      hydroCHLOROthiazide (HYDRODIURIL) 25 mg tablet hydrochlorothiazide 25 mg tablet      hydrOXYchloroQUINE (PLAQUENIL) 200 mg tablet 200 mg daily. leflunomide (ARAVA) 10 mg tablet Take  by mouth. oxybutynin (DITROPAN) 5 mg tablet 5 mg two (2) times a day. amLODIPine (NORVASC) 5 mg tablet amlodipine 5 mg tablet      clopidogreL (PLAVIX) 75 mg tab Take 75 mg by mouth daily. losartan (COZAAR) 100 mg tablet losartan 100 mg tablet      ascorbic acid, vitamin C, (VITAMIN C) 250 mg tablet Take  by mouth daily. ferrous sulfate (IRON) 325 mg (65 mg iron) tablet Take 325 mg by mouth Daily (before breakfast). budesonide-formoteroL (SYMBICORT) 160-4.5 mcg/actuation HFAA budesonide-formoterol  mcg-4.5 mcg/actuation aerosol inhaler      cetirizine (ZYRTEC) 10 mg tablet cetirizine 10 mg tablet   TK 1 T PO QD UTD FOR 30 DAYS      ipratropium (ATROVENT) 42 mcg (0.06 %) nasal spray ipratropium bromide 42 mcg (0.06 %) nasal spray      nystatin (MYCOSTATIN) 100,000 unit/mL suspension nystatin 100,000 unit/mL oral suspension   SAS 5 ML PO QID      pregabalin (LYRICA) 75 mg capsule pregabalin 75 mg capsule      niacin/vit B2/vits A,C,E/min (VIT A-VIT Z7-K3-R-E-MINERALS PO) Take 50 mg by mouth daily.  Indications: Vit B3         STOP taking these medications       traMADoL (ULTRAM) 50 mg tablet Comments:   Reason for Stopping:         cephALEXin (KEFLEX) 500 mg capsule Comments:   Reason for Stopping:         peg-potas chl-prop gly-sod chl (Rhinase) 15-0.17-20-0.22 % gel Comments:   Reason for Stopping: fluticasone propionate (FLONASE) 50 mcg/actuation nasal spray Comments:   Reason for Stopping:         sodium chloride 2.65 % spra Comments:   Reason for Stopping:         TURMERIC-HERBAL COMPLEX NO.278 PO Comments:   Reason for Stopping:               Discharge Condition: Stable      Consults: Neurology with Dr. Amparo Perez  /66   Pulse 76   Temp 98.4 °F (36.9 °C)   Resp 12   SpO2 100%     General: Awake, cooperative, no acute distress    HEENT: NC, Atraumatic. PERRLA, EOMI. Anicteric sclerae. Lungs:  CTA Bilaterally. No Wheezing/Rhonchi/Rales. Heart:  Regular  rhythm,  No murmur, No Rubs, No Gallops  Abdomen: Soft, Non distended, Non tender. +Bowel sounds,   Extremities: No c/c/e  Psych:   Not anxious or agitated. Neurologic:  No acute neurological deficits. Admission HPI : Brit Decker is a 66 y.o. female who with rheumatoid arthritis, hypertension, diabetes mellitus, peripheral vascular disease who presents to the ED after being notified by her primary care clinic that the MRI of her brain showed a stroke. The patient reports a fall a few weeks ago due to being unsteady on her feet following a toe amputation. She hit her head on the nightstand. His son has reported increased forgetfulness and confusion since then. Her son lives with her. She had the MRI done as an outpatient as part of her work-up for forgetfulness. The patient reports being off both aspirin and Plavix for the last few weeks. She ran out of the medications and did not refill them. 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 Elizabeth Ellison with weekly wraps. She denies chest pain, shortness of breath, nausea, vomiting, diarrhea, and fever.     Hospital Course : Patient was admitted for observation and seen in consultation the next morning by neurology who reviewed all films we did a CT angio which showed that she was a vasculopath the neurologist recommended increasing the aspirin dose adding Plavix and increasing the statin dose and she will follow up with both  vascular surgeon and her primary care doctor    Activity: Ad faraz. Diet: Regular heart healthy    Follow-up: With PCP this week with Dr. Iftikhar Beth in 2 weeks and with her vascular surgeon as previously scheduled    Disposition: Home    Minutes spent on discharge: 35 minutes    Discussed at length with her son and her daughter on the phone    Labs: Results:       Chemistry Recent Labs     02/13/21  0120 02/12/21  1825   GLU 61* 81    139   K 4.2 4.1    103   CO2 30 32   BUN 8 12   CREA 0.46* 0.54*   CA 9.0 9.8   AGAP 7 4   BUCR 17 22*   AP  --  94   TP  --  8.3*   ALB  --  3.2*   GLOB  --  5.1*   AGRAT  --  0.6*      CBC w/Diff Recent Labs     02/13/21  0120 02/12/21  1825   WBC 8.2 10.9   RBC 4.04* 4.30   HGB 8.2* 8.3*   HCT 26.3* 28.2*   * 614*   GRANS 59 66   LYMPH 22 18*   EOS 9* 8*      Cardiac Enzymes Recent Labs     02/12/21  2145 02/12/21  1825   CPK 85 88   CKND1 1.8 1.4      Coagulation Recent Labs     02/12/21  1825   PTP 14.6   INR 1.2   APTT 31.7       Lipid Panel No results found for: CHOL, CHOLPOCT, CHOLX, CHLST, CHOLV, 526634, HDL, HDLP, LDL, LDLC, DLDLP, 935579, VLDLC, VLDL, TGLX, TRIGL, TRIGP, TGLPOCT, CHHD, CHHDX   BNP No results for input(s): BNPP in the last 72 hours. Liver Enzymes Recent Labs     02/12/21  1825   TP 8.3*   ALB 3.2*   AP 94      Thyroid Studies No results found for: T4, T3U, TSH, TSHEXT, TSHEXT         Significant Diagnostic Studies: Xr Foot Rt Min 3 V    Result Date: 1/19/2021  EXAM: XR FOOT RT MIN 3 V CLINICAL INDICATION/HISTORY: infection -Additional: None COMPARISON: 2/16/2019 TECHNIQUE: 3 views of the right foot _______________ FINDINGS: BONES: Generalized bony demineralization. The patient has undergone prior fixation of the first digit to the level of the base of the proximal phalanx.  The patient has also undergone interval amputation of the entire second digit. There are erosive changes along the medial and lateral head of the second metatarsal. No fractures are evident. Old healed fracture deformity of the fourth metatarsal diaphysis noted. Posterior and plantar calcaneal enthesopathy noted. SOFT TISSUES: Soft tissue swelling is noted diffusely along the forefoot including overlying the amputation stumps. Scattered basilar calcifications. _______________     1. Postsurgical changes in keeping with amputation of the first and second digits. There are erosive changes present at the second metatarsal head suggestive of acute osteomyelitis. This could be confirmed with MRI. Cta Head Neck W Wo Cont    Result Date: 2/13/2021  EXAM: CTA HEAD AND NECK INDICATION: Abnormal outside MR brain with right frontal and temporal lobe infarcts; intermittent confusion over past 2 weeks COMPARISON: No prior studies TECHNIQUE:  Multiple axial CT images of the neck were obtained extending from the level of the aortic arch to the skull base after the administration of the IV contrast utilizing a CTA protocol. Maximum intensity projection reconstructions were performed in multiple planes. Multiple axial CT images of the head were obtained extending from below the level of the skull base to the vertex after the administration of the IV contrast utilizing a CTA protocol. Maximum intensity projection reconstructions were performed in three planes. Additional 3 D reconstructions were performed at a separate workstation. One or more dose reduction techniques were used on this CT: automated exposure control, adjustment of the mAs and/or kVp according to patient's size, and iterative reconstruction techniques. The specific techniques utilized on this CT exam have been documented in the patient's electronic medical record.  Digital Imaging and Communications in Medicine (DICOM) format image data are available to nonaffiliated external healthcare facilities or entities on a secure, media free, reciprocally searchable basis with patient authorization for at least a 12-month period after this study. ___________________ FINDINGS: CTA NECK Atherosclerotic calcifications are present along the aortic arch and the proximal great vessels with mild to moderate narrowing. Normal variant branching pattern of great vessels along aortic arch is present with a conjoined origin of the innominate artery and left common carotid artery. The left common carotid is moderately irregular with multifocal atherosclerotic calcifications. The left carotid bifurcation is relatively patulous and morphology. Adjacent surgical clips are present. Findings are compatible with prior carotid endarterectomy. Proximal left ICA is widely patent. Estimated minimal luminal diameter proximal left ICA 10.8 mm. Estimated luminal diameter of normal left ICA cervical segment is 5.6 mm. Estimated degree of stenosis of the left ICA based upon NASCET criteria is 0%. Remainder of left ICA cervical segment is slightly irregular. The right common carotid is considerably irregular with extensive atherosclerotic calcifications. Along the mid common carotid artery luminal diameter decreases to approximately 1.5 mm. Estimated normal sized common carotid artery diameter is 6.6 mm, with estimated 77% stenosis. The right carotid bifurcation is considerably irregular with extensive atherosclerotic calcifications. Estimated minimal luminal diameter proximal right ICA 1.2 mm. Estimated luminal diameter of normal right ICA cervical segment is 5.5 mm. Estimated degree of stenosis of the right ICA based upon NASCET criteria is 78%. Remainder of right ICA cervical segment is slightly irregular. The left vertebral artery is dominant. Moderate to severe atherosclerotic stenosis is present along the dominant left vertebral artery origin. Severe stenosis is present at the right vertebral artery origin.  Right vertebral artery is small in size and shows decreased degree of enhancement in comparison to the left. Multifocal irregularity is present throughout the extracranial right vertebral artery. There is suggestion of distal right vertebral artery short segment occlusion along its distal V2 segment. Reconstitution near the V2/V3 junction is present. Considerable irregularity is present more distally along the V3 and V4 segments. Degenerative changes are seen in the spine. Emphysematous changes are present visualized upper lungs. Mild  mucoperiosteal thickening is scattered in the paranasal sinuses. No air-fluid levels are present. The bilateral lenses are absent, likely due to prior lens replacement. CTA HEAD There is no evidence of aneurysm. Mild to moderate atherosclerotic narrowing is seen in the bilateral carotid siphons without high grade stenosis. The carotid terminus is unremarkable. The anterior cerebral arteries are unremarkable. The middle cerebral artery bifurcations are unremarkable. Left vertebral artery is dominant. Mild left V4 segment irregularity is present. Right vertebral artery small size with diminished enhancement and considerable irregularity. Right vertebral artery is not seen continuing onto the vertebrobasilar junction and appears to be occluded. Right PICA portion is not distinctly seen with suggestion of AICA-PICA supply. Left PICA origin is unremarkable. Basilar artery is overall small in caliber with slight irregularity but no high-grade stenosis. Bilateral P1 segments are hypoplastic, particularly on the right. Posterior cerebral arteries are unremarkable. A prominent sized posterior communicating artery is present bilaterally representing normal variant fetal origin type PCA. The dural venous sinuses are patent. ___________________     1. Postoperative changes from prior left carotid endarterectomy. No significant left ICA stenosis based on NASCET criteria.  2.  Considerable atherosclerotic disease with severe stenosis and an lesions involving the right common carotid artery and right ICA. Estimated 77% stenosis involving mid right common carotid artery and 78% stenosis involving proximal right ICA, based on NASCET criteria. 3. Dominant left vertebral artery with moderate to severe origin stenosis. 4. Small sized irregular right vertebral artery with severe origin stenosis, extracranial short segment occlusion along its distal V2 segment with reconstitution, and additional intracranial occlusion. 5. Small sized posterior circulation overall, likely on a developmental basis with bilateral fetal origin type PCA. Xr Chest Port    Result Date: 2/12/2021  CLINICAL HISTORY:  Shortness of breath COMPARISONS:  Chest x-ray 12/24/2020 TECHNIQUE:  single frontal view of the chest ------------------------------------------ FINDINGS: Lungs:  No consolidation or pleural fluid. Mediastinum: No significant cardiomegaly. Atherosclerotic arterial calcification Bones: No evidence of fracture or suspicious bone lesion. Mild thoracic scoliosis. Moderate degenerative disc disease. ------------------------------------------    No acute cardiopulmonary process.                 CC: Makenzie Gonzales MD

## 2021-02-13 NOTE — PROGRESS NOTES
DC Plan: TBD, pending therapy. Home v [place]    Chart reviewed as CM on call. Pt admitted for CVA. Transition of care plan is home v [place]. CM met with pt at bedside to discuss dispo. Pt states her son lives with her. Home address confirmed per face sheet. Son to drive home at discharge and assist. Pt has RW. Pt states she has hospital bed, that she no longer needs and plans to return this week. Pt states she is active with , unsure of agency. States PCP is [facility] Bhaves[facility] mina. States she sees MD Jesusita Cole, states she goes to MD Jesus[facility] Cole q Wed for wound care. Per chart review, pt active with [facility]. FOC offered, pt would like to cont with same agency, will need [place] orders. Referral sent. No other dc needs identified. CM will cont to follow for transition of care needs and will be available via hospital . Reason for Admission:   Per H&P pt is \"is a 66 y.o. female who with rheumatoid arthritis, hypertension, diabetes mellitus, peripheral vascular disease who presents to the ED after being notified by her primary care clinic that the MRI of her brain showed a stroke. The patient reports a fall a few weeks ago due to being unsteady on her feet following a toe amputation. She hit her head on the nightstand. His son has reported increased forgetfulness and confusion since then. Her son lives with her. She had the MRI done as an outpatient as part of her work-up for forgetfulness. The patient reports being off both aspirin and Plavix for the last few weeks. She ran out of the medications and did not refill them. 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 Jesusita Cole with weekly wraps. She denies chest pain, shortness of breath, nausea, vomiting, diarrhea, and fever. \"                  RUR Score:  19%               PCP: First and Last name:  MD Kimberli Nichols   Name of Practice:    Are you a current patient: Yes/No: yes   Approximate date of last visit:    Can you participate in a virtual visit if needed:     Do you (patient/family) have any concerns for transition/discharge? Plan for utilizing home health:   [facility]    Current Advanced Directive/Advance Care Plan: On file    Healthcare Decision Maker:   Click here to complete [address] including selection of the Healthcare Decision Maker Relationship (ie \"Primary\")            Transition of Care Plan:     Home w [address] Management Interventions  PCP Verified by CM: Yes  Mode of Transport at Discharge:  Other (see comment)(son)  Transition of Care Consult (CM Consult): Discharge Planning, [address]: No  Reason Outside [place]: Patient already serviced by other home care/hospice agency([facility])  Physical Therapy Consult: Yes  Current Support Network: Own Home(son lives with pt)  The Plan for Transition of Care is Related to the Following Treatment Goals : HOME W [facility]  The Patient and/or Patient Representative was Provided with a Choice of Provider and Agrees with the Discharge Plan?: Yes  Discharge Location  Discharge Placement: Home with home health

## 2021-02-13 NOTE — ROUTINE PROCESS
Assume care of patient from Michelle Torre RN(ED). Patient received in bed awake. Patient A&Ox4, denies pain and discomfort. No distress noted. Bed locked in low position. Call bell within reach and patient verbalized understanding of use for assistance and needs. NIH score of 0, vitals stable. Dysphagia screening passed. 9480- Bedside and Verbal shift change report given to Miri Fairchild RN (oncoming nurse) by Isidoro Gordon RN (offgoing nurse). Report included the following information SBAR, Kardex, Intake/Output, MAR and Recent Results. [facility] Night Shift Chart Audit Chart Audit completed?  YES

## 2021-02-13 NOTE — PROGRESS NOTES
Problem: Patient Education: Go to Patient Education Activity  Goal: Patient/Family Education  Outcome: Progressing Towards Goal     Problem: TIA/CVA Stroke: 0-24 hours  Goal: Off Pathway (Use only if patient is Off Pathway)  Outcome: Progressing Towards Goal  Goal: Activity/Safety  Outcome: Progressing Towards Goal  Goal: Consults, if ordered  Outcome: Progressing Towards Goal  Goal: Diagnostic Test/Procedures  Outcome: Progressing Towards Goal  Goal: Nutrition/Diet  Outcome: Progressing Towards Goal  Goal: Discharge Planning  Outcome: Progressing Towards Goal  Goal: Medications  Outcome: Progressing Towards Goal  Goal: Respiratory  Outcome: Progressing Towards Goal  Goal: Treatments/Interventions/Procedures  Outcome: Progressing Towards Goal  Goal: Minimize risk of bleeding post-thrombolytic infusion  Outcome: Progressing Towards Goal  Goal: Monitor for complications post-thrombolytic infusion  Outcome: Progressing Towards Goal  Goal: Psychosocial  Outcome: Progressing Towards Goal  Goal: *Hemodynamically stable  Outcome: Progressing Towards Goal  Goal: *Neurologically stable  Description: Absence of additional neurological deficits    Outcome: Progressing Towards Goal  Goal: *Verbalizes anxiety and depression are reduced or absent  Outcome: Progressing Towards Goal  Goal: *Absence of Signs of Aspiration on Current Diet  Outcome: Progressing Towards Goal  Goal: *Absence of deep venous thrombosis signs and symptoms(Stroke Metric)  Outcome: Progressing Towards Goal  Goal: *Ability to perform ADLs and demonstrates progressive mobility and function  Outcome: Progressing Towards Goal  Goal: *Stroke education started(Stroke Metric)  Outcome: Progressing Towards Goal  Goal: *Dysphagia screen performed(Stroke Metric)  Outcome: Progressing Towards Goal  Goal: *Rehab consulted(Stroke Metric)  Outcome: Progressing Towards Goal     Problem: TIA/CVA Stroke: Day 2 Until Discharge  Goal: Off Pathway (Use only if patient is Off Pathway)  Outcome: Progressing Towards Goal  Goal: Activity/Safety  Outcome: Progressing Towards Goal  Goal: Diagnostic Test/Procedures  Outcome: Progressing Towards Goal  Goal: Nutrition/Diet  Outcome: Progressing Towards Goal  Goal: Discharge Planning  Outcome: Progressing Towards Goal  Goal: Medications  Outcome: Progressing Towards Goal  Goal: Respiratory  Outcome: Progressing Towards Goal  Goal: Treatments/Interventions/Procedures  Outcome: Progressing Towards Goal  Goal: Psychosocial  Outcome: Progressing Towards Goal  Goal: *Verbalizes anxiety and depression are reduced or absent  Outcome: Progressing Towards Goal  Goal: *Absence of aspiration  Outcome: Progressing Towards Goal  Goal: *Absence of deep venous thrombosis signs and symptoms(Stroke Metric)  Outcome: Progressing Towards Goal  Goal: *Optimal pain control at patient's stated goal  Outcome: Progressing Towards Goal  Goal: *Tolerating diet  Outcome: Progressing Towards Goal  Goal: *Ability to perform ADLs and demonstrates progressive mobility and function  Outcome: Progressing Towards Goal  Goal: *Stroke education continued(Stroke Metric)  Outcome: Progressing Towards Goal     Problem: Ischemic Stroke: Discharge Outcomes  Goal: *Verbalizes anxiety and depression are reduced or absent  Outcome: Progressing Towards Goal  Goal: *Verbalize understanding of risk factor modification(Stroke Metric)  Outcome: Progressing Towards Goal  Goal: *Hemodynamically stable  Outcome: Progressing Towards Goal  Goal: *Absence of aspiration pneumonia  Outcome: Progressing Towards Goal  Goal: *Aware of needed dietary changes  Outcome: Progressing Towards Goal  Goal: *Verbalize understanding of prescribed medications including anti-coagulants, anti-lipid, and/or anti-platelets(Stroke Metric)  Outcome: Progressing Towards Goal  Goal: *Tolerating diet  Outcome: Progressing Towards Goal  Goal: *Aware of follow-up diagnostics related to anticoagulants  Outcome: Progressing Towards Goal  Goal: *Ability to perform ADLs and demonstrates progressive mobility and function  Outcome: Progressing Towards Goal  Goal: *Absence of DVT(Stroke Metric)  Outcome: Progressing Towards Goal  Goal: *Absence of aspiration  Outcome: Progressing Towards Goal  Goal: *Optimal pain control at patient's stated goal  Outcome: Progressing Towards Goal  Goal: *Home safety concerns addressed  Outcome: Progressing Towards Goal  Goal: *Describes available resources and support systems  Outcome: Progressing Towards Goal  Goal: *Verbalizes understanding of activation of EMS(911) for stroke symptoms(Stroke Metric)  Outcome: Progressing Towards Goal  Goal: *Understands and describes signs and symptoms to report to providers(Stroke Metric)  Outcome: Progressing Towards Goal  Goal: *Neurolgocially stable (absence of additional neurological deficits)  Outcome: Progressing Towards Goal  Goal: *Verbalizes importance of follow-up with primary care physician(Stroke Metric)  Outcome: Progressing Towards Goal  Goal: *Smoking cessation discussed,if applicable(Stroke Metric)  Outcome: Progressing Towards Goal  Goal: *Depression screening completed(Stroke Metric)  Outcome: Progressing Towards Goal     Problem: Patient Education: Go to Patient Education Activity  Goal: Patient/Family Education  Outcome: Progressing Towards Goal     Problem: Pain  Goal: *Control of Pain  Outcome: Progressing Towards Goal  Goal: *PALLIATIVE CARE:  Alleviation of Pain  Outcome: Progressing Towards Goal     Problem: Patient Education: Go to Patient Education Activity  Goal: Patient/Family Education  Outcome: Progressing Towards Goal     Problem: Falls - Risk of  Goal: *Absence of Falls  Description: Document Halina Fall Risk and appropriate interventions in the flowsheet.   Outcome: Progressing Towards Goal  Note: Fall Risk Interventions:  Mobility Interventions: Bed/chair exit alarm, Communicate number of staff needed for ambulation/transfer, Patient to call before getting OOB, Utilize walker, cane, or other assistive device, Strengthening exercises (ROM-active/passive), PT Consult for assist device competence, PT Consult for mobility concerns                   History of Falls Interventions: Bed/chair exit alarm         Problem: Patient Education: Go to Patient Education Activity  Goal: Patient/Family Education  Outcome: Progressing Towards Goal     Problem: Pressure Injury - Risk of  Goal: *Prevention of pressure injury  Description: Document Aries Scale and appropriate interventions in the flowsheet.   Outcome: Progressing Towards Goal     Problem: Patient Education: Go to Patient Education Activity  Goal: Patient/Family Education  Outcome: Progressing Towards Goal

## 2021-02-13 NOTE — DISCHARGE INSTRUCTIONS
DISCHARGE SUMMARY from Nurse    PATIENT INSTRUCTIONS:    After general anesthesia or intravenous sedation, for 24 hours or while taking prescription Narcotics:  · Limit your activities  · Do not drive and operate hazardous machinery  · Do not make important personal or business decisions  · Do  not drink alcoholic beverages  · If you have not urinated within 8 hours after discharge, please contact your doctor on call. Report the following to your doctor  · Temperature over 101.5  · Nausea and vomiting lasting longer than 6 hours or if unable to take medications  · Any signs of decreased circulation or nerve impairment to extremity: change in color, persistent  numbness, tingling, coldness or increase pain  · Any questions    What to do at Home:  Recommended activity: Activity as tolerated and no driving for todayIf you experience any of the following symptoms, please follow up with no running long distances, no hard contact sports, no lifting more than 20lbs prior to MD follow. *  Please give a list of your current medications to your Primary Care Provider. *  Please update this list whenever your medications are discontinued, doses are      changed, or new medications (including over-the-counter products) are added. *  Please carry medication information at all times in case of emergency situations. These are general instructions for a healthy lifestyle:    No smoking/ No tobacco products/ Avoid exposure to second hand smoke  Surgeon General's Warning:  Quitting smoking now greatly reduces serious risk to your health.     Obesity, smoking, and sedentary lifestyle greatly increases your risk for illness    A healthy diet, regular physical exercise & weight monitoring are important for maintaining a healthy lifestyle    You may be retaining fluid if you have a history of heart failure or if you experience any of the following symptoms:  Weight gain of 3 pounds or more overnight or 5 pounds in a week, increased swelling in our hands or feet or shortness of breath while lying flat in bed. Please call your doctor as soon as you notice any of these symptoms; do not wait until your next office visit. The discharge information has been reviewed with the patient. The patient verbalized understanding. Discharge medications reviewed with the patient and appropriate educational materials and side effects teaching were provided.   ___________________________________________________________________________________________________________________________________

## 2021-02-13 NOTE — PROGRESS NOTES
Problem: Mobility Impaired (Adult and Pediatric)  Goal: *Acute Goals and Plan of Care (Insert Text)  Note:   PHYSICAL THERAPY EVALUATION & DISCHARGE    Patient: Delmy Patterson (47 y.o. female)  Date: 2/13/2021  Primary Diagnosis: CVA (cerebral vascular accident) [facility]) [I63.9]  Precautions:   Fall, NWB(RLE)    ASSESSMENT AND RECOMMENDATIONS:  Based on the objective data described below, the patient presents with grossly equal fair strength in B hip and knee, ankle NT, decreased gait quality and endurance, and overall limitations in functional mobility. Pt reports she is NWB on R LE. Pt performed supine to sit with supervision, sit to stand with supervision. Patient ambulated 30 feet with RW, GB applied, SBA. Pt tolerated session well as evidenced by no c/o increased pain, no lightheadedness or dizziness. No LOB or unsteadiness noted during session. Pt was active home health PT and would recommend continuing this along with 24 hour supervision/assistance. Discharge Recommendations: Home Health  Further Equipment Recommendations for Discharge: N/A      SUBJECTIVE:   Patient stated I feel pretty good.     OBJECTIVE DATA SUMMARY:     Past Medical History:   Diagnosis Date    Autoimmune disease ([facility] [address]     RA    Diabetes ([facility] [address]     diet controlled    Diabetes mellitus ([facility] [address]     Hypertension     Macromastia     Menopause     Rheumatoid arthritis(714.0)     TIA (transient ischemic attack)     Ulcer of foot ([facility] [address]      Past Surgical History:   Procedure Laterality Date    HX BREAST REDUCTION  9/2012    HX GYN      HX ORTHOPAEDIC  2009    Left hand surgery    HX OTHER SURGICAL      left shoulder abcess drained    HX PARTIAL HYSTERECTOMY       Barriers to Learning/Limitations: None  Compensate with: visual, verbal, tactile, kinesthetic cues/model  Prior Level of Function/Home Situation: Independent amb s/AD  Home Situation  Home Environment: Private residence  # Steps to Enter: 0  One/Two Story Residence: One story  Living Alone: No  Support Systems: Family member(s)  Patient Expects to be Discharged to[de-identified] Private residence  Current DME Used/Available at Home: [employer]  Critical Behavior:  Neurologic State: Alert  Skin Condition/Temp: Warm;Dry  Skin Integumentary  Skin Color: Appropriate for ethnicity  Skin Condition/Temp: Warm;Dry   Strength:    Strength: Generally decreased, functional  Tone & Sensation:   Tone: Normal  Sensation: Impaired  Range Of Motion:  AROM: Generally decreased, functional  PROM: Generally decreased, functional  Functional Mobility:  Bed Mobility:   Supine to Sit: Supervision  Sit to Supine: Supervision   Transfers:  Sit to Stand: Supervision  Stand to Sit: Supervision  Balance:   Sitting: Intact  Standing: Intact; With support  Ambulation/Gait Training:  Distance (ft): 30 Feet (ft)  Assistive Device: Gait belt;Walker, rolling  Ambulation - Level of Assistance: Stand-by assistance   Gait Description (WDL): Exceptions to WDL  Gait Abnormalities: Decreased step clearance(3 point)  Right Side Weight Bearing: Non-weight bearing    Stance: Right increased  Speed/[place]: Slow  Step Length: Right shortened  Pain:  Pain Scale 1: Numeric (0 - 10)  Pain Intensity 1: 7  Pain Location 1: Foot  Pain Orientation 1: Posterior  Pain Description 1: Aching  Pain Intervention(s) 1: Medication (see MAR)(encourage to elevate foot)  Activity Tolerance:   Good  Please refer to the flowsheet for vital signs taken during this treatment. After treatment:   []         Patient left in no apparent distress sitting up in chair  [x]         Patient left in no apparent distress in bed  [x]         Call bell left within reach  [x]         Nursing notified  []         Caregiver present  []         Bed alarm activated    COMMUNICATION/EDUCATION:   [x]         Fall prevention education was provided and the patient/caregiver indicated understanding.   [x]         Patient/family have participated as able in goal setting and plan of care.  [x]         Patient/family agree to work toward stated goals and plan of care. []         Patient understands intent and goals of therapy, but is neutral about his/her participation. []         Patient is unable to participate in goal setting and plan of care.     Thank you for this referral.  Jewell Abdi   Time Calculation: 23 mins   Eval Complexity: History: MEDIUM  Complexity : 1-2 comorbidities / personal factors will impact the outcome/ POC Exam:MEDIUM Complexity : 3 Standardized tests and measures addressing body structure, function, activity limitation and / or participation in recreation  Presentation: MEDIUM Complexity : Evolving with changing characteristics  Clinical Decision Making:Medium Complexity    Overall Complexity:MEDIUM

## 2021-02-13 NOTE — ROUTINE PROCESS
TRANSFER - OUT REPORT: 
 
Verbal report given to Breann Andrews RN(name) on Antonia Correa  being transferred to [facility] (tele)(unit) for routine progression of care Report consisted of patients Situation, Background, Assessment and  
Recommendations(SBAR). Information from the following report(s) SBAR, Kardex, ED Summary, Procedure Summary, MAR and Recent Results was reviewed with the receiving nurse. Lines:  
Peripheral IV 02/12/21 Right Arm (Active) Site Assessment Clean, dry, & intact 02/12/21 1832 Phlebitis Assessment 0 02/12/21 1832 Infiltration Assessment 0 02/12/21 1832 Dressing Status Clean, dry, & intact 02/12/21 1832 Dressing Type Transparent;Tape 02/12/21 1832 Hub Color/Line Status Pink 02/12/21 1832 Action Taken Blood drawn 02/12/21 1832 Alcohol Cap Used Yes 02/12/21 1832 Opportunity for questions and clarification was provided. Patient transported with: 
 Monitor Registered Nurse

## 2021-02-13 NOTE — PROGRESS NOTES
Problem: Patient Education: Go to Patient Education Activity  Goal: Patient/Family Education  Outcome: Progressing Towards Goal     Problem: TIA/CVA Stroke: 0-24 hours  Goal: Off Pathway (Use only if patient is Off Pathway)  Outcome: Progressing Towards Goal  Goal: Activity/Safety  Outcome: Progressing Towards Goal  Goal: Consults, if ordered  Outcome: Progressing Towards Goal  Goal: Diagnostic Test/Procedures  Outcome: Progressing Towards Goal  Goal: Nutrition/Diet  Outcome: Progressing Towards Goal  Goal: Discharge Planning  Outcome: Progressing Towards Goal  Goal: Medications  Outcome: Progressing Towards Goal  Goal: Respiratory  Outcome: Progressing Towards Goal  Goal: Treatments/Interventions/Procedures  Outcome: Progressing Towards Goal  Goal: Minimize risk of bleeding post-thrombolytic infusion  Outcome: Progressing Towards Goal  Goal: Monitor for complications post-thrombolytic infusion  Outcome: Progressing Towards Goal  Goal: Psychosocial  Outcome: Progressing Towards Goal  Goal: *Hemodynamically stable  Outcome: Progressing Towards Goal  Goal: *Neurologically stable  Description: Absence of additional neurological deficits    Outcome: Progressing Towards Goal  Goal: *Verbalizes anxiety and depression are reduced or absent  Outcome: Progressing Towards Goal  Goal: *Absence of Signs of Aspiration on Current Diet  Outcome: Progressing Towards Goal  Goal: *Absence of deep venous thrombosis signs and symptoms(Stroke Metric)  Outcome: Progressing Towards Goal  Goal: *Ability to perform ADLs and demonstrates progressive mobility and function  Outcome: Progressing Towards Goal  Goal: *Stroke education started(Stroke Metric)  Outcome: Progressing Towards Goal  Goal: *Dysphagia screen performed(Stroke Metric)  Outcome: Progressing Towards Goal  Goal: *Rehab consulted(Stroke Metric)  Outcome: Progressing Towards Goal     Problem: TIA/CVA Stroke: Day 2 Until Discharge  Goal: Off Pathway (Use only if patient is Off Pathway)  Outcome: Progressing Towards Goal  Goal: Activity/Safety  Outcome: Progressing Towards Goal  Goal: Diagnostic Test/Procedures  Outcome: Progressing Towards Goal  Goal: Nutrition/Diet  Outcome: Progressing Towards Goal  Goal: Discharge Planning  Outcome: Progressing Towards Goal  Goal: Medications  Outcome: Progressing Towards Goal  Goal: Respiratory  Outcome: Progressing Towards Goal  Goal: Treatments/Interventions/Procedures  Outcome: Progressing Towards Goal  Goal: Psychosocial  Outcome: Progressing Towards Goal  Goal: *Verbalizes anxiety and depression are reduced or absent  Outcome: Progressing Towards Goal  Goal: *Absence of aspiration  Outcome: Progressing Towards Goal  Goal: *Absence of deep venous thrombosis signs and symptoms(Stroke Metric)  Outcome: Progressing Towards Goal  Goal: *Optimal pain control at patient's stated goal  Outcome: Progressing Towards Goal  Goal: *Tolerating diet  Outcome: Progressing Towards Goal  Goal: *Ability to perform ADLs and demonstrates progressive mobility and function  Outcome: Progressing Towards Goal  Goal: *Stroke education continued(Stroke Metric)  Outcome: Progressing Towards Goal     Problem: Ischemic Stroke: Discharge Outcomes  Goal: *Verbalizes anxiety and depression are reduced or absent  Outcome: Progressing Towards Goal  Goal: *Verbalize understanding of risk factor modification(Stroke Metric)  Outcome: Progressing Towards Goal  Goal: *Hemodynamically stable  Outcome: Progressing Towards Goal  Goal: *Absence of aspiration pneumonia  Outcome: Progressing Towards Goal  Goal: *Aware of needed dietary changes  Outcome: Progressing Towards Goal  Goal: *Verbalize understanding of prescribed medications including anti-coagulants, anti-lipid, and/or anti-platelets(Stroke Metric)  Outcome: Progressing Towards Goal  Goal: *Tolerating diet  Outcome: Progressing Towards Goal  Goal: *Aware of follow-up diagnostics related to anticoagulants  Outcome: Progressing Towards Goal  Goal: *Ability to perform ADLs and demonstrates progressive mobility and function  Outcome: Progressing Towards Goal  Goal: *Absence of DVT(Stroke Metric)  Outcome: Progressing Towards Goal  Goal: *Absence of aspiration  Outcome: Progressing Towards Goal  Goal: *Optimal pain control at patient's stated goal  Outcome: Progressing Towards Goal  Goal: *Home safety concerns addressed  Outcome: Progressing Towards Goal  Goal: *Describes available resources and support systems  Outcome: Progressing Towards Goal  Goal: *Verbalizes understanding of activation of EMS(911) for stroke symptoms(Stroke Metric)  Outcome: Progressing Towards Goal  Goal: *Understands and describes signs and symptoms to report to providers(Stroke Metric)  Outcome: Progressing Towards Goal  Goal: *Neurolgocially stable (absence of additional neurological deficits)  Outcome: Progressing Towards Goal  Goal: *Verbalizes importance of follow-up with primary care physician(Stroke Metric)  Outcome: Progressing Towards Goal  Goal: *Smoking cessation discussed,if applicable(Stroke Metric)  Outcome: Progressing Towards Goal  Goal: *Depression screening completed(Stroke Metric)  Outcome: Progressing Towards Goal     Problem: Patient Education: Go to Patient Education Activity  Goal: Patient/Family Education  Outcome: Progressing Towards Goal     Problem: Pain  Goal: *Control of Pain  Outcome: Progressing Towards Goal  Goal: *PALLIATIVE CARE:  Alleviation of Pain  Outcome: Progressing Towards Goal     Problem: Patient Education: Go to Patient Education Activity  Goal: Patient/Family Education  Outcome: Progressing Towards Goal     Problem: Falls - Risk of  Goal: *Absence of Falls  Description: Document Halina Fall Risk and appropriate interventions in the flowsheet.  Outcome: Progressing Towards Goal  Note: Fall Risk Interventions:  Mobility Interventions: Bed/chair exit alarm, Communicate number of staff needed for ambulation/transfer, OT consult  for ADLs, Patient to call before getting OOB, PT Consult for assist device competence, Strengthening exercises (ROM-active/passive), PT Consult for mobility concerns, Utilize walker, cane, or other assistive device         Medication Interventions: Evaluate medications/consider consulting pharmacy, Patient to call before getting OOB, Teach patient to arise slowly    Elimination Interventions: Call light in reach, Elevated toilet seat, Patient to call for help with toileting needs, Stay With Me (per policy), Toilet paper/wipes in reach, Toileting schedule/hourly rounds    History of Falls Interventions: Bed/chair exit alarm, Consult care management for discharge planning, Door open when patient unattended, Evaluate medications/consider consulting pharmacy, Investigate reason for fall, Vital signs minimum Q4HRs X 24 hrs (comment for end date), Assess for delayed presentation/identification of injury for 48 hrs (comment for end date), Utilize gait belt for transfer/ambulation         Problem: Patient Education: Go to Patient Education Activity  Goal: Patient/Family Education  Outcome: Progressing Towards Goal     Problem: Pressure Injury - Risk of  Goal: *Prevention of pressure injury  Description: Document Aries Scale and appropriate interventions in the flowsheet.   Outcome: Progressing Towards Goal     Problem: Patient Education: Go to Patient Education Activity  Goal: Patient/Family Education  Outcome: Progressing Towards Goal

## 2021-02-13 NOTE — CONSULTS
NEUROLOGY CONSULTATION NOTE    Patient: Jhon Clarke MRN: [id number]  CSN: [id number]    YOB: 1945  Age: 66 y.o. Sex: female    DOA: 2/12/2021 LOS:  LOS: 1 day        Requesting Physician: Dr. Gregorio Donato  Reason for Consultation: Stroke               HISTORY OF PRESENT ILLNESS:   Jhon Clarke is a 66 y.o. female with history of rheumatoid arthritis, hypertension, prior TIAs, diabetes, peripheral vascular disease, who experienced a recent fall and forgetfulness, which required to be evaluated by brain MRI. Her brain MRI showed small lacunar infarctions on the right frontal and posterior right temporal lobe. It is not clear but it seems that the patient was noncompliant with aspirin. She denies any lateralized weakness or numbness. She denies any speech or swallowing difficulties. Stroke Work-up:  Brain MRI: 1. Small punctate foci of restricted diffusion are identified in the anterior right frontal lobe and posterior right temporal lobe. These are suspicious for acute/subacute cortical infarcts. There is no evidence of hemorrhagic conversion. 2. No abnormal enhancement is identified. No acute intracranial hemorrhage is identified. 3. Mild diffuse cortical atrophy. 4. Small chronic cortical infarcts are identified in the superior left frontal lobe, superior left parietal lobe, and left occipital lobe. There is mild encephalomalacia and gliosis at these locations. 5. There is moderate-marked patchy periventricular and subcortical white matter disease, most suggestive of chronic microvascular ischemic changes. A demyelinating process is considered less likely. Similar changes are identified in the chari. CTA of head and neck: The report is pending. I see remarkable atherosclerotic disease on bilateral common carotid arteries and ICAs, more on the right side.   Echocardiogram:   Lipid panel: No results found for: CHOL, CHOLPOCT, CHOLX, CHLST, CHOLV, 518739, HDL, HDLP, LDL, LDLC, DLDLP, 497690, VLDLC, VLDL, TGLX, TRIGL, TRIGP, TGLPOCT, CHHD, CHHDX  HbA1c:   No results found for: HBA1C, HGBE8, TLL6UAMF, EBR8YHQA    PAST MEDICAL HISTORY:  Past Medical History:   Diagnosis Date    Autoimmune disease ([facility]     RA    [facility]     diet controlled    Diabetes mellitus ([facility]     Hypertension     Macromastia     Menopause     Rheumatoid arthritis(714.0)     TIA (transient ischemic attack)     Ulcer of foot ([facility]      PAST SURGICAL HISTORY:  Past Surgical History:   Procedure Laterality Date    HX BREAST REDUCTION  2012    HX GYN      HX ORTHOPAEDIC  2009    Left hand surgery    HX OTHER SURGICAL      left shoulder abcess drained    HX PARTIAL HYSTERECTOMY       FAMILY HISTORY:  Family History   Problem Relation Age of Onset    Heart Disease Mother     Diabetes Mother     No Known Problems Father      SOCIAL HISTORY:  Social History     Socioeconomic History    Marital status:      Spouse name: Not on file    Number of children: Not on file    Years of education: Not on file    Highest education level: Not on file   Tobacco Use    Smoking status: Former Smoker     Quit date: 1997     Years since quittin.4    Smokeless tobacco: Never Used   Substance and Sexual Activity    Alcohol use: No    Drug use: No   Other Topics Concern     MEDICATIONS:  Current Facility-Administered Medications   Medication Dose Route Frequency    arformoterol 15 mcg/budesonide 0.25 mg neb solution   Nebulization BID RT    atorvastatin (LIPITOR) tablet 20 mg  20 mg Oral QHS    gabapentin (NEURONTIN) capsule 300 mg  300 mg Oral TID    traMADoL (ULTRAM) tablet 50 mg  50 mg Oral Q6H PRN    ascorbic acid (vitamin C) (VITAMIN C) tablet 250 mg  250 mg Oral DAILY    cholecalciferol (VITAMIN D3) (1000 Units /25 mcg) tablet 1,000 Units  1,000 Units Oral DAILY    folic acid (FOLVITE) tablet 1 mg  1 mg Oral DAILY    ferrous sulfate tablet 325 mg  325 mg Oral ACB    hydrOXYchloroQUINE (PLAQUENIL) tablet 200 mg  200 mg Oral DAILY WITH BREAKFAST    leflunomide (ARAVA) tablet 10 mg  10 mg Oral DAILY    multivitamin, tx-iron-ca-min (THERA-M w/ IRON) tablet 1 Tab  1 Tab Oral DAILY    clopidogreL (PLAVIX) tablet 75 mg  75 mg Oral DAILY    aspirin chewable tablet 81 mg  81 mg Oral DAILY    insulin lispro (HUMALOG) injection   SubCUTAneous AC&HS    glucose chewable tablet 16 g  16 g Oral PRN    glucagon (GLUCAGEN) injection 1 mg  1 mg IntraMUSCular PRN    dextrose 10% infusion 125-250 mL  125-250 mL IntraVENous PRN    famotidine (PEPCID) tablet 20 mg  20 mg Oral Q48H     Prior to Admission medications    Medication Sig Start Date End Date Taking? Authorizing Provider   multivitamin with minerals (MULTIVITAMIN & MINERAL FORMULA PO) Take 1 Tab by mouth daily. Yes Sen, MD Macarena   cholecalciferol (VITAMIN D3) 25 mcg (1,000 unit) cap Take  by mouth. Yes Other, MD Macarena   folic acid (FOLVITE) 1 mg tablet Take  by mouth. Yes Other, MD Macarena   gabapentin (NEURONTIN) 100 mg capsule gabapentin 100 mg capsule   TAKE 3 CAPSULES BY MOUTH THREE TIMES DAILY 12/8/20  Yes Other, MD Macarena   hydroCHLOROthiazide (HYDRODIURIL) 25 mg tablet hydrochlorothiazide 25 mg tablet   Yes Macarena Chatterjee MD   hydrOXYchloroQUINE (PLAQUENIL) 200 mg tablet 200 mg daily. Yes Sen, MD Macarena   leflunomide (ARAVA) 10 mg tablet Take  by mouth. Yes Sen, MD Macarena   oxybutynin (DITROPAN) 5 mg tablet 5 mg two (2) times a day. Yes Sen, MD Macarena   traMADoL (ULTRAM) 50 mg tablet Take 50 mg by mouth every six (6) hours as needed. Yes Sen, MD Macarena   amLODIPine (NORVASC) 5 mg tablet amlodipine 5 mg tablet   Yes Macarena Chatterjee MD   atorvastatin (LIPITOR) 20 mg tablet atorvastatin 20 mg tablet   Yes Macarena Chatterjee MD   clopidogreL (PLAVIX) 75 mg tab Take 75 mg by mouth daily.    Yes Sen, MD Macarena   losartan (COZAAR) 100 mg tablet losartan 100 mg tablet   Yes Other, MD Macarena   ascorbic acid, vitamin C, (VITAMIN C) 250 mg tablet Take  by mouth daily. Yes Provider, Historical   ferrous sulfate (IRON) 325 mg (65 mg iron) tablet Take 325 mg by mouth Daily (before breakfast). Yes Provider, Historical   cephALEXin (KEFLEX) 500 mg capsule Take 500 mg by mouth three (3) times daily. Provider, Historical   peg-potas chl-prop gly-sod chl (Rhinase) 15-0.17-20-0.22 % gel 0.25 g. Other, MD Macarena   budesonide-formoteroL (SYMBICORT) 160-4.5 mcg/actuation HFAA budesonide-formoterol  mcg-4.5 mcg/actuation aerosol inhaler    Other, MD Macarena   cetirizine (ZYRTEC) 10 mg tablet cetirizine 10 mg tablet   TK 1 T PO QD UTD FOR 30 DAYS    Macarena Chatterjee MD   fluticasone propionate (FLONASE) 50 mcg/actuation nasal spray fluticasone propionate 50 mcg/actuation nasal spray,suspension    OtherMacarena MD   ipratropium (ATROVENT) 42 mcg (0.06 %) nasal spray ipratropium bromide 42 mcg (0.06 %) nasal spray    OtherMacarena MD   nystatin (MYCOSTATIN) 100,000 unit/mL suspension nystatin 100,000 unit/mL oral suspension   SAS 5 ML PO QID    Macarena Chatterjee MD   pregabalin (LYRICA) 75 mg capsule pregabalin 75 mg capsule    Macarena Chatterjee MD   sodium chloride 2.65 % spra [place] [facility] 2.65 % nasal spray aerosol   By nasal inhalation apply two sprays to each nostril three times per day as directed. Other, MD Macarena   niacin/vit B2/vits A,C,E/min (VIT A-VIT D8-S9-N-E-MINERALS PO) Take 50 mg by mouth daily. Indications: Vit B3    Provider, Historical   TURMERIC-HERBAL COMPLEX NO.278 PO Take  by mouth. Provider, Historical       ALLERGIES:  No Known Allergies    Review of Systems  GENERAL: No fevers or chills. HEENT: No change in vision, earache, tinnitus, sore throat or sinus congestion. NECK: No pain or stiffness. CARDIOVASCULAR: No chest pain or pressure. No palpitations. PULMONARY: No shortness of breath, cough or wheeze. GASTROINTESTINAL: No abdominal pain, nausea, vomiting or diarrhea. GENITOURINARY: No urinary frequency or dysuria. MUSCULOSKELETAL: Joint pains. DERMATOLOGIC: No rash, no itching, no lesions. ENDOCRINE: No heat or cold intolerance. HEMATOLOGICAL: No anemia or easy bruising or bleeding. NEUROLOGIC: No headache, seizures. See HPI. The patient denies numbness or weakness. PHYSICAL EXAMINATION:     Visit Vitals  BP (!) 116/49 (BP 1 Location: Right upper arm, BP Patient Position: At rest)   Pulse 80   Temp 97.9 °F (36.6 °C)   Resp 16   SpO2 100%      O2 Device: Room air  GENERAL: Pleasant, in no apparent distress. HEENT: Moist mucous membranes, sclerae anicteric, scalp is atraumatic. CVS: Regular rate and rhythm, no murmurs or gallops. No carotid bruits. PULMONARY: Clear to auscultation bilaterally. No rales or rhonchi. No wheezing. EXTREMITIES: There are arthritic deformities on both upper and lower extremities. Right toes amputated. ABDOMEN: Soft, nontender. SKIN: No rashes or ecchymoses. Warm and dry. NEUROLOGIC: Alert and oriented x3. Speech is fluent without any aphasia or dysarthria. Cranial nerves: Face is symmetric with symmetric smile. Facial sensation is intact. Extraocular movements are intact with no nystagmus. Visual fields are full to confrontation. PERRL. Tongue is midline. Palate elevates symmetrically. Hearing intact to speech. Motor: Generally weak on all muscle groups. She has slight orbiting around the left arm. Sensory: Intact to pinprick and touch on all four. Length dependent sensory loss, more on the left. Coordination: Intact coordination with finger-nose-finger bilaterally. Normal fine movements. No bradykinesia detected. Deep tendon reflexes: 2+ at biceps, brachioradialis, patella and ankles bilaterally. Gait assessment: Deferred.     Labs: Results:       Chemistry Recent Labs     02/13/21  0120 02/12/21  1825   GLU 61* 81    139   K 4.2 4.1    103   CO2 30 32   BUN 8 12   CREA 0.46* 0.54*   CA 9.0 9.8   AGAP 7 4   BUCR 17 22*   AP  --  94   TP  --  8.3* ALB  --  3.2*   GLOB  --  5.1*   AGRAT  --  0.6*      CBC w/Diff Recent Labs     02/13/21  0120 02/12/21 1825   WBC 8.2 10.9   RBC 4.04* 4.30   HGB 8.2* 8.3*   HCT 26.3* 28.2*   * 614*   GRANS 59 66   LYMPH 22 18*   EOS 9* 8*      Cardiac Enzymes Recent Labs     02/12/21  2145 02/12/21 1825   CPK 85 88   CKND1 1.8 1.4      Coagulation Recent Labs     02/12/21 1825   PTP 14.6   INR 1.2   APTT 31.7       Lipid Panel No results found for: CHOL, CHOLPOCT, CHOLX, CHLST, CHOLV, 696432, HDL, HDLP, LDL, LDLC, DLDLP, 783836, VLDLC, VLDL, TGLX, TRIGL, TRIGP, TGLPOCT, CHHD, CHHDX   BNP No results for input(s): BNPP in the last 72 hours. Liver Enzymes Recent Labs     02/12/21  1825   TP 8.3*   ALB 3.2*   AP 94      Thyroid Studies No results found for: T4, T3U, TSH, TSHEXT       Radiology:  Xr Foot Rt Min 3 V    Result Date: 1/19/2021  EXAM: XR FOOT RT MIN 3 V CLINICAL INDICATION/HISTORY: infection -Additional: None COMPARISON: 2/16/2019 TECHNIQUE: 3 views of the right foot _______________ FINDINGS: BONES: Generalized bony demineralization. The patient has undergone prior fixation of the first digit to the level of the base of the proximal phalanx. The patient has also undergone interval amputation of the entire second digit. There are erosive changes along the medial and lateral head of the second metatarsal. No fractures are evident. Old healed fracture deformity of the fourth metatarsal diaphysis noted. Posterior and plantar calcaneal enthesopathy noted. SOFT TISSUES: Soft tissue swelling is noted diffusely along the forefoot including overlying the amputation stumps. Scattered basilar calcifications. _______________     1. Postsurgical changes in keeping with amputation of the first and second digits. There are erosive changes present at the second metatarsal head suggestive of acute osteomyelitis. This could be confirmed with MRI.     Xr Chest Port    Result Date: 2/12/2021  CLINICAL HISTORY:  Shortness of breath COMPARISONS:  Chest x-ray 12/24/2020 TECHNIQUE:  single frontal view of the chest ------------------------------------------ FINDINGS: Lungs:  No consolidation or pleural fluid. Mediastinum: No significant cardiomegaly. Atherosclerotic arterial calcification Bones: No evidence of fracture or suspicious bone lesion. Mild thoracic scoliosis. Moderate degenerative disc disease. ------------------------------------------    No acute cardiopulmonary process. ASSESSMENT/IMPRESSION:  Right frontal and temporal lacunar infarctions, possibly secondary to artery to artery emboli in the setting of remarkable atherosclerotic disease. I am not sure if she needs any procedure on the right common carotid artery or ICA. The patient needs to be on dual antiplatelet regimen and eventually she needs to be followed by vascular surgery as outpatient for surgical treatment options. For now, we are going to continue with acute stroke management. RECOMMENDATIONS:  1. Aspirin 325 mg and Plavix 75 mg daily. I will increase the daily dose of aspirin from 81 mg to 325 mg.  2.  Atorvastatin 80 mg daily. We are going to increase the dose of atorvastatin due to remarkable atherosclerotic disease. 3.  Neuro checks per stroke protocol  4. Echocardiogram with bubble study  5. CTA head and neck report pending  6. PT/OT and disposition planning  7. Hemoglobin A1c and lipid panel  8. DVT prophylaxis and SCD  9. Oral liquids or IV fluids for hydration. I will follow the patient.  Please do not hesitate to return with any questions.    ------------------------------------  Rohit Madison MD  2/13/2021  10:31 AM

## 2021-02-13 NOTE — PROGRESS NOTES
2386: report given to this nurse by Nicole Wen RN    1667: MD Bryson Oliver communicates with this nurse pt condition, CTA results pending, 0 on the NIHSS, continue to monitor pt for any change  Sonia Solares RN    [phone]: pt gives pt son update with pt consent  Sonia Solares RN    [phone]: MD Catia Basurto made aware pt request for tylenol d/t foot pain, n/o noted see MAR and kardex  Sonia Solares RN    1897: MD Rodriguez d/c order noted at [address]. Glinda Gowers RN    6798: this nurse explains to pt and pt son multiple times son can not come to the [facility] unit to see pt and take pt down stairs d/t the pandemic  A. Yessenia PAZ    3134: this nurse explains to pt and pt son multiple times son can not come to the [facility] unit to see pt and take pt down stairs d/t the pandemic  ARandi Casas RN    [id number]: d/c instructions printed for review with pt  Sonia Solares RN    1800: d/c instructions reviewed all questions answered, no c/o health concern at time of d/c, pt taken in w/c to d/c center for family to   A.  Glinda Gowers RN

## 2021-02-14 NOTE — PROGRESS NOTES
2/14 3414  Chart reviewed as CM on call. Noted PT rec HH. Referral/orders faxed to [facility].  Noted pt dc yesterday

## 2021-03-15 ENCOUNTER — HOSPITAL ENCOUNTER (OUTPATIENT)
Dept: PREADMISSION TESTING | Age: 76
Discharge: HOME | End: 2021-03-15
Payer: MEDICARE

## 2021-03-15 PROCEDURE — U0003 INFECTIOUS AGENT DETECTION BY NUCLEIC ACID (DNA OR RNA); SEVERE ACUTE RESPIRATORY SYNDROME CORONAVIRUS 2 (SARS-COV-2) (CORONAVIRUS DISEASE [COVID-19]), AMPLIFIED PROBE TECHNIQUE, MAKING USE OF HIGH THROUGHPUT TECHNOLOGIES AS DESCRIBED BY CMS-2020-01-R: HCPCS

## 2021-03-16 LAB — SARS-COV-2, NAA: NOT DETECTED

## 2021-03-17 ENCOUNTER — HOSPITAL ENCOUNTER (OUTPATIENT)
Dept: PREADMISSION TESTING | Age: 76
Discharge: HOME | End: 2021-03-17
Payer: MEDICARE

## 2021-03-17 ENCOUNTER — HOSPITAL ENCOUNTER (OUTPATIENT)
Dept: LAB | Age: 76
Discharge: HOME | End: 2021-03-17
Payer: MEDICARE

## 2021-03-17 LAB
ANION GAP SERPL CALC-SCNC: 2 MMOL/L (ref 3–18)
BASOPHILS # BLD: 0 K/UL (ref 0–0.1)
BASOPHILS NFR BLD: 1 % (ref 0–2)
BUN SERPL-MCNC: 14 MG/DL (ref 7–18)
BUN/CREAT SERPL: 29 (ref 12–20)
CALCIUM SERPL-MCNC: 9.4 MG/DL (ref 8.5–10.1)
CHLORIDE SERPL-SCNC: 103 MMOL/L (ref 100–111)
CO2 SERPL-SCNC: 32 MMOL/L (ref 21–32)
CREAT SERPL-MCNC: 0.48 MG/DL (ref 0.6–1.3)
DIFFERENTIAL METHOD BLD: ABNORMAL
EOSINOPHIL # BLD: 0.9 K/UL (ref 0–0.4)
EOSINOPHIL NFR BLD: 14 % (ref 0–5)
ERYTHROCYTE [DISTWIDTH] IN BLOOD BY AUTOMATED COUNT: 21.9 % (ref 11.6–14.5)
EST. AVERAGE GLUCOSE BLD GHB EST-MCNC: 91 MG/DL
GLUCOSE SERPL-MCNC: 82 MG/DL (ref 74–99)
HBA1C MFR BLD: 4.8 % (ref 4.2–5.6)
HCT VFR BLD AUTO: 29 % (ref 35–45)
HGB BLD-MCNC: 8.2 G/DL (ref 12–16)
LYMPHOCYTES # BLD: 1.6 K/UL (ref 0.9–3.6)
LYMPHOCYTES NFR BLD: 27 % (ref 21–52)
MCH RBC QN AUTO: 19.6 PG (ref 24–34)
MCHC RBC AUTO-ENTMCNC: 28.3 G/DL (ref 31–37)
MCV RBC AUTO: 69.4 FL (ref 74–97)
MONOCYTES # BLD: 0.6 K/UL (ref 0.05–1.2)
MONOCYTES NFR BLD: 10 % (ref 3–10)
NEUTS SEG # BLD: 2.9 K/UL (ref 1.8–8)
NEUTS SEG NFR BLD: 48 % (ref 40–73)
PLATELET # BLD AUTO: 438 K/UL (ref 135–420)
PMV BLD AUTO: 9.3 FL (ref 9.2–11.8)
POTASSIUM SERPL-SCNC: 4.6 MMOL/L (ref 3.5–5.5)
RBC # BLD AUTO: 4.18 M/UL (ref 4.2–5.3)
SODIUM SERPL-SCNC: 137 MMOL/L (ref 136–145)
WBC # BLD AUTO: 6 K/UL (ref 4.6–13.2)

## 2021-03-17 PROCEDURE — 36415 COLL VENOUS BLD VENIPUNCTURE: CPT

## 2021-03-17 PROCEDURE — 80048 BASIC METABOLIC PNL TOTAL CA: CPT

## 2021-03-17 PROCEDURE — 85025 COMPLETE CBC W/AUTO DIFF WBC: CPT

## 2021-03-17 PROCEDURE — 83036 HEMOGLOBIN GLYCOSYLATED A1C: CPT

## 2021-03-17 NOTE — H&P
[facility] [place]  HISTORY AND PHYSICAL    Name:  Zaid Lopez  MR#:   [id number]  :  1945  ACCOUNT #:  [de-identified]  ADMIT DATE:  2021      She is scheduled for surgery on 2021 at [facility]. HISTORY OF PRESENT ILLNESS:  The patient presents today for scheduled surgical debridement of ulceration, right heel and bone biopsy. She has developed a large ulceration on the plantar aspect of the right heel with fibrous tissue and slough. She has been slow to heal and presented today for surgical debridement and bone biopsy. PAST MEDICAL HISTORY:  Includes hypertension, neuropathy, and arthritis. CURRENT MEDICATIONS:  Include amlodipine, atorvastatin, gabapentin, hydroxychloroquine, losartan, and tramadol. ALLERGIES:  NO KNOWN DRUG ALLERGIES. PAST SURGICAL HISTORY:  She has had prior foot surgery without complication from anesthesia. SOCIAL HISTORY:  She does relate to social alcohol use, is a former smoker. FAMILY HISTORY:  Unremarkable to the chief complaint. PHYSICAL EXAMINATION:  VASCULAR:  Dorsalis pedis and posterior tibial pulses are nonpalpable. Leg temperature is warm to cool legs and toes. NEUROLOGIC:  All epicritic sensations are intact, but diminished. DERMATOLOGIC:  Examination reveals a 4.5 cm x 3 cm x 1.5 cm ulceration in the plantar aspect, right heel with exposed fascia as well as fibrous and necrotic tissue. It does penetrate down through the subcu level. There is otoniel-wound erythema. There is no bone exposed. There is no tracking. Skin edges are adhered. There is no undermining. IMPRESSION:  1. Ulceration, right heel. 2.  Diabetes. 3.  Osteomyelitis? PLAN:  I consulted with the patient. We discussed options and alternatives, and recommendations were given. She was given a full surgical consultation regarding surgical treatment of wound on her right heel as well as application of TheraSkin allograft.   All risks, benefits, and alternatives were explained and all pre, otoniel, and postoperative course were discussed. She was given the opportunity to ask questions and all questions were answered. The consent was reviewed. All risks were discussed. It is signed and on the chart. We will plan to follow her back in the office for postoperative management. Matias Estrada DPM      [id number]  D:  03/17/2021 7:22  T:  03/17/2021 9:14  JOB #:  [id number]  CC:   [address] [place] [address]

## 2021-03-18 LAB
FAX TO INFO,FAXT: NORMAL
FAX TO NUMBER,FAXN: NORMAL

## 2021-03-19 ENCOUNTER — ANESTHESIA (OUTPATIENT)
Dept: SURGERY | Age: 76
End: 2021-03-19
Payer: MEDICARE

## 2021-03-19 ENCOUNTER — ANESTHESIA EVENT (OUTPATIENT)
Dept: SURGERY | Age: 76
End: 2021-03-19
Payer: MEDICARE

## 2021-03-19 ENCOUNTER — HOSPITAL ENCOUNTER (OUTPATIENT)
Age: 76
Setting detail: OUTPATIENT SURGERY
Discharge: HOME | End: 2021-03-19
Attending: PODIATRIST | Admitting: PODIATRIST
Payer: MEDICARE

## 2021-03-19 VITALS
BODY MASS INDEX: 20.95 KG/M2 | SYSTOLIC BLOOD PRESSURE: 155 MMHG | OXYGEN SATURATION: 100 % | HEART RATE: 76 BPM | WEIGHT: 130.38 LBS | RESPIRATION RATE: 14 BRPM | TEMPERATURE: 97.5 F | HEIGHT: 66 IN | DIASTOLIC BLOOD PRESSURE: 51 MMHG

## 2021-03-19 PROBLEM — M86.9 OSTEOMYELITIS (HCC): Status: ACTIVE | Noted: 2021-03-19

## 2021-03-19 PROBLEM — L97.412 ULCER OF RIGHT HEEL, WITH FAT LAYER EXPOSED (HCC): Status: ACTIVE | Noted: 2021-03-19

## 2021-03-19 LAB — GLUCOSE BLD STRIP.AUTO-MCNC: 94 MG/DL (ref 70–110)

## 2021-03-19 PROCEDURE — 88311 DECALCIFY TISSUE: CPT

## 2021-03-19 PROCEDURE — 76210000022 HC REC RM PH II 1.5 TO 2 HR: Performed by: PODIATRIST

## 2021-03-19 PROCEDURE — 77030020782 HC GWN BAIR PAWS FLX 3M -B: Performed by: PODIATRIST

## 2021-03-19 PROCEDURE — 87186 SC STD MICRODIL/AGAR DIL: CPT

## 2021-03-19 PROCEDURE — 76060000032 HC ANESTHESIA 0.5 TO 1 HR: Performed by: PODIATRIST

## 2021-03-19 PROCEDURE — 82962 GLUCOSE BLOOD TEST: CPT

## 2021-03-19 PROCEDURE — 74011250636 HC RX REV CODE- 250/636: Performed by: PODIATRIST

## 2021-03-19 PROCEDURE — 74011250636 HC RX REV CODE- 250/636: Performed by: NURSE ANESTHETIST, CERTIFIED REGISTERED

## 2021-03-19 PROCEDURE — 74011250637 HC RX REV CODE- 250/637: Performed by: STUDENT IN AN ORGANIZED HEALTH CARE EDUCATION/TRAINING PROGRAM

## 2021-03-19 PROCEDURE — 87205 SMEAR GRAM STAIN: CPT

## 2021-03-19 PROCEDURE — 88307 TISSUE EXAM BY PATHOLOGIST: CPT

## 2021-03-19 PROCEDURE — 74011000250 HC RX REV CODE- 250: Performed by: PODIATRIST

## 2021-03-19 PROCEDURE — 74011000250 HC RX REV CODE- 250: Performed by: NURSE ANESTHETIST, CERTIFIED REGISTERED

## 2021-03-19 PROCEDURE — 88304 TISSUE EXAM BY PATHOLOGIST: CPT

## 2021-03-19 PROCEDURE — 77030020268 HC MISC GENERAL SUPPLY: Performed by: PODIATRIST

## 2021-03-19 PROCEDURE — 77030040361 HC SLV COMPR DVT MDII -B: Performed by: PODIATRIST

## 2021-03-19 PROCEDURE — 87106 FUNGI IDENTIFICATION YEAST: CPT

## 2021-03-19 PROCEDURE — 87077 CULTURE AEROBIC IDENTIFY: CPT

## 2021-03-19 PROCEDURE — 2709999900 HC NON-CHARGEABLE SUPPLY: Performed by: PODIATRIST

## 2021-03-19 PROCEDURE — 76010000138 HC OR TIME 0.5 TO 1 HR: Performed by: PODIATRIST

## 2021-03-19 DEVICE — THERASKIN LG 39SQ CM 5.1X7.6CM: Type: IMPLANTABLE DEVICE | Site: HEEL | Status: FUNCTIONAL

## 2021-03-19 RX ORDER — PROPOFOL 10 MG/ML
INJECTION, EMULSION INTRAVENOUS AS NEEDED
Status: DISCONTINUED | OUTPATIENT
Start: 2021-03-19 | End: 2021-03-19 | Stop reason: HOSPADM

## 2021-03-19 RX ORDER — CEFAZOLIN SODIUM/WATER 2 G/20 ML
2 SYRINGE (ML) INTRAVENOUS ONCE
Status: COMPLETED | OUTPATIENT
Start: 2021-03-19 | End: 2021-03-19

## 2021-03-19 RX ORDER — PROPOFOL 10 MG/ML
INJECTION, EMULSION INTRAVENOUS
Status: DISCONTINUED | OUTPATIENT
Start: 2021-03-19 | End: 2021-03-19 | Stop reason: HOSPADM

## 2021-03-19 RX ORDER — SODIUM CHLORIDE, SODIUM LACTATE, POTASSIUM CHLORIDE, CALCIUM CHLORIDE 600; 310; 30; 20 MG/100ML; MG/100ML; MG/100ML; MG/100ML
125 INJECTION, SOLUTION INTRAVENOUS CONTINUOUS
Status: DISCONTINUED | OUTPATIENT
Start: 2021-03-19 | End: 2021-03-19 | Stop reason: HOSPADM

## 2021-03-19 RX ORDER — BACITRACIN 500 [USP'U]/G
OINTMENT TOPICAL AS NEEDED
Status: DISCONTINUED | OUTPATIENT
Start: 2021-03-19 | End: 2021-03-19 | Stop reason: HOSPADM

## 2021-03-19 RX ORDER — BUPIVACAINE HYDROCHLORIDE 5 MG/ML
INJECTION, SOLUTION EPIDURAL; INTRACAUDAL AS NEEDED
Status: DISCONTINUED | OUTPATIENT
Start: 2021-03-19 | End: 2021-03-19 | Stop reason: HOSPADM

## 2021-03-19 RX ORDER — LIDOCAINE HYDROCHLORIDE 20 MG/ML
INJECTION, SOLUTION EPIDURAL; INFILTRATION; INTRACAUDAL; PERINEURAL AS NEEDED
Status: DISCONTINUED | OUTPATIENT
Start: 2021-03-19 | End: 2021-03-19 | Stop reason: HOSPADM

## 2021-03-19 RX ORDER — OXYCODONE HYDROCHLORIDE 5 MG/1
5 TABLET ORAL
Status: DISCONTINUED | OUTPATIENT
Start: 2021-03-19 | End: 2021-03-19 | Stop reason: HOSPADM

## 2021-03-19 RX ADMIN — PROPOFOL 50 MG: 10 INJECTION, EMULSION INTRAVENOUS at 12:25

## 2021-03-19 RX ADMIN — LIDOCAINE HYDROCHLORIDE 60 MG: 20 INJECTION, SOLUTION EPIDURAL; INFILTRATION; INTRACAUDAL; PERINEURAL at 12:25

## 2021-03-19 RX ADMIN — PROPOFOL 50 MG: 10 INJECTION, EMULSION INTRAVENOUS at 12:35

## 2021-03-19 RX ADMIN — OXYCODONE 5 MG: 5 TABLET ORAL at 14:04

## 2021-03-19 RX ADMIN — SODIUM CHLORIDE, POTASSIUM CHLORIDE, SODIUM LACTATE AND CALCIUM CHLORIDE 125 ML/HR: 600; 310; 30; 20 INJECTION, SOLUTION INTRAVENOUS at 10:29

## 2021-03-19 RX ADMIN — PROPOFOL 75 MCG/KG/MIN: 10 INJECTION, EMULSION INTRAVENOUS at 12:27

## 2021-03-19 RX ADMIN — CEFAZOLIN 2 G: 1 INJECTION, POWDER, FOR SOLUTION INTRAVENOUS at 12:27

## 2021-03-19 RX ADMIN — PROPOFOL 50 MG: 10 INJECTION, EMULSION INTRAVENOUS at 12:27

## 2021-03-19 NOTE — BRIEF OP NOTE
Brief Postoperative Note    Patient: Wilmer Perkins  YOB: 1945  MRN: [id number]    Date of Procedure: 3/19/2021     Pre-Op Diagnosis: DIABETIC ULCER OSTEOMYELITIS RIGHT FOOT    Post-Op Diagnosis: same    Procedure(s):  MISONIX DEBRIDEMENT RIGHT HEEL, BONE BIOPSY, (THERASKIN 39 SQCM GRAFT, MISONIX DEBRIDEMENT)  **SPEC POP**    Surgeon(s):  Pascual Schroeder MD    Surgical Assistant: Surg Asst-1: Claritza Oliva    Anesthesia: MAC     Estimated Blood Loss (mL): 3 mL    Complications: none    Specimens:   ID Type Source Tests Collected by Time Destination   1 :     Pascual Schroeder MD 3/19/2021 1247 Pathology        Implants:   Implant Name Type Inv.  Item Serial No.  Lot No. LRB No. Used Action   GRAFT HUM TISS [id number] MESHED [employer]sha [employer] [id number]  GRAFT HUM TISS [id number] MESHED [facility] [id number] LIFENET SKIN WND ALLOGRFT INST_WD  Right 1 Implanted       Drains: none    Findings: ulcer right heel    Electronically Signed by Reena Harris MD on 3/19/2021 at 12:47 PM

## 2021-03-19 NOTE — PERIOP NOTES
Called Dr. Lubna Brizuela and notified him of patient's plavix 3/18/21 and low H&H he states it is okay to proceed. Also clarified whether patient is on contact isolation or not for MRSA infection. Per Dr. Lubna Brizuela, patient does not have an active MRSA infection to right heel wound and does not need to be placed on contact precautions.

## 2021-03-19 NOTE — PROGRESS NOTES
Patient is scheduled for debridement of wound with use of thera skin allograft and bone biopsy. All risks benefits discussed and all options discussed.  She was given opportunity to ask questions and all answered

## 2021-03-19 NOTE — DISCHARGE INSTRUCTIONS
Post op instructions     Non weight bearing with crutches  Keep leg elevated  Ice to ankle 30 minutes on 30 minutes off for 3 days     Nilsa Nicolas, MDDischarge Instructions  [facility]  Foot  Procedures    You are being discharged after a procedure to your heel. Please continue to use your walker or crutches as long as you need them. Dressing care-  You will need to keep your foot elevated for the next several days. Let your comfort help you decide how long your foot can be down over the next few days. During the first few days, your foot will feel tight and uncomfortable if it is down too long. It is normal for you to see a little blood staining on the dressing over your incision sites. It will be necessary to keep the dressing dry at all times. NEVER remove your dressing as it has been applied to hold your foot in the corrected position. Failure to follow this instruction may result in serious compromise of your correction. Medications- Please use your pain medication as directed. Refills can only be obtained during normal office hours. It is always best in call before noon. Please use one aspirin a day (81 mg for 325 mg) for the first few months until you resume normal activity on your leg. Cautions- please report the following:  #1- unusual pain or pressure inside the dressing  #2- fever above 100.5  #3- any chest pain or shortness of breath  #4- any reactions to your medications that would necessitate a change in your medication  Call 911 for any serious life-threatening emergencies    Follow up appointments have already been arranged for you as follows:  Please call the office at [phone] if you need to change any of these.     Reena Harris MD 3/19/2021 12:49 PM      DISCHARGE SUMMARY from Nurse    PATIENT INSTRUCTIONS:    After general anesthesia or intravenous sedation, for 24 hours or while taking prescription Narcotics:  · Limit your activities  · Do not drive and operate hazardous machinery  · Do not make important personal or business decisions  · Do  not drink alcoholic beverages  · If you have not urinated within 8 hours after discharge, please contact your surgeon on call. Report the following to your surgeon:  · Excessive pain, swelling, redness or odor of or around the surgical area  · Temperature over 100.5  · Nausea and vomiting lasting longer than 4 hours or if unable to take medications  · Any signs of decreased circulation or nerve impairment to extremity: change in color, persistent  numbness, tingling, coldness or increase pain  · Any questions    What to do at Home:  Recommended activity: Activity as tolerated and no driving for today,     If you experience any of the following symptoms as noted above, please follow up with Dr. Shubham Friedman. *  Please give a list of your current medications to your Primary Care Provider. *  Please update this list whenever your medications are discontinued, doses are      changed, or new medications (including over-the-counter products) are added. *  Please carry medication information at all times in case of emergency situations. These are general instructions for a healthy lifestyle:    No smoking/ No tobacco products/ Avoid exposure to second hand smoke  Surgeon General's Warning:  Quitting smoking now greatly reduces serious risk to your health. Obesity, smoking, and sedentary lifestyle greatly increases your risk for illness    A healthy diet, regular physical exercise & weight monitoring are important for maintaining a healthy lifestyle    You may be retaining fluid if you have a history of heart failure or if you experience any of the following symptoms:  Weight gain of 3 pounds or more overnight or 5 pounds in a week, increased swelling in our hands or feet or shortness of breath while lying flat in bed. Please call your doctor as soon as you notice any of these symptoms; do not wait until your next office visit.         The discharge information has been reviewed with the patient and caregiver. The patient and caregiver verbalized understanding. Discharge medications reviewed with the patient and caregiver and appropriate educational materials and side effects teaching were provided. ___________________________________________________________________________________________________________________________________    Patient armband removed and shredded         10 Things to Do When You Have COVID-19    Stay home. Don't go to school, work, or public areas. And don't use public transportation, ride-shares, or taxis unless you have no choice. Leave your home only if you need to get medical care. But call the doctor's office first so they know you're coming. And wear a cloth face cover. Ask before leaving isolation. Talk with your doctor or other health professional about when it will be safe for you to leave isolation. Wear a cloth face cover when you are around other people. It can help stop the spread of the virus when you cough or sneeze. Limit contact with people in your home. If possible, stay in a separate bedroom and use a separate bathroom. Avoid contact with pets and other animals. If possible, have a friend or family member care for them while you're sick. Cover your mouth and nose with a tissue when you cough or sneeze. Then throw the tissue in the trash right away. Wash your hands often, especially after you cough or sneeze. Use soap and water, and scrub for at least 20 seconds. If soap and water aren't available, use an alcohol-based hand . Don't share personal household items. These include bedding, towels, cups and glasses, and eating utensils. Clean and disinfect your home every day. Use household  or disinfectant wipes or sprays. Take special care to clean things that you grab with your hands.  These include doorknobs, remote controls, phones, and handles on your refrigerator and microwave. And don't forget countertops, tabletops, bathrooms, and computer keyboards. Take acetaminophen (Tylenol) to relieve fever and body aches. Read and follow all instructions on the label. Current as of: December 18, 2020               Content Version: 12.7  © 3611-7422 [employer]. Care instructions adapted under license by [employer] (which disclaims liability or warranty for this information). If you have questions about a medical condition or this instruction, always ask your healthcare professional. [place] [address] any warranty or liability for your use of this information.

## 2021-03-19 NOTE — ANESTHESIA PREPROCEDURE EVALUATION
Relevant Problems   No relevant active problems       Anesthetic History   No history of anesthetic complications     Pertinent negatives: No PONV       Review of Systems / Medical History  Patient summary reviewed, nursing notes reviewed and pertinent labs reviewed    Pulmonary            Asthma : well controlled  Pertinent negatives: No COPD and sleep apnea     Neuro/Psych       CVA  TIA  Pertinent negatives: No seizures   Cardiovascular    Hypertension            Pertinent negatives: No past MI and CHF       GI/Hepatic/Renal             Pertinent negatives: No liver disease and renal disease   Endo/Other    Diabetes: well controlled    Arthritis     Other Findings   Comments: Rheumatoid arthritis           Physical Exam    Airway  Mallampati: II  TM Distance: 4 - 6 cm  Neck ROM: normal range of motion   Mouth opening: Normal     Cardiovascular    Rhythm: regular  Rate: normal         Dental  No notable dental hx       Pulmonary  Breath sounds clear to auscultation               Abdominal  GI exam deferred       Other Findings            Anesthetic Plan    ASA: 3  Anesthesia type: MAC          Induction: Intravenous  Anesthetic plan and risks discussed with: Patient

## 2021-03-19 NOTE — PERIOP NOTES
Reviewed PTA medication list with patient/caregiver and patient/caregiver denies any additional medications. Patient admits to having a responsible adult care for them at home for at least 24 hours after surgery. Patient encouraged to use gown warming system and informed that using said warming gown to regulate body temperature prior to a procedure has been shown to help reduce the risks of blood clots and infection. Patient's pharmacy of choice verified and documented in PTA medication section. Dual skin assessment & fall risk band verification completed with Elva Hu RN.

## 2021-03-19 NOTE — ANESTHESIA POSTPROCEDURE EVALUATION
Post-Anesthesia Evaluation and Assessment    Cardiovascular Function/Vital Signs  Visit Vitals  BP (!) 184/57   Pulse 74   Temp 36.4 °C (97.5 °F)   Resp 16   Ht 5' 6\" (1.676 m)   Wt 59.1 kg (130 lb 6 oz)   SpO2 100%   BMI 21.04 kg/m²       Patient is status post Procedure(s):  MISONIX DEBRIDEMENT RIGHT HEEL,THERASKIN 39 SQAURE CENTIMETERS BONE BIOPSY. Nausea/Vomiting: Controlled. Postoperative hydration reviewed and adequate. Pain:  Pain Scale 1: Numeric (0 - 10) (03/19/21 1348)  Pain Intensity 1: 5 (03/19/21 1348)   Managed. Neurological Status:   Neuro (WDL): Exceptions to WDL (03/19/21 1306)   At baseline. Mental Status and Level of Consciousness: Baseline and appropriate for discharge. Pulmonary Status:   O2 Device: Room air (03/19/21 1348)   Adequate oxygenation and airway patent. Complications related to anesthesia: None    Post-anesthesia assessment completed. No concerns. Patient has met all discharge requirements.     Signed By: Carolyn Laughlin MD    March 19, 2021

## 2021-03-19 NOTE — PERIOP NOTES
Patient and family given discharge instructions    Family would like Dr. Elis Miller to call them with update.   Dr. Elis Miller currently in surgery

## 2021-03-19 NOTE — ANESTHESIA PROCEDURE NOTES
Peripheral Block Performed by: Donna Levin MD 
Authorized by: Donna Levin MD  
 
 
Block Type: Assessment: 
 
Injection Assessment:

## 2021-03-19 NOTE — PERIOP NOTES
Notified Annalise Nuñez RN that patient's consent needs to be clarified with Dr. Emanuel Perkins. She states she will clarify with Dr. Emanuel Perkins prior to procedure.

## 2021-03-20 NOTE — OP NOTES
[facility]    Name:  Annel Morgan  MR#:   [id number]  :  1945  ACCOUNT #:  [de-identified]  DATE OF SERVICE:  2021    PREOPERATIVE DIAGNOSES:  1. Ulceration, right heel. 2.  Osteomyelitis, right heel. POSTOPERATIVE DIAGNOSES:  1. Ulceration, right heel. 2.  Osteomyelitis, right heel. PROCEDURES PERFORMED:  1. Bone biopsy, right heel. 2.  Misonix debridement of ulceration, right heel, with application of 39 sq cm of TheraSkin allograft. SURGEON:  Ana Salas DPM    ASSISTANT:  Apple Hernandez. ANESTHESIA:  Sedation with 20 mL of 0.5% Marcaine plain. HEMOSTASIS:  None. COMPLICATIONS:  None. SPECIMENS REMOVED/PATHOLOGY:  Soft tissue and bone. MATERIALS:  One 39 sq cm of TheraSkin allograft. IMPLANTS:  none    ESTIMATED BLOOD LOSS:  3 mL. DISPOSITION:    The patient tolerated the procedure and anesthesia without complication and left the operating room with vascular status intact and vital signs stable. PROCEDURE:  The patient was brought to the operating room and placed on the table in supine position after administration of sedation and the aforementioned local anesthesia was infiltrated and formed a local block. At this time, attention was then directed to the right foot and ankle where the foot was then prepped and draped in the usual sterile manner. Attention was then directed to the lateral aspect of the left foot where 1-cm incision was made. At this time utilizing a trephine, a bone biopsy of the calcaneus was performed. The wound site was flushed, and skin was reapproximated  with 4-0 nylon in simple interrupted fashion. Attention was then directed to the plantar aspect of the right heel where utilizing the Misonix ultrasonic debrider, the ulceration which was noted to be 6 cm x 4 cm x 1 cm depth was surgically debrided full thickness down to and into subcutaneous with the debrider.   All debris and drainage were evacuated. There was 2 mL of debris removed and 3 mL of blood loss. Hemostasis was achieved. The wound bed was prepared. At this time, 39 sq cm of TheraSkin allograft was placed over the ulceration and was tacked in placed. Bacitracin ointment, 4 x 4's, ABDs, and a well-padded bandage were applied. She tolerated the procedure without complication and left the operating room with vascular status intact and vital signs stable. She enjoyed an uneventful time in the recovery room and was given both oral and written postoperative instructions. She will follow in the office for postoperative management. ALEXYS Chavez [email or web address]  D:  03/19/2021 12:54  T:  03/19/2021 21:15  JOB #:  [id number]  CC:   [address] [place] [address]

## 2021-03-22 NOTE — PROGRESS NOTES
precall to be done by [facility]  Pt to be informed of arrival time 1015a for 1100a procedure start by [place]

## 2021-03-23 ENCOUNTER — HOSPITAL ENCOUNTER (OUTPATIENT)
Dept: INTERVENTIONAL RADIOLOGY/VASCULAR | Age: 76
Discharge: HOME | End: 2021-03-23
Attending: PODIATRIST
Payer: MEDICARE

## 2021-03-23 DIAGNOSIS — M86.171 ACUTE OSTEOMYELITIS OF RIGHT FOOT (HCC): ICD-10-CM

## 2021-03-23 LAB
ANION GAP SERPL CALC-SCNC: 4 MMOL/L (ref 3–18)
APTT PPP: 27 SEC (ref 23–36.4)
BACTERIA SPEC CULT: ABNORMAL
BACTERIA SPEC CULT: ABNORMAL
BASOPHILS # BLD: 0.1 K/UL (ref 0–0.1)
BASOPHILS NFR BLD: 1 % (ref 0–2)
BUN SERPL-MCNC: 10 MG/DL (ref 7–18)
BUN/CREAT SERPL: 22 (ref 12–20)
CALCIUM SERPL-MCNC: 9.4 MG/DL (ref 8.5–10.1)
CHLORIDE SERPL-SCNC: 107 MMOL/L (ref 100–111)
CO2 SERPL-SCNC: 29 MMOL/L (ref 21–32)
CREAT SERPL-MCNC: 0.46 MG/DL (ref 0.6–1.3)
DIFFERENTIAL METHOD BLD: ABNORMAL
EOSINOPHIL # BLD: 0.6 K/UL (ref 0–0.4)
EOSINOPHIL NFR BLD: 8 % (ref 0–5)
ERYTHROCYTE [DISTWIDTH] IN BLOOD BY AUTOMATED COUNT: 20.9 % (ref 11.6–14.5)
ERYTHROCYTE [SEDIMENTATION RATE] IN BLOOD: 45 MM/HR (ref 0–30)
GLUCOSE SERPL-MCNC: 118 MG/DL (ref 74–99)
GRAM STN SPEC: ABNORMAL
GRAM STN SPEC: ABNORMAL
HCT VFR BLD AUTO: 31.1 % (ref 35–45)
HGB BLD-MCNC: 9.4 G/DL (ref 12–16)
INR PPP: 1.1 (ref 0.8–1.2)
LYMPHOCYTES # BLD: 1.8 K/UL (ref 0.9–3.6)
LYMPHOCYTES NFR BLD: 24 % (ref 21–52)
MCH RBC QN AUTO: 20.7 PG (ref 24–34)
MCHC RBC AUTO-ENTMCNC: 30.2 G/DL (ref 31–37)
MCV RBC AUTO: 68.4 FL (ref 74–97)
MONOCYTES # BLD: 0.4 K/UL (ref 0.05–1.2)
MONOCYTES NFR BLD: 6 % (ref 3–10)
NEUTS SEG # BLD: 4.4 K/UL (ref 1.8–8)
NEUTS SEG NFR BLD: 61 % (ref 40–73)
PLATELET # BLD AUTO: 427 K/UL (ref 135–420)
PMV BLD AUTO: 9 FL (ref 9.2–11.8)
POTASSIUM SERPL-SCNC: 4.4 MMOL/L (ref 3.5–5.5)
PROTHROMBIN TIME: 13.7 SEC (ref 11.5–15.2)
RBC # BLD AUTO: 4.55 M/UL (ref 4.2–5.3)
RBC MORPH BLD: ABNORMAL
SERVICE CMNT-IMP: ABNORMAL
SODIUM SERPL-SCNC: 140 MMOL/L (ref 136–145)
WBC # BLD AUTO: 7.3 K/UL (ref 4.6–13.2)

## 2021-03-23 PROCEDURE — 74011250636 HC RX REV CODE- 250/636: Performed by: RADIOLOGY

## 2021-03-23 PROCEDURE — 85025 COMPLETE CBC W/AUTO DIFF WBC: CPT

## 2021-03-23 PROCEDURE — 85610 PROTHROMBIN TIME: CPT

## 2021-03-23 PROCEDURE — 74011000250 HC RX REV CODE- 250: Performed by: RADIOLOGY

## 2021-03-23 PROCEDURE — 80048 BASIC METABOLIC PNL TOTAL CA: CPT

## 2021-03-23 PROCEDURE — C1751 CATH, INF, PER/CENT/MIDLINE: HCPCS

## 2021-03-23 PROCEDURE — 85652 RBC SED RATE AUTOMATED: CPT

## 2021-03-23 PROCEDURE — 36415 COLL VENOUS BLD VENIPUNCTURE: CPT

## 2021-03-23 PROCEDURE — 85730 THROMBOPLASTIN TIME PARTIAL: CPT

## 2021-03-23 RX ORDER — HEPARIN SODIUM (PORCINE) LOCK FLUSH IV SOLN 100 UNIT/ML 100 UNIT/ML
500 SOLUTION INTRAVENOUS
Status: COMPLETED | OUTPATIENT
Start: 2021-03-23 | End: 2021-03-23

## 2021-03-23 RX ORDER — LIDOCAINE HYDROCHLORIDE 10 MG/ML
1-20 INJECTION INFILTRATION; PERINEURAL
Status: COMPLETED | OUTPATIENT
Start: 2021-03-23 | End: 2021-03-23

## 2021-03-23 RX ORDER — HEPARIN SODIUM 200 [USP'U]/100ML
500 INJECTION, SOLUTION INTRAVENOUS
Status: COMPLETED | OUTPATIENT
Start: 2021-03-23 | End: 2021-03-23

## 2021-03-23 RX ADMIN — HEPARIN SODIUM (PORCINE) LOCK FLUSH IV SOLN 100 UNIT/ML 500 UNITS: 100 SOLUTION at 11:28

## 2021-03-23 RX ADMIN — LIDOCAINE HYDROCHLORIDE 3 ML: 10 INJECTION, SOLUTION INFILTRATION; PERINEURAL at 11:28

## 2021-03-23 RX ADMIN — HEPARIN SODIUM IN SODIUM CHLORIDE 1000 UNITS: 200 INJECTION INTRAVENOUS at 11:28

## 2021-03-23 NOTE — PROCEDURES
Interventional Radiology Brief Procedure Note    Performed By:  Tawanna Ormond, PA-C    Attending Radiologist: Susi Calvillo MD    Pre-operative Diagnosis:  Need for long term IV antibiotics    Post-operative Diagnosis: Same as pre-op diagnosis    Procedure(s) Performed:  Ultrasound & fluoroscopic guided PICC line placement    Anesthesia:  Local      Findings:  Successful right arm dual lumen 5F PICC line placement. Please see dictated report for details. Complications: None immediate    Estimated Blood Loss:  Minimal    Specimens: None    Condition: Stable    Disposition: Discharge home. PICC line is ready for immediate use.        Tawanna Ormond, PA-C  [facility]  Vascular & Interventional Radiology  3/23/2021

## 2021-03-24 ENCOUNTER — HOSPITAL ENCOUNTER (OUTPATIENT)
Dept: INFUSION THERAPY | Age: 76
Discharge: HOME | End: 2021-03-24
Payer: MEDICARE

## 2021-03-24 VITALS
TEMPERATURE: 98.6 F | RESPIRATION RATE: 16 BRPM | HEART RATE: 96 BPM | OXYGEN SATURATION: 98 % | DIASTOLIC BLOOD PRESSURE: 61 MMHG | SYSTOLIC BLOOD PRESSURE: 151 MMHG

## 2021-03-24 PROCEDURE — 74011250636 HC RX REV CODE- 250/636: Performed by: PODIATRIST

## 2021-03-24 PROCEDURE — 96374 THER/PROPH/DIAG INJ IV PUSH: CPT

## 2021-03-24 PROCEDURE — 74011000250 HC RX REV CODE- 250: Performed by: PODIATRIST

## 2021-03-24 RX ORDER — HEPARIN 100 UNIT/ML
500 SYRINGE INTRAVENOUS ONCE
Status: COMPLETED | OUTPATIENT
Start: 2021-03-24 | End: 2021-03-24

## 2021-03-24 RX ORDER — SODIUM CHLORIDE 0.9 % (FLUSH) 0.9 %
5-10 SYRINGE (ML) INJECTION AS NEEDED
Status: DISCONTINUED | OUTPATIENT
Start: 2021-03-24 | End: 2021-03-26 | Stop reason: HOSPADM

## 2021-03-24 RX ADMIN — Medication 10 ML: at 11:26

## 2021-03-24 RX ADMIN — HEPARIN 500 UNITS: 100 SYRINGE at 11:26

## 2021-03-24 RX ADMIN — DAPTOMYCIN 350 MG: 500 INJECTION, POWDER, LYOPHILIZED, FOR SOLUTION INTRAVENOUS at 11:23

## 2021-03-24 NOTE — PROGRESS NOTES
[facility] Progress Note    Date: 2021    Name: Brit Decker    MRN: [id number]         : 1945     IV Antibiotics     Ms. Valentina Martell was assessed and education was provided. Ms. Elayne Figueroa vitals were reviewed. Visit Vitals  BP (!) 151/61 (BP Patient Position: Sitting)   Pulse 96   Temp 98.6 °F (37 °C)   Resp 16   SpO2 98%   Breastfeeding No             []  Vancomycin     []  Invanz     [x]  Cubicin 350 mg IV push     []  Rocephin    was infused per orders without complications. Ms. Valentina Martell tolerated infusion, and had no complaints at this time. Ms. Valentina Martell was observed in office for 30 minutes post infusion. No s/s of reactions or complications noted. PICC line flushed per protocol and secured with curos cap, coban and stockinette. Patient armband removed and shredded. Ms. Valentina Martell was discharged from [place] [address] in stable condition at 1200. She is to return on 3/25/21 at 56 for her next appointment. Armband removed and shredded.      Hollie Ross RN  2021  12:01 PM

## 2021-03-25 ENCOUNTER — HOSPITAL ENCOUNTER (OUTPATIENT)
Dept: INFUSION THERAPY | Age: 76
Discharge: HOME | End: 2021-03-25
Payer: MEDICARE

## 2021-03-25 VITALS
TEMPERATURE: 98.2 F | RESPIRATION RATE: 16 BRPM | HEART RATE: 97 BPM | SYSTOLIC BLOOD PRESSURE: 128 MMHG | OXYGEN SATURATION: 98 % | DIASTOLIC BLOOD PRESSURE: 64 MMHG

## 2021-03-25 PROCEDURE — 74011000250 HC RX REV CODE- 250: Performed by: PODIATRIST

## 2021-03-25 PROCEDURE — 96374 THER/PROPH/DIAG INJ IV PUSH: CPT

## 2021-03-25 PROCEDURE — 74011250636 HC RX REV CODE- 250/636: Performed by: PODIATRIST

## 2021-03-25 RX ORDER — HEPARIN 100 UNIT/ML
500 SYRINGE INTRAVENOUS ONCE
Status: COMPLETED | OUTPATIENT
Start: 2021-03-25 | End: 2021-03-25

## 2021-03-25 RX ORDER — SODIUM CHLORIDE 0.9 % (FLUSH) 0.9 %
5-10 SYRINGE (ML) INJECTION AS NEEDED
Status: DISCONTINUED | OUTPATIENT
Start: 2021-03-25 | End: 2021-03-27 | Stop reason: HOSPADM

## 2021-03-25 RX ADMIN — DAPTOMYCIN 350 MG: 500 INJECTION, POWDER, LYOPHILIZED, FOR SOLUTION INTRAVENOUS at 10:39

## 2021-03-25 RX ADMIN — Medication 10 ML: at 10:43

## 2021-03-25 RX ADMIN — Medication 500 UNITS: at 10:43

## 2021-03-25 NOTE — PROGRESS NOTES
[facility] Progress Note    Date: 2021    Name: Jhon Sanders    MRN: [id number]         : 1945     IV Antibiotics     Ms. Alexa Buckley was assessed and education was provided. Ms. Linda Quezada vitals were reviewed. Visit Vitals  /64 (BP 1 Location: Left arm, BP Patient Position: Sitting)   Pulse 97   Temp 98.2 °F (36.8 °C)   Resp 16   SpO2 98%             []  Vancomycin     []  Invanz     [x]  Cubicin 350 mg IV push     []  Rocephin    was infused per orders without complications. Ms. Alexa Buckley tolerated infusion, and had no complaints at this time. Patient armband removed and shredded. Ms. Alexa Buckley was discharged from [place] [address] in stable condition at 1045. She is to return on 3/26/21 at [phone] for her next appointment.     Dania Jean Baptiste RN  2021  12:21 PM

## 2021-03-26 ENCOUNTER — HOSPITAL ENCOUNTER (OUTPATIENT)
Dept: INFUSION THERAPY | Age: 76
Discharge: HOME | End: 2021-03-26
Payer: MEDICARE

## 2021-03-26 VITALS
OXYGEN SATURATION: 98 % | HEART RATE: 97 BPM | SYSTOLIC BLOOD PRESSURE: 133 MMHG | RESPIRATION RATE: 16 BRPM | DIASTOLIC BLOOD PRESSURE: 66 MMHG | TEMPERATURE: 98 F

## 2021-03-26 PROCEDURE — 74011000250 HC RX REV CODE- 250: Performed by: PODIATRIST

## 2021-03-26 PROCEDURE — 74011250636 HC RX REV CODE- 250/636: Performed by: PODIATRIST

## 2021-03-26 PROCEDURE — 96374 THER/PROPH/DIAG INJ IV PUSH: CPT

## 2021-03-26 RX ORDER — HEPARIN 100 UNIT/ML
500 SYRINGE INTRAVENOUS ONCE
Status: COMPLETED | OUTPATIENT
Start: 2021-03-26 | End: 2021-03-26

## 2021-03-26 RX ORDER — SODIUM CHLORIDE 0.9 % (FLUSH) 0.9 %
5-10 SYRINGE (ML) INJECTION AS NEEDED
Status: DISCONTINUED | OUTPATIENT
Start: 2021-03-26 | End: 2021-03-28 | Stop reason: HOSPADM

## 2021-03-26 RX ADMIN — Medication 10 ML: at 11:05

## 2021-03-26 RX ADMIN — DAPTOMYCIN 350 MG: 500 INJECTION, POWDER, LYOPHILIZED, FOR SOLUTION INTRAVENOUS at 11:00

## 2021-03-26 RX ADMIN — HEPARIN 500 UNITS: 100 SYRINGE at 11:05

## 2021-03-26 NOTE — PROGRESS NOTES
[facility] Progress Note    Date: 2021    Name: Jhon Sanders    MRN: [id number]         : 1945     IV Antibiotics     Ms. Alexa Buckley was assessed and education was provided. Ms. Linda Quezada vitals were reviewed. Visit Vitals  /66 (BP 1 Location: Left arm, BP Patient Position: Sitting)   Pulse 97   Temp 98 °F (36.7 °C)   Resp 16   SpO2 98%             []  Vancomycin     []  Invanz     [x]  Cubicin 350 mg IV push     []  Rocephin    was infused per orders without complications    Ms. Ralph tolerated infusion, and had no complaints at this time. Patient armband removed and shredded. PICC line flushed per orders, and secured with curos cap, coban and stockinette. Ms. Alexa Buckley was discharged from [place] [address] in stable condition at 1110. She is to return on 3/29/21 at 1000 for her next appointment.     Dania Jean Baptiste RN  2021  11:35 AM

## 2021-03-29 ENCOUNTER — HOSPITAL ENCOUNTER (OUTPATIENT)
Dept: INFUSION THERAPY | Age: 76
Discharge: HOME | End: 2021-03-29
Payer: MEDICARE

## 2021-03-29 VITALS
OXYGEN SATURATION: 98 % | SYSTOLIC BLOOD PRESSURE: 137 MMHG | RESPIRATION RATE: 16 BRPM | DIASTOLIC BLOOD PRESSURE: 80 MMHG | TEMPERATURE: 98.2 F | HEART RATE: 99 BPM

## 2021-03-29 LAB — ERYTHROCYTE [SEDIMENTATION RATE] IN BLOOD: 54 MM/HR (ref 0–30)

## 2021-03-29 PROCEDURE — 85652 RBC SED RATE AUTOMATED: CPT

## 2021-03-29 PROCEDURE — 96374 THER/PROPH/DIAG INJ IV PUSH: CPT

## 2021-03-29 PROCEDURE — 74011250636 HC RX REV CODE- 250/636: Performed by: PODIATRIST

## 2021-03-29 PROCEDURE — 77030020847 HC STATLOK BARD -A

## 2021-03-29 PROCEDURE — 74011000250 HC RX REV CODE- 250: Performed by: PODIATRIST

## 2021-03-29 RX ORDER — HEPARIN 100 UNIT/ML
500 SYRINGE INTRAVENOUS ONCE
Status: COMPLETED | OUTPATIENT
Start: 2021-03-29 | End: 2021-03-29

## 2021-03-29 RX ORDER — HEPARIN 100 UNIT/ML
SYRINGE INTRAVENOUS
Status: DISPENSED
Start: 2021-03-29 | End: 2021-03-29

## 2021-03-29 RX ORDER — SODIUM CHLORIDE 0.9 % (FLUSH) 0.9 %
5-10 SYRINGE (ML) INJECTION AS NEEDED
Status: DISCONTINUED | OUTPATIENT
Start: 2021-03-29 | End: 2021-03-31 | Stop reason: HOSPADM

## 2021-03-29 RX ADMIN — Medication 10 ML: at 10:09

## 2021-03-29 RX ADMIN — HEPARIN 500 UNITS: 100 SYRINGE at 10:10

## 2021-03-29 RX ADMIN — DAPTOMYCIN 350 MG: 500 INJECTION, POWDER, LYOPHILIZED, FOR SOLUTION INTRAVENOUS at 10:09

## 2021-03-29 NOTE — PROGRESS NOTES
[facility] Progress Note    Date: 2021    Name: Jesus Manuel Stein    MRN: [id number]         : 1945     IV Antibiotics     Ms. Pepe Vega was assessed and education was provided. Ms. Artis Pennington vitals were reviewed. Visit Vitals  /80   Pulse 99   Temp 98.2 °F (36.8 °C)   Resp 16   SpO2 98%     PICC line dressing due to be changed, site is CDI. Dressing changed per protocol, new biopatch, statlockm, tegaderm, and microclaves applied. Labs drawn and sent per order.      []  Vancomycin     []  Invanz     [x]  Cubicin 350 mg IV push     []  Rocephin    was infused per orders without complications    Ms. Ralph tolerated infusion, and had no complaints at this time. Patient armband removed and shredded. PICC line flushed per orders, and secured with new curos cap, coban and stockinette. Ms. Pepe Vega was discharged from [place] [address] in stable condition at 1110. She is to return on 3/30/21 at 1000 for her next appointment.     Mikel Buckner RN  2021  11:35 AM

## 2021-03-30 ENCOUNTER — HOSPITAL ENCOUNTER (OUTPATIENT)
Dept: INFUSION THERAPY | Age: 76
Discharge: HOME | End: 2021-03-30
Payer: MEDICARE

## 2021-03-30 VITALS
DIASTOLIC BLOOD PRESSURE: 76 MMHG | RESPIRATION RATE: 16 BRPM | TEMPERATURE: 98.2 F | HEART RATE: 99 BPM | SYSTOLIC BLOOD PRESSURE: 143 MMHG | OXYGEN SATURATION: 98 %

## 2021-03-30 PROCEDURE — 74011000250 HC RX REV CODE- 250: Performed by: PODIATRIST

## 2021-03-30 PROCEDURE — 74011250636 HC RX REV CODE- 250/636: Performed by: PODIATRIST

## 2021-03-30 PROCEDURE — 96374 THER/PROPH/DIAG INJ IV PUSH: CPT

## 2021-03-30 RX ORDER — SODIUM CHLORIDE 0.9 % (FLUSH) 0.9 %
5-10 SYRINGE (ML) INJECTION AS NEEDED
Status: DISCONTINUED | OUTPATIENT
Start: 2021-03-30 | End: 2021-04-01 | Stop reason: HOSPADM

## 2021-03-30 RX ORDER — HEPARIN 100 UNIT/ML
500 SYRINGE INTRAVENOUS ONCE
Status: ACTIVE | OUTPATIENT
Start: 2021-03-30 | End: 2021-03-30

## 2021-03-30 RX ADMIN — DAPTOMYCIN 350 MG: 500 INJECTION, POWDER, LYOPHILIZED, FOR SOLUTION INTRAVENOUS at 11:03

## 2021-03-30 NOTE — PROGRESS NOTES
[facility] Progress Note    Date: 2021    Name: Cadence Barrow    MRN: [id number]         : 1945     IV Antibiotics     Ms. Grady Nelson was assessed and education was provided. Ms. Frannie Wu vitals were reviewed. Visit Vitals  BP (!) 143/76 (BP 1 Location: Left arm, BP Patient Position: Sitting)   Pulse 99   Temp 98.2 °F (36.8 °C)   Resp 16   SpO2 98%     PICC line dressing CDI, flushes easily bilaterally with brisk blood. []  Vancomycin     []  Invanz     [x]  Cubicin 350 mg IV push     []  Rocephin    was infused per orders without complications    Ms. Ralph tolerated infusion, and had no complaints at this time. Patient armband removed and shredded. PICC line flushed per orders, and secured with new curos cap, coban and stockinette. Ms. Grady Nelson was discharged from [place] [address] in stable condition at 1120. She is to return on 3/31/21 at 1000 for her next appointment.     Maryl Bosworth, RN  2021  11:35 AM

## 2021-03-31 ENCOUNTER — HOSPITAL ENCOUNTER (OUTPATIENT)
Dept: INFUSION THERAPY | Age: 76
Discharge: HOME | End: 2021-03-31
Payer: MEDICARE

## 2021-03-31 VITALS
RESPIRATION RATE: 16 BRPM | HEART RATE: 95 BPM | DIASTOLIC BLOOD PRESSURE: 55 MMHG | OXYGEN SATURATION: 97 % | SYSTOLIC BLOOD PRESSURE: 134 MMHG | TEMPERATURE: 98.2 F

## 2021-03-31 PROCEDURE — 74011000250 HC RX REV CODE- 250: Performed by: PODIATRIST

## 2021-03-31 PROCEDURE — 74011250636 HC RX REV CODE- 250/636: Performed by: PODIATRIST

## 2021-03-31 PROCEDURE — 96374 THER/PROPH/DIAG INJ IV PUSH: CPT

## 2021-03-31 RX ORDER — SODIUM CHLORIDE 0.9 % (FLUSH) 0.9 %
5-10 SYRINGE (ML) INJECTION AS NEEDED
Status: DISCONTINUED | OUTPATIENT
Start: 2021-03-31 | End: 2021-04-02 | Stop reason: HOSPADM

## 2021-03-31 RX ORDER — HEPARIN 100 UNIT/ML
500 SYRINGE INTRAVENOUS ONCE
Status: COMPLETED | OUTPATIENT
Start: 2021-03-31 | End: 2021-03-31

## 2021-03-31 RX ADMIN — Medication 500 UNITS: at 10:18

## 2021-03-31 RX ADMIN — DAPTOMYCIN 350 MG: 500 INJECTION, POWDER, LYOPHILIZED, FOR SOLUTION INTRAVENOUS at 10:15

## 2021-03-31 RX ADMIN — Medication 10 ML: at 10:18

## 2021-03-31 NOTE — PROGRESS NOTES
\[facility]\"" Progress Note    Date: 2021    Name: Daniel Vogel    MRN: [id number]         : 1945     IV Antibiotics     Ms. Lee Fernandes was assessed and education was provided. Ms. Shannon Salinas vitals were reviewed. Visit Vitals  BP (!) 134/55 (BP 1 Location: Left arm, BP Patient Position: Sitting)   Pulse 95   Temp 98.2 °F (36.8 °C)   Resp 16   SpO2 97%             []  Vancomycin     []  Invanz     [x]  Cubicin 350 mg IV push     []  Rocephin    was infused per orders without complications. Ms. Lee Fernandes tolerated infusion, and had no complaints at this time. Patient armband removed and shredded. Ms. Lee Fernandes was discharged from [place] [address] in stable condition at 1020. She is to return on 21 at 1100 for her next appointment.       James Aranda RN  2021  10:45 AM

## 2021-04-01 ENCOUNTER — HOSPITAL ENCOUNTER (OUTPATIENT)
Dept: INFUSION THERAPY | Age: 76
Discharge: HOME | End: 2021-04-01
Payer: MEDICARE

## 2021-04-01 VITALS
DIASTOLIC BLOOD PRESSURE: 47 MMHG | HEART RATE: 96 BPM | TEMPERATURE: 98.6 F | RESPIRATION RATE: 16 BRPM | OXYGEN SATURATION: 96 % | SYSTOLIC BLOOD PRESSURE: 124 MMHG

## 2021-04-01 PROCEDURE — 74011250636 HC RX REV CODE- 250/636

## 2021-04-01 PROCEDURE — 74011000250 HC RX REV CODE- 250

## 2021-04-01 PROCEDURE — 74011250636 HC RX REV CODE- 250/636: Performed by: PODIATRIST

## 2021-04-01 PROCEDURE — 96374 THER/PROPH/DIAG INJ IV PUSH: CPT

## 2021-04-01 RX ORDER — SODIUM CHLORIDE 0.9 % (FLUSH) 0.9 %
5-10 SYRINGE (ML) INJECTION AS NEEDED
Status: DISCONTINUED | OUTPATIENT
Start: 2021-04-01 | End: 2021-04-03 | Stop reason: HOSPADM

## 2021-04-01 RX ORDER — HEPARIN 100 UNIT/ML
500 SYRINGE INTRAVENOUS ONCE
Status: COMPLETED | OUTPATIENT
Start: 2021-04-01 | End: 2021-04-01

## 2021-04-01 RX ADMIN — Medication 10 ML: at 11:15

## 2021-04-01 RX ADMIN — Medication 500 UNITS: at 11:15

## 2021-04-01 RX ADMIN — DAPTOMYCIN 350 MG: 500 INJECTION, POWDER, LYOPHILIZED, FOR SOLUTION INTRAVENOUS at 11:09

## 2021-04-01 NOTE — PROGRESS NOTES
[facility] Progress Note    Date: 2021    Name: Jesus Manuel Stein    MRN: [id number]         : 1945     IV Antibiotics    Ms. Pepe Vega was assessed and education was provided. Ms. Artis Pennington vitals were reviewed. Visit Vitals  BP (!) 124/47 (BP 1 Location: Left arm, BP Patient Position: Sitting)   Pulse 96   Temp 98.6 °F (37 °C)   Resp 16   SpO2 96%   Breastfeeding No             []  Vancomycin     []  Invanz     [x]  Cubicin 350 mg IV push     []  Rocephin    was infused per orders without complications. Ms. Pepe Vega tolerated infusion, and had no complaints at this time. Patient armband removed and shredded. Ms. Pepe Vega was discharged from [place] [address] in stable condition at 1120. She is to return on 21 at 1100 for her next appointment.     Phoebe Bowden RN  2021  11:25 AM

## 2021-04-02 ENCOUNTER — HOSPITAL ENCOUNTER (OUTPATIENT)
Dept: INFUSION THERAPY | Age: 76
Discharge: HOME | End: 2021-04-02
Payer: MEDICARE

## 2021-04-02 VITALS
OXYGEN SATURATION: 99 % | HEART RATE: 84 BPM | DIASTOLIC BLOOD PRESSURE: 57 MMHG | RESPIRATION RATE: 16 BRPM | SYSTOLIC BLOOD PRESSURE: 132 MMHG | TEMPERATURE: 98.4 F

## 2021-04-02 PROCEDURE — 74011250636 HC RX REV CODE- 250/636: Performed by: PODIATRIST

## 2021-04-02 PROCEDURE — 96374 THER/PROPH/DIAG INJ IV PUSH: CPT

## 2021-04-02 PROCEDURE — 74011000250 HC RX REV CODE- 250: Performed by: PODIATRIST

## 2021-04-02 PROCEDURE — 74011250636 HC RX REV CODE- 250/636

## 2021-04-02 RX ORDER — SODIUM CHLORIDE 0.9 % (FLUSH) 0.9 %
5-10 SYRINGE (ML) INJECTION AS NEEDED
Status: DISCONTINUED | OUTPATIENT
Start: 2021-04-02 | End: 2021-04-04 | Stop reason: HOSPADM

## 2021-04-02 RX ORDER — HEPARIN 100 UNIT/ML
500 SYRINGE INTRAVENOUS ONCE
Status: COMPLETED | OUTPATIENT
Start: 2021-04-02 | End: 2021-04-02

## 2021-04-02 RX ORDER — HEPARIN 100 UNIT/ML
SYRINGE INTRAVENOUS
Status: COMPLETED
Start: 2021-04-02 | End: 2021-04-02

## 2021-04-02 RX ADMIN — HEPARIN 500 UNITS: 100 SYRINGE at 11:10

## 2021-04-02 RX ADMIN — Medication 10 ML: at 11:08

## 2021-04-02 RX ADMIN — DAPTOMYCIN 350 MG: 500 INJECTION, POWDER, LYOPHILIZED, FOR SOLUTION INTRAVENOUS at 11:04

## 2021-04-02 NOTE — PROGRESS NOTES
[facility] Progress Note    Date: 2021    Name: Dory Brochure    MRN: [id number]         : 1945     IV Antibiotics    Ms. Kristy Kayser was assessed and education was provided. Ms. Pedro Samuel vitals were reviewed. Visit Vitals  BP (!) 132/57 (BP 1 Location: Left arm, BP Patient Position: Sitting)   Pulse 84   Temp 98.4 °F (36.9 °C)   Resp 16   SpO2 99%       Lab results were obtained and reviewed. No results found for this or any previous visit (from the past 12 hour(s)). []  Vancomycin     []  Invanz     [x]  Cubicin 350 mg IV push     []  Rocephin    was infused per orders without complications. Ms. Kristy Kayser tolerated infusion, and had no complaints at this time. Patient armband removed and shredded. Ms. Kristy Kayser was discharged from [place] [address] in stable condition at 1115. She is to return on 21 at 1000 for her next appointment.     Jennifer Aguirre RN  2021  11:11 AM

## 2021-04-05 ENCOUNTER — HOSPITAL ENCOUNTER (OUTPATIENT)
Dept: INFUSION THERAPY | Age: 76
Discharge: HOME | End: 2021-04-05
Payer: MEDICARE

## 2021-04-05 VITALS
DIASTOLIC BLOOD PRESSURE: 65 MMHG | SYSTOLIC BLOOD PRESSURE: 145 MMHG | HEART RATE: 81 BPM | TEMPERATURE: 98.9 F | OXYGEN SATURATION: 99 % | RESPIRATION RATE: 18 BRPM

## 2021-04-05 LAB
ALBUMIN SERPL-MCNC: 3.2 G/DL (ref 3.4–5)
ALBUMIN/GLOB SERPL: 0.8 {RATIO} (ref 0.8–1.7)
ALP SERPL-CCNC: 87 U/L (ref 45–117)
ALT SERPL-CCNC: 16 U/L (ref 13–56)
ANION GAP SERPL CALC-SCNC: 5 MMOL/L (ref 3–18)
AST SERPL-CCNC: 21 U/L (ref 10–38)
BASO+EOS+MONOS # BLD AUTO: 1.1 K/UL (ref 0–2.3)
BASO+EOS+MONOS NFR BLD AUTO: 14 % (ref 0.1–17)
BILIRUB SERPL-MCNC: 0.2 MG/DL (ref 0.2–1)
BUN SERPL-MCNC: 11 MG/DL (ref 7–18)
BUN/CREAT SERPL: 24 (ref 12–20)
CALCIUM SERPL-MCNC: 9.1 MG/DL (ref 8.5–10.1)
CHLORIDE SERPL-SCNC: 104 MMOL/L (ref 100–111)
CK SERPL-CCNC: 108 U/L (ref 26–192)
CO2 SERPL-SCNC: 31 MMOL/L (ref 21–32)
CREAT SERPL-MCNC: 0.46 MG/DL (ref 0.6–1.3)
DIFFERENTIAL METHOD BLD: ABNORMAL
ERYTHROCYTE [DISTWIDTH] IN BLOOD BY AUTOMATED COUNT: 21.9 % (ref 11.5–14.5)
ERYTHROCYTE [SEDIMENTATION RATE] IN BLOOD: 119 MM/HR (ref 0–30)
GLOBULIN SER CALC-MCNC: 3.9 G/DL (ref 2–4)
GLUCOSE SERPL-MCNC: 89 MG/DL (ref 74–99)
HCT VFR BLD AUTO: 29.5 % (ref 36–48)
HGB BLD-MCNC: 8.6 G/DL (ref 12–16)
LYMPHOCYTES # BLD: 1.5 K/UL (ref 1.1–5.9)
LYMPHOCYTES NFR BLD: 18 % (ref 14–44)
MCH RBC QN AUTO: 20.7 PG (ref 25–35)
MCHC RBC AUTO-ENTMCNC: 29.2 G/DL (ref 31–37)
MCV RBC AUTO: 71.1 FL (ref 78–102)
NEUTS SEG # BLD: 5.5 K/UL (ref 1.8–9.5)
NEUTS SEG NFR BLD: 68 % (ref 40–70)
PLATELET # BLD AUTO: 329 K/UL (ref 135–420)
POTASSIUM SERPL-SCNC: 4.4 MMOL/L (ref 3.5–5.5)
PROT SERPL-MCNC: 7.1 G/DL (ref 6.4–8.2)
RBC # BLD AUTO: 4.15 M/UL (ref 4.1–5.1)
SODIUM SERPL-SCNC: 140 MMOL/L (ref 136–145)
WBC # BLD AUTO: 8.1 K/UL (ref 4.5–13)

## 2021-04-05 PROCEDURE — 85049 AUTOMATED PLATELET COUNT: CPT

## 2021-04-05 PROCEDURE — 85652 RBC SED RATE AUTOMATED: CPT

## 2021-04-05 PROCEDURE — 74011000250 HC RX REV CODE- 250: Performed by: PODIATRIST

## 2021-04-05 PROCEDURE — 77030020847 HC STATLOK BARD -A

## 2021-04-05 PROCEDURE — 74011250636 HC RX REV CODE- 250/636: Performed by: PODIATRIST

## 2021-04-05 PROCEDURE — 82550 ASSAY OF CK (CPK): CPT

## 2021-04-05 PROCEDURE — 80053 COMPREHEN METABOLIC PANEL: CPT

## 2021-04-05 PROCEDURE — 96374 THER/PROPH/DIAG INJ IV PUSH: CPT

## 2021-04-05 PROCEDURE — 36592 COLLECT BLOOD FROM PICC: CPT

## 2021-04-05 PROCEDURE — 85025 COMPLETE CBC W/AUTO DIFF WBC: CPT

## 2021-04-05 PROCEDURE — 36591 DRAW BLOOD OFF VENOUS DEVICE: CPT

## 2021-04-05 RX ORDER — SODIUM CHLORIDE 0.9 % (FLUSH) 0.9 %
10-40 SYRINGE (ML) INJECTION AS NEEDED
Status: DISCONTINUED | OUTPATIENT
Start: 2021-04-05 | End: 2021-04-07 | Stop reason: HOSPADM

## 2021-04-05 RX ORDER — HEPARIN 100 UNIT/ML
500 SYRINGE INTRAVENOUS ONCE
Status: COMPLETED | OUTPATIENT
Start: 2021-04-05 | End: 2021-04-05

## 2021-04-05 RX ADMIN — Medication 10 ML: at 10:19

## 2021-04-05 RX ADMIN — Medication 500 UNITS: at 10:21

## 2021-04-05 RX ADMIN — DAPTOMYCIN 350 MG: 500 INJECTION, POWDER, LYOPHILIZED, FOR SOLUTION INTRAVENOUS at 10:16

## 2021-04-05 NOTE — PROGRESS NOTES
[facility] Progress Note    Date: 2021    Name: Eligha Angelucci    MRN: [id number]         : 1945     IV Antibiotics/Labs/Picc-care. Ms. Oriana Pearson was assessed and education was provided. Ms. Mendoza Forward vitals were reviewed. Visit Vitals  BP (!) 145/65 (BP 1 Location: Left upper arm, BP Patient Position: Sitting)   Pulse 81   Temp 98.9 °F (37.2 °C)   Resp 18   SpO2 99%     Picc-line dressing clean, dry, and intact and due to be changed today. Both lumens flushes easily and noted with brisk blood return. Labs CBC, CMP, CPK, Sed Rate were obtained and sent to lab for processing. No results found for this or any previous visit (from the past 12 hour(s)). Picc-line dressing changed under sterile technique per hospital policy. Patient tolerated procedure well.      []  Vancomycin     []  Invanz     [x]  Cubicin 350 mg IV push over 2 minutes     []  Rocephin    was infused per orders without complications. Both lumens flushed and heparinized. Curos caps changed and lumens wrapped with gauze. stockinette applied to site. Ms. Oriana Pearson tolerated infusion, and had no complaints at this time. Patient armband removed and shredded. Ms. Oriana Pearson was discharged from [place] [address] in stable condition at 1030. She is to return on 21 at 1000 for her next appointment.     Blanca Ross  2021  11:11 AM

## 2021-04-06 ENCOUNTER — HOSPITAL ENCOUNTER (OUTPATIENT)
Dept: INFUSION THERAPY | Age: 76
Discharge: HOME | End: 2021-04-06
Payer: MEDICARE

## 2021-04-06 VITALS
RESPIRATION RATE: 16 BRPM | DIASTOLIC BLOOD PRESSURE: 49 MMHG | OXYGEN SATURATION: 98 % | SYSTOLIC BLOOD PRESSURE: 125 MMHG | HEART RATE: 83 BPM | TEMPERATURE: 98 F

## 2021-04-06 PROCEDURE — 74011250636 HC RX REV CODE- 250/636

## 2021-04-06 PROCEDURE — 74011250636 HC RX REV CODE- 250/636: Performed by: PODIATRIST

## 2021-04-06 PROCEDURE — 96374 THER/PROPH/DIAG INJ IV PUSH: CPT

## 2021-04-06 PROCEDURE — 74011000250 HC RX REV CODE- 250: Performed by: PODIATRIST

## 2021-04-06 RX ORDER — HEPARIN 100 UNIT/ML
500 SYRINGE INTRAVENOUS AS NEEDED
Status: DISCONTINUED | OUTPATIENT
Start: 2021-04-06 | End: 2021-04-08 | Stop reason: HOSPADM

## 2021-04-06 RX ORDER — SODIUM CHLORIDE 0.9 % (FLUSH) 0.9 %
5-10 SYRINGE (ML) INJECTION AS NEEDED
Status: DISCONTINUED | OUTPATIENT
Start: 2021-04-06 | End: 2021-04-08 | Stop reason: HOSPADM

## 2021-04-06 RX ORDER — HEPARIN 100 UNIT/ML
SYRINGE INTRAVENOUS
Status: COMPLETED
Start: 2021-04-06 | End: 2021-04-06

## 2021-04-06 RX ADMIN — Medication 10 ML: at 10:25

## 2021-04-06 RX ADMIN — HEPARIN 500 UNITS: 100 SYRINGE at 10:27

## 2021-04-06 RX ADMIN — DAPTOMYCIN 350 MG: 500 INJECTION, POWDER, LYOPHILIZED, FOR SOLUTION INTRAVENOUS at 10:25

## 2021-04-06 NOTE — PROGRESS NOTES
[facility] Progress Note    Date: 2021    Name: Nella Barragan    MRN: [id number]         : 1945     IV Antibiotics/Labs/Picc-care. Ms. Faisal Murphy was assessed and education was provided. Ms. Garcia Serum vitals were reviewed. Visit Vitals  BP (!) 125/49 (BP 1 Location: Left upper arm, BP Patient Position: Sitting)   Pulse 83   Temp 98 °F (36.7 °C)   Resp 16   SpO2 98%     Picc-line dressing clean, dry, and intact and due to be changed today. Both lumens flushes easily and noted with brisk blood return. Picc-line dressing changed on 21. Not due to be changed today. Patient tolerated procedure well.      []  Vancomycin     []  Invanz     [x]  Cubicin 350 mg IV push over 2 minutes     []  Rocephin    was infused per orders without complications. Both lumens flushed and heparinized. Curos caps changed and lumens wrapped with gauze. stockinette applied to site. Ms. Faisal Murphy tolerated infusion, and had no complaints at this time. Patient armband removed and shredded. Ms. Faisal Murphy was discharged from [place] [address] in stable condition at 1030. She is to return on 21 for her next appointment.     Mary Fonseca RN  2021  11:11 AM

## 2021-04-07 ENCOUNTER — HOSPITAL ENCOUNTER (OUTPATIENT)
Dept: INFUSION THERAPY | Age: 76
Discharge: HOME | End: 2021-04-07
Payer: MEDICARE

## 2021-04-07 VITALS
OXYGEN SATURATION: 99 % | SYSTOLIC BLOOD PRESSURE: 145 MMHG | TEMPERATURE: 98.7 F | RESPIRATION RATE: 18 BRPM | DIASTOLIC BLOOD PRESSURE: 54 MMHG | HEART RATE: 84 BPM

## 2021-04-07 PROCEDURE — 74011250636 HC RX REV CODE- 250/636: Performed by: PODIATRIST

## 2021-04-07 PROCEDURE — 74011000250 HC RX REV CODE- 250: Performed by: PODIATRIST

## 2021-04-07 PROCEDURE — 96374 THER/PROPH/DIAG INJ IV PUSH: CPT

## 2021-04-07 RX ORDER — HEPARIN 100 UNIT/ML
500 SYRINGE INTRAVENOUS ONCE
Status: COMPLETED | OUTPATIENT
Start: 2021-04-07 | End: 2021-04-07

## 2021-04-07 RX ORDER — SODIUM CHLORIDE 0.9 % (FLUSH) 0.9 %
5-10 SYRINGE (ML) INJECTION AS NEEDED
Status: DISCONTINUED | OUTPATIENT
Start: 2021-04-07 | End: 2021-04-09 | Stop reason: HOSPADM

## 2021-04-07 RX ADMIN — DAPTOMYCIN 350 MG: 500 INJECTION, POWDER, LYOPHILIZED, FOR SOLUTION INTRAVENOUS at 11:27

## 2021-04-07 RX ADMIN — HEPARIN 500 UNITS: 100 SYRINGE at 11:30

## 2021-04-07 NOTE — PROGRESS NOTES
[facility] Progress Note    Date: 2021    Name: Kaitlynn Sparks    MRN: [id number]         : 1945     IV Antibiotics. Ms. Jose Carlos Brown was assessed and education was provided. Ms. Rosette Nicolas vitals were reviewed. Visit Vitals  BP (!) 145/54 (BP 1 Location: Left upper arm, BP Patient Position: Sitting)   Pulse 84   Temp 98.7 °F (37.1 °C)   Resp 18   SpO2 99%     Picc-line dressing clean, dry, and intact and not due to be changed today. Both lumens flushes easily and noted with brisk blood return.        []  Vancomycin     []  Invanz     [x]  Cubicin 350 mg IV push over 2 minutes     []  Rocephin    was infused per orders without complications. Both lumens flushed and heparinized. Curos caps changed and lumens wrapped with gauze. stockinette applied to site. Ms. Jose Carlos Brown tolerated infusion, and had no complaints at this time. Patient armband removed and shredded. Ms. Jose Carlos Brown was discharged from [place] [address] in stable condition at 1140. She is to return on 21 at 1100 for her next appointment.     Blanca Ross  2021  11:11 AM

## 2021-04-08 ENCOUNTER — HOSPITAL ENCOUNTER (OUTPATIENT)
Dept: INFUSION THERAPY | Age: 76
Discharge: HOME | End: 2021-04-08
Payer: MEDICARE

## 2021-04-08 VITALS
HEART RATE: 79 BPM | RESPIRATION RATE: 18 BRPM | DIASTOLIC BLOOD PRESSURE: 52 MMHG | OXYGEN SATURATION: 98 % | TEMPERATURE: 98.9 F | SYSTOLIC BLOOD PRESSURE: 141 MMHG

## 2021-04-08 PROCEDURE — 74011250636 HC RX REV CODE- 250/636: Performed by: INTERNAL MEDICINE

## 2021-04-08 PROCEDURE — 74011000250 HC RX REV CODE- 250: Performed by: PODIATRIST

## 2021-04-08 PROCEDURE — 96374 THER/PROPH/DIAG INJ IV PUSH: CPT

## 2021-04-08 PROCEDURE — 74011250636 HC RX REV CODE- 250/636: Performed by: PODIATRIST

## 2021-04-08 RX ORDER — HEPARIN 100 UNIT/ML
500 SYRINGE INTRAVENOUS ONCE
Status: COMPLETED | OUTPATIENT
Start: 2021-04-08 | End: 2021-04-08

## 2021-04-08 RX ORDER — SODIUM CHLORIDE 0.9 % (FLUSH) 0.9 %
10-40 SYRINGE (ML) INJECTION EVERY 8 HOURS
Status: DISCONTINUED | OUTPATIENT
Start: 2021-04-08 | End: 2021-04-10 | Stop reason: HOSPADM

## 2021-04-08 RX ADMIN — DAPTOMYCIN 350 MG: 500 INJECTION, POWDER, LYOPHILIZED, FOR SOLUTION INTRAVENOUS at 11:08

## 2021-04-08 RX ADMIN — Medication 500 UNITS: at 11:11

## 2021-04-08 RX ADMIN — Medication 20 ML: at 11:10

## 2021-04-08 NOTE — PROGRESS NOTES
[facility] Progress Note    Date: 2021    Name: Eligha Angelucci    MRN: [id number]         : 1945     IV Antibiotics. Ms. Oriana Pearson was assessed and education was provided. Ms. Mendoza Forward vitals were reviewed. Visit Vitals  BP (!) 141/52 (BP 1 Location: Left upper arm, BP Patient Position: Sitting)   Pulse 79   Temp 98.9 °F (37.2 °C)   Resp 18   SpO2 98%     Picc-line dressing clean, dry, and intact and not due to be changed today. Both lumens flushes easily and noted with brisk blood return.        []  Vancomycin     []  Invanz     [x]  Cubicin 350 mg IV push over 2 minutes     []  Rocephin    was infused per orders without complications. Both lumens flushed and heparinized. Curos caps changed and lumens wrapped with gauze. stockinette applied to site. Ms. Oriana Pearson tolerated infusion, and had no complaints at this time. Patient armband removed and shredded. Ms. Oriana Pearson was discharged from [place] [address] in stable condition at 1140. She is to return on 21 at 1000 for her next appointment.     Serena Fernandez RN  2021

## 2021-04-09 ENCOUNTER — HOSPITAL ENCOUNTER (OUTPATIENT)
Dept: INFUSION THERAPY | Age: 76
Discharge: HOME | End: 2021-04-09
Payer: MEDICARE

## 2021-04-09 VITALS
OXYGEN SATURATION: 98 % | SYSTOLIC BLOOD PRESSURE: 145 MMHG | TEMPERATURE: 98.5 F | HEART RATE: 80 BPM | DIASTOLIC BLOOD PRESSURE: 60 MMHG | RESPIRATION RATE: 18 BRPM

## 2021-04-09 PROCEDURE — 74011250636 HC RX REV CODE- 250/636: Performed by: PODIATRIST

## 2021-04-09 PROCEDURE — 74011000250 HC RX REV CODE- 250: Performed by: PODIATRIST

## 2021-04-09 PROCEDURE — 96374 THER/PROPH/DIAG INJ IV PUSH: CPT

## 2021-04-09 RX ORDER — HEPARIN 100 UNIT/ML
500 SYRINGE INTRAVENOUS ONCE
Status: COMPLETED | OUTPATIENT
Start: 2021-04-09 | End: 2021-04-09

## 2021-04-09 RX ORDER — SODIUM CHLORIDE 0.9 % (FLUSH) 0.9 %
5-10 SYRINGE (ML) INJECTION AS NEEDED
Status: DISCONTINUED | OUTPATIENT
Start: 2021-04-09 | End: 2021-04-11 | Stop reason: HOSPADM

## 2021-04-09 RX ADMIN — Medication 10 ML: at 10:10

## 2021-04-09 RX ADMIN — Medication 500 UNITS: at 10:10

## 2021-04-09 RX ADMIN — DAPTOMYCIN 350 MG: 500 INJECTION, POWDER, LYOPHILIZED, FOR SOLUTION INTRAVENOUS at 10:07

## 2021-04-12 ENCOUNTER — HOSPITAL ENCOUNTER (OUTPATIENT)
Dept: INFUSION THERAPY | Age: 76
Discharge: HOME | End: 2021-04-12
Payer: MEDICARE

## 2021-04-12 VITALS
SYSTOLIC BLOOD PRESSURE: 141 MMHG | TEMPERATURE: 98.6 F | OXYGEN SATURATION: 97 % | HEART RATE: 82 BPM | RESPIRATION RATE: 18 BRPM | DIASTOLIC BLOOD PRESSURE: 69 MMHG

## 2021-04-12 LAB
ANION GAP SERPL CALC-SCNC: 3 MMOL/L (ref 3–18)
BUN SERPL-MCNC: 13 MG/DL (ref 7–18)
BUN/CREAT SERPL: 27 (ref 12–20)
CALCIUM SERPL-MCNC: 9.1 MG/DL (ref 8.5–10.1)
CHLORIDE SERPL-SCNC: 102 MMOL/L (ref 100–111)
CK SERPL-CCNC: 66 U/L (ref 26–192)
CO2 SERPL-SCNC: 32 MMOL/L (ref 21–32)
CREAT SERPL-MCNC: 0.48 MG/DL (ref 0.6–1.3)
ERYTHROCYTE [SEDIMENTATION RATE] IN BLOOD: 36 MM/HR (ref 0–30)
GLUCOSE SERPL-MCNC: 73 MG/DL (ref 74–99)
POTASSIUM SERPL-SCNC: 4.8 MMOL/L (ref 3.5–5.5)
SODIUM SERPL-SCNC: 137 MMOL/L (ref 136–145)

## 2021-04-12 PROCEDURE — 86140 C-REACTIVE PROTEIN: CPT

## 2021-04-12 PROCEDURE — 86141 C-REACTIVE PROTEIN HS: CPT

## 2021-04-12 PROCEDURE — 36592 COLLECT BLOOD FROM PICC: CPT

## 2021-04-12 PROCEDURE — 77030020847 HC STATLOK BARD -A

## 2021-04-12 PROCEDURE — 80048 BASIC METABOLIC PNL TOTAL CA: CPT

## 2021-04-12 PROCEDURE — 74011250636 HC RX REV CODE- 250/636: Performed by: PODIATRIST

## 2021-04-12 PROCEDURE — 85652 RBC SED RATE AUTOMATED: CPT

## 2021-04-12 PROCEDURE — 82550 ASSAY OF CK (CPK): CPT

## 2021-04-12 PROCEDURE — 74011000250 HC RX REV CODE- 250: Performed by: PODIATRIST

## 2021-04-12 PROCEDURE — 96374 THER/PROPH/DIAG INJ IV PUSH: CPT

## 2021-04-12 PROCEDURE — 36415 COLL VENOUS BLD VENIPUNCTURE: CPT

## 2021-04-12 RX ORDER — SODIUM CHLORIDE 0.9 % (FLUSH) 0.9 %
10-40 SYRINGE (ML) INJECTION AS NEEDED
Status: DISCONTINUED | OUTPATIENT
Start: 2021-04-12 | End: 2021-04-14 | Stop reason: HOSPADM

## 2021-04-12 RX ORDER — HEPARIN 100 UNIT/ML
500 SYRINGE INTRAVENOUS ONCE
Status: COMPLETED | OUTPATIENT
Start: 2021-04-12 | End: 2021-04-12

## 2021-04-12 RX ADMIN — HEPARIN 500 UNITS: 100 SYRINGE at 10:14

## 2021-04-12 RX ADMIN — DAPTOMYCIN 350 MG: 500 INJECTION, POWDER, LYOPHILIZED, FOR SOLUTION INTRAVENOUS at 10:10

## 2021-04-12 RX ADMIN — Medication 20 ML: at 10:13

## 2021-04-12 NOTE — PROGRESS NOTES
[facility] Progress Note    Date: 2021    Name: Elyssa Pulido    MRN: [id number]         : 1945     IV Antibiotics/Labs/Picc-care. Ms. Brennon Plata was assessed and education was provided. Ms. Tawny Jeter vitals were reviewed. Visit Vitals  BP (!) 141/69 (BP 1 Location: Left upper arm, BP Patient Position: Sitting)   Pulse 82   Temp 98.6 °F (37 °C)   Resp 18   SpO2 97%     Picc-line dressing clean, dry, and intact and due to be changed today. Both lumens flushes easily and noted with brisk blood return. Labs CMP, CK, CRP, and Sed Rate were obtained and sent to lab for processing. No results found for this or any previous visit (from the past 12 hour(s)). Picc-line dressing changed under sterile technique per hospital policy. Patient tolerated procedure well.      []  Vancomycin     []  Invanz     [x]  Cubicin 350 mg IV push over 2 minutes     []  Rocephin    was infused per orders without complications. Both lumens flushed and heparinized. Curos caps changed and lumens wrapped with gauze. stockinette applied to site. Ms. Brennon Plata tolerated infusion, and had no complaints at this time. Patient armband removed and shredded. Ms. Brennon Plata was discharged from [place] [address] in stable condition at 1030. She is to return on 21 at 1000 for her next appointment.     Blanca Ross  2021  11:11 AM

## 2021-04-13 ENCOUNTER — HOSPITAL ENCOUNTER (OUTPATIENT)
Dept: INFUSION THERAPY | Age: 76
Discharge: HOME | End: 2021-04-13
Payer: MEDICARE

## 2021-04-13 VITALS
RESPIRATION RATE: 18 BRPM | DIASTOLIC BLOOD PRESSURE: 55 MMHG | HEART RATE: 85 BPM | OXYGEN SATURATION: 98 % | SYSTOLIC BLOOD PRESSURE: 134 MMHG | TEMPERATURE: 98.6 F

## 2021-04-13 LAB
CRP SERPL HS-MCNC: >9.5 MG/L
CRP SERPL-MCNC: 1.4 MG/DL (ref 0–0.3)

## 2021-04-13 PROCEDURE — 96374 THER/PROPH/DIAG INJ IV PUSH: CPT

## 2021-04-13 PROCEDURE — 74011250636 HC RX REV CODE- 250/636: Performed by: PODIATRIST

## 2021-04-13 PROCEDURE — 74011000250 HC RX REV CODE- 250: Performed by: PODIATRIST

## 2021-04-13 RX ORDER — HEPARIN 100 UNIT/ML
500 SYRINGE INTRAVENOUS AS NEEDED
Status: DISCONTINUED | OUTPATIENT
Start: 2021-04-13 | End: 2021-04-15 | Stop reason: HOSPADM

## 2021-04-13 RX ORDER — SODIUM CHLORIDE 0.9 % (FLUSH) 0.9 %
5-10 SYRINGE (ML) INJECTION AS NEEDED
Status: DISCONTINUED | OUTPATIENT
Start: 2021-04-13 | End: 2021-04-15 | Stop reason: HOSPADM

## 2021-04-13 RX ADMIN — DAPTOMYCIN 350 MG: 500 INJECTION, POWDER, LYOPHILIZED, FOR SOLUTION INTRAVENOUS at 10:12

## 2021-04-13 RX ADMIN — Medication 10 ML: at 10:16

## 2021-04-13 RX ADMIN — HEPARIN 500 UNITS: 100 SYRINGE at 10:18

## 2021-04-13 NOTE — PROGRESS NOTES
[facility] Progress Note    Date: 2021    Name: Cadence Barrow    MRN: [id number]         : 1945     IV Antibiotics/Labs/Picc-care. Ms. Grady Nelson was assessed and education was provided. Ms. Frannie Wu vitals were reviewed. Visit Vitals  BP (!) 134/55 (BP 1 Location: Left upper arm, BP Patient Position: Sitting)   Pulse 85   Temp 98.6 °F (37 °C)   Resp 18   SpO2 98%     Picc-line dressing clean, dry, and intact and not due to be changed today. Both lumens flushes easily and noted with brisk blood return.      []  Vancomycin     []  Invanz     [x]  Cubicin 350 mg IV push over 2 minutes     []  Rocephin    was infused per orders without complications. Both lumens flushed and heparinized. Curos caps changed and lumens wrapped with gauze. stockinette applied to site. Ms. Grady Nelson tolerated infusion, and had no complaints at this time. Patient armband removed and shredded. Ms. Grady Nelson was discharged from [place] [address] in stable condition at 1020. She is to return on 21 at 1000 for her next appointment.     Yaz Arndt RN  2021  11:11 AM

## 2021-04-14 ENCOUNTER — HOSPITAL ENCOUNTER (OUTPATIENT)
Dept: INFUSION THERAPY | Age: 76
Discharge: HOME | End: 2021-04-14
Payer: MEDICARE

## 2021-04-14 VITALS
RESPIRATION RATE: 18 BRPM | HEART RATE: 80 BPM | OXYGEN SATURATION: 96 % | DIASTOLIC BLOOD PRESSURE: 46 MMHG | SYSTOLIC BLOOD PRESSURE: 124 MMHG | TEMPERATURE: 98.5 F

## 2021-04-14 PROCEDURE — 74011000250 HC RX REV CODE- 250: Performed by: PODIATRIST

## 2021-04-14 PROCEDURE — 96374 THER/PROPH/DIAG INJ IV PUSH: CPT

## 2021-04-14 PROCEDURE — 74011250636 HC RX REV CODE- 250/636: Performed by: PODIATRIST

## 2021-04-14 RX ORDER — SODIUM CHLORIDE 0.9 % (FLUSH) 0.9 %
5-10 SYRINGE (ML) INJECTION AS NEEDED
Status: DISCONTINUED | OUTPATIENT
Start: 2021-04-14 | End: 2021-04-16 | Stop reason: HOSPADM

## 2021-04-14 RX ORDER — HEPARIN 100 UNIT/ML
500 SYRINGE INTRAVENOUS ONCE
Status: COMPLETED | OUTPATIENT
Start: 2021-04-14 | End: 2021-04-14

## 2021-04-14 RX ADMIN — DAPTOMYCIN 350 MG: 500 INJECTION, POWDER, LYOPHILIZED, FOR SOLUTION INTRAVENOUS at 10:17

## 2021-04-14 RX ADMIN — Medication 500 UNITS: at 10:20

## 2021-04-14 RX ADMIN — Medication 10 ML: at 10:20

## 2021-04-14 NOTE — PROGRESS NOTES
\[facility]\"" Progress Note    Date: 2021    Name: Deena Sapp    MRN: [id number]         : 1945     IV Antibiotics    Ms. Maykel Luu was assessed and education was provided. Ms. Vola Goodpasture vitals were reviewed. Visit Vitals  BP (!) 124/46 (BP 1 Location: Left upper arm, BP Patient Position: Sitting)   Pulse 80   Temp 98.5 °F (36.9 °C)   Resp 18   SpO2 96%             []  Vancomycin     []  Invanz     [x]  Cubicin 350 mg IV push     []  Rocephin    was infused per orders without complications. Ms. Maykel Luu tolerated infusion, and had no complaints at this time. Both lumens flushed and heparinized. Curos caps changed and lumens wrapped with gauze. stockinette applied to site. Patient armband removed and shredded. Ms. Maykel Luu was discharged from [place] [address] in stable condition at 1025. She is to return on 4/15/21 at 1100 for her next appointment.     Temo Mcguire RN  2021  12:20 PM

## 2021-04-15 ENCOUNTER — HOSPITAL ENCOUNTER (OUTPATIENT)
Dept: INFUSION THERAPY | Age: 76
Discharge: HOME | End: 2021-04-15
Payer: MEDICARE

## 2021-04-15 VITALS
TEMPERATURE: 98.4 F | DIASTOLIC BLOOD PRESSURE: 53 MMHG | SYSTOLIC BLOOD PRESSURE: 126 MMHG | OXYGEN SATURATION: 99 % | HEART RATE: 81 BPM | RESPIRATION RATE: 18 BRPM

## 2021-04-15 PROCEDURE — 74011000250 HC RX REV CODE- 250: Performed by: PODIATRIST

## 2021-04-15 PROCEDURE — 74011250636 HC RX REV CODE- 250/636: Performed by: PODIATRIST

## 2021-04-15 PROCEDURE — 96374 THER/PROPH/DIAG INJ IV PUSH: CPT

## 2021-04-15 RX ORDER — HEPARIN 100 UNIT/ML
500 SYRINGE INTRAVENOUS ONCE
Status: COMPLETED | OUTPATIENT
Start: 2021-04-15 | End: 2021-04-15

## 2021-04-15 RX ORDER — HEPARIN 100 UNIT/ML
SYRINGE INTRAVENOUS
Status: DISPENSED
Start: 2021-04-15 | End: 2021-04-15

## 2021-04-15 RX ORDER — SODIUM CHLORIDE 0.9 % (FLUSH) 0.9 %
5-10 SYRINGE (ML) INJECTION AS NEEDED
Status: DISCONTINUED | OUTPATIENT
Start: 2021-04-15 | End: 2021-04-17 | Stop reason: HOSPADM

## 2021-04-15 RX ADMIN — Medication 10 ML: at 11:25

## 2021-04-15 RX ADMIN — Medication 500 UNITS: at 11:25

## 2021-04-15 RX ADMIN — DAPTOMYCIN 350 MG: 500 INJECTION, POWDER, LYOPHILIZED, FOR SOLUTION INTRAVENOUS at 11:15

## 2021-04-15 NOTE — PROGRESS NOTES
[facility] Progress Note    Date: April 15, 2021    Name: Jose Angel Mendoza    MRN: [id number]         : 1945     IV Antibiotics    Ms. Cisco Lawler was assessed and education was provided. Ms. Lisa Deras vitals were reviewed. Visit Vitals  BP (!) 126/53 (BP 1 Location: Left upper arm, BP Patient Position: Sitting)   Pulse 81   Temp 98.4 °F (36.9 °C)   Resp 18   SpO2 99%             []  Vancomycin     []  Invanz     [x]  Cubicin 350 mg IV push     []  Rocephin    was infused per orders without complications. Ms. Cisco Lawler tolerated infusion, and had no complaints at this time. Patient armband removed and shredded. Ms. Cisco Lawler was discharged from [place] [address] in stable condition at 1125. She is to return on 21 at 1100 for her next appointment.     Laney Sanchez RN  April 15, 2021  12:08 PM

## 2021-04-16 ENCOUNTER — HOSPITAL ENCOUNTER (OUTPATIENT)
Dept: INFUSION THERAPY | Age: 76
Discharge: HOME | End: 2021-04-16
Payer: MEDICARE

## 2021-04-16 VITALS
RESPIRATION RATE: 18 BRPM | DIASTOLIC BLOOD PRESSURE: 58 MMHG | HEART RATE: 86 BPM | TEMPERATURE: 98.3 F | OXYGEN SATURATION: 98 % | SYSTOLIC BLOOD PRESSURE: 116 MMHG

## 2021-04-16 PROCEDURE — 74011000250 HC RX REV CODE- 250: Performed by: PODIATRIST

## 2021-04-16 PROCEDURE — 74011250636 HC RX REV CODE- 250/636: Performed by: PODIATRIST

## 2021-04-16 PROCEDURE — 96374 THER/PROPH/DIAG INJ IV PUSH: CPT

## 2021-04-16 RX ORDER — SODIUM CHLORIDE 0.9 % (FLUSH) 0.9 %
5-10 SYRINGE (ML) INJECTION AS NEEDED
Status: DISCONTINUED | OUTPATIENT
Start: 2021-04-16 | End: 2021-04-18 | Stop reason: HOSPADM

## 2021-04-16 RX ORDER — HEPARIN 100 UNIT/ML
500 SYRINGE INTRAVENOUS AS NEEDED
Status: DISCONTINUED | OUTPATIENT
Start: 2021-04-16 | End: 2021-04-18 | Stop reason: HOSPADM

## 2021-04-16 RX ADMIN — HEPARIN 500 UNITS: 100 SYRINGE at 11:18

## 2021-04-16 RX ADMIN — DAPTOMYCIN 350 MG: 500 INJECTION, POWDER, LYOPHILIZED, FOR SOLUTION INTRAVENOUS at 11:14

## 2021-04-16 RX ADMIN — Medication 10 ML: at 11:17

## 2021-04-16 NOTE — PROGRESS NOTES
[facility] Progress Note    Date: 2021    Name: Brigitte Hagen    MRN: [id number]         : 1945     IV Antibiotics    Ms. Alessandra Augustine was assessed and education was provided. Ms. Jomar Evangelista vitals were reviewed. Visit Vitals  BP (!) 116/58 (BP 1 Location: Left upper arm, BP Patient Position: Sitting)   Pulse 86   Temp 98.3 °F (36.8 °C)   Resp 18   SpO2 98%             []  Vancomycin     []  Invanz     [x]  Cubicin 350 mg IV push     []  Rocephin    was infused per orders    Ms. Ralph tolerated infusion, and had no complaints at this time. Patient armband removed and shredded. Ms. Alessandra Augustine was discharged from [place] [address] in stable condition at 1130. She is to return on 21 at 1100 for her next appointment.      Kika Alcantara RN  2021  3:47 PM

## 2021-04-19 ENCOUNTER — HOSPITAL ENCOUNTER (OUTPATIENT)
Dept: INFUSION THERAPY | Age: 76
Discharge: HOME | End: 2021-04-19
Payer: MEDICARE

## 2021-04-19 VITALS
RESPIRATION RATE: 16 BRPM | HEART RATE: 80 BPM | DIASTOLIC BLOOD PRESSURE: 55 MMHG | SYSTOLIC BLOOD PRESSURE: 132 MMHG | OXYGEN SATURATION: 98 % | TEMPERATURE: 98 F

## 2021-04-19 LAB — ERYTHROCYTE [SEDIMENTATION RATE] IN BLOOD: 30 MM/HR (ref 0–30)

## 2021-04-19 PROCEDURE — 74011000250 HC RX REV CODE- 250: Performed by: PODIATRIST

## 2021-04-19 PROCEDURE — 77030020847 HC STATLOK BARD -A

## 2021-04-19 PROCEDURE — 85652 RBC SED RATE AUTOMATED: CPT

## 2021-04-19 PROCEDURE — 36415 COLL VENOUS BLD VENIPUNCTURE: CPT

## 2021-04-19 PROCEDURE — 96374 THER/PROPH/DIAG INJ IV PUSH: CPT

## 2021-04-19 PROCEDURE — 74011250636 HC RX REV CODE- 250/636: Performed by: PODIATRIST

## 2021-04-19 RX ORDER — HEPARIN 100 UNIT/ML
500 SYRINGE INTRAVENOUS ONCE
Status: COMPLETED | OUTPATIENT
Start: 2021-04-19 | End: 2021-04-19

## 2021-04-19 RX ORDER — SODIUM CHLORIDE 0.9 % (FLUSH) 0.9 %
5-10 SYRINGE (ML) INJECTION AS NEEDED
Status: DISCONTINUED | OUTPATIENT
Start: 2021-04-19 | End: 2021-04-21 | Stop reason: HOSPADM

## 2021-04-19 RX ADMIN — Medication 10 ML: at 11:31

## 2021-04-19 RX ADMIN — HEPARIN 500 UNITS: 100 SYRINGE at 11:31

## 2021-04-19 RX ADMIN — DAPTOMYCIN 350 MG: 500 INJECTION, POWDER, LYOPHILIZED, FOR SOLUTION INTRAVENOUS at 11:01

## 2021-04-19 NOTE — PROGRESS NOTES
[facility] Progress Note    Date: 2021    Name: Kaitlynn Sparks    MRN: [id number]         : 1945     IV Antibiotics    Ms. Jose Carlos Brown was assessed and education was provided. Ms. Rosette Nicolas vitals were reviewed. Visit Vitals  BP (!) 132/55 (BP 1 Location: Left arm, BP Patient Position: Sitting)   Pulse 80   Temp 98 °F (36.7 °C)   Resp 16   SpO2 98%     PICC dressing changed per protocol without complication, new statlock, biopatch, tegaderm, and microclaves applied. Lines flushes easily with brisk blood return prior to administration. []  Vancomycin     []  Invanz     [x]  Cubicin 350 mg IV push     []  Rocephin    was infused per orders    Bilateral lumens flushed with NS and heparin per protocol, new curos caps applied, line secured with coban and new stockinette. Ms. Jose Carlos Brown tolerated infusion, and had no complaints at this time. Patient armband removed and shredded. Ms. Jose Carlos Brown was discharged from [place] [address] in stable condition at 1130. She is to return on 21 at 1100 for her next appointment.      Baron Avni RN  2021  3:47 PM

## 2021-04-20 ENCOUNTER — HOSPITAL ENCOUNTER (OUTPATIENT)
Dept: INFUSION THERAPY | Age: 76
Discharge: HOME | End: 2021-04-20
Payer: MEDICARE

## 2021-04-20 VITALS
DIASTOLIC BLOOD PRESSURE: 76 MMHG | RESPIRATION RATE: 16 BRPM | OXYGEN SATURATION: 98 % | TEMPERATURE: 98.2 F | HEART RATE: 84 BPM | SYSTOLIC BLOOD PRESSURE: 155 MMHG

## 2021-04-20 LAB
ALBUMIN SERPL-MCNC: 3.5 G/DL (ref 3.4–5)
ALBUMIN/GLOB SERPL: 0.9 {RATIO} (ref 0.8–1.7)
ALP SERPL-CCNC: 93 U/L (ref 45–117)
ALT SERPL-CCNC: 21 U/L (ref 13–56)
ANION GAP SERPL CALC-SCNC: 5 MMOL/L (ref 3–18)
AST SERPL-CCNC: 22 U/L (ref 10–38)
BASOPHILS # BLD: 0.1 K/UL (ref 0–0.1)
BASOPHILS NFR BLD: 1 % (ref 0–2)
BILIRUB SERPL-MCNC: 0.2 MG/DL (ref 0.2–1)
BUN SERPL-MCNC: 9 MG/DL (ref 7–18)
BUN/CREAT SERPL: 18 (ref 12–20)
CALCIUM SERPL-MCNC: 9.2 MG/DL (ref 8.5–10.1)
CHLORIDE SERPL-SCNC: 105 MMOL/L (ref 100–111)
CK SERPL-CCNC: 98 U/L (ref 26–192)
CO2 SERPL-SCNC: 29 MMOL/L (ref 21–32)
CREAT SERPL-MCNC: 0.51 MG/DL (ref 0.6–1.3)
CRP SERPL-MCNC: 1.1 MG/DL (ref 0–0.3)
DIFFERENTIAL METHOD BLD: ABNORMAL
EOSINOPHIL # BLD: 0.3 K/UL (ref 0–0.4)
EOSINOPHIL NFR BLD: 4 % (ref 0–5)
ERYTHROCYTE [DISTWIDTH] IN BLOOD BY AUTOMATED COUNT: 21.9 % (ref 11.6–14.5)
GLOBULIN SER CALC-MCNC: 3.7 G/DL (ref 2–4)
GLUCOSE SERPL-MCNC: 127 MG/DL (ref 74–99)
HCT VFR BLD AUTO: 33.1 % (ref 35–45)
HGB BLD-MCNC: 9.7 G/DL (ref 12–16)
LYMPHOCYTES # BLD: 1.6 K/UL (ref 0.9–3.6)
LYMPHOCYTES NFR BLD: 23 % (ref 21–52)
MCH RBC QN AUTO: 20.8 PG (ref 24–34)
MCHC RBC AUTO-ENTMCNC: 29.3 G/DL (ref 31–37)
MCV RBC AUTO: 70.9 FL (ref 74–97)
MONOCYTES # BLD: 0.3 K/UL (ref 0.05–1.2)
MONOCYTES NFR BLD: 5 % (ref 3–10)
NEUTS SEG # BLD: 4.6 K/UL (ref 1.8–8)
NEUTS SEG NFR BLD: 67 % (ref 40–73)
PLATELET # BLD AUTO: 317 K/UL (ref 135–420)
PLATELET COMMENTS,PCOM: ABNORMAL
POTASSIUM SERPL-SCNC: 4.1 MMOL/L (ref 3.5–5.5)
PROT SERPL-MCNC: 7.2 G/DL (ref 6.4–8.2)
RBC # BLD AUTO: 4.67 M/UL (ref 4.2–5.3)
RBC MORPH BLD: ABNORMAL
SODIUM SERPL-SCNC: 139 MMOL/L (ref 136–145)
WBC # BLD AUTO: 6.9 K/UL (ref 4.6–13.2)

## 2021-04-20 PROCEDURE — 80053 COMPREHEN METABOLIC PANEL: CPT

## 2021-04-20 PROCEDURE — 74011250636 HC RX REV CODE- 250/636: Performed by: PODIATRIST

## 2021-04-20 PROCEDURE — 96374 THER/PROPH/DIAG INJ IV PUSH: CPT

## 2021-04-20 PROCEDURE — 74011000250 HC RX REV CODE- 250: Performed by: PODIATRIST

## 2021-04-20 PROCEDURE — 85025 COMPLETE CBC W/AUTO DIFF WBC: CPT

## 2021-04-20 PROCEDURE — 82550 ASSAY OF CK (CPK): CPT

## 2021-04-20 PROCEDURE — 86140 C-REACTIVE PROTEIN: CPT

## 2021-04-20 RX ORDER — HEPARIN 100 UNIT/ML
SYRINGE INTRAVENOUS
Status: DISCONTINUED
Start: 2021-04-20 | End: 2021-04-21 | Stop reason: HOSPADM

## 2021-04-20 RX ORDER — HEPARIN 100 UNIT/ML
SYRINGE INTRAVENOUS
Status: DISPENSED
Start: 2021-04-20 | End: 2021-04-20

## 2021-04-20 RX ORDER — SODIUM CHLORIDE 0.9 % (FLUSH) 0.9 %
5-10 SYRINGE (ML) INJECTION AS NEEDED
Status: DISCONTINUED | OUTPATIENT
Start: 2021-04-20 | End: 2021-04-22 | Stop reason: HOSPADM

## 2021-04-20 RX ORDER — HEPARIN 100 UNIT/ML
500 SYRINGE INTRAVENOUS ONCE
Status: COMPLETED | OUTPATIENT
Start: 2021-04-20 | End: 2021-04-20

## 2021-04-20 RX ADMIN — DAPTOMYCIN 350 MG: 500 INJECTION, POWDER, LYOPHILIZED, FOR SOLUTION INTRAVENOUS at 10:55

## 2021-04-20 RX ADMIN — HEPARIN 500 UNITS: 100 SYRINGE at 11:11

## 2021-04-20 RX ADMIN — Medication 10 ML: at 11:12

## 2021-04-21 ENCOUNTER — HOSPITAL ENCOUNTER (OUTPATIENT)
Dept: INFUSION THERAPY | Age: 76
Discharge: HOME | End: 2021-04-21
Payer: MEDICARE

## 2021-04-21 VITALS
OXYGEN SATURATION: 99 % | SYSTOLIC BLOOD PRESSURE: 153 MMHG | TEMPERATURE: 97.7 F | RESPIRATION RATE: 16 BRPM | DIASTOLIC BLOOD PRESSURE: 71 MMHG

## 2021-04-21 PROCEDURE — 96374 THER/PROPH/DIAG INJ IV PUSH: CPT

## 2021-04-21 PROCEDURE — 74011250636 HC RX REV CODE- 250/636: Performed by: PODIATRIST

## 2021-04-21 PROCEDURE — 74011000250 HC RX REV CODE- 250: Performed by: PODIATRIST

## 2021-04-21 RX ORDER — HEPARIN 100 UNIT/ML
500 SYRINGE INTRAVENOUS ONCE
Status: COMPLETED | OUTPATIENT
Start: 2021-04-21 | End: 2021-04-21

## 2021-04-21 RX ORDER — SODIUM CHLORIDE 0.9 % (FLUSH) 0.9 %
5-10 SYRINGE (ML) INJECTION AS NEEDED
Status: DISCONTINUED | OUTPATIENT
Start: 2021-04-21 | End: 2021-04-23 | Stop reason: HOSPADM

## 2021-04-21 RX ADMIN — Medication 10 ML: at 08:10

## 2021-04-21 RX ADMIN — DAPTOMYCIN 350 MG: 500 INJECTION, POWDER, LYOPHILIZED, FOR SOLUTION INTRAVENOUS at 08:01

## 2021-04-21 RX ADMIN — HEPARIN 500 UNITS: 100 SYRINGE at 08:11

## 2021-04-21 NOTE — PROGRESS NOTES
[facility] Progress Note    Date: 2021    Name: Joo Harrison    MRN: [id number]         : 1945     IV Antibiotics    Ms. Jose Valdez was assessed and education was provided. Pt starts hyperbaric treatments on her wound today 2021. Ms. Melania Corral vitals were reviewed. Visit Vitals  BP (!) 153/71 (BP 1 Location: Left arm, BP Patient Position: Sitting)   Temp 97.7 °F (36.5 °C)   Resp 16   SpO2 99%     PICC dressing CDI, not due to be changed. Line flushes easily with brisk blood return prior to administration. []  Vancomycin     []  Invanz     [x]  Cubicin 350 mg IV push     []  Rocephin    was infused per orders    Bilateral lumens flushed with NS and heparin per protocol, new curos caps applied, line secured with coban and new stockinette. Ms. Jose Valdez tolerated infusion, and had no complaints at this time. Patient armband removed and shredded. Ms. Jose Valdez was discharged from [place] [address] in stable condition at 1130. She is to return on 21 at 0800 for her next appointment.      Carey Krishnamurthy RN  2021  3:47 PM

## 2021-04-22 ENCOUNTER — HOSPITAL ENCOUNTER (OUTPATIENT)
Dept: INFUSION THERAPY | Age: 76
Discharge: HOME | End: 2021-04-22
Payer: MEDICARE

## 2021-04-22 VITALS
SYSTOLIC BLOOD PRESSURE: 156 MMHG | DIASTOLIC BLOOD PRESSURE: 64 MMHG | TEMPERATURE: 97.9 F | RESPIRATION RATE: 16 BRPM | OXYGEN SATURATION: 96 % | HEART RATE: 80 BPM

## 2021-04-22 PROCEDURE — 74011250636 HC RX REV CODE- 250/636: Performed by: PODIATRIST

## 2021-04-22 PROCEDURE — 96374 THER/PROPH/DIAG INJ IV PUSH: CPT

## 2021-04-22 PROCEDURE — 74011000250 HC RX REV CODE- 250: Performed by: PODIATRIST

## 2021-04-22 RX ORDER — SODIUM CHLORIDE 0.9 % (FLUSH) 0.9 %
5-10 SYRINGE (ML) INJECTION AS NEEDED
Status: DISCONTINUED | OUTPATIENT
Start: 2021-04-22 | End: 2021-04-24 | Stop reason: HOSPADM

## 2021-04-22 RX ORDER — HEPARIN 100 UNIT/ML
SYRINGE INTRAVENOUS
Status: DISCONTINUED
Start: 2021-04-22 | End: 2021-04-22 | Stop reason: HOSPADM

## 2021-04-22 RX ORDER — HEPARIN 100 UNIT/ML
500 SYRINGE INTRAVENOUS ONCE
Status: COMPLETED | OUTPATIENT
Start: 2021-04-22 | End: 2021-04-22

## 2021-04-22 RX ORDER — HEPARIN 100 UNIT/ML
SYRINGE INTRAVENOUS
Status: DISCONTINUED
Start: 2021-04-22 | End: 2021-04-23 | Stop reason: HOSPADM

## 2021-04-22 RX ADMIN — Medication 10 ML: at 08:26

## 2021-04-22 RX ADMIN — DAPTOMYCIN 350 MG: 500 INJECTION, POWDER, LYOPHILIZED, FOR SOLUTION INTRAVENOUS at 08:23

## 2021-04-22 RX ADMIN — HEPARIN 500 UNITS: 100 SYRINGE at 08:26

## 2021-04-22 NOTE — PROGRESS NOTES
[facility] Progress Note    Date: 2021    Name: Jhon Sanders    MRN: [id number]         : 1945     IV Antibiotics    Ms. Alexa Buckley was assessed and education was provided. Pt denies any issues after previous infusion. Ms. Linda Quezada vitals were reviewed. Visit Vitals  BP (!) 156/64 (BP 1 Location: Left arm, BP Patient Position: Sitting)   Pulse 80   Temp 97.9 °F (36.6 °C)   Resp 16   SpO2 96%     PICC dressing CDI, not due to be changed. Line flushes easily bilaterally with brisk blood return prior to administration. []  Vancomycin     []  Invanz     [x]  Cubicin 350 mg IV push     []  Rocephin    was infused per orders    Bilateral lumens flushed with NS and heparin per protocol, new curos caps applied, line secured with coban and new stockinette. Ms. Alexa Buckley tolerated infusion, and had no complaints at this time. Patient armband removed and shredded. Ms. Alexa Buckley was discharged from [place] [address] in stable condition at 1130. She is to return on 21 at 0800 for her next appointment.      Katlyn Solares RN  2021  3:47 PM

## 2021-04-23 ENCOUNTER — HOSPITAL ENCOUNTER (OUTPATIENT)
Dept: INFUSION THERAPY | Age: 76
Discharge: HOME | End: 2021-04-23
Payer: MEDICARE

## 2021-04-23 VITALS
RESPIRATION RATE: 16 BRPM | SYSTOLIC BLOOD PRESSURE: 133 MMHG | OXYGEN SATURATION: 96 % | TEMPERATURE: 97.9 F | HEART RATE: 83 BPM | DIASTOLIC BLOOD PRESSURE: 57 MMHG

## 2021-04-23 PROCEDURE — 74011250636 HC RX REV CODE- 250/636

## 2021-04-23 PROCEDURE — 74011250636 HC RX REV CODE- 250/636: Performed by: PODIATRIST

## 2021-04-23 PROCEDURE — 96374 THER/PROPH/DIAG INJ IV PUSH: CPT

## 2021-04-23 PROCEDURE — 74011000250 HC RX REV CODE- 250: Performed by: PODIATRIST

## 2021-04-23 RX ORDER — HEPARIN 100 UNIT/ML
SYRINGE INTRAVENOUS
Status: COMPLETED
Start: 2021-04-23 | End: 2021-04-23

## 2021-04-23 RX ORDER — SODIUM CHLORIDE 0.9 % (FLUSH) 0.9 %
5-10 SYRINGE (ML) INJECTION AS NEEDED
Status: DISCONTINUED | OUTPATIENT
Start: 2021-04-23 | End: 2021-04-25 | Stop reason: HOSPADM

## 2021-04-23 RX ORDER — HEPARIN 100 UNIT/ML
500 SYRINGE INTRAVENOUS AS NEEDED
Status: DISCONTINUED | OUTPATIENT
Start: 2021-04-23 | End: 2021-04-25 | Stop reason: HOSPADM

## 2021-04-23 RX ADMIN — HEPARIN 500 UNITS: 100 SYRINGE at 08:53

## 2021-04-23 RX ADMIN — Medication 10 ML: at 08:52

## 2021-04-23 RX ADMIN — DAPTOMYCIN 350 MG: 500 INJECTION, POWDER, LYOPHILIZED, FOR SOLUTION INTRAVENOUS at 08:50

## 2021-04-23 NOTE — PROGRESS NOTES
[facility] Progress Note    Date: 2021    Name: Sandra Mazariegos    MRN: [id number]         : 1945     IV Antibiotics    Ms. Fara Espinoza was assessed and education was provided. Pt denies any issues after previous infusion. Ms. Sondra Matthews vitals were reviewed. Visit Vitals  BP (!) 133/57 (BP 1 Location: Left arm, BP Patient Position: Sitting)   Pulse 83   Temp 97.9 °F (36.6 °C)   Resp 16   SpO2 96%     PICC dressing CDI, not due to be changed. Line flushes easily bilaterally with brisk blood return prior to administration. []  Vancomycin     []  Invanz     [x]  Cubicin 350 mg IV push     []  Rocephin    was infused per orders    Bilateral lumens flushed with NS and heparin per protocol, new curos caps applied, line secured with coban and new stockinette. Ms. Fara Espinoza tolerated infusion, and had no complaints at this time. Patient armband removed and shredded. Ms. Fara Espinoza was discharged from [place] [address] in stable condition at 0900. She is to return on 21 at 0800 for her next appointment.      Aquiles Montiel RN  2021  3:47 PM

## 2021-04-26 ENCOUNTER — HOSPITAL ENCOUNTER (OUTPATIENT)
Dept: INFUSION THERAPY | Age: 76
Discharge: HOME | End: 2021-04-26
Payer: MEDICARE

## 2021-04-26 VITALS
OXYGEN SATURATION: 97 % | DIASTOLIC BLOOD PRESSURE: 72 MMHG | TEMPERATURE: 98.8 F | RESPIRATION RATE: 18 BRPM | SYSTOLIC BLOOD PRESSURE: 156 MMHG | HEART RATE: 87 BPM

## 2021-04-26 LAB
ANION GAP SERPL CALC-SCNC: 7 MMOL/L (ref 3–18)
BASO+EOS+MONOS # BLD AUTO: 0.9 K/UL (ref 0–2.3)
BASO+EOS+MONOS NFR BLD AUTO: 16 % (ref 0.1–17)
BUN SERPL-MCNC: 12 MG/DL (ref 7–18)
BUN/CREAT SERPL: 26 (ref 12–20)
CALCIUM SERPL-MCNC: 9.1 MG/DL (ref 8.5–10.1)
CHLORIDE SERPL-SCNC: 109 MMOL/L (ref 100–111)
CK SERPL-CCNC: 106 U/L (ref 26–192)
CO2 SERPL-SCNC: 28 MMOL/L (ref 21–32)
CREAT SERPL-MCNC: 0.47 MG/DL (ref 0.6–1.3)
CRP SERPL HS-MCNC: 4 MG/L
DIFFERENTIAL METHOD BLD: ABNORMAL
ERYTHROCYTE [DISTWIDTH] IN BLOOD BY AUTOMATED COUNT: 22 % (ref 11.5–14.5)
ERYTHROCYTE [SEDIMENTATION RATE] IN BLOOD: 23 MM/HR (ref 0–30)
GLUCOSE SERPL-MCNC: 93 MG/DL (ref 74–99)
HCT VFR BLD AUTO: 33.5 % (ref 36–48)
HGB BLD-MCNC: 9.9 G/DL (ref 12–16)
LYMPHOCYTES # BLD: 1.5 K/UL (ref 1.1–5.9)
LYMPHOCYTES NFR BLD: 25 % (ref 14–44)
MCH RBC QN AUTO: 21.4 PG (ref 25–35)
MCHC RBC AUTO-ENTMCNC: 29.6 G/DL (ref 31–37)
MCV RBC AUTO: 72.5 FL (ref 78–102)
NEUTS SEG # BLD: 3.4 K/UL (ref 1.8–9.5)
NEUTS SEG NFR BLD: 59 % (ref 40–70)
PLATELET # BLD AUTO: 283 K/UL (ref 135–420)
POTASSIUM SERPL-SCNC: 4 MMOL/L (ref 3.5–5.5)
RBC # BLD AUTO: 4.62 M/UL (ref 4.1–5.1)
SODIUM SERPL-SCNC: 144 MMOL/L (ref 136–145)
WBC # BLD AUTO: 5.8 K/UL (ref 4.5–13)

## 2021-04-26 PROCEDURE — 77030020847 HC STATLOK BARD -A

## 2021-04-26 PROCEDURE — 96374 THER/PROPH/DIAG INJ IV PUSH: CPT

## 2021-04-26 PROCEDURE — 86141 C-REACTIVE PROTEIN HS: CPT

## 2021-04-26 PROCEDURE — 85049 AUTOMATED PLATELET COUNT: CPT

## 2021-04-26 PROCEDURE — 80048 BASIC METABOLIC PNL TOTAL CA: CPT

## 2021-04-26 PROCEDURE — 82550 ASSAY OF CK (CPK): CPT

## 2021-04-26 PROCEDURE — 74011250636 HC RX REV CODE- 250/636: Performed by: PODIATRIST

## 2021-04-26 PROCEDURE — 85652 RBC SED RATE AUTOMATED: CPT

## 2021-04-26 PROCEDURE — 85025 COMPLETE CBC W/AUTO DIFF WBC: CPT

## 2021-04-26 PROCEDURE — 74011000250 HC RX REV CODE- 250: Performed by: PODIATRIST

## 2021-04-26 RX ORDER — SODIUM CHLORIDE 0.9 % (FLUSH) 0.9 %
10 SYRINGE (ML) INJECTION EVERY 8 HOURS
Status: DISCONTINUED | OUTPATIENT
Start: 2021-04-26 | End: 2021-04-28 | Stop reason: HOSPADM

## 2021-04-26 RX ORDER — HEPARIN 100 UNIT/ML
500 SYRINGE INTRAVENOUS ONCE
Status: COMPLETED | OUTPATIENT
Start: 2021-04-26 | End: 2021-04-26

## 2021-04-26 RX ADMIN — DAPTOMYCIN 350 MG: 500 INJECTION, POWDER, LYOPHILIZED, FOR SOLUTION INTRAVENOUS at 08:32

## 2021-04-26 RX ADMIN — Medication 10 ML: at 08:20

## 2021-04-26 RX ADMIN — Medication 10 ML: at 08:23

## 2021-04-26 RX ADMIN — HEPARIN 500 UNITS: 100 SYRINGE at 08:33

## 2021-04-26 NOTE — PROGRESS NOTES
\[facility]\"" Progress Note    Date: 2021    Name: Rashid Kong    MRN: [id number]         : 1945     IV Antibiotics    Ms. Grisel Quinones was assessed and education was provided. Pt denies any reactions or complications,    Ms. Ralph's vitals were reviewed. Visit Vitals  BP (!) 156/72 (BP 1 Location: Left upper arm, BP Patient Position: Sitting)   Pulse 87   Temp 98.8 °F (37.1 °C)   Resp 18   SpO2 97%   Flushed bilateral lumens with 10 ml normal saline and verifies brisk blood return, collected weekly labs per protocol. Recent Results (from the past 12 hour(s))   CBC WITH 3 PART DIFF    Collection Time: 21  8:15 AM   Result Value Ref Range    WBC 5.8 4.5 - 13.0 K/uL    RBC 4.62 4.10 - 5.10 M/uL    HGB 9.9 (L) 12.0 - 16.0 g/dL    HCT 33.5 (L) 36 - 48 %    MCV 72.5 (L) 78 - 102 FL    MCH 21.4 (L) 25.0 - 35.0 PG    MCHC 29.6 (L) 31 - 37 g/dL    RDW 22.0 (H) 11.5 - 14.5 %    NEUTROPHILS 59 40 - 70 %    MIXED CELLS 16 0.1 - 17 %    LYMPHOCYTES 25 14 - 44 %    ABS. NEUTROPHILS 3.4 1.8 - 9.5 K/UL    ABS. MIXED CELLS 0.9 0.0 - 2.3 K/uL    ABS.  LYMPHOCYTES 1.5 1.1 - 5.9 K/UL    DF AUTOMATED     METABOLIC PANEL, BASIC    Collection Time: 21  8:15 AM   Result Value Ref Range    Sodium 144 136 - 145 mmol/L    Potassium 4.0 3.5 - 5.5 mmol/L    Chloride 109 100 - 111 mmol/L    CO2 28 21 - 32 mmol/L    Anion gap 7 3.0 - 18 mmol/L    Glucose 93 74 - 99 mg/dL    BUN 12 7.0 - 18 MG/DL    Creatinine 0.47 (L) 0.6 - 1.3 MG/DL    BUN/Creatinine ratio 26 (H) 12 - 20      GFR est AA >60 >60 ml/min/1.73m2    GFR est non-AA >60 >60 ml/min/1.73m2    Calcium 9.1 8.5 - 10.1 MG/DL   CK    Collection Time: 21  8:15 AM   Result Value Ref Range     26 - 192 U/L   SED RATE (ESR)    Collection Time: 21  8:15 AM   Result Value Ref Range    Sed rate, automated 23 0 - 30 mm/hr   PLATELET COUNT    Collection Time: 21  9:45 AM   Result Value Ref Range    PLATELET 049 605 - 754 K/uL         PICC dressing changed per protocol without complication, new statlock, biopatch, tegaderm, and microclaves applied. Lines flushes easily with brisk blood return prior to administration. []  Vancomycin     []  Invanz     [x]  Cubicin 350 mg IV push     []  Rocephin    was infused per orders    Bilateral lumens flushed with NS and heparin per protocol, new curos caps applied, line secured with coban and new stockinette. Ms. Jose Valdez tolerated infusion, and had no complaints at this time. Patient armband removed and shredded. Ms. Jose Valdez was discharged from [place] [address] in stable condition at 0830.  She is to return on 4/27/21 at 0800 for her next appointment for JACKSON Jeffries  April 26, 2021  3:47 PM

## 2021-04-27 ENCOUNTER — HOSPITAL ENCOUNTER (OUTPATIENT)
Dept: INFUSION THERAPY | Age: 76
Discharge: HOME | End: 2021-04-27
Payer: MEDICARE

## 2021-04-27 VITALS
HEART RATE: 77 BPM | OXYGEN SATURATION: 97 % | TEMPERATURE: 98.8 F | SYSTOLIC BLOOD PRESSURE: 145 MMHG | DIASTOLIC BLOOD PRESSURE: 67 MMHG | RESPIRATION RATE: 18 BRPM

## 2021-04-27 PROCEDURE — 96374 THER/PROPH/DIAG INJ IV PUSH: CPT

## 2021-04-27 PROCEDURE — 74011000250 HC RX REV CODE- 250: Performed by: PODIATRIST

## 2021-04-27 PROCEDURE — 74011250636 HC RX REV CODE- 250/636: Performed by: PODIATRIST

## 2021-04-27 RX ORDER — HEPARIN 100 UNIT/ML
SYRINGE INTRAVENOUS
Status: DISCONTINUED
Start: 2021-04-27 | End: 2021-04-27 | Stop reason: HOSPADM

## 2021-04-27 RX ORDER — HEPARIN 100 UNIT/ML
500 SYRINGE INTRAVENOUS AS NEEDED
Status: DISCONTINUED | OUTPATIENT
Start: 2021-04-27 | End: 2021-04-29 | Stop reason: HOSPADM

## 2021-04-27 RX ORDER — SODIUM CHLORIDE 0.9 % (FLUSH) 0.9 %
5-10 SYRINGE (ML) INJECTION AS NEEDED
Status: DISCONTINUED | OUTPATIENT
Start: 2021-04-27 | End: 2021-04-29 | Stop reason: HOSPADM

## 2021-04-27 RX ADMIN — Medication 10 ML: at 08:47

## 2021-04-27 RX ADMIN — HEPARIN 500 UNITS: 100 SYRINGE at 08:53

## 2021-04-27 RX ADMIN — DAPTOMYCIN 350 MG: 500 INJECTION, POWDER, LYOPHILIZED, FOR SOLUTION INTRAVENOUS at 08:43

## 2021-04-27 NOTE — PROGRESS NOTES
[facility] Progress Note    Date: 2021    Name: Dory Brochure    MRN: [id number]         : 1945     IV Antibiotics    Ms. Kristy Kayser was assessed and education was provided. Pt denies any issues after previous infusion. Ms. Pedro Samuel vitals were reviewed. Visit Vitals  BP (!) 145/67 (BP 1 Location: Left upper arm, BP Patient Position: Sitting)   Pulse 77   Temp 98.8 °F (37.1 °C)   Resp 18   SpO2 97%     PICC dressing CDI, not due to be changed. Line flushes easily bilaterally with brisk blood return prior to administration. []  Vancomycin     []  Invanz     [x]  Cubicin 350 mg IV push     []  Rocephin    was infused per orders    Bilateral lumens flushed with NS and heparin per protocol, new curos caps applied, line secured with coban and new stockinette. Ms. Kristy Kayser tolerated infusion, and had no complaints at this time. Patient armband removed and shredded. Ms. Kristy Kayser was discharged from [place] [address] in stable condition at 0900. She is to return on 21 at 0800 for her next appointment.      Francisco Zendejas RN  2021  3:47 PM

## 2021-04-28 ENCOUNTER — HOSPITAL ENCOUNTER (OUTPATIENT)
Dept: INFUSION THERAPY | Age: 76
Discharge: HOME | End: 2021-04-28
Payer: MEDICARE

## 2021-04-28 VITALS
SYSTOLIC BLOOD PRESSURE: 114 MMHG | OXYGEN SATURATION: 98 % | TEMPERATURE: 98.7 F | DIASTOLIC BLOOD PRESSURE: 64 MMHG | HEART RATE: 86 BPM | RESPIRATION RATE: 18 BRPM

## 2021-04-28 PROCEDURE — 74011250636 HC RX REV CODE- 250/636

## 2021-04-28 PROCEDURE — 96374 THER/PROPH/DIAG INJ IV PUSH: CPT

## 2021-04-28 PROCEDURE — 74011250636 HC RX REV CODE- 250/636: Performed by: PODIATRIST

## 2021-04-28 PROCEDURE — 74011000250 HC RX REV CODE- 250

## 2021-04-28 RX ORDER — SODIUM CHLORIDE 0.9 % (FLUSH) 0.9 %
5-10 SYRINGE (ML) INJECTION AS NEEDED
Status: DISCONTINUED | OUTPATIENT
Start: 2021-04-28 | End: 2021-04-30 | Stop reason: HOSPADM

## 2021-04-28 RX ORDER — HEPARIN 100 UNIT/ML
500 SYRINGE INTRAVENOUS AS NEEDED
Status: DISCONTINUED | OUTPATIENT
Start: 2021-04-28 | End: 2021-04-30 | Stop reason: HOSPADM

## 2021-04-28 RX ORDER — HEPARIN 100 UNIT/ML
SYRINGE INTRAVENOUS
Status: DISPENSED
Start: 2021-04-28 | End: 2021-04-28

## 2021-04-28 RX ADMIN — DAPTOMYCIN 350 MG: 500 INJECTION, POWDER, LYOPHILIZED, FOR SOLUTION INTRAVENOUS at 08:48

## 2021-04-28 RX ADMIN — Medication 10 ML: at 08:53

## 2021-04-28 RX ADMIN — HEPARIN 500 UNITS: 100 SYRINGE at 08:55

## 2021-04-29 ENCOUNTER — HOSPITAL ENCOUNTER (OUTPATIENT)
Dept: INFUSION THERAPY | Age: 76
Discharge: HOME | End: 2021-04-29
Payer: MEDICARE

## 2021-04-29 VITALS
OXYGEN SATURATION: 99 % | SYSTOLIC BLOOD PRESSURE: 150 MMHG | DIASTOLIC BLOOD PRESSURE: 68 MMHG | TEMPERATURE: 98.8 F | RESPIRATION RATE: 16 BRPM

## 2021-04-29 PROCEDURE — 74011250636 HC RX REV CODE- 250/636: Performed by: PODIATRIST

## 2021-04-29 PROCEDURE — 74011000250 HC RX REV CODE- 250: Performed by: PODIATRIST

## 2021-04-29 PROCEDURE — 96374 THER/PROPH/DIAG INJ IV PUSH: CPT

## 2021-04-29 RX ORDER — SODIUM CHLORIDE 0.9 % (FLUSH) 0.9 %
5-10 SYRINGE (ML) INJECTION AS NEEDED
Status: DISCONTINUED | OUTPATIENT
Start: 2021-04-29 | End: 2021-05-01 | Stop reason: HOSPADM

## 2021-04-29 RX ORDER — HEPARIN 100 UNIT/ML
500 SYRINGE INTRAVENOUS ONCE
Status: COMPLETED | OUTPATIENT
Start: 2021-04-29 | End: 2021-04-29

## 2021-04-29 RX ADMIN — HEPARIN 500 UNITS: 100 SYRINGE at 09:10

## 2021-04-29 RX ADMIN — Medication 10 ML: at 09:10

## 2021-04-29 RX ADMIN — DAPTOMYCIN 350 MG: 500 INJECTION, POWDER, LYOPHILIZED, FOR SOLUTION INTRAVENOUS at 09:00

## 2021-04-29 NOTE — PROGRESS NOTES
[facility] Progress Note    Date: 2021    Name: Lucrecia Goodson    MRN: [id number]         : 1945     IV Antibiotics    Ms. Maria Del Carmen Dwyer was assessed and education was provided. Pt denies any issues after previous infusion. Ms. Emely Forbes vitals were reviewed. Visit Vitals  BP (!) 150/68 (BP 1 Location: Left arm, BP Patient Position: Sitting)   Temp 98.8 °F (37.1 °C)   Resp 16   SpO2 99%     PICC dressing CDI, not due to be changed. Line flushes easily bilaterally with brisk blood return prior to administration. []  Vancomycin     []  Invanz     [x]  Cubicin 350 mg IV push     []  Rocephin    was infused per orders    Bilateral lumens flushed with NS and heparin per protocol, new curos caps applied, line secured with coban and new stockinette. Ms. Maria Del Carmen Dwyer tolerated infusion, and had no complaints at this time. Patient armband removed and shredded. Ms. Maria Del Carmen Dwyer was discharged from [place] [address] in stable condition at 0900. She is to return on 21 for her next appointment.      Michael Huggins RN  2021  3:47 PM

## 2021-04-30 ENCOUNTER — HOSPITAL ENCOUNTER (OUTPATIENT)
Dept: INFUSION THERAPY | Age: 76
Discharge: HOME | End: 2021-04-30
Payer: MEDICARE

## 2021-04-30 VITALS
DIASTOLIC BLOOD PRESSURE: 72 MMHG | TEMPERATURE: 98.6 F | HEART RATE: 86 BPM | RESPIRATION RATE: 16 BRPM | SYSTOLIC BLOOD PRESSURE: 157 MMHG | OXYGEN SATURATION: 99 %

## 2021-04-30 PROCEDURE — 74011000250 HC RX REV CODE- 250: Performed by: PODIATRIST

## 2021-04-30 PROCEDURE — 74011250636 HC RX REV CODE- 250/636: Performed by: PODIATRIST

## 2021-04-30 PROCEDURE — 96374 THER/PROPH/DIAG INJ IV PUSH: CPT

## 2021-04-30 RX ORDER — HEPARIN 100 UNIT/ML
500 SYRINGE INTRAVENOUS ONCE
Status: COMPLETED | OUTPATIENT
Start: 2021-04-30 | End: 2021-04-30

## 2021-04-30 RX ORDER — SODIUM CHLORIDE 0.9 % (FLUSH) 0.9 %
5-10 SYRINGE (ML) INJECTION AS NEEDED
Status: DISCONTINUED | OUTPATIENT
Start: 2021-04-30 | End: 2021-05-02 | Stop reason: HOSPADM

## 2021-04-30 RX ADMIN — DAPTOMYCIN 350 MG: 500 INJECTION, POWDER, LYOPHILIZED, FOR SOLUTION INTRAVENOUS at 08:20

## 2021-04-30 RX ADMIN — Medication 500 UNITS: at 08:25

## 2021-04-30 RX ADMIN — Medication 10 ML: at 08:25

## 2021-04-30 NOTE — PROGRESS NOTES
[facility] Progress Note    Date: 2021    Name: David Whitehead    MRN: [id number]         : 1945     IV Antibiotics    Ms. Madison Parsons was assessed and education was provided. Ms. Beena Tafoya vitals were reviewed. Visit Vitals  BP (!) 157/72 (BP 1 Location: Left arm, BP Patient Position: Sitting)   Pulse 86   Temp 98.6 °F (37 °C)   Resp 16   SpO2 99%           []  Vancomycin     []  Invanz     [x]  Cubicin 350 mg IV push     []  Rocephin    was infused per orders without complications. Ms. Madison Parsons tolerated infusion, and had no complaints at this time. Patient armband removed and shredded. Ms. Madison Parsons was discharged from [place] [address] in stable condition at 0830. She is to return on 5/3/21 at 0800 for her next appointment.     Alma Lopez RN  2021  8:26 AM

## 2021-05-03 RX ORDER — HEPARIN 100 UNIT/ML
SYRINGE INTRAVENOUS
Status: DISPENSED
Start: 2021-05-03 | End: 2021-05-03

## 2021-05-04 ENCOUNTER — HOSPITAL ENCOUNTER (OUTPATIENT)
Dept: INFUSION THERAPY | Age: 76
Discharge: HOME | End: 2021-05-04
Payer: MEDICARE

## 2021-05-04 VITALS
RESPIRATION RATE: 16 BRPM | DIASTOLIC BLOOD PRESSURE: 65 MMHG | HEART RATE: 82 BPM | TEMPERATURE: 98.1 F | SYSTOLIC BLOOD PRESSURE: 144 MMHG | OXYGEN SATURATION: 97 %

## 2021-05-04 PROCEDURE — 96374 THER/PROPH/DIAG INJ IV PUSH: CPT

## 2021-05-04 PROCEDURE — 74011000250 HC RX REV CODE- 250: Performed by: PODIATRIST

## 2021-05-04 PROCEDURE — 74011250636 HC RX REV CODE- 250/636: Performed by: PODIATRIST

## 2021-05-04 RX ORDER — HEPARIN 100 UNIT/ML
500 SYRINGE INTRAVENOUS AS NEEDED
Status: DISCONTINUED | OUTPATIENT
Start: 2021-05-04 | End: 2021-05-06 | Stop reason: HOSPADM

## 2021-05-04 RX ORDER — SODIUM CHLORIDE 0.9 % (FLUSH) 0.9 %
5-10 SYRINGE (ML) INJECTION AS NEEDED
Status: DISCONTINUED | OUTPATIENT
Start: 2021-05-04 | End: 2021-05-06 | Stop reason: HOSPADM

## 2021-05-04 RX ADMIN — HEPARIN 500 UNITS: 100 SYRINGE at 09:00

## 2021-05-04 RX ADMIN — DAPTOMYCIN 350 MG: 500 INJECTION, POWDER, LYOPHILIZED, FOR SOLUTION INTRAVENOUS at 08:55

## 2021-05-04 RX ADMIN — Medication 10 ML: at 08:59

## 2021-05-04 NOTE — PROGRESS NOTES
[facility] Progress Note    Date: May 4, 2021    Name: Carlos Cameron    MRN: [id number]         : 1945     IV Antibiotics    Ms. Shannan Wright was assessed and education was provided. Ms. Noé Rasmussen vitals were reviewed. Visit Vitals  BP (!) 144/65 (BP 1 Location: Left arm, BP Patient Position: Sitting)   Pulse 82   Temp 98.1 °F (36.7 °C)   Resp 16   SpO2 97%           []  Vancomycin     []  Invanz     [x]  Cubicin 350 mg IV push     []  Rocephin    was infused per orders without complications. Ms. Shannan Wright tolerated infusion, and had no complaints at this time. Patient armband removed and shredded. Ms. Shannan Wright was discharged from [place] [address] in stable condition at 0900. She is to return on 21 at 0800 for her next appointment.     Glenda Poon RN  May 4, 2021  8:26 AM

## 2021-05-05 ENCOUNTER — HOSPITAL ENCOUNTER (OUTPATIENT)
Dept: INFUSION THERAPY | Age: 76
Discharge: HOME | End: 2021-05-05
Payer: MEDICARE

## 2021-05-05 VITALS
DIASTOLIC BLOOD PRESSURE: 53 MMHG | SYSTOLIC BLOOD PRESSURE: 116 MMHG | TEMPERATURE: 98.6 F | OXYGEN SATURATION: 98 % | HEART RATE: 88 BPM | RESPIRATION RATE: 16 BRPM

## 2021-05-05 PROCEDURE — 74011250636 HC RX REV CODE- 250/636: Performed by: PODIATRIST

## 2021-05-05 PROCEDURE — 74011000250 HC RX REV CODE- 250: Performed by: PODIATRIST

## 2021-05-05 PROCEDURE — 96374 THER/PROPH/DIAG INJ IV PUSH: CPT

## 2021-05-05 PROCEDURE — 74011250636 HC RX REV CODE- 250/636

## 2021-05-05 RX ORDER — HEPARIN 100 UNIT/ML
SYRINGE INTRAVENOUS
Status: COMPLETED
Start: 2021-05-05 | End: 2021-05-05

## 2021-05-05 RX ORDER — HEPARIN 100 UNIT/ML
500 SYRINGE INTRAVENOUS AS NEEDED
Status: DISCONTINUED | OUTPATIENT
Start: 2021-05-05 | End: 2021-05-07 | Stop reason: HOSPADM

## 2021-05-05 RX ORDER — SODIUM CHLORIDE 0.9 % (FLUSH) 0.9 %
5-10 SYRINGE (ML) INJECTION AS NEEDED
Status: DISCONTINUED | OUTPATIENT
Start: 2021-05-05 | End: 2021-05-07 | Stop reason: HOSPADM

## 2021-05-05 RX ADMIN — DAPTOMYCIN 350 MG: 500 INJECTION, POWDER, LYOPHILIZED, FOR SOLUTION INTRAVENOUS at 09:10

## 2021-05-05 RX ADMIN — Medication 10 ML: at 09:14

## 2021-05-05 RX ADMIN — HEPARIN 500 UNITS: 100 SYRINGE at 09:16

## 2021-05-05 NOTE — PROGRESS NOTES
[facility] Progress Note    Date: May 5, 2021    Name: Ivonne Ch    MRN: [id number]         : 1945     IV Antibiotics    Ms. Jannet Elias was assessed and education was provided. Ms. Chadd Madison vitals were reviewed. Visit Vitals  BP (!) 116/53 (BP 1 Location: Left arm, BP Patient Position: Sitting)   Pulse 88   Temp 98.6 °F (37 °C)   Resp 16   SpO2 98%           []  Vancomycin     []  Invanz     [x]  Cubicin 350 mg IV push     []  Rocephin    was infused per orders without complications. Ms. Jannet Elias tolerated infusion, and had no complaints at this time. Patient armband removed and shredded. Ms. Jannet Elias was discharged from [place] [address] in stable condition at [phone]. She is to return on 21 for her next appointment.     Susy Cabral RN  May 5, 2021  8:26 AM

## 2021-05-06 ENCOUNTER — HOSPITAL ENCOUNTER (OUTPATIENT)
Dept: INFUSION THERAPY | Age: 76
Discharge: HOME | End: 2021-05-06
Payer: MEDICARE

## 2021-05-06 VITALS
HEART RATE: 91 BPM | SYSTOLIC BLOOD PRESSURE: 139 MMHG | RESPIRATION RATE: 16 BRPM | OXYGEN SATURATION: 99 % | DIASTOLIC BLOOD PRESSURE: 63 MMHG | TEMPERATURE: 98.2 F

## 2021-05-06 PROCEDURE — 74011250636 HC RX REV CODE- 250/636: Performed by: PODIATRIST

## 2021-05-06 PROCEDURE — 74011250636 HC RX REV CODE- 250/636

## 2021-05-06 PROCEDURE — 74011000250 HC RX REV CODE- 250: Performed by: PODIATRIST

## 2021-05-06 PROCEDURE — 96374 THER/PROPH/DIAG INJ IV PUSH: CPT

## 2021-05-06 RX ORDER — HEPARIN 100 UNIT/ML
SYRINGE INTRAVENOUS
Status: COMPLETED
Start: 2021-05-06 | End: 2021-05-06

## 2021-05-06 RX ORDER — SODIUM CHLORIDE 0.9 % (FLUSH) 0.9 %
5-10 SYRINGE (ML) INJECTION AS NEEDED
Status: DISCONTINUED | OUTPATIENT
Start: 2021-05-06 | End: 2021-05-08 | Stop reason: HOSPADM

## 2021-05-06 RX ORDER — HEPARIN 100 UNIT/ML
500 SYRINGE INTRAVENOUS AS NEEDED
Status: DISCONTINUED | OUTPATIENT
Start: 2021-05-06 | End: 2021-05-08 | Stop reason: HOSPADM

## 2021-05-06 RX ADMIN — Medication 10 ML: at 08:58

## 2021-05-06 RX ADMIN — HEPARIN 500 UNITS: 100 SYRINGE at 08:59

## 2021-05-06 RX ADMIN — DAPTOMYCIN 350 MG: 500 INJECTION, POWDER, LYOPHILIZED, FOR SOLUTION INTRAVENOUS at 08:53

## 2021-05-06 NOTE — PROGRESS NOTES
[facility] Progress Note    Date: May 6, 2021    Name: Brigitte Hagen    MRN: [id number]         : 1945     IV Antibiotics    Ms. Alessandra Augustine was assessed and education was provided. Ms. Jomar Evangelista vitals were reviewed. Visit Vitals  /63 (BP 1 Location: Left arm, BP Patient Position: Sitting)   Pulse 91   Temp 98.2 °F (36.8 °C)   Resp 16   SpO2 99%           []  Vancomycin     []  Invanz     [x]  Cubicin 350 mg IV push     []  Rocephin    was infused per orders without complications. Ms. Alessandra Augustine tolerated infusion, and had no complaints at this time. Patient armband removed and shredded. Ms. Alessandra Augustine was discharged from [place] [address] in stable condition at 0900. She is to return on 21 for her next appointment.     Malini Gomes RN  May 6, 2021  8:26 AM

## 2021-05-07 ENCOUNTER — HOSPITAL ENCOUNTER (OUTPATIENT)
Dept: INFUSION THERAPY | Age: 76
Discharge: HOME | End: 2021-05-07
Payer: MEDICARE

## 2021-05-07 VITALS
SYSTOLIC BLOOD PRESSURE: 150 MMHG | OXYGEN SATURATION: 100 % | HEART RATE: 86 BPM | TEMPERATURE: 98.8 F | DIASTOLIC BLOOD PRESSURE: 69 MMHG | RESPIRATION RATE: 16 BRPM

## 2021-05-07 PROCEDURE — 96374 THER/PROPH/DIAG INJ IV PUSH: CPT

## 2021-05-07 PROCEDURE — 74011000250 HC RX REV CODE- 250: Performed by: PODIATRIST

## 2021-05-07 PROCEDURE — 74011250636 HC RX REV CODE- 250/636

## 2021-05-07 PROCEDURE — 74011250636 HC RX REV CODE- 250/636: Performed by: PODIATRIST

## 2021-05-07 RX ORDER — HEPARIN 100 UNIT/ML
SYRINGE INTRAVENOUS
Status: DISCONTINUED
Start: 2021-05-07 | End: 2021-05-07 | Stop reason: HOSPADM

## 2021-05-07 RX ORDER — HEPARIN 100 UNIT/ML
SYRINGE INTRAVENOUS
Status: COMPLETED
Start: 2021-05-07 | End: 2021-05-07

## 2021-05-07 RX ORDER — SODIUM CHLORIDE 0.9 % (FLUSH) 0.9 %
5-10 SYRINGE (ML) INJECTION AS NEEDED
Status: DISCONTINUED | OUTPATIENT
Start: 2021-05-07 | End: 2021-05-09 | Stop reason: HOSPADM

## 2021-05-07 RX ORDER — HEPARIN 100 UNIT/ML
500 SYRINGE INTRAVENOUS AS NEEDED
Status: DISCONTINUED | OUTPATIENT
Start: 2021-05-07 | End: 2021-05-09 | Stop reason: HOSPADM

## 2021-05-07 RX ADMIN — HEPARIN 500 UNITS: 100 SYRINGE at 08:13

## 2021-05-07 RX ADMIN — Medication 10 ML: at 08:11

## 2021-05-07 RX ADMIN — DAPTOMYCIN 350 MG: 500 INJECTION, POWDER, LYOPHILIZED, FOR SOLUTION INTRAVENOUS at 08:07

## 2021-05-07 NOTE — PROGRESS NOTES
[facility] Progress Note    Date: May 7, 2021    Name: Deena Sapp    MRN: [id number]         : 1945     IV Antibiotics    Ms. Maykel Luu was assessed and education was provided. Ms. Vola Goodpasture vitals were reviewed. Visit Vitals  BP (!) 150/69 (BP 1 Location: Left arm, BP Patient Position: Sitting)   Pulse 86   Temp 98.8 °F (37.1 °C)   Resp 16   SpO2 100%           []  Vancomycin     []  Invanz     [x]  Cubicin 350 mg IV push     []  Rocephin    was infused per orders without complications. Ms. Maykel Luu tolerated infusion, and had no complaints at this time. Patient armband removed and shredded. Ms. Maykel Luu was discharged from [place] [address] in stable condition at [facility] 134. She is to return on 05/10/21 for her next appointment.     Tiffanie Fernandes RN  May 7, 2021  8:26 AM

## 2021-05-10 ENCOUNTER — HOSPITAL ENCOUNTER (OUTPATIENT)
Dept: INFUSION THERAPY | Age: 76
Discharge: HOME | End: 2021-05-10
Payer: MEDICARE

## 2021-05-10 VITALS
OXYGEN SATURATION: 99 % | SYSTOLIC BLOOD PRESSURE: 117 MMHG | TEMPERATURE: 98.4 F | HEART RATE: 83 BPM | RESPIRATION RATE: 16 BRPM | DIASTOLIC BLOOD PRESSURE: 60 MMHG

## 2021-05-10 LAB
ALBUMIN SERPL-MCNC: 3.4 G/DL (ref 3.4–5)
ALBUMIN/GLOB SERPL: 1 {RATIO} (ref 0.8–1.7)
ALP SERPL-CCNC: 76 U/L (ref 45–117)
ALT SERPL-CCNC: 21 U/L (ref 13–56)
ANION GAP SERPL CALC-SCNC: 3 MMOL/L (ref 3–18)
AST SERPL-CCNC: 18 U/L (ref 10–38)
BASO+EOS+MONOS # BLD AUTO: 1.2 K/UL (ref 0–2.3)
BASO+EOS+MONOS NFR BLD AUTO: 17 % (ref 0.1–17)
BASOPHILS # BLD: 0.1 K/UL (ref 0–0.1)
BASOPHILS NFR BLD: 1 % (ref 0–2)
BILIRUB SERPL-MCNC: 0.2 MG/DL (ref 0.2–1)
BUN SERPL-MCNC: 14 MG/DL (ref 7–18)
BUN/CREAT SERPL: 36 (ref 12–20)
CALCIUM SERPL-MCNC: 8 MG/DL (ref 8.5–10.1)
CHLORIDE SERPL-SCNC: 111 MMOL/L (ref 100–111)
CK SERPL-CCNC: 124 U/L (ref 26–192)
CO2 SERPL-SCNC: 28 MMOL/L (ref 21–32)
CREAT SERPL-MCNC: 0.39 MG/DL (ref 0.6–1.3)
CRP SERPL-MCNC: <0.3 MG/DL (ref 0–0.3)
DIFFERENTIAL METHOD BLD: ABNORMAL
DIFFERENTIAL METHOD BLD: ABNORMAL
EOSINOPHIL # BLD: 0.7 K/UL (ref 0–0.4)
EOSINOPHIL NFR BLD: 11 % (ref 0–5)
ERYTHROCYTE [DISTWIDTH] IN BLOOD BY AUTOMATED COUNT: 21.3 % (ref 11.5–14.5)
ERYTHROCYTE [DISTWIDTH] IN BLOOD BY AUTOMATED COUNT: 21.8 % (ref 11.6–14.5)
ERYTHROCYTE [SEDIMENTATION RATE] IN BLOOD: 21 MM/HR (ref 0–30)
GLOBULIN SER CALC-MCNC: 3.3 G/DL (ref 2–4)
GLUCOSE SERPL-MCNC: 90 MG/DL (ref 74–99)
HCT VFR BLD AUTO: 30.2 % (ref 35–45)
HCT VFR BLD AUTO: 30.7 % (ref 36–48)
HGB BLD-MCNC: 8.8 G/DL (ref 12–16)
HGB BLD-MCNC: 9 G/DL (ref 12–16)
LYMPHOCYTES # BLD: 2.1 K/UL (ref 0.9–3.6)
LYMPHOCYTES # BLD: 2.1 K/UL (ref 1.1–5.9)
LYMPHOCYTES NFR BLD: 30 % (ref 14–44)
LYMPHOCYTES NFR BLD: 31 % (ref 21–52)
MCH RBC QN AUTO: 21.2 PG (ref 24–34)
MCH RBC QN AUTO: 21.6 PG (ref 25–35)
MCHC RBC AUTO-ENTMCNC: 29.1 G/DL (ref 31–37)
MCHC RBC AUTO-ENTMCNC: 29.3 G/DL (ref 31–37)
MCV RBC AUTO: 72.6 FL (ref 74–97)
MCV RBC AUTO: 73.6 FL (ref 78–102)
MONOCYTES # BLD: 0.6 K/UL (ref 0.05–1.2)
MONOCYTES NFR BLD: 9 % (ref 3–10)
NEUTS SEG # BLD: 3.2 K/UL (ref 1.8–8)
NEUTS SEG # BLD: 3.6 K/UL (ref 1.8–9.5)
NEUTS SEG NFR BLD: 48 % (ref 40–73)
NEUTS SEG NFR BLD: 53 % (ref 40–70)
PLATELET # BLD AUTO: 263 K/UL (ref 135–420)
PLATELET COMMENTS,PCOM: ABNORMAL
POTASSIUM SERPL-SCNC: 4.6 MMOL/L (ref 3.5–5.5)
PROT SERPL-MCNC: 6.7 G/DL (ref 6.4–8.2)
RBC # BLD AUTO: 4.16 M/UL (ref 4.2–5.3)
RBC # BLD AUTO: 4.17 M/UL (ref 4.1–5.1)
RBC MORPH BLD: ABNORMAL
SODIUM SERPL-SCNC: 142 MMOL/L (ref 136–145)
WBC # BLD AUTO: 6.7 K/UL (ref 4.6–13.2)
WBC # BLD AUTO: 6.9 K/UL (ref 4.5–13)

## 2021-05-10 PROCEDURE — 74011250636 HC RX REV CODE- 250/636: Performed by: PODIATRIST

## 2021-05-10 PROCEDURE — 85652 RBC SED RATE AUTOMATED: CPT

## 2021-05-10 PROCEDURE — 96374 THER/PROPH/DIAG INJ IV PUSH: CPT

## 2021-05-10 PROCEDURE — 86140 C-REACTIVE PROTEIN: CPT

## 2021-05-10 PROCEDURE — 80053 COMPREHEN METABOLIC PANEL: CPT

## 2021-05-10 PROCEDURE — 85025 COMPLETE CBC W/AUTO DIFF WBC: CPT

## 2021-05-10 PROCEDURE — 77030020847 HC STATLOK BARD -A

## 2021-05-10 PROCEDURE — 74011000250 HC RX REV CODE- 250: Performed by: PODIATRIST

## 2021-05-10 PROCEDURE — 82550 ASSAY OF CK (CPK): CPT

## 2021-05-10 RX ORDER — HEPARIN 100 UNIT/ML
SYRINGE INTRAVENOUS
Status: DISPENSED
Start: 2021-05-10 | End: 2021-05-10

## 2021-05-10 RX ORDER — HEPARIN 100 UNIT/ML
500 SYRINGE INTRAVENOUS ONCE
Status: ACTIVE | OUTPATIENT
Start: 2021-05-10 | End: 2021-05-10

## 2021-05-10 RX ORDER — SODIUM CHLORIDE 0.9 % (FLUSH) 0.9 %
5-10 SYRINGE (ML) INJECTION AS NEEDED
Status: DISCONTINUED | OUTPATIENT
Start: 2021-05-10 | End: 2021-05-12 | Stop reason: HOSPADM

## 2021-05-10 RX ADMIN — DAPTOMYCIN 350 MG: 500 INJECTION, POWDER, LYOPHILIZED, FOR SOLUTION INTRAVENOUS at 08:23

## 2021-05-10 NOTE — PROGRESS NOTES
[facility] Progress Note    Date: May 10, 2021    Name: Sandra Mazariegos    MRN: [id number]         : 1945     IV Antibiotics    Ms. Fara Espinoza was assessed and education was provided. Ms. Sondra Matthews vitals were reviewed. Visit Vitals  /60 (BP 1 Location: Left arm, BP Patient Position: Sitting)   Pulse 83   Temp 98.4 °F (36.9 °C)   Resp 16   SpO2 99%     PICC flushes easily with brisk blood return, labs drawn and sent per order. PICC dressing changed per protocol without complication, new biopatch, statlock, and claves applied. []  Vancomycin     []  Invanz     [x]  Cubicin 350 mg IV push     []  Rocephin    was infused per orders without complications. Ms. Fara Espinoza tolerated infusion, and had no complaints at this time. Patient armband removed and shredded. Ms. Fara Espinoza was discharged from [place] [address] in stable condition at . [facility] 134. She is to return on 21 for her next appointment.     Michael Gamboa RN  May 10, 2021  8:26 AM

## 2021-05-11 ENCOUNTER — HOSPITAL ENCOUNTER (OUTPATIENT)
Dept: INFUSION THERAPY | Age: 76
Discharge: HOME | End: 2021-05-11
Payer: MEDICARE

## 2021-05-11 VITALS
HEART RATE: 80 BPM | OXYGEN SATURATION: 98 % | RESPIRATION RATE: 16 BRPM | SYSTOLIC BLOOD PRESSURE: 136 MMHG | DIASTOLIC BLOOD PRESSURE: 56 MMHG | TEMPERATURE: 98 F

## 2021-05-11 PROCEDURE — 96374 THER/PROPH/DIAG INJ IV PUSH: CPT

## 2021-05-11 PROCEDURE — 74011250636 HC RX REV CODE- 250/636: Performed by: PODIATRIST

## 2021-05-11 PROCEDURE — 74011000250 HC RX REV CODE- 250: Performed by: PODIATRIST

## 2021-05-11 RX ORDER — HEPARIN 100 UNIT/ML
500 SYRINGE INTRAVENOUS ONCE
Status: COMPLETED | OUTPATIENT
Start: 2021-05-11 | End: 2021-05-11

## 2021-05-11 RX ORDER — SODIUM CHLORIDE 0.9 % (FLUSH) 0.9 %
5-10 SYRINGE (ML) INJECTION AS NEEDED
Status: DISCONTINUED | OUTPATIENT
Start: 2021-05-11 | End: 2021-05-13 | Stop reason: HOSPADM

## 2021-05-11 RX ORDER — HEPARIN 100 UNIT/ML
SYRINGE INTRAVENOUS
Status: DISCONTINUED
Start: 2021-05-11 | End: 2021-05-11 | Stop reason: HOSPADM

## 2021-05-11 RX ADMIN — DAPTOMYCIN 350 MG: 500 INJECTION, POWDER, LYOPHILIZED, FOR SOLUTION INTRAVENOUS at 08:08

## 2021-05-11 RX ADMIN — Medication 10 ML: at 08:18

## 2021-05-11 RX ADMIN — HEPARIN 500 UNITS: 100 SYRINGE at 08:18

## 2021-05-11 NOTE — PROGRESS NOTES
[facility] Progress Note    Date: May 11, 2021    Name: Reymundo Costello    MRN: [id number]         : 1945     IV Antibiotics    Ms. Ridge Don was assessed and education was provided. Ms. Ryan Maldonado vitals were reviewed. Visit Vitals  BP (!) 136/56 (BP 1 Location: Left arm, BP Patient Position: Sitting)   Pulse 80   Temp 98 °F (36.7 °C)   Resp 16   SpO2 98%     PICC flushes easily with brisk blood return from bilateral lumens. []  Vancomycin     []  Invanz     [x]  Cubicin 350 mg IV push     []  Rocephin    was infused per orders without complications. Ms. Ridge Don tolerated infusion, and had no complaints at this time. Patient armband removed and shredded. Ms. Ridge Don was discharged from [place] [address] in stable condition at . [facility] 134. She is to return on 21 for her final antibiotics appointment.     Rizwan Ramires RN  May 11, 2021  8:26 AM

## 2021-05-12 ENCOUNTER — HOSPITAL ENCOUNTER (OUTPATIENT)
Dept: INFUSION THERAPY | Age: 76
Discharge: HOME | End: 2021-05-12
Payer: MEDICARE

## 2021-05-12 VITALS
RESPIRATION RATE: 16 BRPM | OXYGEN SATURATION: 100 % | SYSTOLIC BLOOD PRESSURE: 140 MMHG | HEART RATE: 80 BPM | TEMPERATURE: 98.7 F | DIASTOLIC BLOOD PRESSURE: 52 MMHG

## 2021-05-12 PROCEDURE — 74011000250 HC RX REV CODE- 250: Performed by: PODIATRIST

## 2021-05-12 PROCEDURE — 96374 THER/PROPH/DIAG INJ IV PUSH: CPT

## 2021-05-12 PROCEDURE — 74011250636 HC RX REV CODE- 250/636: Performed by: PODIATRIST

## 2021-05-12 PROCEDURE — 99212 OFFICE O/P EST SF 10 MIN: CPT

## 2021-05-12 RX ORDER — SODIUM CHLORIDE 0.9 % (FLUSH) 0.9 %
5-10 SYRINGE (ML) INJECTION AS NEEDED
Status: DISCONTINUED | OUTPATIENT
Start: 2021-05-12 | End: 2021-05-14 | Stop reason: HOSPADM

## 2021-05-12 RX ADMIN — Medication 10 ML: at 08:22

## 2021-05-12 RX ADMIN — DAPTOMYCIN 350 MG: 500 INJECTION, POWDER, LYOPHILIZED, FOR SOLUTION INTRAVENOUS at 08:19

## 2021-05-12 NOTE — PROGRESS NOTES
[facility] Progress Note    Date: May 12, 2021    Name: Harry Rodriguez    MRN: [id number]         : 1945     IV Antibiotics/Pull PICC    Ms. Kevin Chow was assessed and education was provided. Ms. Mariama Reynolds vitals were reviewed. Visit Vitals  BP (!) 140/52 (BP 1 Location: Left arm, BP Patient Position: Sitting)   Pulse 80   Temp 98.7 °F (37.1 °C)   Resp 16   SpO2 100%         []  Vancomycin     []  Invanz     [x]  Cubicin 350 mg IV push     []  Rocephin    was infused without complications. PICC line flushed with NS and PICC line d/c'd per orders, per protocol without complications. Gauze and transparent dressing applied to site. Ms. Kevin Chow was observed in office for 30 minutes post PICC removal. No s/s of bleeding, swelling or complications noted. Ms. Kevin Chow tolerated procedure, and had no complaints at this time. Ms. Kevin Chow was discharged from [place] [address] in stable condition at [phone]. She has no further appointments scheduled at this time and will follow up with Dr. Flor Juan as advised. Patient armband removed and shredded.      Elvis Baires RN  May 12, 2021  9:38 AM

## 2021-05-13 ENCOUNTER — APPOINTMENT (OUTPATIENT)
Dept: INFUSION THERAPY | Age: 76
End: 2021-05-13

## 2021-05-14 ENCOUNTER — APPOINTMENT (OUTPATIENT)
Dept: INFUSION THERAPY | Age: 76
End: 2021-05-14

## 2021-05-17 ENCOUNTER — APPOINTMENT (OUTPATIENT)
Dept: INFUSION THERAPY | Age: 76
End: 2021-05-17

## 2021-05-18 ENCOUNTER — APPOINTMENT (OUTPATIENT)
Dept: INFUSION THERAPY | Age: 76
End: 2021-05-18

## 2021-05-19 ENCOUNTER — APPOINTMENT (OUTPATIENT)
Dept: INFUSION THERAPY | Age: 76
End: 2021-05-19

## 2021-05-20 ENCOUNTER — APPOINTMENT (OUTPATIENT)
Dept: INFUSION THERAPY | Age: 76
End: 2021-05-20

## 2021-05-27 ENCOUNTER — APPOINTMENT (OUTPATIENT)
Dept: GENERAL RADIOLOGY | Age: 76
End: 2021-05-27
Attending: EMERGENCY MEDICINE
Payer: MEDICARE

## 2021-05-27 ENCOUNTER — HOSPITAL ENCOUNTER (EMERGENCY)
Age: 76
Discharge: HOME | End: 2021-05-27
Attending: EMERGENCY MEDICINE
Payer: MEDICARE

## 2021-05-27 ENCOUNTER — APPOINTMENT (OUTPATIENT)
Dept: CT IMAGING | Age: 76
End: 2021-05-27
Attending: EMERGENCY MEDICINE
Payer: MEDICARE

## 2021-05-27 VITALS
OXYGEN SATURATION: 98 % | TEMPERATURE: 97.7 F | HEART RATE: 77 BPM | BODY MASS INDEX: 22.5 KG/M2 | WEIGHT: 140 LBS | RESPIRATION RATE: 16 BRPM | DIASTOLIC BLOOD PRESSURE: 68 MMHG | SYSTOLIC BLOOD PRESSURE: 116 MMHG | HEIGHT: 66 IN

## 2021-05-27 DIAGNOSIS — R10.31 ABDOMINAL PAIN, RIGHT LOWER QUADRANT: ICD-10-CM

## 2021-05-27 DIAGNOSIS — I77.811 AORTIC ECTASIA, ABDOMINAL (HCC): Primary | ICD-10-CM

## 2021-05-27 DIAGNOSIS — R39.15 URINARY URGENCY: ICD-10-CM

## 2021-05-27 LAB
ALBUMIN SERPL-MCNC: 3.8 G/DL (ref 3.4–5)
ALBUMIN/GLOB SERPL: 1.1 {RATIO} (ref 0.8–1.7)
ALP SERPL-CCNC: 84 U/L (ref 45–117)
ALT SERPL-CCNC: 23 U/L (ref 13–56)
ANION GAP SERPL CALC-SCNC: 4 MMOL/L (ref 3–18)
APPEARANCE UR: CLEAR
AST SERPL-CCNC: 22 U/L (ref 10–38)
BACTERIA URNS QL MICRO: ABNORMAL /HPF
BASOPHILS # BLD: 0.1 K/UL (ref 0–0.1)
BASOPHILS NFR BLD: 1 % (ref 0–2)
BILIRUB SERPL-MCNC: 0.2 MG/DL (ref 0.2–1)
BILIRUB UR QL: NEGATIVE
BNP SERPL-MCNC: 167 PG/ML (ref 0–1800)
BUN SERPL-MCNC: 22 MG/DL (ref 7–18)
BUN/CREAT SERPL: 27 (ref 12–20)
CALCIUM SERPL-MCNC: 7.9 MG/DL (ref 8.5–10.1)
CAOX CRY URNS QL MICRO: ABNORMAL
CHLORIDE SERPL-SCNC: 107 MMOL/L (ref 100–111)
CK MB CFR SERPL CALC: 1.2 % (ref 0–4)
CK MB SERPL-MCNC: 1.7 NG/ML (ref 5–25)
CK SERPL-CCNC: 138 U/L (ref 26–192)
CO2 SERPL-SCNC: 30 MMOL/L (ref 21–32)
COLOR UR: ABNORMAL
CREAT SERPL-MCNC: 0.81 MG/DL (ref 0.6–1.3)
DIFFERENTIAL METHOD BLD: ABNORMAL
EOSINOPHIL # BLD: 0.6 K/UL (ref 0–0.4)
EOSINOPHIL NFR BLD: 6 % (ref 0–5)
EPITH CASTS URNS QL MICRO: ABNORMAL /LPF (ref 0–5)
ERYTHROCYTE [DISTWIDTH] IN BLOOD BY AUTOMATED COUNT: 21.5 % (ref 11.6–14.5)
GLOBULIN SER CALC-MCNC: 3.4 G/DL (ref 2–4)
GLUCOSE BLD STRIP.AUTO-MCNC: 80 MG/DL (ref 70–110)
GLUCOSE SERPL-MCNC: 98 MG/DL (ref 74–99)
GLUCOSE UR STRIP.AUTO-MCNC: NEGATIVE MG/DL
GRAN CASTS URNS QL MICRO: ABNORMAL /LPF
HCT VFR BLD AUTO: 31.2 % (ref 35–45)
HGB BLD-MCNC: 9.2 G/DL (ref 12–16)
HGB UR QL STRIP: NEGATIVE
KETONES UR QL STRIP.AUTO: ABNORMAL MG/DL
LEUKOCYTE ESTERASE UR QL STRIP.AUTO: NEGATIVE
LYMPHOCYTES # BLD: 1.7 K/UL (ref 0.9–3.6)
LYMPHOCYTES NFR BLD: 18 % (ref 21–52)
MAGNESIUM SERPL-MCNC: 2.7 MG/DL (ref 1.6–2.6)
MCH RBC QN AUTO: 21.8 PG (ref 24–34)
MCHC RBC AUTO-ENTMCNC: 29.5 G/DL (ref 31–37)
MCV RBC AUTO: 73.9 FL (ref 74–97)
MONOCYTES # BLD: 0.9 K/UL (ref 0.05–1.2)
MONOCYTES NFR BLD: 9 % (ref 3–10)
MUCOUS THREADS URNS QL MICRO: ABNORMAL /LPF
NEUTS SEG # BLD: 6.2 K/UL (ref 1.8–8)
NEUTS SEG NFR BLD: 66 % (ref 40–73)
NITRITE UR QL STRIP.AUTO: NEGATIVE
PH UR STRIP: 5.5 [PH] (ref 5–8)
PLATELET # BLD AUTO: 291 K/UL (ref 135–420)
PLATELET COMMENTS,PCOM: ABNORMAL
POTASSIUM SERPL-SCNC: 5.3 MMOL/L (ref 3.5–5.5)
PROT SERPL-MCNC: 7.2 G/DL (ref 6.4–8.2)
PROT UR STRIP-MCNC: ABNORMAL MG/DL
RBC # BLD AUTO: 4.22 M/UL (ref 4.2–5.3)
RBC #/AREA URNS HPF: ABNORMAL /HPF (ref 0–5)
RBC MORPH BLD: ABNORMAL
SODIUM SERPL-SCNC: 141 MMOL/L (ref 136–145)
SP GR UR REFRACTOMETRY: 1.02 (ref 1–1.03)
TROPONIN I SERPL-MCNC: <0.02 NG/ML (ref 0–0.04)
UROBILINOGEN UR QL STRIP.AUTO: 0.2 EU/DL (ref 0.2–1)
WBC # BLD AUTO: 9.5 K/UL (ref 4.6–13.2)
WBC URNS QL MICRO: ABNORMAL /HPF (ref 0–5)

## 2021-05-27 PROCEDURE — 70450 CT HEAD/BRAIN W/O DYE: CPT

## 2021-05-27 PROCEDURE — 71045 X-RAY EXAM CHEST 1 VIEW: CPT

## 2021-05-27 PROCEDURE — 93005 ELECTROCARDIOGRAM TRACING: CPT

## 2021-05-27 PROCEDURE — 83735 ASSAY OF MAGNESIUM: CPT

## 2021-05-27 PROCEDURE — 80053 COMPREHEN METABOLIC PANEL: CPT

## 2021-05-27 PROCEDURE — 82553 CREATINE MB FRACTION: CPT

## 2021-05-27 PROCEDURE — 83880 ASSAY OF NATRIURETIC PEPTIDE: CPT

## 2021-05-27 PROCEDURE — 74011250636 HC RX REV CODE- 250/636: Performed by: EMERGENCY MEDICINE

## 2021-05-27 PROCEDURE — 82962 GLUCOSE BLOOD TEST: CPT

## 2021-05-27 PROCEDURE — 85025 COMPLETE CBC W/AUTO DIFF WBC: CPT

## 2021-05-27 PROCEDURE — 81001 URINALYSIS AUTO W/SCOPE: CPT

## 2021-05-27 PROCEDURE — 99285 EMERGENCY DEPT VISIT HI MDM: CPT

## 2021-05-27 PROCEDURE — 74177 CT ABD & PELVIS W/CONTRAST: CPT

## 2021-05-27 PROCEDURE — 51701 INSERT BLADDER CATHETER: CPT

## 2021-05-27 PROCEDURE — 74011000636 HC RX REV CODE- 636: Performed by: EMERGENCY MEDICINE

## 2021-05-27 RX ORDER — MECLIZINE HCL 12.5 MG 12.5 MG/1
25 TABLET ORAL
Status: COMPLETED | OUTPATIENT
Start: 2021-05-27 | End: 2021-05-27

## 2021-05-27 RX ADMIN — MECLIZINE 25 MG: 12.5 TABLET ORAL at 10:13

## 2021-05-27 RX ADMIN — SODIUM CHLORIDE 1000 ML: 900 INJECTION, SOLUTION INTRAVENOUS at 10:13

## 2021-05-27 RX ADMIN — IOPAMIDOL 100 ML: 612 INJECTION, SOLUTION INTRAVENOUS at 11:32

## 2021-05-27 NOTE — ED PROVIDER NOTES
[address]  EMERGENCY DEPARTMENT HISTORY AND PHYSICAL EXAM    9:43 AM    Date: 5/27/2021  Patient Name: Amada Flores    History of Presenting Illness     Chief Complaint   Patient presents with    Other       History Provided By: Patient  Location/Duration/Severity/Modifying factors   This patient is a 47-year-old female who presents to the emergency department with a chief complaint of feeling off balance and right-sided abdominal pain. The patient reports that the symptoms regarding the abdominal pain been going on for few days, she had some pain while she was in the hyperbaric chamber and for right foot wound recently that resolved after about 5 minutes, patient had similar symptoms this morning on awakening with some right lower quadrant pain which has been going on since this morning he reports is mostly resolved at this point but she also notes an issue where she is unable to urinate. Patient is not having any back pain at present. She denies any symptoms in her lower extremities of weakness numbness or tingling. The patient also describes feeling off balance, she is poorly description of the symptoms, she does not feel as if she is listing towards one direction or the other just feels generally off balance. Worsens with ambulation improved somewhat with rest she does not have any dizziness or the sensation right now. Reports a history of a \"middle ear issue\". She has been followed by a specialist for that, she does not know what the diagnosis was. .  She tells me that she is also had issues with ringing in her ears for a year but not new. Patient on chart review, patient was admitted for a stroke in February of this year. She does not believe she has any residual deficits. Pain was fairly mild at onset, described as crampy now has resolved.   She reports that she has a chronic right-sided foot wound which is undergoing hyperbaric therapy reports it is otherwise doing well today.          PCP: Pati Souza MD    Current Outpatient Medications   Medication Sig Dispense Refill    acetaminophen (TYLENOL) 500 mg tablet Take 1,000 mg by mouth two (2) times daily as needed for Pain.  traMADoL (ULTRAM) 50 mg tablet Take 100 mg by mouth every eight (8) hours as needed for Pain.  atorvastatin (LIPITOR) 20 mg tablet Take 4 Tabs by mouth nightly. (Patient taking differently: Take 20 mg by mouth daily.) 30 Tab 0    multivitamin with minerals (MULTIVITAMIN & MINERAL FORMULA PO) Take 1 Tab by mouth daily.  budesonide-formoteroL (SYMBICORT) 160-4.5 mcg/actuation HFAA 1 Puff as needed.  cetirizine (ZYRTEC) 10 mg tablet cetirizine 10 mg tablet   TK 1 T PO QD UTD FOR 30 DAYS      cholecalciferol (VITAMIN D3) 25 mcg (1,000 unit) cap Take  by mouth.  folic acid (FOLVITE) 1 mg tablet Take  by mouth.  gabapentin (NEURONTIN) 100 mg capsule 300 mg three (3) times daily.  hydroCHLOROthiazide (HYDRODIURIL) 25 mg tablet hydrochlorothiazide 25 mg tablet      hydrOXYchloroQUINE (PLAQUENIL) 200 mg tablet 200 mg daily.  ipratropium (ATROVENT) 42 mcg (0.06 %) nasal spray as needed.  leflunomide (ARAVA) 10 mg tablet Take  by mouth.  oxybutynin (DITROPAN) 5 mg tablet 5 mg two (2) times a day.  pregabalin (LYRICA) 75 mg capsule pregabalin 75 mg capsule      amLODIPine (NORVASC) 5 mg tablet 5 mg nightly.  clopidogreL (PLAVIX) 75 mg tab Take 75 mg by mouth daily.  losartan (COZAAR) 100 mg tablet 100 mg nightly.  ascorbic acid, vitamin C, (VITAMIN C) 250 mg tablet Take  by mouth daily.  ferrous sulfate (IRON) 325 mg (65 mg iron) tablet Take 325 mg by mouth Daily (before breakfast).          Past History     Past Medical History:  Past Medical History:   Diagnosis Date    Arthritis     Asthma     seasonal     Diabetes [facility]     diet controlled    Diabetes mellitus ([facility] [address]     Type 2    Hypertension     Macromastia     Hx of    Menopause     RA [facility]oid arthritis) [facility] [address]     Rheumatoid arthritis(714.0)     TIA (transient ischemic attack) 2021    Ulcer of foot ([facility] [address]     Right foot       Past Surgical History:  Past Surgical History:   Procedure Laterality Date    HX BREAST REDUCTION Bilateral 2012    HX HYSTERECTOMY  1970    HX ORTHOPAEDIC  2009    Left hand surgery    HX OTHER SURGICAL      left shoulder abcess drained    HX PARTIAL HYSTERECTOMY         Family History:  Family History   Problem Relation Age of Onset    Heart Disease Mother     Diabetes Mother     No Known Problems Father        Social History:  Social History     Tobacco Use    Smoking status: Former Smoker     Quit date: 1997     Years since quittin.7    Smokeless tobacco: Never Used   Vaping Use    Vaping Use: Never used   Substance Use Topics    Alcohol use: No    Drug use: No       Allergies:  No Known Allergies    I reviewed and confirmed the above information with patient and updated as necessary. Review of Systems     Review of Systems   Constitutional: Negative for chills and fever. HENT: Negative for congestion, rhinorrhea, sinus pressure and sneezing. Eyes: Negative for visual disturbance. Respiratory: Negative for cough and shortness of breath. Cardiovascular: Negative for chest pain. Gastrointestinal: Positive for abdominal pain. Negative for diarrhea, nausea and vomiting. Genitourinary: Negative for dysuria, frequency and urgency. Musculoskeletal: Negative for back pain and neck pain. Skin: Negative for rash. Neurological: Positive for dizziness (\"Off Balance\"). Negative for syncope, numbness and headaches. Physical Exam     Visit Vitals  /68 (BP 1 Location: Right upper arm, BP Patient Position: At rest)   Pulse 77   Temp 97.7 °F (36.5 °C)   Resp 16   Ht 5' 6\" (1.676 m)   Wt 63.5 kg (140 lb)   SpO2 98%   BMI 22.60 kg/m²       Physical Exam  Vitals and nursing note reviewed.    Constitutional: General: She is not in acute distress. Appearance: Normal appearance. She is normal weight. HENT:      Head: Normocephalic and atraumatic. Right Ear: External ear normal.      Left Ear: Ear canal and external ear normal.      Ears:      Comments: Scant TM effusion on the left, the right TM is obscured by cerumen     Nose: Nose normal. No congestion or rhinorrhea. Mouth/Throat:      Mouth: Mucous membranes are moist.      Pharynx: Oropharynx is clear. No oropharyngeal exudate or posterior oropharyngeal erythema. Eyes:      Conjunctiva/sclera: Conjunctivae normal.   Cardiovascular:      Rate and Rhythm: Normal rate and regular rhythm. Pulses: Normal pulses. Heart sounds: Normal heart sounds. No murmur heard. Pulmonary:      Effort: Pulmonary effort is normal.      Breath sounds: Normal breath sounds. No wheezing, rhonchi or rales. Abdominal:      General: Abdomen is flat. Tenderness: There is no abdominal tenderness. There is no guarding or rebound. Musculoskeletal:         General: No swelling or tenderness. Normal range of motion. Cervical back: Normal range of motion and neck supple. Right lower leg: No edema. Left lower leg: No edema. Skin:     General: Skin is warm and dry. Capillary Refill: Capillary refill takes less than 2 seconds. Findings: No rash. Neurological:      General: No focal deficit present. Mental Status: She is alert. Cranial Nerves: No cranial nerve deficit. Motor: No weakness. Comments: Patient is alert, oriented x4. Patient responds appropriately to questioning. Cranial nerves II through XII are grossly intact. Equal strength in the upper and lower extremities bilaterally which is symmetric.   Finger-to-nose and heel-to-shin testing is normal.  Normal sensory exam.             Diagnostic Study Results     Labs -  Recent Results (from the past 24 hour(s))   GLUCOSE, POC    Collection Time: 05/27/21 9: 57 AM   Result Value Ref Range    Glucose (POC) 80 70 - 110 mg/dL   EKG, 12 LEAD, INITIAL    Collection Time: 05/27/21 10:10 AM   Result Value Ref Range    Ventricular Rate 67 BPM    Atrial Rate 67 BPM    P-R Interval 192 ms    QRS Duration 86 ms    Q-T Interval 402 ms    QTC Calculation (Bezet) 424 ms    Calculated P Axis 9 degrees    Calculated R Axis 43 degrees    Calculated T Axis 44 degrees    Diagnosis       Normal sinus rhythm  Normal ECG  When compared with ECG of 12-FEB-2021 18:18,  No significant change was found     URINALYSIS W/ RFLX MICROSCOPIC    Collection Time: 05/27/21 10:20 AM   Result Value Ref Range    Color DARK YELLOW      Appearance CLEAR      Specific gravity 1.020 1.005 - 1.030      pH (UA) 5.5 5.0 - 8.0      Protein TRACE (A) NEG mg/dL    Glucose Negative NEG mg/dL    Ketone TRACE (A) NEG mg/dL    Bilirubin Negative NEG      Blood Negative NEG      Urobilinogen 0.2 0.2 - 1.0 EU/dL    Nitrites Negative NEG      Leukocyte Esterase Negative NEG     URINE MICROSCOPIC ONLY    Collection Time: 05/27/21 10:20 AM   Result Value Ref Range    WBC 0 to 3 0 - 5 /hpf    RBC 0 to 3 0 - 5 /hpf    Epithelial cells 2+ 0 - 5 /lpf    Bacteria 1+ (A) NEG /hpf    Mucus 4+ (A) NEG /lpf    CA Oxalate crystals 2+ (A) NEG    Granular cast 0 to 3 NEG /lpf   CBC WITH AUTOMATED DIFF    Collection Time: 05/27/21 10:30 AM   Result Value Ref Range    WBC 9.5 4.6 - 13.2 K/uL    RBC 4.22 4.20 - 5.30 M/uL    HGB 9.2 (L) 12.0 - 16.0 g/dL    HCT 31.2 (L) 35.0 - 45.0 %    MCV 73.9 (L) 74.0 - 97.0 FL    MCH 21.8 (L) 24.0 - 34.0 PG    MCHC 29.5 (L) 31.0 - 37.0 g/dL    RDW 21.5 (H) 11.6 - 14.5 %    PLATELET 051 354 - 828 K/uL    NEUTROPHILS 66 40 - 73 %    LYMPHOCYTES 18 (L) 21 - 52 %    MONOCYTES 9 3 - 10 %    EOSINOPHILS 6 (H) 0 - 5 %    BASOPHILS 1 0 - 2 %    ABS. NEUTROPHILS 6.2 1.8 - 8.0 K/UL    ABS. LYMPHOCYTES 1.7 0.9 - 3.6 K/UL    ABS. MONOCYTES 0.9 0.05 - 1.2 K/UL    ABS. EOSINOPHILS 0.6 (H) 0.0 - 0.4 K/UL    ABS. BASOPHILS 0.1 0.0 - 0.1 K/UL    DF AUTOMATED      PLATELET COMMENTS Increased Platelets      RBC COMMENTS MICROCYTOSIS  1+        RBC COMMENTS HYPOCHROMIA  1+        RBC COMMENTS OVALOCYTES  1+        RBC COMMENTS ANISOCYTOSIS  3+        RBC COMMENTS RBC FRAGMENTS  1+  TARGET CELLS  1+       CARDIAC PANEL,(CK, CKMB & TROPONIN)    Collection Time: 05/27/21 10:30 AM   Result Value Ref Range    CK - MB 1.7 <3.6 ng/ml    CK-MB Index 1.2 0.0 - 4.0 %     26 - 192 U/L    Troponin-I, QT <0.02 0.0 - 0.045 NG/ML   MAGNESIUM    Collection Time: 05/27/21 10:30 AM   Result Value Ref Range    Magnesium 2.7 (H) 1.6 - 2.6 mg/dL   METABOLIC PANEL, COMPREHENSIVE    Collection Time: 05/27/21 10:30 AM   Result Value Ref Range    Sodium 141 136 - 145 mmol/L    Potassium 5.3 3.5 - 5.5 mmol/L    Chloride 107 100 - 111 mmol/L    CO2 30 21 - 32 mmol/L    Anion gap 4 3.0 - 18 mmol/L    Glucose 98 74 - 99 mg/dL    BUN 22 (H) 7.0 - 18 MG/DL    Creatinine 0.81 0.6 - 1.3 MG/DL    BUN/Creatinine ratio 27 (H) 12 - 20      GFR est AA >60 >60 ml/min/1.73m2    GFR est non-AA >60 >60 ml/min/1.73m2    Calcium 7.9 (L) 8.5 - 10.1 MG/DL    Bilirubin, total 0.2 0.2 - 1.0 MG/DL    ALT (SGPT) 23 13 - 56 U/L    AST (SGOT) 22 10 - 38 U/L    Alk. phosphatase 84 45 - 117 U/L    Protein, total 7.2 6.4 - 8.2 g/dL    Albumin 3.8 3.4 - 5.0 g/dL    Globulin 3.4 2.0 - 4.0 g/dL    A-G Ratio 1.1 0.8 - 1.7     NT-PRO BNP    Collection Time: 05/27/21 10:30 AM   Result Value Ref Range    NT pro- 0 - 1,800 PG/ML         Radiologic Studies -   CT ABD PELV W CONT   Final Result      No acute intra-abdominal abnormality. Infrarenal aortic focal ectasia measuring 2.8 cm with penetrating   atherosclerotic ulcer. CT HEAD WO CONT   Final Result         1. No acute intracranial abnormality. 2. Subcortical and periventricular white matter low-attenuation as can be seen   with sequela of chronic ischemic microvascular change.    3. Minor, nonspecific paranasal mucosal thickening. XR CHEST PORT    (Results Pending)           Medical Decision Making   I am the first provider for this patient. I reviewed the vital signs, available nursing notes, past medical history, past surgical history, family history and social history. Vital Signs-Reviewed the patient's vital signs. EKG: Normal sinus rhythm, rate of 67. Normal NC, QRS and QTc intervals. Normal axis. No ST segment elevation or depression. Overall normal sinus rhythm and normal EKG. Records Reviewed: Nursing Notes, Old Medical Records, Previous Radiology Studies and Previous Laboratory Studies (Time of Review: 9:43 AM)    ED Course: Progress Notes, Reevaluation, and Consults:  ED Course as of May 27 1429   Thu May 27, 2021   1258 Chest x-ray 1 view no focal consolidation, no pneumothorax. [TIFFANIE]   0649 Discussed the CT findings with the radiologist on-call who read the study, he clarified findings of the ectasia as being primarily more chronic and in need of outpatient follow-up. [TIFFANIE]      ED Course User Index  [TIFFANIE] Milo Munoz DO         Provider Notes (Medical Decision Making):   MDM  Number of Diagnoses or Management Options  Abdominal pain, right lower quadrant  Aortic ectasia, abdominal ([address]  Urinary urgency  Diagnosis management comments: Patient is a 78-year-old female who presents to the emergency department with a chief complaint of abdominal pain and difficulty urinating. Also complains of feeling off balance as her primary issue. On exam the patient does not have any focal neurologic deficit. She has no nystagmus. No reproducible tenderness in her abdomen or CVA tenderness. Complains of no back pain. Plan will be to get a urine sample to rule out infection, also consider pyelonephritis, appendicitis or other intra-abdominal process. Regarding her dizziness, consider stroke although no focal neurologic deficits. We will get a CT scan to evaluate this further.   We will try to treat with some meclizine for right now. She certainly has a history of a stroke so maintain a degree of suspicion for this. CT will also look to rule out stone. Otherwise she is well-appearing vital signs are stable. During the course of her stay she was able to urinate spontaneously and without difficulty. Results reviewed: The patient's CT brain did not show any acute stroke or hemorrhage. The patient on reassessment reports the dizziness is gone. She is able to ambulate with a steady gait. There is no ataxia or other signs of stroke. The CT of the abdomen pelvis did not show a stone or pyelonephritis. Urinalysis is normal.  The stone did show an ectatic abdominal aorta protruding ulcer. I discussed with the radiologist for clarification. I will have the patient follow closely with her PCP and give her vascular surgery follow-up instructions over the PCP may be the best choice for her to follow-up with first.  Get an ultrasound at that time and evaluate this further does not appear to be aneurysmal at this time. Advised patient that this was an incidental finding however important to follow-up on. Advised return if he is worsening in any way or develops any new or worsening symptoms. The patient agrees with the plan for discharge, expressed understanding all questions answered. Stable for discharge home. At this time, patient is stable and appropriate for discharge home.  Patient demonstrates understanding of current diagnoses and is in agreement with the treatment plan. Eduarda Mascorro are advised that while the likelihood of serious underlying condition is low at this point given the evaluation performed today, we cannot fully rule it out. Eduarda Mascorro are advised to immediately return with any new symptoms or worsening of current condition.  All questions have been answered. Desean Amatokristi is given educational material regarding their diagnoses, including danger symptoms and when to return to the ED. This note was dictated utilizing Dragon voice recognition software. Unfortunately this leads to occasional typographical errors. I apologize in advance if the situation occurs. If questions occur please do not hesitate to contact me directly. Alexis Argueta,           Procedures        Diagnosis     Clinical Impression:   1. Aortic ectasia, abdominal ([address]    2. Urinary urgency    3. Abdominal pain, right lower quadrant        Disposition: Discharge    Follow-up Information     Follow up With Specialties Details Why Contact Info    Christa Washington MD Family Medicine In 1 day Call today or tomorrow to arrange a follow-up appointment. You should discussed the findings on your abdominal aorta. You likely need to follow-up with vascular surgery. [address] [address]  [address] [facility] Vascular Surgery   [address] [facility] [address] [facility]    [address] [facility] [address] [facility]    [facility] EMERGENCY DEPT Emergency Medicine  As needed, If symptoms worsen [address] [address]  [phone]           Patient's Medications   Start Taking    No medications on file   Continue Taking    ACETAMINOPHEN (TYLENOL) 500 MG TABLET    Take 1,000 mg by mouth two (2) times daily as needed for Pain. AMLODIPINE (NORVASC) 5 MG TABLET    5 mg nightly. ASCORBIC ACID, VITAMIN C, (VITAMIN C) 250 MG TABLET    Take  by mouth daily. ATORVASTATIN (LIPITOR) 20 MG TABLET    Take 4 Tabs by mouth nightly. BUDESONIDE-FORMOTEROL (SYMBICORT) 160-4.5 MCG/ACTUATION HFAA    1 Puff as needed. CETIRIZINE (ZYRTEC) 10 MG TABLET    cetirizine 10 mg tablet   TK 1 T PO QD UTD FOR 30 DAYS    CHOLECALCIFEROL (VITAMIN D3) 25 MCG (1,000 UNIT) CAP    Take  by mouth. CLOPIDOGREL (PLAVIX) 75 MG TAB    Take 75 mg by mouth daily.     FERROUS SULFATE (IRON) 325 MG (65 MG IRON) TABLET    Take 325 mg by mouth Daily (before breakfast). FOLIC ACID (FOLVITE) 1 MG TABLET    Take  by mouth. GABAPENTIN (NEURONTIN) 100 MG CAPSULE    300 mg three (3) times daily. HYDROCHLOROTHIAZIDE (HYDRODIURIL) 25 MG TABLET    hydrochlorothiazide 25 mg tablet    HYDROXYCHLOROQUINE (PLAQUENIL) 200 MG TABLET    200 mg daily. IPRATROPIUM (ATROVENT) 42 MCG (0.06 %) NASAL SPRAY    as needed. LEFLUNOMIDE (ARAVA) 10 MG TABLET    Take  by mouth. LOSARTAN (COZAAR) 100 MG TABLET    100 mg nightly. MULTIVITAMIN WITH MINERALS (MULTIVITAMIN & MINERAL FORMULA PO)    Take 1 Tab by mouth daily. OXYBUTYNIN (DITROPAN) 5 MG TABLET    5 mg two (2) times a day. PREGABALIN (LYRICA) 75 MG CAPSULE    pregabalin 75 mg capsule    TRAMADOL (ULTRAM) 50 MG TABLET    Take 100 mg by mouth every eight (8) hours as needed for Pain. These Medications have changed    No medications on file   Stop Taking    No medications on file       Ginger Doherty DO   Emergency Medicine   May 27, 2021, 9:43 AM     This note is dictated utilizing Dragon voice recognition software. Unfortunately this leads to occasional typographical errors using the voice recognition. I apologize in advance if the situation occurs. If questions occur please do not hesitate to contact me directly.     Ginger Doherty, DO

## 2021-05-27 NOTE — DISCHARGE INSTRUCTIONS
Your work-up today was overall reassuring. The abdominal CT found an incidental abdominal aortic ectasia which is abnormal twisting of the aorta and dilation of the aorta. It is not yet at the aneurysmal stage where it becomes like a balloon. The importance of this to you was that you need to have this followed up. You should call your PCP or one of the attached vascular surgery resources. You likely need to have an outpatient ultrasound and further evaluation. The rest of your testing was all pretty normal with no acute findings. You should return if you develop recurrent episodes of dizziness, numbness tingling weakness in your arms or legs or any worsening in your condition.

## 2021-05-27 NOTE — ED NOTES
Pt c/o only urinating a small amount this am and R sided flank pain that started this am. Pt reports she also felty a little weak and dizzy this am. Pt is negative on the NIH stroke scale. Pt able to speak in full sentences w/o complications. Pt denies any dysuria, injuries or other complaints. RN is attempting to get pt to provide urine sample with bed side commode.

## 2021-05-27 NOTE — ED NOTES
This RN attempted to gain IV access attempted 3 times w/o success, pt reports she is a hard stick. RN called RRT to attempt IV access.

## 2021-05-27 NOTE — ED TRIAGE NOTES
Patient into ER c/o pain in right side and feeling \"unbalanced\". Patient states she goes to the hyperbaric chamber for wound on R leg. Patient states she has not been able to urinate this morning and thinks she is dehydrated. Patient has no facial droop, good  and push pulls.

## 2021-05-28 LAB
ATRIAL RATE: 67 BPM
CALCULATED P AXIS, ECG09: 9 DEGREES
CALCULATED R AXIS, ECG10: 43 DEGREES
CALCULATED T AXIS, ECG11: 44 DEGREES
DIAGNOSIS, 93000: NORMAL
P-R INTERVAL, ECG05: 192 MS
Q-T INTERVAL, ECG07: 402 MS
QRS DURATION, ECG06: 86 MS
QTC CALCULATION (BEZET), ECG08: 424 MS
VENTRICULAR RATE, ECG03: 67 BPM

## 2021-11-22 ENCOUNTER — HOSPITAL ENCOUNTER (OUTPATIENT)
Dept: LAB | Age: 76
Discharge: HOME | End: 2021-11-22
Payer: MEDICARE

## 2021-11-22 ENCOUNTER — TRANSCRIBE ORDER (OUTPATIENT)
Dept: SCHEDULING | Age: 76
End: 2021-11-22

## 2021-11-22 DIAGNOSIS — M81.0 AGE RELATED OSTEOPOROSIS: Primary | ICD-10-CM

## 2021-11-22 LAB
ALBUMIN SERPL-MCNC: 3.8 G/DL (ref 3.4–5)
ALBUMIN/GLOB SERPL: 1.2 {RATIO} (ref 0.8–1.7)
ALP SERPL-CCNC: 55 U/L (ref 45–117)
ALT SERPL-CCNC: 33 U/L (ref 13–56)
ANION GAP SERPL CALC-SCNC: 3 MMOL/L (ref 3–18)
AST SERPL-CCNC: 25 U/L (ref 10–38)
BASOPHILS # BLD: 0 K/UL (ref 0–0.1)
BASOPHILS NFR BLD: 1 % (ref 0–2)
BILIRUB SERPL-MCNC: 0.4 MG/DL (ref 0.2–1)
BUN SERPL-MCNC: 12 MG/DL (ref 7–18)
BUN/CREAT SERPL: 21 (ref 12–20)
CALCIUM SERPL-MCNC: 9.5 MG/DL (ref 8.5–10.1)
CHLORIDE SERPL-SCNC: 106 MMOL/L (ref 100–111)
CO2 SERPL-SCNC: 33 MMOL/L (ref 21–32)
CREAT SERPL-MCNC: 0.58 MG/DL (ref 0.6–1.3)
CRP SERPL-MCNC: <0.3 MG/DL (ref 0–0.3)
DIFFERENTIAL METHOD BLD: ABNORMAL
EOSINOPHIL # BLD: 0.7 K/UL (ref 0–0.4)
EOSINOPHIL NFR BLD: 11 % (ref 0–5)
ERYTHROCYTE [DISTWIDTH] IN BLOOD BY AUTOMATED COUNT: 17.2 % (ref 11.6–14.5)
ERYTHROCYTE [SEDIMENTATION RATE] IN BLOOD: 9 MM/HR (ref 0–30)
GLOBULIN SER CALC-MCNC: 3.2 G/DL (ref 2–4)
GLUCOSE SERPL-MCNC: 84 MG/DL (ref 74–99)
HBV SURFACE AB SER QL IA: NEGATIVE
HBV SURFACE AB SERPL IA-ACNC: <3.1 MIU/ML
HBV SURFACE AG SER QL: <0.1 INDEX
HBV SURFACE AG SER QL: NEGATIVE
HCT VFR BLD AUTO: 33.2 % (ref 35–45)
HCV AB SER IA-ACNC: 0.1 INDEX
HCV AB SERPL QL IA: NEGATIVE
HCV COMMENT,HCGAC: NORMAL
HGB BLD-MCNC: 9.9 G/DL (ref 12–16)
IMM GRANULOCYTES # BLD AUTO: 0 K/UL (ref 0–0.04)
IMM GRANULOCYTES NFR BLD AUTO: 0 % (ref 0–0.5)
LYMPHOCYTES # BLD: 1.5 K/UL (ref 0.9–3.6)
LYMPHOCYTES NFR BLD: 24 % (ref 21–52)
MCH RBC QN AUTO: 22.7 PG (ref 24–34)
MCHC RBC AUTO-ENTMCNC: 29.8 G/DL (ref 31–37)
MCV RBC AUTO: 76.1 FL (ref 78–100)
MONOCYTES # BLD: 0.5 K/UL (ref 0.05–1.2)
MONOCYTES NFR BLD: 8 % (ref 3–10)
NEUTS SEG # BLD: 3.4 K/UL (ref 1.8–8)
NEUTS SEG NFR BLD: 56 % (ref 40–73)
NRBC # BLD: 0 K/UL (ref 0–0.01)
NRBC BLD-RTO: 0 PER 100 WBC
PLATELET # BLD AUTO: 351 K/UL (ref 135–420)
PMV BLD AUTO: 9.3 FL (ref 9.2–11.8)
POTASSIUM SERPL-SCNC: 4.2 MMOL/L (ref 3.5–5.5)
PROT SERPL-MCNC: 7 G/DL (ref 6.4–8.2)
RBC # BLD AUTO: 4.36 M/UL (ref 4.2–5.3)
SODIUM SERPL-SCNC: 142 MMOL/L (ref 136–145)
WBC # BLD AUTO: 6 K/UL (ref 4.6–13.2)

## 2021-11-22 PROCEDURE — 86803 HEPATITIS C AB TEST: CPT

## 2021-11-22 PROCEDURE — 87340 HEPATITIS B SURFACE AG IA: CPT

## 2021-11-22 PROCEDURE — 85652 RBC SED RATE AUTOMATED: CPT

## 2021-11-22 PROCEDURE — 86480 TB TEST CELL IMMUN MEASURE: CPT

## 2021-11-22 PROCEDURE — 86704 HEP B CORE ANTIBODY TOTAL: CPT

## 2021-11-22 PROCEDURE — 80053 COMPREHEN METABOLIC PANEL: CPT

## 2021-11-22 PROCEDURE — 85025 COMPLETE CBC W/AUTO DIFF WBC: CPT

## 2021-11-22 PROCEDURE — 86706 HEP B SURFACE ANTIBODY: CPT

## 2021-11-22 PROCEDURE — 86140 C-REACTIVE PROTEIN: CPT

## 2021-11-22 PROCEDURE — 36415 COLL VENOUS BLD VENIPUNCTURE: CPT

## 2021-11-22 PROCEDURE — 87389 HIV-1 AG W/HIV-1&-2 AB AG IA: CPT

## 2021-11-23 LAB
HBV CORE AB SERPL QL IA: NEGATIVE
HBV CORE IGM SERPL QL IA: NEGATIVE
HIV 1+2 AB+HIV1 P24 AG SERPL QL IA: NONREACTIVE
HIV12 RESULT COMMENT, HHIVC: NORMAL

## 2021-11-25 LAB
M TB IFN-G BLD-IMP: ABNORMAL
QUANTIFERON CRITERIA, QFI1T: NORMAL
QUANTIFERON INCUBATION, QF1T: ABNORMAL
QUANTIFERON MITOGEN VALUE: >10 IU/ML
QUANTIFERON NIL VALUE: >10 IU/ML
QUANTIFERON TB1 AG: >10 IU/ML
QUANTIFERON TB2 AG: >10 IU/ML

## 2021-11-26 LAB
FAX TO INFO,FAXT: NORMAL
FAX TO NUMBER,FAXN: NORMAL

## 2022-01-10 ENCOUNTER — APPOINTMENT (OUTPATIENT)
Dept: CT IMAGING | Age: 77
End: 2022-01-10
Attending: EMERGENCY MEDICINE
Payer: MEDICARE

## 2022-01-10 ENCOUNTER — HOSPITAL ENCOUNTER (EMERGENCY)
Age: 77
Discharge: HOME | End: 2022-01-10
Attending: EMERGENCY MEDICINE
Payer: MEDICARE

## 2022-01-10 VITALS
OXYGEN SATURATION: 100 % | SYSTOLIC BLOOD PRESSURE: 170 MMHG | TEMPERATURE: 97.8 F | BODY MASS INDEX: 22.5 KG/M2 | RESPIRATION RATE: 18 BRPM | HEIGHT: 66 IN | HEART RATE: 83 BPM | WEIGHT: 140 LBS | DIASTOLIC BLOOD PRESSURE: 93 MMHG

## 2022-01-10 DIAGNOSIS — R51.9 SCALP PAIN: ICD-10-CM

## 2022-01-10 DIAGNOSIS — D64.9 CHRONIC ANEMIA: ICD-10-CM

## 2022-01-10 DIAGNOSIS — L73.9 FOLLICULITIS: Primary | ICD-10-CM

## 2022-01-10 LAB
ALBUMIN SERPL-MCNC: 3.9 G/DL (ref 3.4–5)
ALBUMIN/GLOB SERPL: 0.9 {RATIO} (ref 0.8–1.7)
ALP SERPL-CCNC: 64 U/L (ref 45–117)
ALT SERPL-CCNC: 26 U/L (ref 13–56)
ANION GAP SERPL CALC-SCNC: 5 MMOL/L (ref 3–18)
AST SERPL-CCNC: 25 U/L (ref 10–38)
BASOPHILS # BLD: 0 K/UL (ref 0–0.1)
BASOPHILS NFR BLD: 1 % (ref 0–2)
BILIRUB SERPL-MCNC: 0.3 MG/DL (ref 0.2–1)
BUN SERPL-MCNC: 15 MG/DL (ref 7–18)
BUN/CREAT SERPL: 26 (ref 12–20)
CALCIUM SERPL-MCNC: 9.1 MG/DL (ref 8.5–10.1)
CHLORIDE SERPL-SCNC: 104 MMOL/L (ref 100–111)
CO2 SERPL-SCNC: 30 MMOL/L (ref 21–32)
CREAT SERPL-MCNC: 0.57 MG/DL (ref 0.6–1.3)
DIFFERENTIAL METHOD BLD: ABNORMAL
EOSINOPHIL # BLD: 0.5 K/UL (ref 0–0.4)
EOSINOPHIL NFR BLD: 6 % (ref 0–5)
ERYTHROCYTE [DISTWIDTH] IN BLOOD BY AUTOMATED COUNT: 15.6 % (ref 11.6–14.5)
GLOBULIN SER CALC-MCNC: 4.2 G/DL (ref 2–4)
GLUCOSE SERPL-MCNC: 84 MG/DL (ref 74–99)
HCT VFR BLD AUTO: 32.5 % (ref 35–45)
HGB BLD-MCNC: 9.5 G/DL (ref 12–16)
IMM GRANULOCYTES # BLD AUTO: 0 K/UL (ref 0–0.04)
IMM GRANULOCYTES NFR BLD AUTO: 1 % (ref 0–0.5)
LYMPHOCYTES # BLD: 1.3 K/UL (ref 0.9–3.6)
LYMPHOCYTES NFR BLD: 16 % (ref 21–52)
MCH RBC QN AUTO: 22.3 PG (ref 24–34)
MCHC RBC AUTO-ENTMCNC: 29.2 G/DL (ref 31–37)
MCV RBC AUTO: 76.3 FL (ref 78–100)
MONOCYTES # BLD: 0.5 K/UL (ref 0.05–1.2)
MONOCYTES NFR BLD: 7 % (ref 3–10)
NEUTS SEG # BLD: 5.9 K/UL (ref 1.8–8)
NEUTS SEG NFR BLD: 70 % (ref 40–73)
NRBC # BLD: 0 K/UL (ref 0–0.01)
NRBC BLD-RTO: 0 PER 100 WBC
PLATELET # BLD AUTO: 407 K/UL (ref 135–420)
PMV BLD AUTO: 9.8 FL (ref 9.2–11.8)
POTASSIUM SERPL-SCNC: 4.2 MMOL/L (ref 3.5–5.5)
PROT SERPL-MCNC: 8.1 G/DL (ref 6.4–8.2)
RBC # BLD AUTO: 4.26 M/UL (ref 4.2–5.3)
SODIUM SERPL-SCNC: 139 MMOL/L (ref 136–145)
WBC # BLD AUTO: 8.3 K/UL (ref 4.6–13.2)

## 2022-01-10 PROCEDURE — 70450 CT HEAD/BRAIN W/O DYE: CPT

## 2022-01-10 PROCEDURE — 99283 EMERGENCY DEPT VISIT LOW MDM: CPT

## 2022-01-10 PROCEDURE — 72125 CT NECK SPINE W/O DYE: CPT

## 2022-01-10 PROCEDURE — 85025 COMPLETE CBC W/AUTO DIFF WBC: CPT

## 2022-01-10 PROCEDURE — 80053 COMPREHEN METABOLIC PANEL: CPT

## 2022-01-10 PROCEDURE — 74011250637 HC RX REV CODE- 250/637: Performed by: EMERGENCY MEDICINE

## 2022-01-10 RX ORDER — TRAMADOL HYDROCHLORIDE 50 MG/1
50 TABLET ORAL
Qty: 15 TABLET | Refills: 0 | Status: SHIPPED | OUTPATIENT
Start: 2022-01-10 | End: 2022-01-14

## 2022-01-10 RX ORDER — ACETAMINOPHEN 325 MG/1
650 TABLET ORAL
Status: COMPLETED | OUTPATIENT
Start: 2022-01-10 | End: 2022-01-10

## 2022-01-10 RX ORDER — DOXYCYCLINE HYCLATE 100 MG
100 TABLET ORAL 2 TIMES DAILY
Qty: 14 TABLET | Refills: 0 | Status: SHIPPED | OUTPATIENT
Start: 2022-01-10 | End: 2022-01-17

## 2022-01-10 RX ADMIN — ACETAMINOPHEN 650 MG: 325 TABLET ORAL at 12:40

## 2022-01-10 NOTE — ED TRIAGE NOTES
Pt states \" During this past week I have been having a headache to the top of my head and then last night I thought my head above my left eye was swollen and I could see my vein and I always have ringing in my ears x 8 months ago. \"

## 2022-01-10 NOTE — ED PROVIDER NOTES
EMERGENCY DEPARTMENT HISTORY AND PHYSICAL EXAM    12:29 PM    Date: 1/10/2022  Patient Name: Nathalie Tinsley    History of Presenting Illness     Chief Complaint   Patient presents with    Headache       History Provided By: Patient  Location/Duration/Severity/Modifying factors   Patient is a 77-year-old female with noted past medical history presenting to the ED with chief complaint of head pain and headache. She describes pain all over her scalp which is sensitive to the touch as well as left-sided frontal headache she describes an area on the anterior scalp that was a \"bump\" yesterday but reports that she \"popped it\". She reports after that it seemed like her symptoms improved. She noted a small amount of swelling in the left forehead which she cannot account for reports that it was there yesterday and it seems like maybe it is improved since then. She describes acute on chronic bilateral shoulder pain as well as knee pain for which she has been doing more physical therapy for her. She denies any chest pain or any shortness of breath. She denies any numbness, tingling or weakness. She denies any visual loss or any pain in her eyes. No pain with extraocular movements. The right shoulder elicits pain when she tries to abduct it across her body. PCP: Raquel Acharya MD    Current Outpatient Medications   Medication Sig Dispense Refill    doxycycline (VIBRA-TABS) 100 mg tablet Take 1 Tablet by mouth two (2) times a day for 7 days. 14 Tablet 0    traMADoL (Ultram) 50 mg tablet Take 1 Tablet by mouth every six (6) hours as needed for Pain for up to 4 days. Max Daily Amount: 200 mg. 15 Tablet 0    acetaminophen (TYLENOL) 500 mg tablet Take 1,000 mg by mouth two (2) times daily as needed for Pain.  traMADoL (ULTRAM) 50 mg tablet Take 100 mg by mouth every eight (8) hours as needed for Pain.  atorvastatin (LIPITOR) 20 mg tablet Take 4 Tabs by mouth nightly.  (Patient taking differently: Take 20 mg by mouth daily.) 30 Tab 0    multivitamin with minerals (MULTIVITAMIN & MINERAL FORMULA PO) Take 1 Tab by mouth daily.  budesonide-formoteroL (SYMBICORT) 160-4.5 mcg/actuation HFAA 1 Puff as needed.  cetirizine (ZYRTEC) 10 mg tablet cetirizine 10 mg tablet   TK 1 T PO QD UTD FOR 30 DAYS      cholecalciferol (VITAMIN D3) 25 mcg (1,000 unit) cap Take  by mouth.  folic acid (FOLVITE) 1 mg tablet Take  by mouth.  gabapentin (NEURONTIN) 100 mg capsule 300 mg three (3) times daily.  hydroCHLOROthiazide (HYDRODIURIL) 25 mg tablet hydrochlorothiazide 25 mg tablet      hydrOXYchloroQUINE (PLAQUENIL) 200 mg tablet 200 mg daily.  ipratropium (ATROVENT) 42 mcg (0.06 %) nasal spray as needed.  leflunomide (ARAVA) 10 mg tablet Take  by mouth.  oxybutynin (DITROPAN) 5 mg tablet 5 mg two (2) times a day.  pregabalin (LYRICA) 75 mg capsule pregabalin 75 mg capsule      amLODIPine (NORVASC) 5 mg tablet 5 mg nightly.  clopidogreL (PLAVIX) 75 mg tab Take 75 mg by mouth daily.  losartan (COZAAR) 100 mg tablet 100 mg nightly.  ascorbic acid, vitamin C, (VITAMIN C) 250 mg tablet Take  by mouth daily.  ferrous sulfate (IRON) 325 mg (65 mg iron) tablet Take 325 mg by mouth Daily (before breakfast).          Past History     Past Medical History:  Past Medical History:   Diagnosis Date    Arthritis     Asthma     seasonal     Diabetes ([facility] [address]     diet controlled    Diabetes mellitus ([facility] [facility]     Type 2    Hypertension     Macromastia     Hx of    Menopause     RA (rheumatoid arthritis) ([facility] [address]     Rheumatoid arthritis(714.0)     TIA (transient ischemic attack) 01/2021    Ulcer of foot ([facility] [address]     Right foot       Past Surgical History:  Past Surgical History:   Procedure Laterality Date    HX BREAST REDUCTION Bilateral 9/2012    HX HYSTERECTOMY  1970    HX ORTHOPAEDIC  2009    Left hand surgery    HX OTHER SURGICAL      left shoulder abcess drained    HX PARTIAL HYSTERECTOMY         Family History:  Family History   Problem Relation Age of Onset    Heart Disease Mother     Diabetes Mother     No Known Problems Father        Social History:  Social History     Tobacco Use    Smoking status: Former Smoker     Quit date: 1997     Years since quittin.3    Smokeless tobacco: Never Used   Vaping Use    Vaping Use: Never used   Substance Use Topics    Alcohol use: No    Drug use: No       Allergies:  No Known Allergies    I reviewed and confirmed the above information with patient and updated as necessary. Review of Systems     Review of Systems   Constitutional: Negative for chills and fever. HENT: Negative for congestion, rhinorrhea, sinus pressure and sneezing. Eyes: Negative for visual disturbance. Respiratory: Negative for cough, shortness of breath and wheezing. Cardiovascular: Negative for chest pain and leg swelling. Gastrointestinal: Negative for abdominal pain, diarrhea, nausea and vomiting. Genitourinary: Negative for dysuria, frequency, urgency, vaginal bleeding and vaginal discharge. Musculoskeletal: Positive for arthralgias. Negative for back pain and neck pain. Skin: Negative for color change, rash and wound. Neurological: Positive for headaches. Negative for syncope and numbness. Physical Exam     Visit Vitals  BP (!) 170/93   Pulse 83   Temp 97.8 °F (36.6 °C)   Resp 18   Ht 5' 6\" (1.676 m)   Wt 63.5 kg (140 lb)   SpO2 100%   BMI 22.60 kg/m²       Physical Exam  Vitals and nursing note reviewed. Constitutional:       General: She is not in acute distress. Appearance: Normal appearance. She is normal weight. HENT:      Head: Normocephalic and atraumatic. Comments: There is a scabbed area in the anterior scalp just behind the hairline that is nonfluctuant there is minimal erythema.   Consistent with likely folliculitis     Right Ear: External ear normal.      Left Ear: External ear normal.      Nose: Nose normal. No congestion or rhinorrhea. Mouth/Throat:      Mouth: Mucous membranes are moist.      Pharynx: Oropharynx is clear. No oropharyngeal exudate. Eyes:      Conjunctiva/sclera: Conjunctivae normal.      Pupils: Pupils are equal, round, and reactive to light. Comments: Orbits and globes are soft and normal on palpation   Neck:      Comments: There is no midline spinal tenderness. Good range of motion. Cardiovascular:      Rate and Rhythm: Normal rate and regular rhythm. Pulses: Normal pulses. Heart sounds: Normal heart sounds. No murmur heard. Pulmonary:      Effort: Pulmonary effort is normal.      Breath sounds: Normal breath sounds. No wheezing, rhonchi or rales. Abdominal:      General: Abdomen is flat. Tenderness: There is no abdominal tenderness. There is no guarding or rebound. Musculoskeletal:         General: No swelling or tenderness. Normal range of motion. Cervical back: Normal range of motion and neck supple. Right lower leg: No edema. Left lower leg: No edema. Comments: No peripheral edema. Skin:     General: Skin is warm and dry. Capillary Refill: Capillary refill takes less than 2 seconds. Findings: No rash. Neurological:      General: No focal deficit present. Mental Status: She is alert. Comments: Cranial nerves II through XII are intact. Symmetric strength and sensation in both upper and lower extremities.          Diagnostic Study Results     Labs -  Recent Results (from the past 24 hour(s))   CBC WITH AUTOMATED DIFF    Collection Time: 01/10/22 12:55 PM   Result Value Ref Range    WBC 8.3 4.6 - 13.2 K/uL    RBC 4.26 4.20 - 5.30 M/uL    HGB 9.5 (L) 12.0 - 16.0 g/dL    HCT 32.5 (L) 35.0 - 45.0 %    MCV 76.3 (L) 78.0 - 100.0 FL    MCH 22.3 (L) 24.0 - 34.0 PG    MCHC 29.2 (L) 31.0 - 37.0 g/dL    RDW 15.6 (H) 11.6 - 14.5 %    PLATELET 322 472 - 561 K/uL    MPV 9.8 9.2 - 11.8 FL    NRBC 0.0 0  WBC ABSOLUTE NRBC 0.00 0.00 - 0.01 K/uL    NEUTROPHILS 70 40 - 73 %    LYMPHOCYTES 16 (L) 21 - 52 %    MONOCYTES 7 3 - 10 %    EOSINOPHILS 6 (H) 0 - 5 %    BASOPHILS 1 0 - 2 %    IMMATURE GRANULOCYTES 1 (H) 0.0 - 0.5 %    ABS. NEUTROPHILS 5.9 1.8 - 8.0 K/UL    ABS. LYMPHOCYTES 1.3 0.9 - 3.6 K/UL    ABS. MONOCYTES 0.5 0.05 - 1.2 K/UL    ABS. EOSINOPHILS 0.5 (H) 0.0 - 0.4 K/UL    ABS. BASOPHILS 0.0 0.0 - 0.1 K/UL    ABS. IMM. GRANS. 0.0 0.00 - 0.04 K/UL    DF AUTOMATED     METABOLIC PANEL, COMPREHENSIVE    Collection Time: 01/10/22 12:55 PM   Result Value Ref Range    Sodium 139 136 - 145 mmol/L    Potassium 4.2 3.5 - 5.5 mmol/L    Chloride 104 100 - 111 mmol/L    CO2 30 21 - 32 mmol/L    Anion gap 5 3.0 - 18 mmol/L    Glucose 84 74 - 99 mg/dL    BUN 15 7.0 - 18 MG/DL    Creatinine 0.57 (L) 0.6 - 1.3 MG/DL    BUN/Creatinine ratio 26 (H) 12 - 20      GFR est AA >60 >60 ml/min/1.73m2    GFR est non-AA >60 >60 ml/min/1.73m2    Calcium 9.1 8.5 - 10.1 MG/DL    Bilirubin, total 0.3 0.2 - 1.0 MG/DL    ALT (SGPT) 26 13 - 56 U/L    AST (SGOT) 25 10 - 38 U/L    Alk. phosphatase 64 45 - 117 U/L    Protein, total 8.1 6.4 - 8.2 g/dL    Albumin 3.9 3.4 - 5.0 g/dL    Globulin 4.2 (H) 2.0 - 4.0 g/dL    A-G Ratio 0.9 0.8 - 1.7           Radiologic Studies -   CT HEAD WO CONT   Final Result         1. No acute intracranial abnormality demonstrated. Stable examination. 2. Minor, nonspecific paranasal mucosal disease. CT SPINE CERV WO CONT   Final Result         1. Multilevel degenerative findings as above without evidence of fracture or   acute traumatic listhesis. Medical Decision Making   I am the first provider for this patient. I reviewed the vital signs, available nursing notes, past medical history, past surgical history, family history and social history. Vital Signs-Reviewed the patient's vital signs.     Records Reviewed: Nursing Notes, Old Medical Records, Previous Radiology Studies and Previous Laboratory Studies (Time of Review: 12:29 PM)      Provider Notes (Medical Decision Making):   MDM  Number of Diagnoses or Management Options  Chronic anemia  Folliculitis  Scalp pain  Diagnosis management comments: 58-year-old female who presents to the ED with a chief complaint of head and scalp pain. Her symptoms are not terribly concerning for stroke or intracranial hemorrhage or other intracranial process. It is reproducible on palpation. Differential for the patient would include stroke although I think is less likely or intracranial hemorrhage or some sort of intracranial tumor, I suspect she does have a folliculitis on the frontal scalp. This is based on her history. I do not see anything drainable at this time. Would also consider that the diffuse scalp pain may be neuropathic in nature she does have some chronic neck osteoarthritis but denies any injury she may be dealing with some radicular pain will check CT brain and CT cervical spine. We will check some basic labs to rule out electrolyte derangement or disturbance. CT brain is negative. CT spine showing some arthritic changes. I reviewed the results with the patient. We will treat her with Keflex for this area in the anterior scalp that may be developing folliculitis. I am not sure exactly why she is having any scalp sensitivity. She is feeling better she would like a refill of her tramadol. May be nerve related I suggested he speak to her primary care doctor about restarting her gabapentin. She did have any other symptoms and patient agreeable to be discharged and feels much better.     At this time, patient is stable and appropriate for discharge home.  Patient demonstrates understanding of current diagnoses and is in agreement with the treatment plan. Sarath Escoto are advised that while the likelihood of serious underlying condition is low at this point given the evaluation performed today, we cannot fully rule it out. Sarath Escoto are advised to immediately return with any new symptoms or worsening of current condition. Any Incidental findings were noted on the patient's discharge paperwork as well as verbally directly to the patient, and the appropriate follow-up was given to the patient as far as instructions on testing needed as well as the timeframe.  All questions have been answered. Little Mini is given educational material regarding their diagnoses, including danger symptoms and when to return to the ED. This note was dictated utilizing Dragon voice recognition software. Unfortunately this leads to occasional typographical errors. I apologize in advance if the situation occurs. If questions occur please do not hesitate to contact me directly. Uzma Oliveros, DO          ED Course: Progress Notes, Reevaluation, and Consults:       Procedures    Critical Care Time: N/A    Diagnosis     Clinical Impression:   1. Folliculitis    2. Scalp pain    3. Chronic anemia        Disposition: Discharge    Follow-up Information     Follow up With Specialties Details Why Contact Info    Prachi Siegel MD Family Medicine Schedule an appointment as soon as possible for a visit   [facility] [facility] [address]  [phone]      [facility] EMERGENCY DEPT Emergency Medicine  As needed, If symptoms worsen; or [address]  [phone]           Discharge Medication List as of 1/10/2022  3:02 PM      START taking these medications    Details   doxycycline (VIBRA-TABS) 100 mg tablet Take 1 Tablet by mouth two (2) times a day for 7 days. , Normal, Disp-14 Tablet, R-0      !! traMADoL (Ultram) 50 mg tablet Take 1 Tablet by mouth every six (6) hours as needed for Pain for up to 4 days. Max Daily Amount: 200 mg., Normal, Disp-15 Tablet, R-0       !! - Potential duplicate medications found. Please discuss with provider.       CONTINUE these medications which have NOT CHANGED    Details acetaminophen (TYLENOL) 500 mg tablet Take 1,000 mg by mouth two (2) times daily as needed for Pain., Historical Med      !! traMADoL (ULTRAM) 50 mg tablet Take 100 mg by mouth every eight (8) hours as needed for Pain., Historical Med      atorvastatin (LIPITOR) 20 mg tablet Take 4 Tabs by mouth nightly. , Print, Disp-30 Tab, R-0      multivitamin with minerals (MULTIVITAMIN & MINERAL FORMULA PO) Take 1 Tab by mouth daily. , Historical Med      budesonide-formoteroL (SYMBICORT) 160-4.5 mcg/actuation HFAA 1 Puff as needed., Historical Med      cetirizine (ZYRTEC) 10 mg tablet cetirizine 10 mg tablet   TK 1 T PO QD UTD FOR 30 DAYS, Historical Med      cholecalciferol (VITAMIN D3) 25 mcg (1,000 unit) cap Take  by mouth., Historical Med      folic acid (FOLVITE) 1 mg tablet Take  by mouth., Historical Med      gabapentin (NEURONTIN) 100 mg capsule 300 mg three (3) times daily. , Historical Med      hydroCHLOROthiazide (HYDRODIURIL) 25 mg tablet hydrochlorothiazide 25 mg tablet, Historical Med      hydrOXYchloroQUINE (PLAQUENIL) 200 mg tablet 200 mg daily. , Historical Med      ipratropium (ATROVENT) 42 mcg (0.06 %) nasal spray as needed., Historical Med      leflunomide (ARAVA) 10 mg tablet Take  by mouth., Historical Med      oxybutynin (DITROPAN) 5 mg tablet 5 mg two (2) times a day., Historical Med      pregabalin (LYRICA) 75 mg capsule pregabalin 75 mg capsule, Historical Med      amLODIPine (NORVASC) 5 mg tablet 5 mg nightly., Historical Med      clopidogreL (PLAVIX) 75 mg tab Take 75 mg by mouth daily. , Historical Med      losartan (COZAAR) 100 mg tablet 100 mg nightly., Historical Med      ascorbic acid, vitamin C, (VITAMIN C) 250 mg tablet Take  by mouth daily. , Historical Med      ferrous sulfate (IRON) 325 mg (65 mg iron) tablet Take 325 mg by mouth Daily (before breakfast). , Historical Med       !! - Potential duplicate medications found. Please discuss with provider.           Arnol Marcialk DO   Emergency Medicine   January 10, 2022, 12:29 PM     This note is dictated utilizing Dragon voice recognition software. Unfortunately this leads to occasional typographical errors using the voice recognition. I apologize in advance if the situation occurs. If questions occur please do not hesitate to contact me directly. Patient was seen  and treated during the context of the COVID-19 pandemic. Contemporary protocols utilized based on the best available evidence, utilizing evolving public health  guidelines and treatment protocols.     Cecil Ortiz, DO

## 2022-01-31 ENCOUNTER — HOSPITAL ENCOUNTER (INPATIENT)
Age: 77
LOS: 4 days | Discharge: HOME | DRG: 377 | End: 2022-02-04
Attending: EMERGENCY MEDICINE | Admitting: HOSPITALIST
Payer: MEDICARE

## 2022-01-31 ENCOUNTER — APPOINTMENT (OUTPATIENT)
Dept: GENERAL RADIOLOGY | Age: 77
DRG: 377 | End: 2022-01-31
Attending: EMERGENCY MEDICINE
Payer: MEDICARE

## 2022-01-31 DIAGNOSIS — U07.1 COVID-19: ICD-10-CM

## 2022-01-31 DIAGNOSIS — K92.1 MELENA: ICD-10-CM

## 2022-01-31 DIAGNOSIS — J10.1 INFLUENZA A: ICD-10-CM

## 2022-01-31 DIAGNOSIS — D64.9 SYMPTOMATIC ANEMIA: Primary | ICD-10-CM

## 2022-01-31 PROBLEM — J09.X2 INFLUENZA A (H5N1): Status: ACTIVE | Noted: 2022-01-31

## 2022-01-31 PROBLEM — K92.2 GI BLEED: Status: ACTIVE | Noted: 2022-01-31

## 2022-01-31 LAB
ALBUMIN SERPL-MCNC: 3.2 G/DL (ref 3.4–5)
ALBUMIN/GLOB SERPL: 0.9 {RATIO} (ref 0.8–1.7)
ALP SERPL-CCNC: 53 U/L (ref 45–117)
ALT SERPL-CCNC: 25 U/L (ref 13–56)
ANION GAP SERPL CALC-SCNC: 6 MMOL/L (ref 3–18)
APPEARANCE UR: CLEAR
AST SERPL-CCNC: 42 U/L (ref 10–38)
ATRIAL RATE: 86 BPM
BACTERIA URNS QL MICRO: ABNORMAL /HPF
BASOPHILS # BLD: 0 K/UL (ref 0–0.1)
BASOPHILS NFR BLD: 0 % (ref 0–2)
BILIRUB SERPL-MCNC: 0.2 MG/DL (ref 0.2–1)
BILIRUB UR QL: NEGATIVE
BUN SERPL-MCNC: 12 MG/DL (ref 7–18)
BUN/CREAT SERPL: 19 (ref 12–20)
CALCIUM SERPL-MCNC: 8.6 MG/DL (ref 8.5–10.1)
CALCULATED P AXIS, ECG09: -28 DEGREES
CALCULATED R AXIS, ECG10: 0 DEGREES
CALCULATED T AXIS, ECG11: 23 DEGREES
CHLORIDE SERPL-SCNC: 106 MMOL/L (ref 100–111)
CO2 SERPL-SCNC: 29 MMOL/L (ref 21–32)
COLOR UR: ABNORMAL
COVID-19 RAPID TEST, COVR: DETECTED
CREAT SERPL-MCNC: 0.63 MG/DL (ref 0.6–1.3)
DIAGNOSIS, 93000: NORMAL
DIFFERENTIAL METHOD BLD: ABNORMAL
EOSINOPHIL # BLD: 0.1 K/UL (ref 0–0.4)
EOSINOPHIL NFR BLD: 1 % (ref 0–5)
EPITH CASTS URNS QL MICRO: ABNORMAL /LPF (ref 0–5)
ERYTHROCYTE [DISTWIDTH] IN BLOOD BY AUTOMATED COUNT: 15.8 % (ref 11.6–14.5)
FLUAV AG NPH QL IA: POSITIVE
FLUBV AG NOSE QL IA: NEGATIVE
FOLATE SERPL-MCNC: >20 NG/ML (ref 3.1–17.5)
GLOBULIN SER CALC-MCNC: 3.5 G/DL (ref 2–4)
GLUCOSE BLD STRIP.AUTO-MCNC: 99 MG/DL (ref 70–110)
GLUCOSE SERPL-MCNC: 110 MG/DL (ref 74–99)
GLUCOSE UR STRIP.AUTO-MCNC: NEGATIVE MG/DL
HCT VFR BLD AUTO: 19.7 % (ref 35–45)
HCT VFR BLD AUTO: 25.2 % (ref 35–45)
HEMOCCULT STL QL: POSITIVE
HGB BLD-MCNC: 5.9 G/DL (ref 12–16)
HGB BLD-MCNC: 7.8 G/DL (ref 12–16)
HGB UR QL STRIP: NEGATIVE
HISTORY CHECKED?,CKHIST: NORMAL
IMM GRANULOCYTES # BLD AUTO: 0.1 K/UL (ref 0–0.04)
IMM GRANULOCYTES NFR BLD AUTO: 1 % (ref 0–0.5)
IRON SATN MFR SERPL: 10 % (ref 20–50)
IRON SERPL-MCNC: 28 UG/DL (ref 50–175)
KETONES UR QL STRIP.AUTO: ABNORMAL MG/DL
LEUKOCYTE ESTERASE UR QL STRIP.AUTO: NEGATIVE
LYMPHOCYTES # BLD: 1.2 K/UL (ref 0.9–3.6)
LYMPHOCYTES NFR BLD: 16 % (ref 21–52)
MCH RBC QN AUTO: 22 PG (ref 24–34)
MCHC RBC AUTO-ENTMCNC: 29.9 G/DL (ref 31–37)
MCV RBC AUTO: 73.5 FL (ref 78–100)
MONOCYTES # BLD: 0.7 K/UL (ref 0.05–1.2)
MONOCYTES NFR BLD: 9 % (ref 3–10)
MUCOUS THREADS URNS QL MICRO: ABNORMAL /LPF
NEUTS SEG # BLD: 5.5 K/UL (ref 1.8–8)
NEUTS SEG NFR BLD: 73 % (ref 40–73)
NITRITE UR QL STRIP.AUTO: NEGATIVE
NRBC # BLD: 0.17 K/UL (ref 0–0.01)
NRBC BLD-RTO: 2.3 PER 100 WBC
P-R INTERVAL, ECG05: 138 MS
PH UR STRIP: 5 [PH] (ref 5–8)
PLATELET # BLD AUTO: 430 K/UL (ref 135–420)
PMV BLD AUTO: 10.9 FL (ref 9.2–11.8)
POTASSIUM SERPL-SCNC: 4.3 MMOL/L (ref 3.5–5.5)
PROT SERPL-MCNC: 6.7 G/DL (ref 6.4–8.2)
PROT UR STRIP-MCNC: 30 MG/DL
Q-T INTERVAL, ECG07: 362 MS
QRS DURATION, ECG06: 80 MS
QTC CALCULATION (BEZET), ECG08: 433 MS
RBC # BLD AUTO: 2.68 M/UL (ref 4.2–5.3)
RBC #/AREA URNS HPF: NEGATIVE /HPF (ref 0–5)
RETICS/RBC NFR AUTO: 2 % (ref 0.5–2.5)
SODIUM SERPL-SCNC: 141 MMOL/L (ref 136–145)
SOURCE, COVRS: ABNORMAL
SP GR UR REFRACTOMETRY: 1.03 (ref 1–1.03)
TIBC SERPL-MCNC: 276 UG/DL (ref 250–450)
TROPONIN-HIGH SENSITIVITY: 9 NG/L (ref 0–54)
UROBILINOGEN UR QL STRIP.AUTO: 1 EU/DL (ref 0.2–1)
VENTRICULAR RATE, ECG03: 86 BPM
VIT B12 SERPL-MCNC: >2000 PG/ML (ref 211–911)
WBC # BLD AUTO: 7.5 K/UL (ref 4.6–13.2)
WBC URNS QL MICRO: ABNORMAL /HPF (ref 0–5)

## 2022-01-31 PROCEDURE — 82272 OCCULT BLD FECES 1-3 TESTS: CPT

## 2022-01-31 PROCEDURE — 74011250636 HC RX REV CODE- 250/636: Performed by: EMERGENCY MEDICINE

## 2022-01-31 PROCEDURE — 81001 URINALYSIS AUTO W/SCOPE: CPT

## 2022-01-31 PROCEDURE — 94762 N-INVAS EAR/PLS OXIMTRY CONT: CPT

## 2022-01-31 PROCEDURE — 87635 SARS-COV-2 COVID-19 AMP PRB: CPT

## 2022-01-31 PROCEDURE — 87804 INFLUENZA ASSAY W/OPTIC: CPT

## 2022-01-31 PROCEDURE — 71045 X-RAY EXAM CHEST 1 VIEW: CPT

## 2022-01-31 PROCEDURE — 84484 ASSAY OF TROPONIN QUANT: CPT

## 2022-01-31 PROCEDURE — 65270000029 HC RM PRIVATE

## 2022-01-31 PROCEDURE — 77030040361 HC SLV COMPR DVT MDII -B

## 2022-01-31 PROCEDURE — 36430 TRANSFUSION BLD/BLD COMPNT: CPT

## 2022-01-31 PROCEDURE — 36415 COLL VENOUS BLD VENIPUNCTURE: CPT

## 2022-01-31 PROCEDURE — P9016 RBC LEUKOCYTES REDUCED: HCPCS

## 2022-01-31 PROCEDURE — 83540 ASSAY OF IRON: CPT

## 2022-01-31 PROCEDURE — 85025 COMPLETE CBC W/AUTO DIFF WBC: CPT

## 2022-01-31 PROCEDURE — 93005 ELECTROCARDIOGRAM TRACING: CPT

## 2022-01-31 PROCEDURE — 86900 BLOOD TYPING SEROLOGIC ABO: CPT

## 2022-01-31 PROCEDURE — 82607 VITAMIN B-12: CPT

## 2022-01-31 PROCEDURE — 99284 EMERGENCY DEPT VISIT MOD MDM: CPT

## 2022-01-31 PROCEDURE — 80053 COMPREHEN METABOLIC PANEL: CPT

## 2022-01-31 PROCEDURE — 86677 HELICOBACTER PYLORI ANTIBODY: CPT

## 2022-01-31 PROCEDURE — C9113 INJ PANTOPRAZOLE SODIUM, VIA: HCPCS | Performed by: EMERGENCY MEDICINE

## 2022-01-31 PROCEDURE — 82962 GLUCOSE BLOOD TEST: CPT

## 2022-01-31 PROCEDURE — 85045 AUTOMATED RETICULOCYTE COUNT: CPT

## 2022-01-31 PROCEDURE — 30233N1 TRANSFUSION OF NONAUTOLOGOUS RED BLOOD CELLS INTO PERIPHERAL VEIN, PERCUTANEOUS APPROACH: ICD-10-PCS | Performed by: HOSPITALIST

## 2022-01-31 PROCEDURE — 74011250637 HC RX REV CODE- 250/637: Performed by: INTERNAL MEDICINE

## 2022-01-31 PROCEDURE — 85018 HEMOGLOBIN: CPT

## 2022-01-31 PROCEDURE — 86923 COMPATIBILITY TEST ELECTRIC: CPT

## 2022-01-31 RX ORDER — SODIUM CHLORIDE 9 MG/ML
250 INJECTION, SOLUTION INTRAVENOUS AS NEEDED
Status: DISCONTINUED | OUTPATIENT
Start: 2022-01-31 | End: 2022-02-04 | Stop reason: HOSPADM

## 2022-01-31 RX ORDER — IPRATROPIUM BROMIDE AND ALBUTEROL SULFATE 2.5; .5 MG/3ML; MG/3ML
3 SOLUTION RESPIRATORY (INHALATION)
Status: DISCONTINUED | OUTPATIENT
Start: 2022-01-31 | End: 2022-02-03

## 2022-01-31 RX ORDER — MAGNESIUM SULFATE 100 %
16 CRYSTALS MISCELLANEOUS AS NEEDED
Status: DISCONTINUED | OUTPATIENT
Start: 2022-01-31 | End: 2022-02-04 | Stop reason: HOSPADM

## 2022-01-31 RX ORDER — ACETAMINOPHEN 325 MG/1
650 TABLET ORAL
Status: DISCONTINUED | OUTPATIENT
Start: 2022-01-31 | End: 2022-02-04 | Stop reason: HOSPADM

## 2022-01-31 RX ORDER — LOSARTAN POTASSIUM 50 MG/1
100 TABLET ORAL DAILY
Status: DISCONTINUED | OUTPATIENT
Start: 2022-02-01 | End: 2022-02-04 | Stop reason: HOSPADM

## 2022-01-31 RX ORDER — INSULIN LISPRO 100 [IU]/ML
INJECTION, SOLUTION INTRAVENOUS; SUBCUTANEOUS
Status: DISCONTINUED | OUTPATIENT
Start: 2022-01-31 | End: 2022-02-04 | Stop reason: HOSPADM

## 2022-01-31 RX ORDER — AMLODIPINE BESYLATE 5 MG/1
5 TABLET ORAL DAILY
Status: DISCONTINUED | OUTPATIENT
Start: 2022-02-01 | End: 2022-02-04 | Stop reason: HOSPADM

## 2022-01-31 RX ORDER — ATORVASTATIN CALCIUM 20 MG/1
20 TABLET, FILM COATED ORAL DAILY
Status: DISCONTINUED | OUTPATIENT
Start: 2022-02-01 | End: 2022-02-04 | Stop reason: HOSPADM

## 2022-01-31 RX ORDER — PANTOPRAZOLE SODIUM 40 MG/10ML
40 INJECTION, POWDER, LYOPHILIZED, FOR SOLUTION INTRAVENOUS
Status: COMPLETED | OUTPATIENT
Start: 2022-01-31 | End: 2022-01-31

## 2022-01-31 RX ORDER — DEXTROSE MONOHYDRATE 100 MG/ML
125-250 INJECTION, SOLUTION INTRAVENOUS AS NEEDED
Status: DISCONTINUED | OUTPATIENT
Start: 2022-01-31 | End: 2022-02-04 | Stop reason: HOSPADM

## 2022-01-31 RX ADMIN — ACETAMINOPHEN 650 MG: 325 TABLET ORAL at 20:51

## 2022-01-31 RX ADMIN — PANTOPRAZOLE SODIUM 40 MG: 40 INJECTION, POWDER, FOR SOLUTION INTRAVENOUS at 15:28

## 2022-01-31 NOTE — PROGRESS NOTES
Care Management    Reason for Admission: fatigue, Covid-19    Chart reviewed. Per H&P: Marysol Vallejo is a 64 y.o. female with PMHX of hypertension, rheumatoid arthritis, high cholesterol, fully vaccinated for COVID-19 including the booster who presents to the emergency department C/O malaise and fatigue. Patient reports she has not felt well for the past week. She reports symptoms wax and wane without clear relieving or exacerbating factors. She reports associated with chest tightness and intermittent headaches though denies both of these symptoms at this time. Denies shortness of breath, cough, bowel or urinary complaints. Has noted some increased congestion and sneezing. No known sick contacts or recent travel. Prior to admission patient was living: with her son     Prior to admission patient was using the following DME:  none                   RUR Score:  14%                  Plan for utilizing home health:TBD       COVID Vaccine Status: vaccinated and boosted    PCP: First and Last name: Grabiel Herrera MD    Name of Practice:    Are you a current patient: Yes/No: yes   Approximate date of last visit: within the last 2 weeks   Can you participate in a virtual visit with your PCP:                     Current Advanced Directive/Advance Care Plan: Prior    Healthcare Decision Maker:   Click here to complete [address] including selection of the Healthcare Decision Maker Relationship (ie \"Primary\")                         Transition of Care Plan: In progress       CM spoke with patient's daughter who verified her contact info, primary contact and PCP, whom she saw in the past two weeks. Daughter stated patient does not use DME, does not use home O2, drives herself and will have a rdie available at discharge. Daughter stated that the patient had toes amputated \"a while back\" but that she does not need any help getting around now and is \"very independent. \" Plan is to discharge back home where she lives with her son. Care Management Interventions  PCP Verified by CM:  Yes  Last Visit to PCP: 01/17/22  Transition of Care Consult (CM Consult): Discharge Planning  Support Systems: Child(jerri)  Confirm Follow Up Transport: Family  The Plan for Transition of Care is Related to the Following Treatment Goals :  (discharge home with physician follow up and family assistance)  Discharge Location  Patient Expects to be Discharged to[de-identified]  (discharge home with physician follow up and family assistance)

## 2022-01-31 NOTE — ROUTINE PROCESS
TRANSFER - OUT REPORT:    Verbal report given to [facility]) on Sandra Ramirez  being transferred to Remote Tele(unit) for routine progression of care       Report consisted of patients Situation, Background, Assessment and   Recommendations(SBAR). Information from the following report(s) ED Summary was reviewed with the receiving nurse. Lines:   Peripheral IV 01/31/22 Right Antecubital (Active)        Opportunity for questions and clarification was provided.       Patient transported with:   Monitor  Registered Nurse  Tech

## 2022-01-31 NOTE — ED PROVIDER NOTES
EMERGENCY DEPARTMENT HISTORY AND PHYSICAL EXAM    Date: 1/31/2022  Patient Name: Marysol Vallejo    History of Presenting Illness     Chief Complaint   Patient presents with    Fatigue         History Provided By: Patient    12:40 PM  Marysol Vallejo is a 64 y.o. female with PMHX of hypertension, rheumatoid arthritis, high cholesterol, fully vaccinated for COVID-19 including the booster who presents to the emergency department C/O malaise and fatigue. Patient reports she has not felt well for the past week. She reports symptoms wax and wane without clear relieving or exacerbating factors. She reports associated with chest tightness and intermittent headaches though denies both of these symptoms at this time. Denies shortness of breath, cough, bowel or urinary complaints. Has noted some increased congestion and sneezing. No known sick contacts or recent travel. PCP: Grabeil Herrera MD    Current Facility-Administered Medications   Medication Dose Route Frequency Provider Last Rate Last Admin    0.9% sodium chloride infusion 250 mL  250 mL IntraVENous PRN Carolyn Marshall MD        pantoprazole (PROTONIX) injection 40 mg  40 mg IntraVENous NOW Carolyn Marshall MD         Current Outpatient Medications   Medication Sig Dispense Refill    acetaminophen (TYLENOL) 500 mg tablet Take 1,000 mg by mouth two (2) times daily as needed for Pain.  traMADoL (ULTRAM) 50 mg tablet Take 100 mg by mouth every eight (8) hours as needed for Pain.  atorvastatin (LIPITOR) 20 mg tablet Take 4 Tabs by mouth nightly. (Patient taking differently: Take 20 mg by mouth daily.) 30 Tab 0    multivitamin with minerals (MULTIVITAMIN & MINERAL FORMULA PO) Take 1 Tab by mouth daily.  budesonide-formoteroL (SYMBICORT) 160-4.5 mcg/actuation HFAA 1 Puff as needed.       cetirizine (ZYRTEC) 10 mg tablet cetirizine 10 mg tablet   TK 1 T PO QD UTD FOR 30 DAYS      cholecalciferol (VITAMIN D3) 25 mcg (1,000 unit) cap Take  by mouth.      folic acid (FOLVITE) 1 mg tablet Take  by mouth.  gabapentin (NEURONTIN) 100 mg capsule 300 mg three (3) times daily.  hydroCHLOROthiazide (HYDRODIURIL) 25 mg tablet hydrochlorothiazide 25 mg tablet      hydrOXYchloroQUINE (PLAQUENIL) 200 mg tablet 200 mg daily.  ipratropium (ATROVENT) 42 mcg (0.06 %) nasal spray as needed.  leflunomide (ARAVA) 10 mg tablet Take  by mouth.  oxybutynin (DITROPAN) 5 mg tablet 5 mg two (2) times a day.  pregabalin (LYRICA) 75 mg capsule pregabalin 75 mg capsule      amLODIPine (NORVASC) 5 mg tablet 5 mg nightly.  clopidogreL (PLAVIX) 75 mg tab Take 75 mg by mouth daily.  losartan (COZAAR) 100 mg tablet 100 mg nightly.  ascorbic acid, vitamin C, (VITAMIN C) 250 mg tablet Take  by mouth daily.  ferrous sulfate (IRON) 325 mg (65 mg iron) tablet Take 325 mg by mouth Daily (before breakfast).          Past History       Past Medical History:  Past Medical History:   Diagnosis Date    Arthritis     Asthma     seasonal     Diabetes ([facility] [address]     diet controlled    Diabetes mellitus ([facility] [address]     Type 2    Hypertension     Macromastia     Hx of    Menopause     RA (rheumatoid arthritis) ([facility] [address]     Rheumatoid arthritis(714.0)     TIA (transient ischemic attack) 2021    Ulcer of foot ([facility] [address]     Right foot       Past Surgical History:  Past Surgical History:   Procedure Laterality Date    HX BREAST REDUCTION Bilateral 2012    HX HYSTERECTOMY  1970    HX ORTHOPAEDIC  2009    Left hand surgery    HX OTHER SURGICAL      left shoulder abcess drained    HX PARTIAL HYSTERECTOMY         Family History:  Family History   Problem Relation Age of Onset    Heart Disease Mother     Diabetes Mother     No Known Problems Father        Social History:  Social History     Tobacco Use    Smoking status: Former Smoker     Quit date: 1997     Years since quittin.3    Smokeless tobacco: Never Used   Vaping Use    Vaping Use: Never used   Substance Use Topics    Alcohol use: No    Drug use: No       Allergies:  No Known Allergies      Review of Systems   Review of Systems   Constitutional: Positive for fatigue. Negative for fever. HENT: Positive for congestion and sneezing. Respiratory: Positive for chest tightness. Negative for shortness of breath. Gastrointestinal: Negative for abdominal pain. Neurological: Positive for headaches. All other systems reviewed and are negative.         Physical Exam     Vitals:    01/31/22 1233   BP: (!) 136/40   Pulse: 91   Resp: 16   Temp: 98.4 °F (36.9 °C)   SpO2: 100%   Weight: 63.5 kg (140 lb)   Height: 5' 6\" (1.676 m)     Physical Exam    Nursing notes and vital signs reviewed    Constitutional: Non toxic appearing, moderate distress  Head: Normocephalic, Atraumatic  Eyes: EOMI  Neck: Supple  Cardiovascular: Regular rate and rhythm, no murmurs, rubs, or gallops  Chest: Normal work of breathing and chest excursion bilaterally  Lungs: Clear to ausculation bilaterally  Abdomen: Soft, non tender, non distended  Back: No evidence of trauma or deformity  Extremities: No evidence of trauma, no LE edema  Skin: Warm and dry, normal cap refill  Neuro: Alert and appropriate, face symmetric, normal speech  Psychiatric: Normal mood and affect      Diagnostic Study Results     Labs -     Recent Results (from the past 12 hour(s))   EKG, 12 LEAD, INITIAL    Collection Time: 01/31/22 12:46 PM   Result Value Ref Range    Ventricular Rate 86 BPM    Atrial Rate 86 BPM    P-R Interval 138 ms    QRS Duration 80 ms    Q-T Interval 362 ms    QTC Calculation (Bezet) 433 ms    Calculated P Axis -28 degrees    Calculated R Axis 0 degrees    Calculated T Axis 23 degrees    Diagnosis       Normal sinus rhythm  Normal ECG  When compared with ECG of 27-MAY-2021 10:10,  Nonspecific T wave abnormality now evident in Inferior leads     CBC WITH AUTOMATED DIFF    Collection Time: 01/31/22 12:55 PM   Result Value Ref Range    WBC 7.5 4.6 - 13.2 K/uL    RBC 2.68 (L) 4.20 - 5.30 M/uL    HGB 5.9 (LL) 12.0 - 16.0 g/dL    HCT 19.7 (L) 35.0 - 45.0 %    MCV 73.5 (L) 78.0 - 100.0 FL    MCH 22.0 (L) 24.0 - 34.0 PG    MCHC 29.9 (L) 31.0 - 37.0 g/dL    RDW 15.8 (H) 11.6 - 14.5 %    PLATELET 942 (H) 735 - 420 K/uL    MPV 10.9 9.2 - 11.8 FL    NRBC 2.3 (H) 0  WBC    ABSOLUTE NRBC 0.17 (H) 0.00 - 0.01 K/uL    NEUTROPHILS 73 40 - 73 %    LYMPHOCYTES 16 (L) 21 - 52 %    MONOCYTES 9 3 - 10 %    EOSINOPHILS 1 0 - 5 %    BASOPHILS 0 0 - 2 %    IMMATURE GRANULOCYTES 1 (H) 0.0 - 0.5 %    ABS. NEUTROPHILS 5.5 1.8 - 8.0 K/UL    ABS. LYMPHOCYTES 1.2 0.9 - 3.6 K/UL    ABS. MONOCYTES 0.7 0.05 - 1.2 K/UL    ABS. EOSINOPHILS 0.1 0.0 - 0.4 K/UL    ABS. BASOPHILS 0.0 0.0 - 0.1 K/UL    ABS. IMM. GRANS. 0.1 (H) 0.00 - 0.04 K/UL    DF AUTOMATED     METABOLIC PANEL, COMPREHENSIVE    Collection Time: 01/31/22 12:55 PM   Result Value Ref Range    Sodium 141 136 - 145 mmol/L    Potassium 4.3 3.5 - 5.5 mmol/L    Chloride 106 100 - 111 mmol/L    CO2 29 21 - 32 mmol/L    Anion gap 6 3.0 - 18 mmol/L    Glucose 110 (H) 74 - 99 mg/dL    BUN 12 7.0 - 18 MG/DL    Creatinine 0.63 0.6 - 1.3 MG/DL    BUN/Creatinine ratio 19 12 - 20      GFR est AA >60 >60 ml/min/1.73m2    GFR est non-AA >60 >60 ml/min/1.73m2    Calcium 8.6 8.5 - 10.1 MG/DL    Bilirubin, total 0.2 0.2 - 1.0 MG/DL    ALT (SGPT) 25 13 - 56 U/L    AST (SGOT) 42 (H) 10 - 38 U/L    Alk.  phosphatase 53 45 - 117 U/L    Protein, total 6.7 6.4 - 8.2 g/dL    Albumin 3.2 (L) 3.4 - 5.0 g/dL    Globulin 3.5 2.0 - 4.0 g/dL    A-G Ratio 0.9 0.8 - 1.7     TROPONIN-HIGH SENSITIVITY    Collection Time: 01/31/22 12:55 PM   Result Value Ref Range    Troponin-High Sensitivity 9 0 - 54 ng/L   URINALYSIS W/ RFLX MICROSCOPIC    Collection Time: 01/31/22 12:55 PM   Result Value Ref Range    Color DARK YELLOW      Appearance CLEAR      Specific gravity 1.026 1.005 - 1.030      pH (UA) 5.0 5.0 - 8.0      Protein 30 (A) NEG mg/dL Glucose Negative NEG mg/dL    Ketone TRACE (A) NEG mg/dL    Bilirubin Negative NEG      Blood Negative NEG      Urobilinogen 1.0 0.2 - 1.0 EU/dL    Nitrites Negative NEG      Leukocyte Esterase Negative NEG     INFLUENZA A & B AG (RAPID TEST)    Collection Time: 01/31/22 12:55 PM   Result Value Ref Range    Influenza A Antigen Positive (A) NEG      Influenza B Antigen Negative NEG     COVID-19 RAPID TEST    Collection Time: 01/31/22 12:55 PM   Result Value Ref Range    Specimen source Nasopharyngeal      COVID-19 rapid test Detected (AA) NOTD     URINE MICROSCOPIC ONLY    Collection Time: 01/31/22 12:55 PM   Result Value Ref Range    WBC 0 to 3 0 - 5 /hpf    RBC Negative 0 - 5 /hpf    Epithelial cells 3+ 0 - 5 /lpf    Bacteria 2+ (A) NEG /hpf    Mucus 1+ (A) NEG /lpf   OCCULT BLOOD, STOOL    Collection Time: 01/31/22  1:27 PM   Result Value Ref Range    Occult blood, stool Positive (A) NEG     TYPE & SCREEN    Collection Time: 01/31/22  1:28 PM   Result Value Ref Range    Crossmatch Expiration 02/03/2022,2359     ABO/Rh(D) A POSITIVE     Antibody screen NEG     CALLED TO:       1 UNIT READY TO [facility] BY CAM ON 1/31/22 AT 1424. Unit number [id number]     Blood component type  LR     Unit division 00     Status of unit ALLOCATED     Crossmatch result Compatible    RBC, ALLOCATE    Collection Time: 01/31/22  1:45 PM   Result Value Ref Range    HISTORY CHECKED? Historical check performed        Radiologic Studies -   XR CHEST PORT   Final Result      Left basilar streaky opacities, atelectasis versus atypical infection. CT Results  (Last 48 hours)    None        CXR Results  (Last 48 hours)               01/31/22 1321  XR CHEST PORT Final result    Impression:      Left basilar streaky opacities, atelectasis versus atypical infection.        Narrative:  EXAM: XR CHEST PORT       CLINICAL INDICATION/HISTORY: malaise   -Additional: None       COMPARISON: 5/27/2021       TECHNIQUE: Frontal view of the chest       _______________       FINDINGS:       HEART AND MEDIASTINUM: Normal cardiac size and mediastinal contours. LUNGS AND PLEURAL SPACES: Left basilar streaky opacities. No focal pneumonic   consolidation, pneumothorax, or pleural effusion. BONY THORAX AND SOFT TISSUES: No acute osseous abnormality       _______________                 Medications given in the ED-  Medications   0.9% sodium chloride infusion 250 mL (has no administration in time range)   pantoprazole (PROTONIX) injection 40 mg (has no administration in time range)         Medical Decision Making   I am the first provider for this patient. I reviewed the vital signs, available nursing notes, past medical history, past surgical history, family history and social history. Vital Signs-Reviewed the patient's vital signs. Pulse Oximetry Analysis - 100% on room air, not hypoxic     Cardiac Monitor:  Rate: 87 bpm  Rhythm: Normal sinus    EKG interpretation: (Preliminary)  EKG read by Dr. Yanely Fischer at 12:59 PM  Normal sinus rhythm at rate of 86 bpm, PA interval of 130 ms, QS duration 80 ms, similar to prior to prior from May 2021    Records Reviewed: Nursing Notes, Old Medical Records and Previous electrocardiograms    Provider Notes (Medical Decision Making): Elyssa Zacarias is a 64 y.o. female presenting very weak and malaise and fatigue. Patient is hemodynamically stable without focal neuro deficits on exam but labs have multiple abnormalities including a critically low hemoglobin, Hemoccult positive stools, positive for influenza A, positive for COVID-19. Patient typed and cross sent and blood transfusion initiated. PPI initiated and discussed with gastroenterology and hospitalist for further in-hospital evaluation and management. Patient understands and agrees this plan.     Procedures:  Procedures    ED Course:   1:32 PM  I have discussed with the patient the rationale for blood component transfusion; its benefits in treating or preventing fatigue, organ damage, or death; and its risk which includes mild transfusion reactions, rare risk of blood borne infection, or more serious but rare reactions. I have discussed the alternatives to transfusion, including the risk and consequences of not receiving transfusion. The patient had an opportunity to ask questions and had agreed to proceed with transfusion of blood components. PROCEDURE NOTE - RECTAL EXAM:   1:32 PM  Chaperoned by: Sakina PAZ  Rectal exam performed. Black stool was collected. Stool was sent to lab for Hemoccult testing. The procedure took 1-15 minutes, and pt tolerated well. CONSULT NOTE:   2:24 PM  Dr. Ansley Randhawa spoke with Dr. Amee Ludwig   Specialty: Hospitalist  Discussed pt's hx, disposition, and available diagnostic and imaging results over the telephone. Reviewed care plans. Accepts to remote telemetry. CONSULT NOTE:   2:25 PM  Dr. Ansley Randhawa spoke with Dr. Ara Tapia   Specialty: Gastroenterology  Discussed pt's hx, disposition, and available diagnostic and imaging results over the telephone. Reviewed care plans. Will consult on the patient, PPI, H Pylori. 2:30 PM  Updated patient on all results and plan. All questions answered. Diagnosis and Disposition     Critical Care Time: 2:28 PM  I have spent 40 minutes of critical care time involved in lab review, consultations with specialist, family decision-making, and documentation. During this entire length of time I was immediately available to the patient. Critical Care: The reason for providing this level of medical care for this critically ill patient was due a critical illness that impaired one or more vital organ systems such that there was a high probability of imminent or life threatening deterioration in the patients condition.  This care involved high complexity decision making to assess, manipulate, and support vital system functions, to treat this degreee vital organ system failure and to prevent further life threatening deterioration of the patients condition. Core Measures:  For Hospitalized Patients:    1. Hospitalization Decision Time:  The decision to hospitalize the patient was made by Dr. Rosita Black at 1:32 PM on 1/31/2022    2. Aspirin: Aspirin was not given because the patient did not present with a stroke at the time of their Emergency Department evaluation    2:23 PM  Patient is being admitted to the hospital by Dr. Letty Miller. The results of their tests and reasons for their admission have been discussed with them and/or available family. They convey agreement and understanding for the need to be admitted and for their admission diagnosis. CONDITIONS ON ADMISSION:  Sepsis is not present at the time of admission. Deep Vein Thrombosis is not present at the time of admission. Thrombosis is not present at the time of admission. Urinary Tract Infection is not present at the time of admission. Pneumonia is not present at the time of admission. MRSA is not present at the time of admission. Wound infection is not present at the time of admission. Pressure Ulcer is not present at the time of admission. CLINICAL IMPRESSION:    1. Symptomatic anemia    2. Influenza A    3. COVID-19    4. Melena      _______________________________      Please note that this dictation was completed with [employer], the computer voice recognition software. Quite often unanticipated grammatical, syntax, homophones, and other interpretive errors are inadvertently transcribed by the computer software. Please disregard these errors. Please excuse any errors that have escaped final proofreading.

## 2022-02-01 LAB
ABO + RH BLD: NORMAL
ALBUMIN SERPL-MCNC: 3 G/DL (ref 3.4–5)
ALBUMIN/GLOB SERPL: 0.8 {RATIO} (ref 0.8–1.7)
ALP SERPL-CCNC: 54 U/L (ref 45–117)
ALT SERPL-CCNC: 25 U/L (ref 13–56)
ANION GAP SERPL CALC-SCNC: 5 MMOL/L (ref 3–18)
AST SERPL-CCNC: 39 U/L (ref 10–38)
BASOPHILS # BLD: 0 K/UL (ref 0–0.1)
BASOPHILS NFR BLD: 0 % (ref 0–2)
BILIRUB SERPL-MCNC: 0.3 MG/DL (ref 0.2–1)
BLD PROD TYP BPU: NORMAL
BLOOD GROUP ANTIBODIES SERPL: NORMAL
BPU ID: NORMAL
BUN SERPL-MCNC: 7 MG/DL (ref 7–18)
BUN/CREAT SERPL: 13 (ref 12–20)
CALCIUM SERPL-MCNC: 9 MG/DL (ref 8.5–10.1)
CALLED TO:,BCALL1: NORMAL
CHLORIDE SERPL-SCNC: 110 MMOL/L (ref 100–111)
CO2 SERPL-SCNC: 27 MMOL/L (ref 21–32)
CREAT SERPL-MCNC: 0.53 MG/DL (ref 0.6–1.3)
CROSSMATCH RESULT,%XM: NORMAL
DIFFERENTIAL METHOD BLD: ABNORMAL
EOSINOPHIL # BLD: 0.1 K/UL (ref 0–0.4)
EOSINOPHIL NFR BLD: 2 % (ref 0–5)
ERYTHROCYTE [DISTWIDTH] IN BLOOD BY AUTOMATED COUNT: 16.6 % (ref 11.6–14.5)
GLOBULIN SER CALC-MCNC: 3.9 G/DL (ref 2–4)
GLUCOSE BLD STRIP.AUTO-MCNC: 114 MG/DL (ref 70–110)
GLUCOSE BLD STRIP.AUTO-MCNC: 118 MG/DL (ref 70–110)
GLUCOSE BLD STRIP.AUTO-MCNC: 127 MG/DL (ref 70–110)
GLUCOSE BLD STRIP.AUTO-MCNC: 138 MG/DL (ref 70–110)
GLUCOSE SERPL-MCNC: 88 MG/DL (ref 74–99)
HCT VFR BLD AUTO: 24.7 % (ref 35–45)
HGB BLD-MCNC: 7.9 G/DL (ref 12–16)
IMM GRANULOCYTES # BLD AUTO: 0 K/UL (ref 0–0.04)
IMM GRANULOCYTES NFR BLD AUTO: 1 % (ref 0–0.5)
LYMPHOCYTES # BLD: 1.7 K/UL (ref 0.9–3.6)
LYMPHOCYTES NFR BLD: 23 % (ref 21–52)
MCH RBC QN AUTO: 24.2 PG (ref 24–34)
MCHC RBC AUTO-ENTMCNC: 32 G/DL (ref 31–37)
MCV RBC AUTO: 75.8 FL (ref 78–100)
MONOCYTES # BLD: 0.9 K/UL (ref 0.05–1.2)
MONOCYTES NFR BLD: 12 % (ref 3–10)
NEUTS SEG # BLD: 4.5 K/UL (ref 1.8–8)
NEUTS SEG NFR BLD: 62 % (ref 40–73)
NRBC # BLD: 0.12 K/UL (ref 0–0.01)
NRBC BLD-RTO: 1.7 PER 100 WBC
PLATELET # BLD AUTO: 415 K/UL (ref 135–420)
PMV BLD AUTO: 9.6 FL (ref 9.2–11.8)
POTASSIUM SERPL-SCNC: 4.3 MMOL/L (ref 3.5–5.5)
PROT SERPL-MCNC: 6.9 G/DL (ref 6.4–8.2)
RBC # BLD AUTO: 3.26 M/UL (ref 4.2–5.3)
SODIUM SERPL-SCNC: 142 MMOL/L (ref 136–145)
SPECIMEN EXP DATE BLD: NORMAL
STATUS OF UNIT,%ST: NORMAL
UNIT DIVISION, %UDIV: 0
WBC # BLD AUTO: 7.2 K/UL (ref 4.6–13.2)

## 2022-02-01 PROCEDURE — 85025 COMPLETE CBC W/AUTO DIFF WBC: CPT

## 2022-02-01 PROCEDURE — 36415 COLL VENOUS BLD VENIPUNCTURE: CPT

## 2022-02-01 PROCEDURE — 76450000000

## 2022-02-01 PROCEDURE — 74011250637 HC RX REV CODE- 250/637: Performed by: HOSPITALIST

## 2022-02-01 PROCEDURE — 82962 GLUCOSE BLOOD TEST: CPT

## 2022-02-01 PROCEDURE — 74011250637 HC RX REV CODE- 250/637: Performed by: INTERNAL MEDICINE

## 2022-02-01 PROCEDURE — 65270000029 HC RM PRIVATE

## 2022-02-01 PROCEDURE — 80053 COMPREHEN METABOLIC PANEL: CPT

## 2022-02-01 RX ORDER — SODIUM CHLORIDE 0.9 % (FLUSH) 0.9 %
5-40 SYRINGE (ML) INJECTION EVERY 8 HOURS
Status: CANCELLED | OUTPATIENT
Start: 2022-02-01

## 2022-02-01 RX ORDER — EPINEPHRINE 0.1 MG/ML
1 INJECTION INTRACARDIAC; INTRAVENOUS
Status: CANCELLED | OUTPATIENT
Start: 2022-02-01 | End: 2022-02-02

## 2022-02-01 RX ORDER — ATROPINE SULFATE 0.1 MG/ML
0.5 INJECTION INTRAVENOUS
Status: CANCELLED | OUTPATIENT
Start: 2022-02-01 | End: 2022-02-02

## 2022-02-01 RX ORDER — DEXTROMETHORPHAN/PSEUDOEPHED 2.5-7.5/.8
1.2 DROPS ORAL
Status: CANCELLED | OUTPATIENT
Start: 2022-02-01

## 2022-02-01 RX ORDER — SODIUM CHLORIDE 0.9 % (FLUSH) 0.9 %
5-40 SYRINGE (ML) INJECTION AS NEEDED
Status: CANCELLED | OUTPATIENT
Start: 2022-02-01

## 2022-02-01 RX ORDER — DIPHENHYDRAMINE HYDROCHLORIDE 50 MG/ML
50 INJECTION, SOLUTION INTRAMUSCULAR; INTRAVENOUS ONCE
Status: CANCELLED | OUTPATIENT
Start: 2022-02-01 | End: 2022-02-01

## 2022-02-01 RX ADMIN — ACETAMINOPHEN 650 MG: 325 TABLET ORAL at 22:09

## 2022-02-01 RX ADMIN — LOSARTAN POTASSIUM 100 MG: 50 TABLET, FILM COATED ORAL at 09:21

## 2022-02-01 RX ADMIN — AMLODIPINE BESYLATE 5 MG: 5 TABLET ORAL at 09:21

## 2022-02-01 RX ADMIN — ATORVASTATIN CALCIUM 20 MG: 20 TABLET, FILM COATED ORAL at 09:21

## 2022-02-01 NOTE — ROUTINE PROCESS
Bedside and Verbal shift change report given to Maddy Wolf (oncoming nurse) by Roman Barrera nurse). Report included the following information SBAR, Kardex and MAR.

## 2022-02-01 NOTE — PROGRESS NOTES
Patient is in the room on the phone with family. Patient denied any pain or discomfort. Patient is a+ox4. Patient is continent of B&B. Patient has a 20 in right arm which is patent. No s/s of infiltration or phlebitis. Patient accepted all medications without difficulty. Patient remains on droplet plus isolation. Call bell and personal items within reach.

## 2022-02-01 NOTE — PROGRESS NOTES
63 yo came for COVID and influenza illness (Left basilar streaky opacities, atelectasis versus atypical infection) but was found to be severely anemic at 5.9, Iron saturation 10 % with hem positive dark stools when it was 9.5 on Yury 10. A year ago her hgb was 5.3 and was never investigated. BUN and creatinine are normal. H Pylori serology requested and result is pending. She is on baby ASA. apparently she already had a negative colonoscopy 5 years ago. 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 We are going to do tomorrow afternoon an EGD, although this is not urgent but we would like to get to the bottom of this iron deficiency anemia. CT scan on May 27, 2021 was unremarkable. No acute intra-abdominal abnormality. 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

## 2022-02-01 NOTE — H&P
History & Physical    Patient: Omar Hagan MRN: [id number]  CSN: [id number]    YOB: 1945  Age: 64 y.o. Sex: female      DOA: 1/31/2022  Primary Care Provider:  Elizabeth Fajardo MD      Assessment/Plan     Patient Active Problem List   Diagnosis Code    Rheumatoid arthritis ([facility] M06.9    Hypertension I10    Diabetes mellitus ([facility] E11.9    Great toe amputation status, right [id number]    Chronic anemia D64.9    PVD (peripheral vascular disease) ([facility]) I73.9    CVA (cerebral vascular accident) ([facility] I63.9    Thrombocytosis D75.839    Forgetfulness R68.89    History of medication noncompliance Z91.14    Anemia D64.9    GI bleed K92.2       Admit to medical floor    Anemia-  Secondary to GI bleed  Blood transfusion ordered  Follow-up H&H    GI bleed-  Started on Protonix,  Clear liquid diet  Follow H. pylori  GI consulted  Plan for EGD    DM-  Started on sliding scale insulin    Rheumatoid arthritis-  Plaquenil and leflunomide on hold. Peripheral vascular disease    History of CVA-  On Plavix at home, now on hold due to GI bleed. Continue with statin    COVID-19 positive-  Patient is fully vaccinated, asymptomatic. Influenza A-  Unclear when her symptoms started. Will hold onto starting on Tamiflu. SCDs    Estimated length of stay : 2 to 3 days    CC: fatigue       HPI:     Omar Hagan is a 64 y.o. female with diabetes, hypertension, rheumatoid arthritis, CVA, peripheral vascular disease, chronic anemia presents to ER with concerns of not feeling well, fatigue, increased sleepiness. Patient reports that she has been feeling fatigued low energy for the past 1 week. She reports that she would get up in the morning and then again go back to sleep. She does complain of some epigastric pain that is intermittent. She denies any shortness of breath, fever, chills, headache, nausea, vomiting. She reports she regularly goes to fitness center. She drives to the fitness center. She had colonoscopy about 5 years ago. In ER she is noted to have H&H of 5.9/19.7. Her stool for occult blood positive. She tested positive for COVID-19 and influenza A. Past Medical History:   Diagnosis Date    Arthritis     Asthma     seasonal     Diabetes ([facility]     diet controlled    Diabetes mellitus ([facility]     Type 2    Hypertension     Macromastia     Hx of    Menopause     RA (rheumatoid arthritis) ([facility]     Rheumatoid arthritis(714.0)     TIA (transient ischemic attack) 2021    Ulcer of foot ([facility]     Right foot       Past Surgical History:   Procedure Laterality Date    HX BREAST REDUCTION Bilateral 2012    HX HYSTERECTOMY  1970    HX ORTHOPAEDIC  2009    Left hand surgery    HX OTHER SURGICAL      left shoulder abcess drained    HX PARTIAL HYSTERECTOMY         Family History   Problem Relation Age of Onset    Heart Disease Mother     Diabetes Mother     No Known Problems Father        Social History     Socioeconomic History    Marital status:    Tobacco Use    Smoking status: Former Smoker     Quit date: 1997     Years since quittin.3    Smokeless tobacco: Never Used   Vaping Use    Vaping Use: Never used   Substance and Sexual Activity    Alcohol use: No    Drug use: No       Prior to Admission medications    Medication Sig Start Date End Date Taking? Authorizing Provider   acetaminophen (TYLENOL) 500 mg tablet Take 1,000 mg by mouth two (2) times daily as needed for Pain. Yes Provider, Historical   traMADoL (ULTRAM) 50 mg tablet Take 100 mg by mouth every eight (8) hours as needed for Pain. Yes Provider, Historical   atorvastatin (LIPITOR) 20 mg tablet Take 4 Tabs by mouth nightly. Patient taking differently: Take 20 mg by mouth daily. 21  Yes Gadiel Linares MD   hydrOXYchloroQUINE (PLAQUENIL) 200 mg tablet 200 mg daily. Yes Other, MD Macarena   amLODIPine (NORVASC) 5 mg tablet 5 mg nightly.    Yes Other, MD Macarena   losartan (COZAAR) 100 mg tablet 100 mg nightly. Yes Macarena Chatterjee MD   ascorbic acid, vitamin C, (VITAMIN C) 250 mg tablet Take  by mouth daily. Yes Provider, Historical   multivitamin with minerals (MULTIVITAMIN & MINERAL FORMULA PO) Take 1 Tab by mouth daily. Macarena Chatterjee MD   budesonide-formoteroL (SYMBICORT) 160-4.5 mcg/actuation HFAA 1 Puff as needed. Macarena Chatterjee MD   cetirizine (ZYRTEC) 10 mg tablet cetirizine 10 mg tablet   TK 1 T PO QD UTD FOR 30 DAYS    Macarena Chatterjee MD   cholecalciferol (VITAMIN D3) 25 mcg (1,000 unit) cap Take  by mouth. Patient not taking: Reported on 1/31/2022    Macarena Chatterjee MD   folic acid (FOLVITE) 1 mg tablet Take  by mouth. Macarena Chatterjee MD   gabapentin (NEURONTIN) 100 mg capsule 300 mg three (3) times daily. 12/8/20   Macarena Chatterjee MD   hydroCHLOROthiazide (HYDRODIURIL) 25 mg tablet hydrochlorothiazide 25 mg tablet    Macarena Chatterjee MD   ipratropium (ATROVENT) 42 mcg (0.06 %) nasal spray as needed. Macarena Chatterjee MD   leflunomide (ARAVA) 10 mg tablet Take  by mouth. Macarena Chatterjee MD   oxybutynin (DITROPAN) 5 mg tablet 5 mg two (2) times a day. Macarena Chatterjee MD   pregabalin (LYRICA) 75 mg capsule pregabalin 75 mg capsule    Macarena Chatterjee MD   clopidogreL (PLAVIX) 75 mg tab Take 75 mg by mouth daily. Macarena Chatterjee MD   ferrous sulfate (IRON) 325 mg (65 mg iron) tablet Take 325 mg by mouth Daily (before breakfast). Provider, Historical       No Known Allergies    Review of Systems  Gen: see above  Heent: No headache, rhinorrhea, epistaxis, ear pain, hearing loss, sinus pain, neck pain/stiffness, sore throat. Heart: No chest pain, palpitations, JIMENEZ, pnd, or orthopnea. Resp: No cough, hemoptysis, wheezing and shortness of breath. GI: No nausea, vomiting, diarrhea, constipation, melena or hematochezia. : No urinary obstruction, dysuria or hematuria. Derm: No rash, new skin lesion or pruritis. Musc/skeletal: see above. Vasc: No edema, cyanosis or claudication. Endo:  No heat/cold intolerance, no polyuria,polydipsia or polyphagia. Neuro: No unilateral weakness, numbness, tingling. No seizures. Heme: No easy bruising or bleeding. Physical Exam:     Physical Exam:  Visit Vitals  BP (!) 153/67 (BP 1 Location: Right upper arm, BP Patient Position: Sitting)   Pulse 85   Temp 99 °F (37.2 °C)   Resp 16   Ht 5' 6\" (1.676 m)   Wt 63.5 kg (140 lb)   SpO2 97%   BMI 22.60 kg/m²      O2 Device: None (Room air)    Temp (24hrs), Av.4 °F (36.9 °C), Min:98 °F (36.7 °C), Max:99 °F (37.2 °C)    No intake/output data recorded.  0701 -  1900  In: 331.3   Out: -     General:  Awake, cooperative, no distress. Head:  Normocephalic, without obvious abnormality, atraumatic. Eyes:  Conjunctivae/corneas clear, sclera anicteric, PERRL, EOMs intact. Nose: Nares normal. No drainage or sinus tenderness. Throat: Lips, mucosa, and tongue normal.    Neck: Supple, symmetrical, trachea midline, no adenopathy. Lungs:   Clear to auscultation bilaterally. Heart:   S1, S2, no murmur, click, rub or gallop. Abdomen: Soft, non-tender. Bowel sounds normal. No masses,  No organomegaly. Extremities: Arthritic changes of fingers. Pulses: 2+ and symmetric all extremities. Skin: Skin color pink, turgor normal. No rashes or lesions   Neurologic: CNII-XII intact. No focal motor or sensory deficit.        Labs Reviewed:    CMP:   Lab Results   Component Value Date/Time     2022 12:55 PM    K 4.3 2022 12:55 PM     2022 12:55 PM    CO2 29 2022 12:55 PM    AGAP 6 2022 12:55 PM     (H) 2022 12:55 PM    BUN 12 2022 12:55 PM    CREA 0.63 2022 12:55 PM    GFRAA >60 2022 12:55 PM    GFRNA >60 2022 12:55 PM    CA 8.6 2022 12:55 PM    ALB 3.2 (L) 2022 12:55 PM    TP 6.7 2022 12:55 PM    GLOB 3.5 2022 12:55 PM    AGRAT 0.9 2022 12:55 PM    ALT 25 2022 12:55 PM     CBC:   Lab Results   Component Value Date/Time    WBC 7.5 01/31/2022 12:55 PM    HGB 7.8 (L) 01/31/2022 07:00 PM    HCT 25.2 (L) 01/31/2022 07:00 PM     (H) 01/31/2022 12:55 PM     All Cardiac Markers in the last 24 hours: No results found for: CPK, CK, CKMMB, CKMB, RCK3, CKMBT, CKNDX, CKND1, SARAH, TROPT, TROIQ, MONICA, TROPT, TNIPOC, BNP, BNPP      Procedures/imaging: see electronic medical records for all procedures/Xrays and details which were not copied into this note but were reviewed prior to creation of Plan    Please note that this dictation was completed with [employer], the [employer] voice recognition software. Quite often unanticipated grammatical, syntax, homophones, and other interpretive errors are inadvertently transcribed by the computer software. Please disregard these errors. Please excuse any errors that have escaped final proofreading.         CC: Ted Santiago MD

## 2022-02-01 NOTE — ACP (ADVANCE CARE PLANNING)
Advance Care Planning     General Advance Care Planning (ACP) Conversation    Date of Conversation: 2022  Conducted with: Patient with Decision Making Capacity    Healthcare Decision Maker:   No healthcare decision makers have been documented. Click here to complete [address] including selection of the Healthcare Decision Maker Relationship (ie \"Primary\")    Today we documented Decision Maker(s) consistent with Legal Next of Kin hierarchy. Content/Action Overview:   Has NO ACP documents/care preferences - information provided, considering goals and options  Reviewed DNR/DNI and patient elects Full Code (Attempt Resuscitation)    2022 1000 Seen today in room 312. Lying in bed with head of bed elevated/  Awake, alert. Oriented x 4. Respirations unlabored on room air. Able to speak in full  sentences. Pain -- denies. Came to the ED via POV from home on 2022 for fatigue and malaise. Labs reveal significant anemia, (H/H 5.9/19.7) and (+) for influenza A and COVID. She was admitted for GI bleed (transfusion with f/u labs, PPI, GI consultation, possible EGD)    PMH significant for RA, HTN, DM II, s/p great toe amputation, PVD, CVA    The palliative care team has been consulted for goals of care discussion    Introduced the role of palliative medicine for the hospitalized patient. Lives in a single family home with her son. Prior to admission, she was independent in ADLs. Uses nothing for ambulation assistance. Is a frequent user of a fitness facility. Asked if she had ever completed an AMD naming anyone to be her spokesperson if she is unable. She has three living children (one ). She does not have an AMD but is going to think about her options. Asked her thoughts about resuscitation. She saw her son die after CPR was administered (long history of kidney disease) and is pretty sure she would not want that. Also discussed intubation in the event of respiratory decline. She is not sure about that and wants to think about it more and speak with her children. Described the four components of cardiac resuscitation: compressions, medications, electric shocks, and intubation/ventilation. Reviewed benefits and burdens to include fractured ribs, punctured lungs, hypoxic brain injury, and long-term ventilation with need for discussion of tracheostomy or compassionate extubation. Will follow up tomorrow    Disposition plan: anticipate she will return home with family support. Palliative care will continue to follow Diego Levinver  and her family during her hospitalization and support them as they make healthcare decisions and define goals of care.       Dafne Lewis RN, MSN  Palliative Medicine  P: [phone]

## 2022-02-01 NOTE — PROGRESS NOTES
conducted an initial consultation and Spiritual Assessment for Sommer Horner, who is a 64 y.o.,female. Patient's Primary Language is: [place]. According to the patient's EMR Druze Affiliation is: Lexi Landaverde. The reason the Patient came to the hospital is:   Patient Active Problem List    Diagnosis Date Noted    Anemia 01/31/2022    GI bleed 01/31/2022    COVID-19 01/31/2022    Influenza A (H5N1) 01/31/2022    CVA (cerebral vascular accident) ([facility] 02/12/2021    Thrombocytosis 02/12/2021    Forgetfulness 02/12/2021    History of medication noncompliance 02/12/2021    Chronic anemia 12/24/2020    PVD (peripheral vascular disease) ([facility] 12/24/2020    Great toe amputation status, right 02/17/2019    Rheumatoid arthritis ([facility]     Hypertension     Diabetes mellitus ([facility]         The  provided the following Interventions:  Initiated a relationship of care and support. Explored issues of charlie, belief, spirituality and Sabianist/ritual needs while hospitalized. Listened empathically. Offered prayer and assurance of continued prayers on patient's behalf. The following outcomes where achieved:   confirmed Patient's Druze Affiliation. Patient processed feeling about current hospitalization. Patient expressed gratitude for 's visit. Assessment:  Patient does not have any Sabianist/cultural needs that will affect patient's preferences in health care. There are no spiritual or Sabianist issues which require intervention at this time. Plan:  Chaplains will continue to follow and will provide pastoral care on an as needed/requested basis.  recommends bedside caregivers page  on duty if patient shows signs of acute spiritual or emotional distress.     [address]

## 2022-02-01 NOTE — PROGRESS NOTES
Problem: Airway Clearance - Ineffective  Goal: Achieve or maintain patent airway  Outcome: Progressing Towards Goal     Problem: Gas Exchange - Impaired  Goal: Absence of hypoxia  Outcome: Progressing Towards Goal     Problem: Body Temperature -  Risk of, Imbalanced  Goal: Ability to maintain a body temperature within defined limits  Outcome: Progressing Towards Goal     Problem: Isolation Precautions - Risk of Spread of Infection  Goal: Prevent transmission of infectious organism to others  Outcome: Progressing Towards Goal     Problem: Nutrition Deficits  Goal: Optimize nutrtional status  Outcome: Progressing Towards Goal     Problem: Risk for Spread of Infection  Goal: Prevent transmission of infectious organism to others  Description: Prevent the transmission of infectious organisms to other patients, staff members, and visitors.   Outcome: Progressing Towards Goal

## 2022-02-01 NOTE — PROGRESS NOTES
Shift Summary:  Patient slept through the night. Lungs clear, but diminished bilaterally. O2 sats maintained at 94-97% on RA. Vital signs stable, afebrile. 0720 -    Bedside and Verbal shift change report given to CARMINA Diop RN (oncoming nurse) by Sabrina Ceja (offgoing nurse). Report included the following information SBAR, Kardex, Intake/Output and MAR.

## 2022-02-01 NOTE — PROGRESS NOTES
Comprehensive Nutrition Assessment    Type and Reason for Visit: Initial,Positive nutrition screen    Nutrition Recommendations/Plan: will order Ensure Clear TID. Advance diet as soon as clinically feasible as clear liquid diet cannot provide for pt estimated needs. Nutrition Assessment:  PMH: arthritis, asthma, DM, HTN, carmoastia, RA, TIA. Pt admit with c/o not feeling well, fatigue, low energy. Pt found to have low H&H, stool occult positive, pt tested positive for flu and covid 19 per MD note. Estimated Daily Nutrient Needs:  Energy (kcal): (P) 1470; Weight Used for Energy Requirements: (P) Current  Protein (g): (P) 76; Weight Used for Protein Requirements: (P) Current (1.2g/kg)  Fluid (ml/day): (P) 1470; Method Used for Fluid Requirements: (P) 1 ml/kcal      Nutrition Related Findings:  (P) Labs reviewed. Meds: Lispro. BM 1/31. No pressure area per documentation. PO intakes 25-50% x 1. Per chart reviewe pt appears to have had some wt loss in the last 4 months, approx 11%. Attempted to speak with pt, pt unavaiable at this time. Will follow up as time permits.       Wounds:    (P) None       Current Nutrition Therapies:  ADULT DIET Clear Liquid; pt would like vegetarian broth instead of chicken or beef    Anthropometric Measures:  · Height:  5' 6\" (167.6 cm)  · Current Body Wt:  63.5 kg (140 lb)   · Admission Body Wt:       · Usual Body Wt:  (P) 72.1 kg (159 lb) (10/2021)     · Ideal Body Wt:  130 lbs:  107.7 %   · BMI Category:  (P) Normal weight (BMI 22.0-24.9) age over 72       Nutrition Diagnosis:   · (P) Predicted inadequate energy intake related to (P) catabolic illness (and decrease appetite) as evidenced by (P) weight loss greater than or equal to 10% in 6 months    Nutrition Interventions:   Food and/or Nutrient Delivery: (P) Continue current diet,Start oral nutrition supplement  Nutrition Education and Counseling: (P) No recommendations at this time  Coordination of Nutrition Care: (P) Continue to monitor while inpatient    Goals:  (P) PO intakes of meals and supplements >50% throughout the next  4 days       Nutrition Monitoring and Evaluation:   Behavioral-Environmental Outcomes: (P) None identified  Food/Nutrient Intake Outcomes: (P) Food and nutrient intake,Supplement intake  Physical Signs/Symptoms Outcomes: (P) Biochemical data,GI status,Weight,Meal time behavior    Discharge Planning:    (P) No discharge needs at this time,Continue oral nutrition supplement,Continue current diet     Electronically signed by Ernesto Lam on 2/1/2022 at 10:57 AM

## 2022-02-02 LAB
ALBUMIN SERPL-MCNC: 2.9 G/DL (ref 3.4–5)
ALBUMIN/GLOB SERPL: 0.9 {RATIO} (ref 0.8–1.7)
ALP SERPL-CCNC: 52 U/L (ref 45–117)
ALT SERPL-CCNC: 22 U/L (ref 13–56)
ANION GAP SERPL CALC-SCNC: 7 MMOL/L (ref 3–18)
AST SERPL-CCNC: 37 U/L (ref 10–38)
BASOPHILS # BLD: 0 K/UL (ref 0–0.1)
BASOPHILS NFR BLD: 0 % (ref 0–2)
BILIRUB SERPL-MCNC: 0.3 MG/DL (ref 0.2–1)
BUN SERPL-MCNC: 5 MG/DL (ref 7–18)
BUN/CREAT SERPL: 10 (ref 12–20)
CALCIUM SERPL-MCNC: 8.2 MG/DL (ref 8.5–10.1)
CHLORIDE SERPL-SCNC: 108 MMOL/L (ref 100–111)
CO2 SERPL-SCNC: 27 MMOL/L (ref 21–32)
CREAT SERPL-MCNC: 0.52 MG/DL (ref 0.6–1.3)
DIFFERENTIAL METHOD BLD: ABNORMAL
EOSINOPHIL # BLD: 0.1 K/UL (ref 0–0.4)
EOSINOPHIL NFR BLD: 2 % (ref 0–5)
ERYTHROCYTE [DISTWIDTH] IN BLOOD BY AUTOMATED COUNT: 18.2 % (ref 11.6–14.5)
GLOBULIN SER CALC-MCNC: 3.3 G/DL (ref 2–4)
GLUCOSE BLD STRIP.AUTO-MCNC: 123 MG/DL (ref 70–110)
GLUCOSE BLD STRIP.AUTO-MCNC: 125 MG/DL (ref 70–110)
GLUCOSE BLD STRIP.AUTO-MCNC: 142 MG/DL (ref 70–110)
GLUCOSE BLD STRIP.AUTO-MCNC: 88 MG/DL (ref 70–110)
GLUCOSE SERPL-MCNC: 86 MG/DL (ref 74–99)
H PYLORI IGA SER-ACNC: <9 UNITS (ref 0–8.9)
H PYLORI IGG SER IA-ACNC: 0.26 INDEX VALUE (ref 0–0.79)
H PYLORI IGM SER-ACNC: <9 UNITS (ref 0–8.9)
HCT VFR BLD AUTO: 26.5 % (ref 35–45)
HGB BLD-MCNC: 8.1 G/DL (ref 12–16)
IMM GRANULOCYTES # BLD AUTO: 0 K/UL (ref 0–0.04)
IMM GRANULOCYTES NFR BLD AUTO: 1 % (ref 0–0.5)
LYMPHOCYTES # BLD: 1.5 K/UL (ref 0.9–3.6)
LYMPHOCYTES NFR BLD: 26 % (ref 21–52)
MCH RBC QN AUTO: 23 PG (ref 24–34)
MCHC RBC AUTO-ENTMCNC: 30.6 G/DL (ref 31–37)
MCV RBC AUTO: 75.3 FL (ref 78–100)
MONOCYTES # BLD: 0.9 K/UL (ref 0.05–1.2)
MONOCYTES NFR BLD: 15 % (ref 3–10)
NEUTS SEG # BLD: 3.3 K/UL (ref 1.8–8)
NEUTS SEG NFR BLD: 56 % (ref 40–73)
NRBC # BLD: 0.09 K/UL (ref 0–0.01)
NRBC BLD-RTO: 1.5 PER 100 WBC
PLATELET # BLD AUTO: 410 K/UL (ref 135–420)
PMV BLD AUTO: 11.7 FL (ref 9.2–11.8)
POTASSIUM SERPL-SCNC: 4.1 MMOL/L (ref 3.5–5.5)
PROT SERPL-MCNC: 6.2 G/DL (ref 6.4–8.2)
RBC # BLD AUTO: 3.52 M/UL (ref 4.2–5.3)
SODIUM SERPL-SCNC: 142 MMOL/L (ref 136–145)
WBC # BLD AUTO: 5.9 K/UL (ref 4.6–13.2)

## 2022-02-02 PROCEDURE — 0W3P8ZZ CONTROL BLEEDING IN GASTROINTESTINAL TRACT, VIA NATURAL OR ARTIFICIAL OPENING ENDOSCOPIC: ICD-10-PCS | Performed by: INTERNAL MEDICINE

## 2022-02-02 PROCEDURE — 74011250636 HC RX REV CODE- 250/636: Performed by: INTERNAL MEDICINE

## 2022-02-02 PROCEDURE — 77030040361 HC SLV COMPR DVT MDII -B: Performed by: INTERNAL MEDICINE

## 2022-02-02 PROCEDURE — 36415 COLL VENOUS BLD VENIPUNCTURE: CPT

## 2022-02-02 PROCEDURE — C9113 INJ PANTOPRAZOLE SODIUM, VIA: HCPCS | Performed by: HOSPITALIST

## 2022-02-02 PROCEDURE — 77010033678 HC OXYGEN DAILY

## 2022-02-02 PROCEDURE — 80053 COMPREHEN METABOLIC PANEL: CPT

## 2022-02-02 PROCEDURE — 76040000008: Performed by: INTERNAL MEDICINE

## 2022-02-02 PROCEDURE — G0500 MOD SEDAT ENDO SERVICE >5YRS: HCPCS | Performed by: INTERNAL MEDICINE

## 2022-02-02 PROCEDURE — 74011250637 HC RX REV CODE- 250/637: Performed by: HOSPITALIST

## 2022-02-02 PROCEDURE — 85025 COMPLETE CBC W/AUTO DIFF WBC: CPT

## 2022-02-02 PROCEDURE — 65270000029 HC RM PRIVATE

## 2022-02-02 PROCEDURE — 74011250636 HC RX REV CODE- 250/636: Performed by: HOSPITALIST

## 2022-02-02 PROCEDURE — 77030039961 HC KT CUST COLON BSC -D: Performed by: INTERNAL MEDICINE

## 2022-02-02 PROCEDURE — 2709999900 HC NON-CHARGEABLE SUPPLY: Performed by: INTERNAL MEDICINE

## 2022-02-02 PROCEDURE — 82962 GLUCOSE BLOOD TEST: CPT

## 2022-02-02 PROCEDURE — 74011000250 HC RX REV CODE- 250: Performed by: HOSPITALIST

## 2022-02-02 RX ORDER — GABAPENTIN 300 MG/1
300 CAPSULE ORAL 3 TIMES DAILY
Status: DISCONTINUED | OUTPATIENT
Start: 2022-02-02 | End: 2022-02-04 | Stop reason: HOSPADM

## 2022-02-02 RX ORDER — FENTANYL CITRATE 50 UG/ML
100 INJECTION, SOLUTION INTRAMUSCULAR; INTRAVENOUS
Status: DISCONTINUED | OUTPATIENT
Start: 2022-02-02 | End: 2022-02-02 | Stop reason: HOSPADM

## 2022-02-02 RX ORDER — MIDAZOLAM HYDROCHLORIDE 1 MG/ML
.25-5 INJECTION, SOLUTION INTRAMUSCULAR; INTRAVENOUS
Status: DISCONTINUED | OUTPATIENT
Start: 2022-02-02 | End: 2022-02-02 | Stop reason: HOSPADM

## 2022-02-02 RX ORDER — NALOXONE HYDROCHLORIDE 0.4 MG/ML
0.4 INJECTION, SOLUTION INTRAMUSCULAR; INTRAVENOUS; SUBCUTANEOUS
Status: DISCONTINUED | OUTPATIENT
Start: 2022-02-02 | End: 2022-02-02 | Stop reason: HOSPADM

## 2022-02-02 RX ORDER — LANOLIN ALCOHOL/MO/W.PET/CERES
325 CREAM (GRAM) TOPICAL
Status: DISCONTINUED | OUTPATIENT
Start: 2022-02-03 | End: 2022-02-03

## 2022-02-02 RX ORDER — SODIUM CHLORIDE 9 MG/ML
1000 INJECTION, SOLUTION INTRAVENOUS CONTINUOUS
Status: DISPENSED | OUTPATIENT
Start: 2022-02-02 | End: 2022-02-02

## 2022-02-02 RX ORDER — HYDROXYCHLOROQUINE SULFATE 200 MG/1
200 TABLET, FILM COATED ORAL DAILY
Status: DISCONTINUED | OUTPATIENT
Start: 2022-02-02 | End: 2022-02-04 | Stop reason: HOSPADM

## 2022-02-02 RX ORDER — OXYBUTYNIN CHLORIDE 5 MG/1
5 TABLET ORAL 2 TIMES DAILY
Status: DISCONTINUED | OUTPATIENT
Start: 2022-02-02 | End: 2022-02-04 | Stop reason: HOSPADM

## 2022-02-02 RX ORDER — FOLIC ACID 1 MG/1
1 TABLET ORAL DAILY
Status: DISCONTINUED | OUTPATIENT
Start: 2022-02-02 | End: 2022-02-04 | Stop reason: HOSPADM

## 2022-02-02 RX ORDER — FLUMAZENIL 0.1 MG/ML
0.2 INJECTION INTRAVENOUS
Status: DISCONTINUED | OUTPATIENT
Start: 2022-02-02 | End: 2022-02-02 | Stop reason: HOSPADM

## 2022-02-02 RX ORDER — SODIUM CHLORIDE 9 MG/ML
1000 INJECTION, SOLUTION INTRAVENOUS CONTINUOUS
Status: DISCONTINUED | OUTPATIENT
Start: 2022-02-02 | End: 2022-02-02 | Stop reason: HOSPADM

## 2022-02-02 RX ADMIN — SODIUM CHLORIDE 40 MG: 9 INJECTION, SOLUTION INTRAMUSCULAR; INTRAVENOUS; SUBCUTANEOUS at 13:33

## 2022-02-02 RX ADMIN — FOLIC ACID 1 MG: 1 TABLET ORAL at 13:33

## 2022-02-02 RX ADMIN — OXYBUTYNIN CHLORIDE 5 MG: 5 TABLET ORAL at 13:35

## 2022-02-02 RX ADMIN — HYDROXYCHLOROQUINE SULFATE 200 MG: 200 TABLET ORAL at 13:33

## 2022-02-02 RX ADMIN — LOSARTAN POTASSIUM 100 MG: 50 TABLET, FILM COATED ORAL at 09:25

## 2022-02-02 RX ADMIN — OXYBUTYNIN CHLORIDE 5 MG: 5 TABLET ORAL at 21:54

## 2022-02-02 RX ADMIN — ATORVASTATIN CALCIUM 20 MG: 20 TABLET, FILM COATED ORAL at 09:26

## 2022-02-02 RX ADMIN — GABAPENTIN 300 MG: 300 CAPSULE ORAL at 21:54

## 2022-02-02 RX ADMIN — GABAPENTIN 300 MG: 300 CAPSULE ORAL at 18:34

## 2022-02-02 RX ADMIN — AMLODIPINE BESYLATE 5 MG: 5 TABLET ORAL at 09:26

## 2022-02-02 NOTE — PROGRESS NOTES
Hospitalist Progress Note    Patient: Aron Duque MRN: [id number]  CSN: [id number]    YOB: 1945  Age: 64 y.o. Sex: female    DOA: 1/31/2022 LOS:  LOS: 2 days          Chief Complaint:    anemia      Assessment/Plan       64 y. o. female with diabetes, hypertension, rheumatoid arthritis, CVA, peripheral vascular disease, chronic anemia presents to ER with concerns of not feeling well, fatigue, increased sleepiness. In ER noted to have Hb of 5.9.     Anemia-  Secondary to GI bleed  Received Blood transfusion   H/H improved after blood transfusion.     GI bleed-  protonix ordered  Clear liquid diet  Follow H. pylori    Plan for EGD today     DM-type 2  Started on sliding scale insulin     Rheumatoid arthritis-  Plaquenil can resume after EGD  Hold arava     Peripheral vascular disease-hold her plavix for now     History of CVA-  On Plavix at home, now on hold due to GI bleed. Continue with statin     COVID-19 positive-  Patient is fully vaccinated, asymptomatic.     Influenza A-  Will hold onto starting on Tamiflu. No symptoms except fatigue  Uncertain when her sx concurrent with flu    EGD  Follow labs    Diet advancement as per GI         Disposition :  Patient Active Problem List   Diagnosis Code    Rheumatoid arthritis ([facility] M06.9    Hypertension I10    Diabetes mellitus ([facility] E11.9    Great toe amputation status, right [id number]    Chronic anemia D64.9    [facility]VD [facility] ([facility] I73.9    CVA (cerebral vascular accident) ([facility] I63.9    Thrombocytosis D75.839    Forgetfulness R68.89    History of medication noncompliance Z91.14    Anemia D64.9    GI bleed K92.2    COVID-19 U07.1    Influenza A (H5N1) J09. X2       Subjective:    I feel fine except just tired    Review of systems:    Constitutional: denies fevers, chills, myalgias  Respiratory: denies SOB, cough  Cardiovascular: denies chest pain  Gastrointestinal: denies nausea, vomiting, diarrhea      Vital signs/Intake and Output:  Visit Vitals  BP (!) 132/48 (BP 1 Location: Right upper arm, BP Patient Position: At rest;Lying)   Pulse 71   Temp 98.1 °F (36.7 °C)   Resp 16   Ht 5' 6\" (1.676 m)   Wt 63.5 kg (140 lb)   SpO2 100%   BMI 22.60 kg/m²     Current Shift:  No intake/output data recorded. Last three shifts:  01/31 1901 - 02/02 0700  In: 80 [P.O.:580]  Out: 800 [Urine:800]    Exam:    General: elderly thin AAF, alert, NAD, OX3  Head/Neck: NCAT, supple, No masses, No lymphadenopathy  CVS:Regular rate and rhythm, no M/R/G, S1/S2 heard, no thrill  Lungs:Clear to auscultation bilaterally, no wheezes, rhonchi, or rales  Abdomen: Soft, Nontender, No distention, Normal Bowel sounds  Extremities: No C/C/E, pulses palpable 2+  Neuro:grossly normal , follows commands  Psych:appropriate                Labs: Results:       Chemistry Recent Labs     02/02/22  0320 02/01/22  0319 01/31/22  1255   GLU 86 88 110*    142 141   K 4.1 4.3 4.3    110 106   CO2 27 27 29   BUN 5* 7 12   CREA 0.52* 0.53* 0.63   CA 8.2* 9.0 8.6   AGAP 7 5 6   BUCR 10* 13 19   AP 52 54 53   TP 6.2* 6.9 6.7   ALB 2.9* 3.0* 3.2*   GLOB 3.3 3.9 3.5   AGRAT 0.9 0.8 0.9      CBC w/Diff Recent Labs     02/02/22  0320 02/01/22  0319 01/31/22  1900 01/31/22  1255 01/31/22  1255   WBC 5.9 7.2  --   --  7.5   RBC 3.52* 3.26*  --   --  2.68*   HGB 8.1* 7.9* 7.8*   < > 5.9*   HCT 26.5* 24.7* 25.2*   < > 19.7*    415  --   --  430*   GRANS 56 62  --   --  73   LYMPH 26 23  --   --  16*   EOS 2 2  --   --  1    < > = values in this interval not displayed. Cardiac Enzymes No results for input(s): CPK, CKND1, SARAH in the last 72 hours. No lab exists for component: CKRMB, TROIP   Coagulation No results for input(s): PTP, INR, APTT, INREXT in the last 72 hours.     Lipid Panel No results found for: CHOL, CHOLPOCT, CHOLX, CHLST, CHOLV, 690871, HDL, HDLP, LDL, LDLC, DLDLP, 546831, VLDLC, VLDL, TGLX, TRIGL, TRIGP, TGLPOCT, CHHD, CHHDX   BNP No results for input(s): BNPP in the last 72 hours.    Liver Enzymes Recent Labs     02/02/22  0320   TP 6.2*   ALB 2.9*   AP 52      Thyroid Studies No results found for: T4, T3U, TSH, TSHEXT     Procedures/imaging: see electronic medical records for all procedures/Xrays and details which were not copied into this note but were reviewed prior to creation of Audra Mckenzie MD

## 2022-02-02 NOTE — PROGRESS NOTES
D/C Plan: Home with physician follow up vs [facility] and physician follow up     Noted plan for an EGD today. Please encourage ambulation or order therapy services as appropriate to assist with identifying potential transition of care needs. CM to continue to follow and assist.    Care Management Interventions  PCP Verified by CM:  Yes  Last Visit to PCP: 01/17/22  Transition of Care Consult (CM Consult): Discharge Planning  Support Systems: Child(jerri)  Confirm Follow Up Transport: Family  The Plan for Transition of Care is Related to the Following Treatment Goals :  (discharge home with physician follow up and family assistance)  Discharge Location  Patient Expects to be Discharged to[de-identified]  (discharge home with physician follow up and family assistance)

## 2022-02-02 NOTE — PROCEDURES
(EGD) Esophagogastroduodenoscopy (UPPER ENDOSCOPY) Procedure Note  [facility] [place]  __________________________________________________________________________________________________________________________      2/2/2022     Patient: Josee Mckenna YOB: 1945 Gender: female Age: 64 y.o. INDICATION: 63 yo female with PMH of diabetes, hypertension, rheumatoid arthritis on Plaquanil , HTN, CVA TIA on Plavix x one year and a half, peripheral vascular disease, chronic anemia presents to ER jan 31, 2022 with concerns of not feeling well, fatigue, increased sleepiness. Patient reports that she has been feeling fatigued low energy for the past 1 week. She reports that she would get up in the morning and then again go back to sleep. She does complain of some epigastric pain that is intermittent. She denies any shortness of breath, fever, chills, headache, nausea, vomiting. She reports she regularly goes to fitness center. She drives to the fitness center. She had colonoscopy about 5 years ago. CXR (Left basilar streaky opacities, atelectasis versus atypical infection) but was found to be positive  COVID , influenza illness and severely anemic at 5.9, Iron saturation 10 % retic count . 8 with hem positive dark stools when it was 9.5 on Yury 10. She received one blood transfusion and her Hgb is presently 8.6. A year ago on Dec 24, 2020 hgb was 5.3 then the occult blood in the stool was negative and received one blood transfusion but was never investigated. BUN and creatinine are normal. She admitted taking OTC Aleve daily for >3 years for her RA. H Pylori serology is negative. Apparently she already had an EGD and colonoscopy in 2016 where few small polyps were removed from the colon and was to repeat in 5 more years ago. She has occasional heartburns for which she takes Alkaselzer. She denied having abdominal pain, dyspepsia,  nausea, vomiting or dysphagia.  She lost 15 lbs over the last year without loss of appetite. No prior hx of surgery except for right Great toe amputation,  No fx hx of cancer. 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 We are going to do an EGD, to get to the bottom of this iron deficiency anemia. No smoking for 40 years. No prior hx of PUD. She lives with her son who brought her to the ER. CT scan on May 27, 2021 was unremarkable. No acute intra-abdominal abnormality. 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 : Renetta Fisher MD    Referring Provider:  Hiro Roberts MD    Sedation:  Versed 5 mg IV, Fentanyl 100 mcg IV Glucagon 1 mg IV    Procedure Details:  After infomed consent was obtained for the procedure, with all risks and benefits of procedure explained to the family the patient was taken to the endoscopy suite and placed in the left lateral decubitus position. Following sequential administration of sedation as per above, the endoscope was inserted into the mouth and advanced under direct vision to third portion of the duodenum. A careful inspection was made as the gastroscope was withdrawn, including a retroflexed view of the proximal stomach; findings and interventions are described below. OROPHARYNX: The vocal Cords and the larynx are normal.   ESOPHAGUS: The proximal, mid, and distal oesophagus are normal. The Z-Line is intact. 1 cm reducible Hiatal hernia. STOMACH: No evidence of blood, fluid or solid food retention.  The fundus on antegrade and retroflex views is normal. The cardia, body, lesser curvature, greater curvature, the antrum, and pylorus are normal. The gastric mucosa is normal.  DUODENUM: The bulb and area of the major papilla are normal. There are at least 5 small AVM lesions 3 to 4 mm in the  second portion of the duodenum and a tiny one in the third portion of the duodenum all cauterized with difficulty using APC with obliteration of all the visible lesions  PROXIMAL JEJUNUM:  Not examined. Therapies:  APC of 6 duodenal AVM lesions    Specimen: none           Complications:   None    EBL:  Negligible. IMPLANTS: * No implants in log *  IMPRESSION: 1 cm reducible Hiatal hernia. There are at least 5 small AVM lesions 3 to 4 mm in the second portion of the duodenum and a tiny one in the third portion of the duodenum all cauterized with difficulty using APC with obliteration of all the visible lesions          RECOMMENDATION:  May resume clear liquid diet. Avoid all NSAID's including the Aleve. Hold the Plavix for 5 more days. Need regular monitoring of the Hgb/ Hct, iron profile every 3 months. May need repeat EGD in case of re-bleeding or anemia. Start taking Protonix 40 mg daily for 30 days then as needed. Would need non urgently a colonoscopy as an out patient because of hx of small polyps. Recommend to give her iron IV infusions while in the hospital as oral iron would give dark or black stools and may make the distinction of  active bleeding more difficult to detect.     Assistant: None    --Uli Ford MD on 2/2/2022 at 4:32 PM

## 2022-02-02 NOTE — PROGRESS NOTES
Physician Progress Note      Dwayne LEDESMA #:                  [id number]  :                       1945  ADMIT DATE:       2022 12:34 PM  [address] [place] DATE:  RESPONDING  PROVIDER #:        Tim Barron MD        QUERY TEXT:    Type of Anemia: Please provide further specificity, if known. Clinical indicators include: gi bleed, gi bleed  blood transfusion, h&h, chronic anemia, occult blood, ferrous sulfate, iron, epistaxis, hemoptysis, melena, hematochezia, hematuria, bleeding, hgb, hct, hb, anemia, blood transfusion, h/h, rbc  Options provided:  -- Anemia due to acute blood loss  -- Anemia due to chronic blood loss  -- Anemia due to iron deficiency  -- Anemia due to postoperative blood loss  -- Anemia due to chronic disease  -- Other - I will add my own diagnosis  -- Disagree - Not applicable / Not valid  -- Disagree - Clinically Unable to determine / Unknown        PROVIDER RESPONSE TEXT:    The patient has anemia due to chronic blood loss.       Electronically signed by:  Tim Barron MD 2022 9:36 AM

## 2022-02-02 NOTE — PROGRESS NOTES
Hospitalist Progress Note    Patient: Daysi Shafer MRN: [id number]  CSN: [id number]    YOB: 1945  Age: 64 y.o. Sex: female    DOA: 1/31/2022 LOS:  LOS: 1 day                Assessment/Plan     Patient Active Problem List   Diagnosis Code    Rheumatoid arthritis ([facility] M06.9    Hypertension I10    Diabetes mellitus ([facility] E11.9    Great toe amputation status, right [id number]    Chronic anemia D64.9    PVD (peripheral vascular disease) ([facility]) I73.9    CVA (cerebral vascular accident) ([facility] I63.9    Thrombocytosis D75.839    Forgetfulness R68.89    History of medication noncompliance Z91.14    Anemia D64.9    GI bleed K92.2    COVID-19 U07.1    Influenza A (H5N1) J09. X2        Chief complaint :  Fatigue  64 y.o. female with diabetes, hypertension, rheumatoid arthritis, CVA, peripheral vascular disease, chronic anemia presents to ER with concerns of not feeling well, fatigue, increased sleepiness. In ER noted to have Hb of 5.9. Anemia-  Secondary to GI bleed  Received Blood transfusion   H/H improved after blood transfusion.     GI bleed-  continue on Protonix,  Clear liquid diet  Follow H. pylori  GI consulted  Plan for EGD     DM-  Started on sliding scale insulin     Rheumatoid arthritis-  Plaquenil and leflunomide on hold.     Peripheral vascular disease     History of CVA-  On Plavix at home, now on hold due to GI bleed. Continue with statin     COVID-19 positive-  Patient is fully vaccinated, asymptomatic.     Influenza A-  Unclear when her symptoms started. Will hold onto starting on Tamiflu.     SCDs    Disposition : 1-2 days    Review of systems  General: No fevers or chills. Cardiovascular: No chest pain or pressure. No palpitations. Pulmonary: No shortness of breath. Gastrointestinal: No nausea, vomiting. Physical Exam:  General: Awake, cooperative, no acute distress    HEENT: NC, Atraumatic. PERRLA, anicteric sclerae. Lungs: CTA Bilaterally.  No Wheezing/Rhonchi/Rales. Heart:  S1 S2,  No murmur, No Rubs, No Gallops  Abdomen: Soft, Non distended, Non tender.  +Bowel sounds,   Extremities: Arthritis changes on fingers. Psych:   Not anxious or agitated. Neurologic:  No acute neurological deficit. Vital signs/Intake and Output:  Visit Vitals  BP (!) 147/52 (BP 1 Location: Right upper arm, BP Patient Position: Sitting)   Pulse 87   Temp 99.3 °F (37.4 °C)   Resp 16   Ht 5' 6\" (1.676 m)   Wt 63.5 kg (140 lb)   SpO2 98%   BMI 22.60 kg/m²     Current Shift:  02/01 1901 - 02/02 0700  In: -   Out: 800 [Urine:800]  Last three shifts:  01/31 0701 - 02/01 1900  In: 811.3 [P.O.:480]  Out: -             Labs: Results:       Chemistry Recent Labs     02/01/22 0319 01/31/22  1255   GLU 88 110*    141   K 4.3 4.3    106   CO2 27 29   BUN 7 12   CREA 0.53* 0.63   CA 9.0 8.6   AGAP 5 6   BUCR 13 19   AP 54 53   TP 6.9 6.7   ALB 3.0* 3.2*   GLOB 3.9 3.5   AGRAT 0.8 0.9      CBC w/Diff Recent Labs     02/01/22 0319 01/31/22 1900 01/31/22  1255   WBC 7.2  --  7.5   RBC 3.26*  --  2.68*   HGB 7.9* 7.8* 5.9*   HCT 24.7* 25.2* 19.7*     --  430*   GRANS 62  --  73   LYMPH 23  --  16*   EOS 2  --  1      Cardiac Enzymes No results for input(s): CPK, CKND1, SARAH in the last 72 hours. No lab exists for component: CKRMB, TROIP   Coagulation No results for input(s): PTP, INR, APTT, INREXT in the last 72 hours. Lipid Panel No results found for: CHOL, CHOLPOCT, CHOLX, CHLST, CHOLV, 006436, HDL, HDLP, LDL, LDLC, DLDLP, 565266, VLDLC, VLDL, TGLX, TRIGL, TRIGP, TGLPOCT, CHHD, CHHDX   BNP No results for input(s): BNPP in the last 72 hours.    Liver Enzymes Recent Labs     02/01/22  0319   TP 6.9   ALB 3.0*   AP 54      Thyroid Studies No results found for: T4, T3U, TSH, TSHEXT     Procedures/imaging: see electronic medical records for all procedures/Xrays and details which were not copied into this note but were reviewed prior to creation of Plan

## 2022-02-02 NOTE — PROGRESS NOTES
1912 Assumed patient care at this time. Report received from Aicha Tomas RN.   [id number] Assessment completed. 2209 PRN Tylenol administered for a headache.     2322 Bedside and verbal shift change report given to [address] [place] [address] Drive  (oncoming nurse) by Loretta Deshpande RN (offgoing nurse). Report included the following information: SBAR, Kardex, MAR, and recent results.

## 2022-02-02 NOTE — PROGRESS NOTES
Problem: Airway Clearance - Ineffective  Goal: Achieve or maintain patent airway  Outcome: Progressing Towards Goal     Problem: Gas Exchange - Impaired  Goal: Absence of hypoxia  Outcome: Progressing Towards Goal     Problem: Breathing Pattern - Ineffective  Goal: Ability to achieve and maintain a regular respiratory rate  Outcome: Progressing Towards Goal     Problem: Body Temperature -  Risk of, Imbalanced  Goal: Ability to maintain a body temperature within defined limits  Outcome: Progressing Towards Goal     Problem: Isolation Precautions - Risk of Spread of Infection  Goal: Prevent transmission of infectious organism to others  Outcome: Progressing Towards Goal     Problem: Nutrition Deficits  Goal: Optimize nutrtional status  Outcome: Progressing Towards Goal     Problem: Risk for Spread of Infection  Goal: Prevent transmission of infectious organism to others  Description: Prevent the transmission of infectious organisms to other patients, staff members, and visitors. Outcome: Progressing Towards Goal     Problem: Falls - Risk of  Goal: *Absence of Falls  Description: Document Darlen Milton Fall Risk and appropriate interventions in the flowsheet.   Outcome: Progressing Towards Goal  Note: Fall Risk Interventions:            Medication Interventions: Evaluate medications/consider consulting pharmacy,Teach patient to arise slowly    Elimination Interventions: Call light in reach,Toilet paper/wipes in reach

## 2022-02-02 NOTE — PERIOP NOTES
Patient is alert and asking for dentures, patient placed dentures in mouth, patient's earrings and bracelet in place, IV fluids . 9 NS stopped and IV to left forearm's open ports capped with alcohol caps, patient educated she is a fall risk due to medications and to use call bell before getting out of bed, fall risk band applied. Report called to [facility]. Patient is alert and oriented sitting up in bed looking through her purse, patient received Fentanyl 100 mcg IV, Versed 5 mg IV and glucagon 1 mg IV.  Patient taken to room 312 via bed by this writer and tech

## 2022-02-02 NOTE — PERIOP NOTES
Patient received from the floor with a 20g (pink) in right forearm, IV is sluggish and positional. New IV started in left forearm, 22g (blue). Patient has a DNR in place, wants to suspend the DNR for procedure, addendum signed. Patient is wearing earrings and a bracelet on right arm, does not want to remove jewelry. Patients dentures removed and placed in purse.

## 2022-02-02 NOTE — PROGRESS NOTES
Palliative Medicine    CODE STATUS: DNR/DNI; limited interventions; okay for trial period of feeding tube    AMD Status: completed naming her daughter and son as her MPOA     2/2/2022 1340 Seen today in room 312 along with Melvin Finnegan NP. Sitting up in bed eating clear liquid diet. Awake, alert. Oriented x 4 Respirations unlimited on room air. Able to speak in full  sentences. Pain --denies     Patient completed an advance medical directive and POST today. Copy placed on chart for scanning into EMR and copy given to patient for personal records. Primary Decision Maker (Health Care Agent): Cleo Prescott  Relationship to patient: daughter  Phone number: [phone]  [x] Named in a scanned document   [] Legal Next of Kin  [] Guardian    Secondary Decision Maker ([place] [address] [address]): Sindy Mtz  Relationship to patient: son  Phone number:  [phone]  [x] Named in a scanned document   [] Legal Next of Kin  [] Guardian    ACP documents you currently have include:  [] Advance Directive or Living Will  [] Durable Do Not Resuscitate  [x] Physician Orders for Scope of Treatment (POST)  [x] Medical Power of   [] Other    Advanced Steps [facility] (Physician Orders for Scope of Treatment)    Participants:   [x] Patient    [] Healthcare agent (already designated in existing ACP document)    Name:     Relationship to Patient:    Phone number:   [] Other Surrogate Decision Maker / Next of [address]    Name:     Relationship to Patient:    Phone Number:   Advanced Steps® ACP Facilitator: Melvin Finnegan NP      Conversation Topics   Understanding of Medical Condition/s AND Potential Complications:  When it is my time to be with God, I am ready.     Lesson Belen from Experiences Related to Serious Illness: saw her son resuscitated unsuccessfully    Cardiopulmonary Resuscitation    Order Elected for CPR:  []  Attempt Resuscitation [x]  Do Not Attempt Resuscitation  When NOT in Cardiopulmonary Arrest, Order Elected:    [] Comfort Measures  [x] Limited Additional Interventions  [] Full Interventions  Artificially Administered Nutrition, Order Elected:  [] No Feeding Tube   [x] Feeding Tube for a defined trial period  [] Feeding Tube long-term if indicated  The following was provided (check all that apply):     [] Review of existing Advance Directive  [x] Assistance with Completion of New Advance Directive   [x] Review of [place] POST Form       Meeting Outcomes:   [x] ACP discussion completed   [x] [facility] completed  [x] Abdoulaye prepared for Provider review and signature   [x] Original placed on Chart, if in facility (form to be sent with patient at discharge)  [x] Copy given to healthcare agent    [x] Copy scanned to electronic medical record    After meeting with patient , the goals of care have been defined. Code status remains: DNR/DNI; limited interventions; accepts feeding tube for short period  AMD status: on file naming her son and daughter as her MPOA Will sign off but remain available for reconsult as needed/if appropriate.      Thank you for the Palliative Medicine consult and allowing us to participate in the care of Rajan Mendoza RN, MSN  Palliative Medicine     [phone]

## 2022-02-02 NOTE — PROGRESS NOTES
Patient is currently off of the floor having EGD performed. Patient denied any pain or discomfort today. Patient accepted all medications without difficulty. Patient is a+ox4 and very pleasant. Patient is continent of B&B and. Patient has a 20 in right arm which is patent. No s/s of infiltration or phlebitis. Blood sugars have been WNL. Patient remains on droplet plus isolation.

## 2022-02-02 NOTE — ROUTINE PROCESS
Bedside and Verbal shift change report given to Yuri Hannah RN (oncoming nurse) by Juan Manuel Medina RN (offgoing nurse). Report included the following information SBAR and Kardex.

## 2022-02-02 NOTE — PALLIATIVE CARE
[facility]  Good Help to Those in Need  [phone]    Consult Note  Patient Name: Soanl Bryan  YOB: 1945  Age: 64 y.o. Date of Visit: 02/02/22  Facility of Care: [facility]  Patient Room: 312/01     Attending: Sandi Tellez MD   Diagnosis: Anemia [D64.9]    Palliative team including this NP and Milagro Pitts RN in to see the patient at the bedside. Discussion of goals of care and the burdens and benefits of CPR and Intubation. Pt is clear that at this time she would not want those measures taken. She has completed a POST for DNR/DNI with limited feeding tube if needed. Also AMD was completed. Orders have been updated and documents placed in chart for scanning. All medical team have been updated. Palliative medicine will sign off at this time as the goals of care have been documented and established. Please feel free to reconsult if there are further needs for our services. Thank you for the opportunity to assist in the care of this patient and their family. Please contact me at [phone] if you have any further questions.      TELLO Ruiz  Palliative Medicine   [place], [place]

## 2022-02-03 LAB
ALBUMIN SERPL-MCNC: 2.8 G/DL (ref 3.4–5)
ALBUMIN/GLOB SERPL: 0.8 {RATIO} (ref 0.8–1.7)
ALP SERPL-CCNC: 52 U/L (ref 45–117)
ALT SERPL-CCNC: 20 U/L (ref 13–56)
ANION GAP SERPL CALC-SCNC: 4 MMOL/L (ref 3–18)
AST SERPL-CCNC: 29 U/L (ref 10–38)
BASOPHILS # BLD: 0 K/UL (ref 0–0.1)
BASOPHILS NFR BLD: 0 % (ref 0–2)
BILIRUB SERPL-MCNC: 0.3 MG/DL (ref 0.2–1)
BUN SERPL-MCNC: 6 MG/DL (ref 7–18)
BUN/CREAT SERPL: 9 (ref 12–20)
CALCIUM SERPL-MCNC: 8.3 MG/DL (ref 8.5–10.1)
CHLORIDE SERPL-SCNC: 112 MMOL/L (ref 100–111)
CO2 SERPL-SCNC: 28 MMOL/L (ref 21–32)
CREAT SERPL-MCNC: 0.67 MG/DL (ref 0.6–1.3)
DIFFERENTIAL METHOD BLD: ABNORMAL
EOSINOPHIL # BLD: 0.2 K/UL (ref 0–0.4)
EOSINOPHIL NFR BLD: 4 % (ref 0–5)
ERYTHROCYTE [DISTWIDTH] IN BLOOD BY AUTOMATED COUNT: 19.1 % (ref 11.6–14.5)
GLOBULIN SER CALC-MCNC: 3.7 G/DL (ref 2–4)
GLUCOSE BLD STRIP.AUTO-MCNC: 107 MG/DL (ref 70–110)
GLUCOSE BLD STRIP.AUTO-MCNC: 113 MG/DL (ref 70–110)
GLUCOSE BLD STRIP.AUTO-MCNC: 118 MG/DL (ref 70–110)
GLUCOSE BLD STRIP.AUTO-MCNC: 98 MG/DL (ref 70–110)
GLUCOSE SERPL-MCNC: 88 MG/DL (ref 74–99)
HCT VFR BLD AUTO: 26.8 % (ref 35–45)
HGB BLD-MCNC: 8.2 G/DL (ref 12–16)
IMM GRANULOCYTES # BLD AUTO: 0 K/UL (ref 0–0.04)
IMM GRANULOCYTES NFR BLD AUTO: 1 % (ref 0–0.5)
LYMPHOCYTES # BLD: 1.5 K/UL (ref 0.9–3.6)
LYMPHOCYTES NFR BLD: 23 % (ref 21–52)
MCH RBC QN AUTO: 23.3 PG (ref 24–34)
MCHC RBC AUTO-ENTMCNC: 30.6 G/DL (ref 31–37)
MCV RBC AUTO: 76.1 FL (ref 78–100)
MONOCYTES # BLD: 0.8 K/UL (ref 0.05–1.2)
MONOCYTES NFR BLD: 12 % (ref 3–10)
NEUTS SEG # BLD: 4 K/UL (ref 1.8–8)
NEUTS SEG NFR BLD: 61 % (ref 40–73)
NRBC # BLD: 0.07 K/UL (ref 0–0.01)
NRBC BLD-RTO: 1.1 PER 100 WBC
PLATELET # BLD AUTO: 505 K/UL (ref 135–420)
PMV BLD AUTO: 9.5 FL (ref 9.2–11.8)
POTASSIUM SERPL-SCNC: 4 MMOL/L (ref 3.5–5.5)
PROT SERPL-MCNC: 6.5 G/DL (ref 6.4–8.2)
RBC # BLD AUTO: 3.52 M/UL (ref 4.2–5.3)
SODIUM SERPL-SCNC: 144 MMOL/L (ref 136–145)
WBC # BLD AUTO: 6.5 K/UL (ref 4.6–13.2)

## 2022-02-03 PROCEDURE — 85025 COMPLETE CBC W/AUTO DIFF WBC: CPT

## 2022-02-03 PROCEDURE — 74011250637 HC RX REV CODE- 250/637: Performed by: HOSPITALIST

## 2022-02-03 PROCEDURE — 65270000029 HC RM PRIVATE

## 2022-02-03 PROCEDURE — 82962 GLUCOSE BLOOD TEST: CPT

## 2022-02-03 PROCEDURE — 80053 COMPREHEN METABOLIC PANEL: CPT

## 2022-02-03 PROCEDURE — 74011250637 HC RX REV CODE- 250/637: Performed by: INTERNAL MEDICINE

## 2022-02-03 PROCEDURE — 74011250636 HC RX REV CODE- 250/636: Performed by: HOSPITALIST

## 2022-02-03 PROCEDURE — 36415 COLL VENOUS BLD VENIPUNCTURE: CPT

## 2022-02-03 RX ORDER — GUAIFENESIN 100 MG/5ML
100 SOLUTION ORAL
Status: DISCONTINUED | OUTPATIENT
Start: 2022-02-03 | End: 2022-02-04 | Stop reason: HOSPADM

## 2022-02-03 RX ORDER — PANTOPRAZOLE SODIUM 40 MG/1
40 TABLET, DELAYED RELEASE ORAL
Status: DISCONTINUED | OUTPATIENT
Start: 2022-02-03 | End: 2022-02-04 | Stop reason: HOSPADM

## 2022-02-03 RX ADMIN — OXYBUTYNIN CHLORIDE 5 MG: 5 TABLET ORAL at 22:05

## 2022-02-03 RX ADMIN — ATORVASTATIN CALCIUM 20 MG: 20 TABLET, FILM COATED ORAL at 09:57

## 2022-02-03 RX ADMIN — AZITHROMYCIN MONOHYDRATE 500 MG: 500 INJECTION, POWDER, LYOPHILIZED, FOR SOLUTION INTRAVENOUS at 11:54

## 2022-02-03 RX ADMIN — GABAPENTIN 300 MG: 300 CAPSULE ORAL at 16:58

## 2022-02-03 RX ADMIN — AMLODIPINE BESYLATE 5 MG: 5 TABLET ORAL at 09:56

## 2022-02-03 RX ADMIN — GABAPENTIN 300 MG: 300 CAPSULE ORAL at 09:56

## 2022-02-03 RX ADMIN — IRON SUCROSE 200 MG: 20 INJECTION, SOLUTION INTRAVENOUS at 11:46

## 2022-02-03 RX ADMIN — OXYBUTYNIN CHLORIDE 5 MG: 5 TABLET ORAL at 09:57

## 2022-02-03 RX ADMIN — ACETAMINOPHEN 650 MG: 325 TABLET ORAL at 12:11

## 2022-02-03 RX ADMIN — GABAPENTIN 300 MG: 300 CAPSULE ORAL at 22:05

## 2022-02-03 RX ADMIN — PANTOPRAZOLE SODIUM 40 MG: 40 TABLET, DELAYED RELEASE ORAL at 09:56

## 2022-02-03 RX ADMIN — HYDROXYCHLOROQUINE SULFATE 200 MG: 200 TABLET ORAL at 09:57

## 2022-02-03 RX ADMIN — FOLIC ACID 1 MG: 1 TABLET ORAL at 09:56

## 2022-02-03 NOTE — PROGRESS NOTES
1900 Assumed patient care at this time. Report received from Mimi Dietrich RN and Markie Fernández RN.   1547 Assessment completed. Patient drowsy but woke for assessment, vital signs, and blood sugar check. Took pills. Bedside and verbal shift change report given to Abhishek Vallejo RN (oncoming nurse) by Mendoza Clifford RN (offgoing nurse). Report included the following information: SBAR, Kardex, MAR, and recent results.

## 2022-02-03 NOTE — CONSULTS
[id number] [facility]    Name:  Alexa Alejandre  MR#:   [id number]  :  1945  ACCOUNT #:  [de-identified]  DATE OF SERVICE:  2022     HISTORY OF PRESENT ILLNESS:  This is a 24-year-old female, who came to the emergency room two days ago because of feeling fatigued with low energy in the last week. She denies having any shortness of breath or chest pain, but she was found to have severe anemia at 5.6 with heme-positive dark stools. Reticulocyte count was 0.8. Her iron saturation was 10%. Her hemoglobin was 9.5 on 01/10. Presently, it is 8.6 after one blood transfusion. On 2020, her hemoglobin was also very low at 5.3 with negative occult blood in the stools at that time. She required one blood transfusion, but never got investigated. Her BUN and creatinine were normal.  She admitted taking Aleve daily for the past 3-4 years for rheumatoid arthritis. She also has been, for the past year and a half, on Plavix because of TIA and peripheral vascular disease. She denies any dyspepsia, nausea, vomiting, or dysphagia. She denies also having any abdominal pain. No change in bowel habits. She has regular bowel movements once or twice daily. The stool has been dark in the last month, but never had any true melena or rectal bleeding. She was told, in the past, to be anemic, but this was not investigated. Apparently, she had an EGD and colonoscopy five years ago somewhere in [facility], but not at this hospital.  She had a couple of colonic polyps removed and was told to repeat her colonoscopy in five more years. She was told that her upper endoscopy was negative. She claimed that she has very seldom heartburns for which she takes Darling-Elaine. She has no prior history of peptic ulcer disease. She has no family history of cancer, never had any abdominal surgery. She has only a right great toe amputation, possibly for peripheral vascular disease.   She has been borderline diabetic. She has hypertension; rheumatoid arthritis for many years, treated with Plaquenil; had a TIA a year and a half ago. She claimed that she lost 15 pounds in the last year despite a relatively good appetite. She goes to the gym on a regular basis, three times a week. She lives with her son. CT scan of the abdomen on 05/27/2021, was remarkable for infrarenal aortic focal ectasia measuring 2.8 cm with penetrating atherosclerotic ulcers. She has no coronary artery disease, no history of myocardial infarction or syncopal episodes. She tested positive for COVID and influenza, but never had any sore throat, runny nose, or coughing. No shortness of breath. Her chest x-ray revealed that she has left basilar streaky opacity that can be atelectasis versus atypical infection. ALLERGIES:  NO KNOWN DRUG ALLERGIES. MEDICATIONS AT HOME:  1. Tylenol 500.  2.  Ultram 50.  3.  Lipitor 20.  4.  Plaquenil 200.  5.  Norvasc 5.  6.  Cozaar 100.  7.  Vitamin C 250.  8.  Symbicort 160/4.5. 9.  Zyrtec 10 mg. 10.  Vitamin D3 at 1000 units. 11.  Folvite 1 mg. 12.  Neurontin 100 mg three tablets t.i.d.  13.  HydroDIURIL 25.  14.  Atrovent inhalation. [address] or leflunomide 10 mg. 16.  Ditropan 5 mg b.i.d. 17.  Lyrica 75 mg. 18.  Plavix 75.  19.  Iron sulfate 325. SOCIAL HISTORY:  She does not smoke or abuse alcohol. PHYSICAL EXAMINATION:  GENERAL:  We have a 70-year-old [facility] American female, who appears to be in no distress. She was quite alert and oriented, looks younger than her true age. VITAL SIGNS:  She weighs 140 pounds. Temperature 98.8, pulse 87, blood pressure 127/61, breathing 18, saturation 99% to 100% on room air. SKIN:  Normal.  No evidence of any rash. HEENT:  Eyes are remarkable for pale conjunctivae. The sclerae are anicteric. The pupils are equal and reactive to light. Oropharyngeal cavity; she has upper and lower dentures. Mucous membranes are moist and pale.   NECK: Supple. No palpable mass or enlarged thyroid. LUNGS:  Clear to auscultation. HEART:  The cardiac rhythm is regular. S1, S2 normal.  No murmur. ABDOMEN:  Not distended, soft, nontender. No mass or organomegaly. Bowel sounds are normal.  EXTREMITIES:  Remarkable for significant finger deformity from rheumatoid arthritis. No leg edema. NEUROLOGIC:  She is alert and oriented. Moves all her four extremities. LABORATORY DATA:  Blood test as stated above. Hemoglobin is presently 8.1, low MCV and MCH. Normal platelets, WBC, and differential.  Complete metabolic panel was also normal with normal LFTs. Iron saturation 10. Vitamin Q35 and folic acid normal.  CRP less than 0.3. Occult blood in the stools negative. ASSESSMENT:  In conclusion, this is a 18-year-old female, who is on Plavix for a year and a half and who has been taking Aleve daily for rheumatoid arthritis for the past three years, presents with reoccurring severe heme-positive iron deficiency anemia. She already had an esophagogastroduodenoscopy  and colonoscopy about five years ago where only a couple of small polyps were removed. 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 We are going to do, for that reason, an upper endoscopy to check this further. There is also a possibility that she may have an NSAID's induced ulcer where, in her situation, she should not be taking any in the setting of Plavix and anemia. I explained to the patient the procedure of upper endoscopy and the risks involved, which include, but not limited to, bleeding, perforation, or not being able to find the source of her bleeding where she might require to have other procedures. Eventually, she needs to have a followup colonoscopy, but not urgently.   Currently, the patient is positive for COVID; therefore, we should limit the amount of unnecessary exposure at this hospital.    Further notes will follow. MD KAYE Araya/[email or web address]  D:  02/02/2022 18:00  T:  02/02/2022 22:47  JOB #:  [id number]  CC: Elyssa Astudillo MD

## 2022-02-03 NOTE — PROGRESS NOTES
4856 Patient in bed this time, AM medications tolerated well. A/O x 4. Lungs clear, abdomen soft and non-distended. Bowel sounds active, 20 G IV to right AC, capped. No signs of phlebitis or infiltration noted. Skin warm ,dry and intact. Patient denies pain or discomfort. Bed placed in lowest position, call bell within reach.  Tele box 46, NSR

## 2022-02-03 NOTE — PROGRESS NOTES
Hospitalist Progress Note    Patient: Elyssa Zacarias MRN: [id number]  CSN: [id number]    YOB: 1945  Age: 64 y.o. Sex: female    DOA: 1/31/2022 LOS:  LOS: 3 days          Chief Complaint:    anemia      Assessment/Plan     64 y. o. female with diabetes, hypertension, rheumatoid arthritis, CVA, peripheral vascular disease, chronic anemia presents to ER with concerns of not feeling well, fatigue, increased sleepiness. In ER noted to have Hb of 5.9.     Anemia-ac blood loss on chronic iron deficiency  Secondary to GI bleed  Received Blood transfusion   H/H improved after blood transfusion. IV iron     GI bleed-  Due to AVM's, treated by GI  h pylori negative  PPI can be PO     DM-type 2  Started on sliding scale insulin     Rheumatoid arthritis  Plaquenil can resume on d/c  Hold arava     Peripheral vascular disease-hold her plavix for now     History of CVA-  On Plavix at home, now on hold due to GI bleed. HOLD PLAVIX 5 DAYS  Continue with statin     COVID-19 positive-  Patient is fully vaccinated, asymptomatic.     Influenza A-  Will hold onto starting on Tamiflu. No symptoms except fatigue  Uncertain when her sx concurrent with flu    Cough, mild=add zmax X 5 days for URI      Advance diet if ok with GI    PT consult    D/c tomorrow possible     Disposition :  Patient Active Problem List   Diagnosis Code    Rheumatoid arthritis ([facility] [address] M06.9    Hypertension I10    Diabetes mellitus ([facility] [address] E11.9    Great toe amputation status, right [id number]    Chronic anemia D64.9    PVD (peripheral vascular disease) ([facility]) I73.9    CVA (cerebral vascular accident) ([facility] [address] I63.9    Thrombocytosis D75.839    Forgetfulness R68.89    History of medication noncompliance Z91.14    Anemia D64.9    GI bleed K92.2    COVID-19 U07.1    Influenza A (H5N1) J09. X2       Subjective:    I have a dry cough, mild  No CP, SOB  Wants to have more to eat  No abd pain    Review of systems:    Constitutional: denies fevers, chills  Respiratory: denies SOB,   Cardiovascular: denies chest pain  Gastrointestinal: denies nausea, vomiting, diarrhea      Vital signs/Intake and Output:  Visit Vitals  BP (!) 149/61 (BP 1 Location: Right upper arm, BP Patient Position: At rest)   Pulse 77   Temp 97.1 °F (36.2 °C)   Resp 15   Ht 5' 6\" (1.676 m)   Wt 64.9 kg (143 lb)   SpO2 94%   BMI 23.08 kg/m²     Current Shift:  No intake/output data recorded. Last three shifts:  02/01 1901 - 02/03 0700  In: 1050 [P.O.:500; I.V.:550]  Out: 1300 [Urine:1300]    Exam:    General: eldelry thin AAF, alert, NAD, OX3  CVS:Regular rate and rhythm, no M/R/G, S1/S2 heard, no thrill  Lungs:Clear to auscultation bilaterally, no wheezes, rhonchi, or rales  Abdomen: Soft, Nontender, No distention, Normal Bowel sounds  Extremities: No C/C/E, pulses palpable 2+  Neuro:grossly normal , follows commands  Psych:appropriate                Labs: Results:       Chemistry Recent Labs     02/03/22 0357 02/02/22 0320 02/01/22 0319   GLU 88 86 88    142 142   K 4.0 4.1 4.3   * 108 110   CO2 28 27 27   BUN 6* 5* 7   CREA 0.67 0.52* 0.53*   CA 8.3* 8.2* 9.0   AGAP 4 7 5   BUCR 9* 10* 13   AP 52 52 54   TP 6.5 6.2* 6.9   ALB 2.8* 2.9* 3.0*   GLOB 3.7 3.3 3.9   AGRAT 0.8 0.9 0.8      CBC w/Diff Recent Labs     02/03/22 0357 02/02/22 0320 02/01/22 0319   WBC 6.5 5.9 7.2   RBC 3.52* 3.52* 3.26*   HGB 8.2* 8.1* 7.9*   HCT 26.8* 26.5* 24.7*   * 410 415   GRANS 61 56 62   LYMPH 23 26 23   EOS 4 2 2      Cardiac Enzymes No results for input(s): CPK, CKND1, SARAH in the last 72 hours. No lab exists for component: CKRMB, TROIP   Coagulation No results for input(s): PTP, INR, APTT, INREXT in the last 72 hours. Lipid Panel No results found for: CHOL, CHOLPOCT, CHOLX, CHLST, CHOLV, 844002, HDL, HDLP, LDL, LDLC, DLDLP, 234666, VLDLC, VLDL, TGLX, TRIGL, TRIGP, TGLPOCT, CHHD, CHHDX   BNP No results for input(s): BNPP in the last 72 hours.    Liver Enzymes Recent Labs 02/03/22  0357   TP 6.5   ALB 2.8*   AP 52      Thyroid Studies No results found for: T4, T3U, TSH, TSHEXT     Procedures/imaging: see electronic medical records for all procedures/Xrays and details which were not copied into this note but were reviewed prior to creation of Chris Pitt MD

## 2022-02-04 VITALS
HEART RATE: 76 BPM | BODY MASS INDEX: 22.98 KG/M2 | HEIGHT: 66 IN | OXYGEN SATURATION: 97 % | RESPIRATION RATE: 17 BRPM | SYSTOLIC BLOOD PRESSURE: 119 MMHG | WEIGHT: 143 LBS | TEMPERATURE: 98.6 F | DIASTOLIC BLOOD PRESSURE: 49 MMHG

## 2022-02-04 LAB
ALBUMIN SERPL-MCNC: 2.8 G/DL (ref 3.4–5)
ALBUMIN/GLOB SERPL: 0.9 {RATIO} (ref 0.8–1.7)
ALP SERPL-CCNC: 53 U/L (ref 45–117)
ALT SERPL-CCNC: 19 U/L (ref 13–56)
ANION GAP SERPL CALC-SCNC: 8 MMOL/L (ref 3–18)
AST SERPL-CCNC: 29 U/L (ref 10–38)
BASOPHILS # BLD: 0 K/UL (ref 0–0.1)
BASOPHILS NFR BLD: 0 % (ref 0–2)
BILIRUB SERPL-MCNC: 0.5 MG/DL (ref 0.2–1)
BUN SERPL-MCNC: 7 MG/DL (ref 7–18)
BUN/CREAT SERPL: 11 (ref 12–20)
CALCIUM SERPL-MCNC: 7.9 MG/DL (ref 8.5–10.1)
CHLORIDE SERPL-SCNC: 111 MMOL/L (ref 100–111)
CO2 SERPL-SCNC: 23 MMOL/L (ref 21–32)
CREAT SERPL-MCNC: 0.64 MG/DL (ref 0.6–1.3)
DIFFERENTIAL METHOD BLD: ABNORMAL
EOSINOPHIL # BLD: 0.4 K/UL (ref 0–0.4)
EOSINOPHIL NFR BLD: 5 % (ref 0–5)
ERYTHROCYTE [DISTWIDTH] IN BLOOD BY AUTOMATED COUNT: 19 % (ref 11.6–14.5)
GLOBULIN SER CALC-MCNC: 3.1 G/DL (ref 2–4)
GLUCOSE BLD STRIP.AUTO-MCNC: 98 MG/DL (ref 70–110)
GLUCOSE SERPL-MCNC: 90 MG/DL (ref 74–99)
HCT VFR BLD AUTO: 25.1 % (ref 35–45)
HGB BLD-MCNC: 7.6 G/DL (ref 12–16)
IMM GRANULOCYTES # BLD AUTO: 0 K/UL (ref 0–0.04)
IMM GRANULOCYTES NFR BLD AUTO: 0 % (ref 0–0.5)
LYMPHOCYTES # BLD: 1.5 K/UL (ref 0.9–3.6)
LYMPHOCYTES NFR BLD: 20 % (ref 21–52)
MCH RBC QN AUTO: 23.2 PG (ref 24–34)
MCHC RBC AUTO-ENTMCNC: 30.3 G/DL (ref 31–37)
MCV RBC AUTO: 76.5 FL (ref 78–100)
MONOCYTES # BLD: 0.8 K/UL (ref 0.05–1.2)
MONOCYTES NFR BLD: 11 % (ref 3–10)
NEUTS SEG # BLD: 5 K/UL (ref 1.8–8)
NEUTS SEG NFR BLD: 64 % (ref 40–73)
NRBC # BLD: 0.07 K/UL (ref 0–0.01)
NRBC BLD-RTO: 0.9 PER 100 WBC
PLATELET # BLD AUTO: 510 K/UL (ref 135–420)
PMV BLD AUTO: 10.1 FL (ref 9.2–11.8)
POTASSIUM SERPL-SCNC: 4.5 MMOL/L (ref 3.5–5.5)
PROT SERPL-MCNC: 5.9 G/DL (ref 6.4–8.2)
RBC # BLD AUTO: 3.28 M/UL (ref 4.2–5.3)
SODIUM SERPL-SCNC: 142 MMOL/L (ref 136–145)
WBC # BLD AUTO: 7.8 K/UL (ref 4.6–13.2)

## 2022-02-04 PROCEDURE — 82962 GLUCOSE BLOOD TEST: CPT

## 2022-02-04 PROCEDURE — 80053 COMPREHEN METABOLIC PANEL: CPT

## 2022-02-04 PROCEDURE — 97116 GAIT TRAINING THERAPY: CPT

## 2022-02-04 PROCEDURE — 74011250637 HC RX REV CODE- 250/637: Performed by: INTERNAL MEDICINE

## 2022-02-04 PROCEDURE — 74011250636 HC RX REV CODE- 250/636: Performed by: HOSPITALIST

## 2022-02-04 PROCEDURE — 85025 COMPLETE CBC W/AUTO DIFF WBC: CPT

## 2022-02-04 PROCEDURE — 74011250637 HC RX REV CODE- 250/637: Performed by: HOSPITALIST

## 2022-02-04 PROCEDURE — 36415 COLL VENOUS BLD VENIPUNCTURE: CPT

## 2022-02-04 PROCEDURE — 97162 PT EVAL MOD COMPLEX 30 MIN: CPT

## 2022-02-04 RX ORDER — BISACODYL 5 MG
10 TABLET, DELAYED RELEASE (ENTERIC COATED) ORAL
Status: DISCONTINUED | OUTPATIENT
Start: 2022-02-04 | End: 2022-02-04 | Stop reason: HOSPADM

## 2022-02-04 RX ORDER — PANTOPRAZOLE SODIUM 40 MG/1
40 TABLET, DELAYED RELEASE ORAL
Qty: 30 TABLET | Refills: 2 | Status: SHIPPED | OUTPATIENT
Start: 2022-02-05

## 2022-02-04 RX ADMIN — AZITHROMYCIN MONOHYDRATE 500 MG: 500 INJECTION, POWDER, LYOPHILIZED, FOR SOLUTION INTRAVENOUS at 12:00

## 2022-02-04 RX ADMIN — FOLIC ACID 1 MG: 1 TABLET ORAL at 08:19

## 2022-02-04 RX ADMIN — ATORVASTATIN CALCIUM 20 MG: 20 TABLET, FILM COATED ORAL at 08:19

## 2022-02-04 RX ADMIN — AMLODIPINE BESYLATE 5 MG: 5 TABLET ORAL at 08:19

## 2022-02-04 RX ADMIN — LOSARTAN POTASSIUM 100 MG: 50 TABLET, FILM COATED ORAL at 08:19

## 2022-02-04 RX ADMIN — PANTOPRAZOLE SODIUM 40 MG: 40 TABLET, DELAYED RELEASE ORAL at 07:01

## 2022-02-04 RX ADMIN — IRON SUCROSE 200 MG: 20 INJECTION, SOLUTION INTRAVENOUS at 08:29

## 2022-02-04 RX ADMIN — OXYBUTYNIN CHLORIDE 5 MG: 5 TABLET ORAL at 08:19

## 2022-02-04 RX ADMIN — GABAPENTIN 300 MG: 300 CAPSULE ORAL at 08:19

## 2022-02-04 RX ADMIN — ACETAMINOPHEN 650 MG: 325 TABLET ORAL at 08:41

## 2022-02-04 NOTE — DISCHARGE INSTRUCTIONS
Hold plavix for 4 more days  Continue stool softener over the counter  Light bland diet  Will need repeat EGD in 1 month  NO alleve, motrin, ibuprofen, goodys, or naprosyn  ONLY tylenol over the counter for pain

## 2022-02-04 NOTE — PROGRESS NOTES
Problem: Mobility Impaired (Adult and Pediatric)  Goal: *Acute Goals and Plan of Care (Insert Text)  Description: Physical Therapy Goals  Initiated 2/4/2022 and to be accomplished within 7 day(s)  1. Patient will move from supine to sit and sit to supine  in bed with modified independence. 2.  Patient will transfer from bed to chair and chair to bed with modified independence using the least restrictive device. 3.  Patient will perform sit to stand with modified independence. 4.  Patient will ambulate with modified independence for 100 feet with the least restrictive device. 5.  Patient will ascend/descend 2 stairs with handrail(s) with supervision/set-up. Outcome: Progressing Towards Goal  Note:   physical Therapy EVALUATION    Patient: Derick Soto (67 y.o. female)  Date: 2/4/2022  Primary Diagnosis: Anemia [D64.9]  Procedure(s) (LRB):  ESOPHAGOGASTRODUODENOSCOPY (EGD); APC to AVM (N/A) 2 Days Post-Op   Precautions:   Fall    ASSESSMENT :  Based on the objective data described below, the patient presents with lower extremity weakness, decreased gait quality and endurance, impaired bed mobility and transfers, and overall limitations in functional mobility. Pt performed supine to sit with supervision, sit to stand with SBA. Patient ambulated 40 feet without AD, GB applied, CGA; reaching for furniture and slight imblance backwards however able to recover with SBA. Pt tolerated session well as evidenced by no c/o increased pain, lightheadedness or dizziness. Patient would benefit from skilled inpatient physical therapy to address deficits, progress as tolerated to achieve long term goals and allow safe discharge. .    Patient will benefit from skilled intervention to address the above impairments.   Patients rehabilitation potential is considered to be Good  Factors which may influence rehabilitation potential include:   []         None noted  []         Mental ability/status  []         Medical condition  []         Home/family situation and support systems  []         Safety awareness  []         Pain tolerance/management  []         Other:      PLAN :  Recommendations and Planned Interventions:  []           Bed Mobility Training             []    Neuromuscular Re-Education  []           Transfer Training                   []    Orthotic/Prosthetic Training  []           Gait Training                          []    Modalities  []           Therapeutic Exercises          []    Edema Management/Control  []           Therapeutic Activities            []    Patient and Family Training/Education  []           Other (comment):    Frequency/Duration: Patient will be followed by physical therapy 1-2 times per day to address goals. Discharge Recommendations: Home Health  Further Equipment Recommendations for Discharge: rolling walker     SUBJECTIVE:   Patient stated I feel ok.     OBJECTIVE DATA SUMMARY:     Past Medical History:   Diagnosis Date    Arthritis     Asthma     seasonal     Diabetes ([facility] [address]     diet controlled    Diabetes mellitus ([facility] [address]     Type 2    Hypertension     Macromastia     Hx of    Menopause     RA (rheumatoid arthritis) ([facility] [address]     Rheumatoid arthritis(714.0)     TIA (transient ischemic attack) 01/2021    Ulcer of foot ([facility] [address]     Right foot     Past Surgical History:   Procedure Laterality Date    HX BREAST REDUCTION Bilateral 9/2012    HX HYSTERECTOMY  1970    HX ORTHOPAEDIC  2009    Left hand surgery    HX OTHER SURGICAL      left shoulder abcess drained    HX PARTIAL HYSTERECTOMY       Barriers to Learning/Limitations: None  Compensate with: Visual Cues, Verbal Cues, and Tactile Cues  Prior Level of Function/Home Situation: Independent amb without AD  Home Situation  Home Environment: Private residence  # Steps to Enter: 2  Rails to Enter: Yes  One/Two Story Residence: One story  # of Interior Steps: 14  Interior Rails: Left  Living Alone: No  Support Systems: Child(jerri)  Patient Expects to be Discharged to[de-identified] Home  Current DME Used/Available at Home: None  Strength:    Strength: Generally decreased, functional  Tone & Sensation:   Tone: Normal  Sensation: Intact  Range Of Motion:  AROM: Generally decreased, functional  PROM: Generally decreased, functional  Functional Mobility:  Bed Mobility:   Supine to Sit: Supervision  Sit to Supine: Supervision   Transfers:  Sit to Stand: Stand-by assistance  Stand to Sit: Stand-by assistance  Balance:   Sitting: Intact  Standing: Impaired; Without support  Standing - Static: Fair  Standing - Dynamic : Poor; Fair  Ambulation/Gait Training:  Distance (ft): 40 Feet (ft)  Assistive Device: Gait belt  Ambulation - Level of Assistance: Contact guard assistance   Gait Description (WDL): Exceptions to WDL  Gait Abnormalities: Decreased step clearance  Base of Support: Narrowed   Speed/[place]: Slow  Step Length: Right shortened;Left shortened  Pain:  Pain Scale 1: Numeric (0 - 10)  Pain Intensity 1: 0  Activity Tolerance:   Good  Please refer to the flowsheet for vital signs taken during this treatment. After treatment:   []         Patient left in no apparent distress sitting up in chair  [x]         Patient left in no apparent distress in bed  [x]         Call bell left within reach  [x]         Nursing notified  []         Caregiver present  []         Bed alarm activated    COMMUNICATION/EDUCATION:   [x]         Fall prevention education was provided and the patient/caregiver indicated understanding. [x]         Patient/family have participated as able in goal setting and plan of care. [x]         Patient/family agree to work toward stated goals and plan of care. []         Patient understands intent and goals of therapy, but is neutral about his/her participation. []         Patient is unable to participate in goal setting and plan of care.     Thank you for this referral.  Anyi Abdi   Time Calculation: 24 mins

## 2022-02-04 NOTE — PROGRESS NOTES
Problem: Airway Clearance - Ineffective  Goal: Achieve or maintain patent airway  Outcome: Progressing Towards Goal     Problem: Gas Exchange - Impaired  Goal: Absence of hypoxia  Outcome: Progressing Towards Goal  Goal: Promote optimal lung function  Outcome: Progressing Towards Goal     Problem: Breathing Pattern - Ineffective  Goal: Ability to achieve and maintain a regular respiratory rate  Outcome: Progressing Towards Goal     Problem:  Body Temperature -  Risk of, Imbalanced  Goal: Ability to maintain a body temperature within defined limits  Outcome: Progressing Towards Goal  Goal: Will regain or maintain usual level of consciousness  Outcome: Progressing Towards Goal  Goal: Complications related to the disease process, condition or treatment will be avoided or minimized  Outcome: Progressing Towards Goal     Problem: Isolation Precautions - Risk of Spread of Infection  Goal: Prevent transmission of infectious organism to others  Outcome: Progressing Towards Goal     Problem: Nutrition Deficits  Goal: Optimize nutrtional status  Outcome: Progressing Towards Goal     Problem: Risk for Fluid Volume Deficit  Goal: Maintain normal heart rhythm  Outcome: Progressing Towards Goal  Goal: Maintain absence of muscle cramping  Outcome: Progressing Towards Goal  Goal: Maintain normal serum potassium, sodium, calcium, phosphorus, and pH  Outcome: Progressing Towards Goal     Problem: Loneliness or Risk for Loneliness  Goal: Demonstrate positive use of time alone when socialization is not possible  Outcome: Progressing Towards Goal     Problem: Fatigue  Goal: Verbalize increase energy and improved vitality  Outcome: Progressing Towards Goal     Problem: Patient Education: Go to Patient Education Activity  Goal: Patient/Family Education  Outcome: Progressing Towards Goal     Problem: Risk for Spread of Infection  Goal: Prevent transmission of infectious organism to others  Description: Prevent the transmission of infectious organisms to other patients, staff members, and visitors. Outcome: Progressing Towards Goal     Problem: Patient Education:  Go to Education Activity  Goal: Patient/Family Education  Outcome: Progressing Towards Goal     Problem: Falls - Risk of  Goal: *Absence of Falls  Description: Document Clearence Skates Fall Risk and appropriate interventions in the flowsheet.   Outcome: Progressing Towards Goal  Note: Fall Risk Interventions:            Medication Interventions: Patient to call before getting OOB,Teach patient to arise slowly    Elimination Interventions: Call light in reach              Problem: Patient Education: Go to Patient Education Activity  Goal: Patient/Family Education  Outcome: Progressing Towards Goal

## 2022-02-04 NOTE — DISCHARGE SUMMARY
[address]    Name:  Navi Mace  MR#:   [id number]  :  1945  ACCOUNT #:  [de-identified]  ADMIT DATE:  2022  DISCHARGE DATE:  2022    CONSULTANT:  Claudine Samano MD, Gastroenterology. PROCEDURES PERFORMED:  Upper endoscopy. DISCHARGE DIAGNOSES:  1. Acute gastrointestinal bleeding on chronic gastrointestinal bleeding. 2.  Acute-on-chronic blood loss and anemia. 3.  History of transient ischemic attack and peripheral vascular disease. 4.  Rheumatoid arthritis. 5.  COVID infection. 6.  Influenza A infection. HOSPITAL SUMMARY:  This is a delightful 73-year-old [facility] American female who came into the emergency room because of feeling fatigued and low energy for about one week. Hemoglobin was 5.6. She had heme-positive dark stools. She required one blood transfusion. Her BUN and creatinine were normal.  She has been taking Aleve for a few years daily for rheumatoid arthritis. She has been counseled against taking that again. She has had a colonoscopy in the past and she has peripheral vascular disease. She is on Plavix. She is on Plaquenil and Arava for her rheumatism. She does live with her son. The patient was followed in the hospital.  Her hemoglobin came up after transfusion, it is stable at 7.6 and 25.1. Her lowest H and H is 5.9 and 19.7. BUN and creatinine are 7 and 0.64. Electrolytes are normal.  LFTs are within normal limits. Iron level was low at 28, so she has been given intravenous iron. B12 was greater than 2000. Folate greater than 20. The patient had a negative urinalysis, no nitrites or leukocyte esterase. She had a positive COVID test, although she is fully vaccinated and boostered. She has no symptoms from her COVID. She had a mild cough but no fevers, hypoxia, nausea, vomiting, or myalgias. Her weakness has improved since transfusion.   She also tested positive for influenza A, and Tamiflu was not started because her symptom length was unclear and she has really been asymptomatic from any upper respiratory infection symptoms. White blood cell count is normal and she is doing well. She had an endoscopy which showed a 1 cm reducible hiatal hernia, five small AVM lesions in the duodenum, and a tiny one in the third portion of the duodenum. They were all cauterized with difficulty, but obliteration of all visible vessels was done. She was recommended to resume clears post procedure which she did. She has been advanced now to light diet which she is tolerating. She asked for a stool softener today, so she is not having any bloody stools or diarrhea. She has been advised to hold the Plavix for five total days after procedure which she has four more to do after this. She may need a repeat EGD in case she has rebleeding or worsening anemia. She is to take Protonix 30 days once a day and she should follow up for an EGD in likely one month. With that said, today the patient is doing well. She denies any cough, shortness of breath, fevers, chills, or myalgias. Abdomen feels well. No diarrhea. She is eating. Her blood pressure is 119/49, pulse 76, temperature 98.6, respiratory rate 17, SaO2 is 97% on room air. her lungs are clear bilaterally. Cardiac exam, regular rate and rhythm. Abdomen benign without edema. She wants to go home today. She is stable for release from the hospital.  Of course, she should quarantine because of the COVID positivity. Follow up with Dr. Madison Stover on 03/10, Dr. Lyle Snow on 02/17. If her COVID test is negative, she will hold the Plavix for another four days. We have called in Protonix 40 mg for her daily. DISCHARGE MEDICATIONS:  She will resume her:  1. Vitamin C.  2.  Symbicort. 3.  Zyrtec. 4.  Vitamin D.  5.  HCTZ. [id number] [facility]. 7.  Multivitamin. 8.  Lyrica. 9.  Tramadol as needed. 10.  Tylenol as needed. 11.  Norvasc. 12.  Lipitor. 13.  Folic acid.   14. Neurontin. 15.  Plaquenil. 16.  Cozaar. 17.  Ditropan that she is on at home with no changes. I have advised her on no NSAIDs for her discharge, holding the Plavix as noted and following up as instructed already. She has no questions at this time. She is in good spirits, ready for discharge to go home with her son. 45 minutes discharge time.       Shiela Rodriguez MD      [id number]  D:  02/04/2022 12:52  T:  02/04/2022 13:51  JOB #:  [id number]

## 2022-02-04 NOTE — ROUTINE PROCESS
Bedside and Verbal shift change report given to Paresh Capps RN by Malini Christianson RN.  Report included the following information SBAR, Kardex, OR Summary, Intake/Output and MAR

## 2022-02-04 NOTE — PROGRESS NOTES
1930 Assumed patient care at this time. Report received from Sherita Feng RN. Patient in bed in lowest position with wheels locked. Call light within reach. 2205 Assessment completed. 0710 Bedside and verbal shift change report given to Jasbir Contreras RN (oncoming nurse) by Chandu Duke RN (offgoing nurse). Report included the following information: SBAR, Kardex, MAR, and recent results.

## 2022-02-04 NOTE — PROGRESS NOTES
D/C Plan: Home with physician follow up       Noted pt is medically stable to be discharged today. Pt has been ambulating independently in the room. Anticipate pt will transition home today with physician follow up. No other transition of care needs have been identified. Care Management Interventions  PCP Verified by CM:  Yes  Last Visit to PCP: 01/17/22  Transition of Care Consult (CM Consult): Discharge Planning  Support Systems: Child(jerri)  Confirm Follow Up Transport: Family  The Plan for Transition of Care is Related to the Following Treatment Goals :  (discharge home with physician follow up and family assistance)  Discharge Location  Patient Expects to be Discharged to[de-identified]  (discharge home with physician follow up and family assistance)

## 2022-02-07 ENCOUNTER — PATIENT OUTREACH (OUTPATIENT)
Dept: CASE MANAGEMENT | Age: 77
End: 2022-02-07

## 2022-02-07 NOTE — PROGRESS NOTES
Transitions of Care Call  Call within 2 business days of discharge: Yes     Patient: Mckinley Johnson Patient : 1945 MRN: [id number]    Last Discharge [address]       Complaint Diagnosis Description Type Department Provider    22 Fatigue Symptomatic anemia . .. ED to Hosp-Admission (Discharged) (ADMIT) DAGOBERTO Perez MD; Dedrick Marshall. .. Was this an external facility discharge? No Discharge Facility: [facility] 2022 to 2022. Challenges to be reviewed by the provider   Additional needs identified to be addressed with provider no  none           Encounter was not routed to provider for escalation. Method of communication with provider: none. Discussed COVID-19 related testing which was available at this time. Test results were positive. Patient informed of results, if available? yes. Current Symptoms:no new symptoms and no worsening symptoms    Reviewed New or Changed Meds: yes    Do you have what you need at home?  Durable Medical Equipment ordered at discharge: None   Home Health/Outpatient orders at discharge: none    Pulse oximeter? no Discussed and confirmed pulse oximeter discharge instructions and when to notify provider or seek emergency care. Patient education provided: Reviewed appropriate site of care based on symptoms and resources available to patient including: PCP, Specialist and [address] [place] [address] Road. Follow up appointment scheduled within 7 days of discharge: no.Patient received first available appt. If no appointment scheduled, scheduling offered: yes. No future appointments. Interventions: Scheduled appointment with PCPMeet DEVRIES reviewed discharge instructions, medical action plan and red flags with patient who verbalized understanding. Provided contact information for future needs. Plan for follow-up call in 5-7 days based on severity of symptoms and risk factors.   Plan for next call: symptom management-ensure that patient symptoms have decreased.       Sami Hill RN

## 2022-02-14 ENCOUNTER — PATIENT OUTREACH (OUTPATIENT)
Dept: CASE MANAGEMENT | Age: 77
End: 2022-02-14

## 2022-02-14 NOTE — PROGRESS NOTES
2/14/2022  2:54 PM    CTN reviewed patient chart. CTN then attempted to call patient for routine follow up. She was unavailable at the time of the call. Message left introducing myself, the purpose of the call and giving my contact information. Requested that patient call back at her earliest convenience. Will follow.

## 2022-02-15 ENCOUNTER — PATIENT OUTREACH (OUTPATIENT)
Dept: CASE MANAGEMENT | Age: 77
End: 2022-02-15

## 2022-02-15 NOTE — PROGRESS NOTES
Follow Up Call    Challenges to be reviewed by the provider   Additional needs identified to be addressed with provider: no  none           Encounter was not routed to provider for escalation. Method of communication with provider: none. Contacted the patient by telephone to follow up after hospital visit. Status: improved  Interventions to address identified needs: Scheduled appointment with PCP-[facility] follow up appointment(s): No future appointments. Non-[facility] follow up appointment(s): n/a   Follow up appointment completed? No appt is 2/17/2022. Provided contact information for future needs. Plan for follow-up call in 5-7 days based on severity of symptoms and risk factors. Plan for next call: medication management-ensure that patient has all meds and is taking as directed. Also ensure that she knows when to call physician for issues.       Mariza Schmitt RN

## 2022-02-23 ENCOUNTER — PATIENT OUTREACH (OUTPATIENT)
Dept: CASE MANAGEMENT | Age: 77
End: 2022-02-23

## 2022-02-23 NOTE — PROGRESS NOTES
Follow Up Call    Challenges to be reviewed by the provider   Additional needs identified to be addressed with provider: no  none           Encounter was not routed to provider for escalation. Method of communication with provider: none. Contacted the patient by telephone to follow up after hospital visit. Status: improved  Interventions to address identified needs: Scheduled appointment with PCP-[facility] follow up appointment(s): No future appointments. Non-[facility] follow up appointment(s): n/a   Follow up appointment completed? yes. Provided contact information for future needs. Plan for follow-up call in 5-7 days based on severity of symptoms and risk factors. Plan for next call: follow up appointment-encouraged patient to make sure she attends all follow up appts so that she can get best care possible.       Estelle Del Valle RN

## 2022-02-28 ENCOUNTER — PATIENT OUTREACH (OUTPATIENT)
Dept: CASE MANAGEMENT | Age: 77
End: 2022-02-28

## 2022-02-28 NOTE — PROGRESS NOTES
2/28/2022  2:18 PM    CTN reviewed patient chart and then attempted to call for routine follow up. She was unavailable at the time of the call. Message left introducing myself, the purpose of the call and giving my contact information. Requested that patient call back at her earliest convenience. Will follow.

## 2022-03-09 ENCOUNTER — PATIENT OUTREACH (OUTPATIENT)
Dept: CASE MANAGEMENT | Age: 77
End: 2022-03-09

## 2022-03-09 NOTE — PROGRESS NOTES
Patient has graduated from the Transitions of Care Coordination  program on 3/9/2022. Patient's symptoms are stable at this time. Patient/family has the ability to self-manage. Care management goals have been completed at this time. No further care transitions nurse follow up scheduled. Patient has care transitions nurse contact information for any further questions, concerns, or needs. Patient's upcoming visits:  No future appointments.

## 2022-03-18 PROBLEM — K92.2 GI BLEED: Status: ACTIVE | Noted: 2022-01-31

## 2022-03-18 PROBLEM — D64.9 ANEMIA: Status: ACTIVE | Noted: 2022-01-31

## 2022-03-19 PROBLEM — J09.X2 INFLUENZA A (H5N1): Status: ACTIVE | Noted: 2022-01-31

## 2022-03-19 PROBLEM — D75.839 THROMBOCYTOSIS: Status: ACTIVE | Noted: 2021-02-12

## 2022-03-19 PROBLEM — Z91.148 HISTORY OF MEDICATION NONCOMPLIANCE: Status: ACTIVE | Noted: 2021-02-12

## 2022-03-19 PROBLEM — I73.9 PVD (PERIPHERAL VASCULAR DISEASE) (HCC): Status: ACTIVE | Noted: 2020-12-24

## 2022-03-19 PROBLEM — U07.1 COVID-19: Status: ACTIVE | Noted: 2022-01-31

## 2022-03-19 PROBLEM — R68.89 FORGETFULNESS: Status: ACTIVE | Noted: 2021-02-12

## 2022-03-19 PROBLEM — D64.9 CHRONIC ANEMIA: Status: ACTIVE | Noted: 2020-12-24

## 2022-03-20 PROBLEM — I63.9 CVA (CEREBRAL VASCULAR ACCIDENT) (HCC): Status: ACTIVE | Noted: 2021-02-12

## 2022-03-28 ENCOUNTER — TRANSCRIBE ORDER (OUTPATIENT)
Dept: REGISTRATION | Age: 77
End: 2022-03-28

## 2022-03-28 ENCOUNTER — TRANSCRIBE ORDER (OUTPATIENT)
Dept: SCHEDULING | Age: 77
End: 2022-03-28

## 2022-03-28 ENCOUNTER — HOSPITAL ENCOUNTER (OUTPATIENT)
Dept: LAB | Age: 77
Discharge: HOME | End: 2022-03-28
Payer: MEDICARE

## 2022-03-28 DIAGNOSIS — M05.69 RHEU ARTHRITIS MULT SITE W INVOLV OF ORGANS AND SYSTEMS (HCC): Primary | ICD-10-CM

## 2022-03-28 DIAGNOSIS — M81.0 AGE-RELATED OSTEOPOROSIS WITHOUT CURRENT PATHOLOGICAL FRACTURE: Primary | ICD-10-CM

## 2022-03-28 DIAGNOSIS — M05.69 RHEU ARTHRITIS MULT SITE W INVOLV OF ORGANS AND SYSTEMS (HCC): ICD-10-CM

## 2022-03-28 LAB
ALBUMIN SERPL-MCNC: 3.7 G/DL (ref 3.4–5)
ALBUMIN/GLOB SERPL: 0.9 {RATIO} (ref 0.8–1.7)
ALP SERPL-CCNC: 60 U/L (ref 45–117)
ALT SERPL-CCNC: 16 U/L (ref 13–56)
ANION GAP SERPL CALC-SCNC: 4 MMOL/L (ref 3–18)
AST SERPL-CCNC: 18 U/L (ref 10–38)
BASOPHILS # BLD: 0.1 K/UL (ref 0–0.1)
BASOPHILS NFR BLD: 1 % (ref 0–2)
BILIRUB SERPL-MCNC: 0.3 MG/DL (ref 0.2–1)
BUN SERPL-MCNC: 18 MG/DL (ref 7–18)
BUN/CREAT SERPL: 39 (ref 12–20)
CALCIUM SERPL-MCNC: 9.5 MG/DL (ref 8.5–10.1)
CHLORIDE SERPL-SCNC: 105 MMOL/L (ref 100–111)
CO2 SERPL-SCNC: 31 MMOL/L (ref 21–32)
CREAT SERPL-MCNC: 0.46 MG/DL (ref 0.6–1.3)
CRP SERPL-MCNC: 1.7 MG/DL (ref 0–0.3)
DIFFERENTIAL METHOD BLD: ABNORMAL
EOSINOPHIL # BLD: 0.7 K/UL (ref 0–0.4)
EOSINOPHIL NFR BLD: 6 % (ref 0–5)
ERYTHROCYTE [DISTWIDTH] IN BLOOD BY AUTOMATED COUNT: 19.3 % (ref 11.6–14.5)
ERYTHROCYTE [SEDIMENTATION RATE] IN BLOOD: 48 MM/HR (ref 0–30)
GLOBULIN SER CALC-MCNC: 4 G/DL (ref 2–4)
GLUCOSE SERPL-MCNC: 113 MG/DL (ref 74–99)
HCT VFR BLD AUTO: 33.7 % (ref 35–45)
HGB BLD-MCNC: 10.4 G/DL (ref 12–16)
IMM GRANULOCYTES # BLD AUTO: 0.1 K/UL (ref 0–0.04)
IMM GRANULOCYTES NFR BLD AUTO: 0 % (ref 0–0.5)
LYMPHOCYTES # BLD: 1.4 K/UL (ref 0.9–3.6)
LYMPHOCYTES NFR BLD: 11 % (ref 21–52)
MCH RBC QN AUTO: 21.4 PG (ref 24–34)
MCHC RBC AUTO-ENTMCNC: 30.9 G/DL (ref 31–37)
MCV RBC AUTO: 69.5 FL (ref 78–100)
MONOCYTES # BLD: 0.7 K/UL (ref 0.05–1.2)
MONOCYTES NFR BLD: 6 % (ref 3–10)
NEUTS SEG # BLD: 9.6 K/UL (ref 1.8–8)
NEUTS SEG NFR BLD: 77 % (ref 40–73)
NRBC # BLD: 0 K/UL (ref 0–0.01)
NRBC BLD-RTO: 0 PER 100 WBC
PLATELET # BLD AUTO: 393 K/UL (ref 135–420)
PMV BLD AUTO: 9.4 FL (ref 9.2–11.8)
POTASSIUM SERPL-SCNC: 4.3 MMOL/L (ref 3.5–5.5)
PROT SERPL-MCNC: 7.7 G/DL (ref 6.4–8.2)
RBC # BLD AUTO: 4.85 M/UL (ref 4.2–5.3)
SODIUM SERPL-SCNC: 140 MMOL/L (ref 136–145)
WBC # BLD AUTO: 12.6 K/UL (ref 4.6–13.2)

## 2022-03-28 PROCEDURE — 85025 COMPLETE CBC W/AUTO DIFF WBC: CPT

## 2022-03-28 PROCEDURE — 85652 RBC SED RATE AUTOMATED: CPT

## 2022-03-28 PROCEDURE — 36415 COLL VENOUS BLD VENIPUNCTURE: CPT

## 2022-03-28 PROCEDURE — 86140 C-REACTIVE PROTEIN: CPT

## 2022-03-28 PROCEDURE — 80053 COMPREHEN METABOLIC PANEL: CPT

## 2022-04-06 ENCOUNTER — HOSPITAL ENCOUNTER (OUTPATIENT)
Dept: MAMMOGRAPHY | Age: 77
Discharge: HOME | End: 2022-04-06
Attending: NURSE PRACTITIONER
Payer: MEDICARE

## 2022-04-06 DIAGNOSIS — M81.0 AGE-RELATED OSTEOPOROSIS WITHOUT CURRENT PATHOLOGICAL FRACTURE: ICD-10-CM

## 2022-04-06 PROCEDURE — 77080 DXA BONE DENSITY AXIAL: CPT

## 2023-05-23 ENCOUNTER — HOSPITAL ENCOUNTER (OUTPATIENT)
Facility: HOSPITAL | Age: 78
Setting detail: OUTPATIENT SURGERY
Discharge: HOME | End: 2023-05-23
Attending: INTERNAL MEDICINE | Admitting: INTERNAL MEDICINE
Payer: MEDICARE

## 2023-05-23 VITALS
SYSTOLIC BLOOD PRESSURE: 176 MMHG | HEART RATE: 67 BPM | RESPIRATION RATE: 16 BRPM | TEMPERATURE: 97 F | OXYGEN SATURATION: 100 % | DIASTOLIC BLOOD PRESSURE: 61 MMHG

## 2023-05-23 LAB
ERYTHROCYTE [DISTWIDTH] IN BLOOD BY AUTOMATED COUNT: 16.3 % (ref 11.6–14.5)
HCT VFR BLD AUTO: 38.5 % (ref 35–45)
HGB BLD-MCNC: 12.1 G/DL (ref 12–16)
MCH RBC QN AUTO: 22.3 PG (ref 24–34)
MCHC RBC AUTO-ENTMCNC: 31.4 G/DL (ref 31–37)
MCV RBC AUTO: 71 FL (ref 78–100)
NRBC # BLD: 0 K/UL (ref 0–0.01)
NRBC BLD-RTO: 0 PER 100 WBC
PLATELET # BLD AUTO: 405 K/UL (ref 135–420)
PMV BLD AUTO: 11 FL (ref 9.2–11.8)
RBC # BLD AUTO: 5.42 M/UL (ref 4.2–5.3)
WBC # BLD AUTO: 7.7 K/UL (ref 4.6–13.2)

## 2023-05-23 PROCEDURE — 83550 IRON BINDING TEST: CPT

## 2023-05-23 PROCEDURE — 83540 ASSAY OF IRON: CPT

## 2023-05-23 PROCEDURE — 82607 VITAMIN B-12: CPT

## 2023-05-23 PROCEDURE — 85027 COMPLETE CBC AUTOMATED: CPT

## 2023-05-23 RX ORDER — UBIDECARENONE 75 MG
50 CAPSULE ORAL DAILY
COMMUNITY

## 2023-05-23 RX ORDER — SODIUM CHLORIDE 9 MG/ML
INJECTION, SOLUTION INTRAVENOUS CONTINUOUS
Status: DISCONTINUED | OUTPATIENT
Start: 2023-05-23 | End: 2023-05-23 | Stop reason: HOSPADM

## 2023-05-23 ASSESSMENT — PAIN - FUNCTIONAL ASSESSMENT: PAIN_FUNCTIONAL_ASSESSMENT: 0-10

## 2023-05-23 NOTE — PERIOP NOTE
Fleets enema administered due to patient report of 3 solid brown balls of stool after completing colon prep. Resulting bowel movement reveals multiple large brown soft balls of stool. Dr. Yvrose Burgess shown the results. Procedure cancelled by Dr. Yvrose Burgess.     Halina Hurtado RN

## 2023-05-23 NOTE — H&P
Sufficient language. No memory loss.      Active Patient Care Team Members  Name Contact Agency Type Support Role Relationship Active Date Inactive Date Specialty   Marco Antonio Running   Emergency Contact Child, Mother is the Patient      Kamronyesikaa Pallas   oncologist       Raleigh Foley   primary care provider       Finesse Irene   primary care provider       Shyam Sickle   encounter provider    Rheumatology   Chandrika Early   encounter provider    Gastroenterology   Kee Haas   specialist       Katey Damian   Patient provider PCP      Katey Damian   primary care provider       Sruthi Richardson   primary care provider         Patient is questioned and examined

## 2023-05-23 NOTE — PROGRESS NOTES
77 yo female with RA was seen 2022 for severe iron deficiency anemia to 5.9 iron saturation 10% she received one blood transfusion. came to the ER on 2022  EGD the same day 6 tiny AVM in the duodenum 3 to 4 mm in D2 and a tiny one in D3 all cauterized with APC. 1 cm reducible hiatal hernia. She ha s HTN, hx of TIA 2 to 3 years ago but no CAD, DM or real Stroke. She had hysterectomy at 31 yo. breast reduction and abdominoplasty. . She was taking Aleve before her hospital admission but no longer. No smoking or drinking. has bad knee arthritis. She denied having dyspepsia, heartburns, N/V or dysphagia. she lost 10 lbs over 6 months because she is watching her diet and exercising. I saw her in [facility] on May 26, 2022 hgb 9.8. in fact she has been anemic since   She was supposed to have a colonoscopy since that time. We didn't know then that she had an attempted colonoscopy by Dr Main Murrell in 3/9/ 2016 because of iron deficiency anemia, abdominal pain and weight loss. The first attempted exam was cancelled because of poor bowel prep. Then repeated a month later after 6 liters of Golytely of bowel prep on 2016. It was done to the terminal ileum with great difficulty: Very tortuous and fixed sigmoid. 2 small descending colon polyps removed with cold forceps. The polyps were hyperplastic. Dr Main Murrell recommended not to repeat the colonoscopy after this one. She had an EGD by Dr Main Murrell 2016 mild gastritis? Duodenal biopsy was normal and negative for Celiac. She has one bm every 2 days with the help of stool softener which is not enough. She needs to take a combination of 2 different laxative as she has very tortuous and difficult anatomy because of adhesions from previous hysterectomy. Unfortunately, today she ws not well prepped with solid stools coming out after adding the soap sod enema. So I had to cancel the procedure.  Under the circumstance, the difficulty and the previous colonoscopies she

## 2023-05-24 LAB
IRON SATN MFR SERPL: 25 % (ref 20–50)
IRON SERPL-MCNC: 65 UG/DL (ref 50–175)
TIBC SERPL-MCNC: 264 UG/DL (ref 250–450)
VIT B12 SERPL-MCNC: 1467 PG/ML (ref 211–911)

## 2023-10-21 ENCOUNTER — HOSPITAL ENCOUNTER (EMERGENCY)
Facility: HOSPITAL | Age: 78
Discharge: HOME | End: 2023-10-21
Attending: EMERGENCY MEDICINE
Payer: MEDICARE

## 2023-10-21 ENCOUNTER — APPOINTMENT (OUTPATIENT)
Facility: HOSPITAL | Age: 78
End: 2023-10-21
Payer: MEDICARE

## 2023-10-21 VITALS
SYSTOLIC BLOOD PRESSURE: 183 MMHG | DIASTOLIC BLOOD PRESSURE: 73 MMHG | WEIGHT: 110 LBS | OXYGEN SATURATION: 95 % | HEART RATE: 85 BPM | TEMPERATURE: 97.6 F | RESPIRATION RATE: 16 BRPM | HEIGHT: 64 IN | BODY MASS INDEX: 18.78 KG/M2

## 2023-10-21 DIAGNOSIS — S09.90XA CLOSED HEAD INJURY, INITIAL ENCOUNTER: ICD-10-CM
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
## 2023-10-21 DIAGNOSIS — M25.552 LEFT HIP PAIN: ICD-10-CM

## 2023-10-21 PROCEDURE — 73502 X-RAY EXAM HIP UNI 2-3 VIEWS: CPT

## 2023-10-21 PROCEDURE — 6360000002 HC RX W HCPCS: Performed by: EMERGENCY MEDICINE

## 2023-10-21 PROCEDURE — 72125 CT NECK SPINE W/O DYE: CPT

## 2023-10-21 PROCEDURE — 70450 CT HEAD/BRAIN W/O DYE: CPT

## 2023-10-21 PROCEDURE — 6370000000 HC RX 637 (ALT 250 FOR IP): Performed by: EMERGENCY MEDICINE

## 2023-10-21 PROCEDURE — 99284 EMERGENCY DEPT VISIT MOD MDM: CPT

## 2023-10-21 PROCEDURE — 96372 THER/PROPH/DIAG INJ SC/IM: CPT

## 2023-10-21 RX ORDER — CLONIDINE HYDROCHLORIDE 0.1 MG/1
0.2 TABLET ORAL
Status: COMPLETED | OUTPATIENT
Start: 2023-10-21 | End: 2023-10-21

## 2023-10-21 RX ORDER — TRAMADOL HYDROCHLORIDE 50 MG/1
50 TABLET ORAL EVERY 4 HOURS PRN
Qty: 17 TABLET | Refills: 0 | Status: SHIPPED | OUTPATIENT
Start: 2023-10-21 | End: 2023-10-24

## 2023-10-21 RX ORDER — FENTANYL CITRATE 50 UG/ML
100 INJECTION, SOLUTION INTRAMUSCULAR; INTRAVENOUS
Status: COMPLETED | OUTPATIENT
Start: 2023-10-21 | End: 2023-10-21

## 2023-10-21 RX ADMIN — CLONIDINE HYDROCHLORIDE 0.2 MG: 0.1 TABLET ORAL at 13:52

## 2023-10-21 RX ADMIN — FENTANYL CITRATE 100 MCG: 50 INJECTION, SOLUTION INTRAMUSCULAR; INTRAVENOUS at 12:13

## 2023-10-21 ASSESSMENT — PAIN - FUNCTIONAL ASSESSMENT: PAIN_FUNCTIONAL_ASSESSMENT: 0-10

## 2023-10-21 NOTE — ED PROVIDER NOTES
[facility] EMERGENCY DEPT  EMERGENCY DEPARTMENT ENCOUNTER    Patient Name: Amarjit Simon  MRN: [id number]  YOB: 1945  Provider: Ana Eller MD  PCP: Elsie Newman MD   Time/Date of evaluation: 11:50 AM EDT on 10/21/23    History of Presenting Illness     Chief Complaint   Patient presents with    Fall    Head Injury    Hip Pain       History Provided By: Patient and Patient's Daughter     History Esvin Art): Amarjit Simon is a 78 y.o. female who presents to the emergency department after she was at [facility] and went to  a spoon and landed on the floor fell over landed on her left side struck her head on a table. She is got pain in the left hip specifically she is not on any blood thinners that were reported. She rates pain as a 10 out of 10 then 1 move any part of the left leg without causing pain in the hip. Reports no loss of consciousness or other physical symptoms at this time. Nursing Notes were all reviewed and agreed with or any disagreements were addressed in the HPI.     Past History     Past Medical History:  Past Medical History:   Diagnosis Date    Arthritis     Asthma     seasonal     Diabetes ([address])     diet controlled    Diabetes mellitus ([address])     Type 2    Hypertension     Macromastia     Hx of    Menopause     RA (rheumatoid arthritis) ([address])     TIA (transient ischemic attack) 01/2021    Ulcer of foot ([address])     Right foot       Past Surgical History:  Past Surgical History:   Procedure Laterality Date    BREAST REDUCTION SURGERY Bilateral 9/2012    HYSTERECTOMY (CERVIX STATUS UNKNOWN)  1970    ORTHOPEDIC SURGERY  2009    Left hand surgery    OTHER SURGICAL HISTORY      left shoulder abcess drained    PARTIAL HYSTERECTOMY (CERVIX NOT REMOVED)      SHOULDER SURGERY Right     rotator cuff repair    US I&D BREAST ABSCESS DEEP  2/10/2016    US I&D BREAST ABSCESS DEEP       Family History:  Family History   Problem Relation Age of Onset    Diabetes Mother     No Known Course as of 10/21/23 1352   Sat Oct 21, 2023   1348 No evidence of acute intracranial abnormality. Severe chronic microangiopathic white matter disease. [AK]   1348 No acute fracture or subluxation. [AK]   1349 No acute abnormality. [AK]   1349 CT HEAD WO CONTRAST [AK]   1349 CT CERVICAL SPINE WO CONTRAST [AK]   1349 XR HIP 2-3 VW W PELVIS LEFT [AK]      ED Course User Index  [AK] Pipe Borges MD       None    Screenings                  CIWA Assessment  BP: (!) 196/77  Pulse: 80       Medical Decision Making     DDX: Fracture, sprain, strain, dislocation, intercerebral hemorrhage, otherwise differential    DISCUSSION:  Severity of condition: Moderate  Acuity of condition: Acute    78 y.o. female who fell over with pain in the left hip and struck her head. X-rays of the left hip no acute process or fracture CT of the head and cervical spine as well as the hip films were all read normal by radiology. Patient was provided pain medication during her time in the emergency department which helped. Patient also noted to be having an increased blood pressure at 183/73. Patient given 0.2 mg clonidine by mouth to help bring that back down. Patient discharged home in improved condition. In evaluation of the above differential diagnosis, consideration was given to the following tests and treatments. The decision to perform testing and results were discussed with the patient and caretaker. I discussed each of these tests and considerations with the patient and caretaker. They agree with the plan of discharge. Diagnosis and Disposition     DISPOSITION:  DISPOSITION Decision To Discharge 10/21/2023 01:52:37 PM      DISCHARGE NOTE:  Aaron Rizo  results have been reviewed with her. She has been counseled regarding her diagnosis, treatment, and plan.   She verbally conveys understanding and agreement of the signs, symptoms, diagnosis, treatment and prognosis and additionally agrees to follow up as

## 2023-10-21 NOTE — ED NOTES
Pt reports pain is improved at this time. Resting on stretcher awake and alert. No needs voiced at this time.       Jared Phan RN  10/21/23 3583

## 2023-10-21 NOTE — ED TRIAGE NOTES
Patient goes to [facility] and she tripped and fel ovr the table and fell back and hit the back of her head.  Daughter states she was confused but c/o pain on right side there was no loc

## 2023-10-21 NOTE — ED NOTES
Pt provided with walker, assisted into wheelchair and to lobby for discharge home with family. No further needs at time of discharge.       Christina Ahmadi RN  10/21/23 0027

## 2023-10-30 ENCOUNTER — APPOINTMENT (OUTPATIENT)
Facility: HOSPITAL | Age: 78
DRG: 522 | End: 2023-10-30
Payer: MEDICARE

## 2023-10-30 ENCOUNTER — HOSPITAL ENCOUNTER (INPATIENT)
Facility: HOSPITAL | Age: 78
LOS: 9 days | Discharge: HOME HEALTH | DRG: 522 | End: 2023-11-08
Attending: EMERGENCY MEDICINE | Admitting: HOSPITALIST
Payer: MEDICARE

## 2023-10-30 DIAGNOSIS — R94.31 ABNORMAL EKG: ICD-10-CM

## 2023-10-30 DIAGNOSIS — S72.002A CLOSED FRACTURE OF LEFT HIP, INITIAL ENCOUNTER (HCC): Primary | ICD-10-CM

## 2023-10-30 PROBLEM — I71.40 AAA (ABDOMINAL AORTIC ANEURYSM) (HCC): Status: ACTIVE | Noted: 2023-10-30

## 2023-10-30 PROBLEM — S72.001A CLOSED RIGHT HIP FRACTURE, INITIAL ENCOUNTER (HCC): Status: ACTIVE | Noted: 2023-10-30

## 2023-10-30 PROBLEM — D64.9 CHRONIC ANEMIA: Status: ACTIVE | Noted: 2020-12-24

## 2023-10-30 PROBLEM — I73.9 PVD (PERIPHERAL VASCULAR DISEASE) (HCC): Status: ACTIVE | Noted: 2020-12-24

## 2023-10-30 PROBLEM — S72.001A CLOSED RIGHT HIP FRACTURE, INITIAL ENCOUNTER (HCC): Status: RESOLVED | Noted: 2023-10-30 | Resolved: 2023-10-30

## 2023-10-30 PROBLEM — D64.9 ANEMIA: Status: RESOLVED | Noted: 2022-01-31 | Resolved: 2023-10-30

## 2023-10-30 PROBLEM — D64.9 ANEMIA: Status: ACTIVE | Noted: 2022-01-31

## 2023-10-30 LAB
ANION GAP SERPL CALC-SCNC: 6 MMOL/L (ref 3–18)
BASOPHILS # BLD: 0.1 K/UL (ref 0–0.1)
BASOPHILS NFR BLD: 1 % (ref 0–2)
BUN SERPL-MCNC: 24 MG/DL (ref 7–18)
BUN/CREAT SERPL: 24 (ref 12–20)
CALCIUM SERPL-MCNC: 9.8 MG/DL (ref 8.5–10.1)
CHLORIDE SERPL-SCNC: 104 MMOL/L (ref 100–111)
CO2 SERPL-SCNC: 28 MMOL/L (ref 21–32)
CREAT SERPL-MCNC: 1.01 MG/DL (ref 0.6–1.3)
DIFFERENTIAL METHOD BLD: ABNORMAL
EKG ATRIAL RATE: 80 BPM
EKG DIAGNOSIS: NORMAL
EKG P AXIS: -24 DEGREES
EKG P-R INTERVAL: 136 MS
EKG Q-T INTERVAL: 302 MS
EKG QRS DURATION: 84 MS
EKG QTC CALCULATION (BAZETT): 348 MS
EKG R AXIS: -19 DEGREES
EKG T AXIS: -29 DEGREES
EKG VENTRICULAR RATE: 80 BPM
EOSINOPHIL # BLD: 0.5 K/UL (ref 0–0.4)
EOSINOPHIL NFR BLD: 6 % (ref 0–5)
ERYTHROCYTE [DISTWIDTH] IN BLOOD BY AUTOMATED COUNT: 14.5 % (ref 11.6–14.5)
GLUCOSE SERPL-MCNC: 86 MG/DL (ref 74–99)
HCT VFR BLD AUTO: 32.3 % (ref 35–45)
HGB BLD-MCNC: 10 G/DL (ref 12–16)
IMM GRANULOCYTES # BLD AUTO: 0 K/UL (ref 0–0.04)
IMM GRANULOCYTES NFR BLD AUTO: 0 % (ref 0–0.5)
LYMPHOCYTES # BLD: 1.1 K/UL (ref 0.9–3.6)
LYMPHOCYTES NFR BLD: 13 % (ref 21–52)
MCH RBC QN AUTO: 22.5 PG (ref 24–34)
MCHC RBC AUTO-ENTMCNC: 31 G/DL (ref 31–37)
MCV RBC AUTO: 72.6 FL (ref 78–100)
MONOCYTES # BLD: 0.7 K/UL (ref 0.05–1.2)
MONOCYTES NFR BLD: 9 % (ref 3–10)
NEUTS SEG # BLD: 5.8 K/UL (ref 1.8–8)
NEUTS SEG NFR BLD: 71 % (ref 40–73)
NRBC # BLD: 0 K/UL (ref 0–0.01)
NRBC BLD-RTO: 0 PER 100 WBC
PLATELET # BLD AUTO: 483 K/UL (ref 135–420)
PMV BLD AUTO: 10.5 FL (ref 9.2–11.8)
POTASSIUM SERPL-SCNC: 4.2 MMOL/L (ref 3.5–5.5)
RBC # BLD AUTO: 4.45 M/UL (ref 4.2–5.3)
SODIUM SERPL-SCNC: 138 MMOL/L (ref 136–145)
WBC # BLD AUTO: 8.2 K/UL (ref 4.6–13.2)

## 2023-10-30 PROCEDURE — 6360000002 HC RX W HCPCS: Performed by: EMERGENCY MEDICINE

## 2023-10-30 PROCEDURE — 96374 THER/PROPH/DIAG INJ IV PUSH: CPT

## 2023-10-30 PROCEDURE — 96375 TX/PRO/DX INJ NEW DRUG ADDON: CPT

## 2023-10-30 PROCEDURE — 71045 X-RAY EXAM CHEST 1 VIEW: CPT

## 2023-10-30 PROCEDURE — 93005 ELECTROCARDIOGRAM TRACING: CPT | Performed by: HOSPITALIST

## 2023-10-30 PROCEDURE — 80048 BASIC METABOLIC PNL TOTAL CA: CPT

## 2023-10-30 PROCEDURE — 74176 CT ABD & PELVIS W/O CONTRAST: CPT

## 2023-10-30 PROCEDURE — 1100000000 HC RM PRIVATE

## 2023-10-30 PROCEDURE — 99285 EMERGENCY DEPT VISIT HI MDM: CPT

## 2023-10-30 PROCEDURE — 85025 COMPLETE CBC W/AUTO DIFF WBC: CPT

## 2023-10-30 PROCEDURE — 96376 TX/PRO/DX INJ SAME DRUG ADON: CPT

## 2023-10-30 PROCEDURE — 2580000003 HC RX 258: Performed by: HOSPITALIST

## 2023-10-30 RX ORDER — SODIUM CHLORIDE 0.9 % (FLUSH) 0.9 %
5-40 SYRINGE (ML) INJECTION EVERY 12 HOURS SCHEDULED
Status: DISCONTINUED | OUTPATIENT
Start: 2023-10-30 | End: 2023-11-08 | Stop reason: HOSPADM

## 2023-10-30 RX ORDER — ONDANSETRON 2 MG/ML
4 INJECTION INTRAMUSCULAR; INTRAVENOUS EVERY 6 HOURS PRN
Status: DISCONTINUED | OUTPATIENT
Start: 2023-10-30 | End: 2023-11-02 | Stop reason: SDUPTHER

## 2023-10-30 RX ORDER — POLYETHYLENE GLYCOL 3350 17 G/17G
17 POWDER, FOR SOLUTION ORAL DAILY PRN
Status: DISCONTINUED | OUTPATIENT
Start: 2023-10-30 | End: 2023-11-08 | Stop reason: HOSPADM

## 2023-10-30 RX ORDER — SODIUM CHLORIDE 9 MG/ML
INJECTION, SOLUTION INTRAVENOUS PRN
Status: DISCONTINUED | OUTPATIENT
Start: 2023-10-30 | End: 2023-11-08 | Stop reason: HOSPADM

## 2023-10-30 RX ORDER — FENTANYL CITRATE 50 UG/ML
50 INJECTION, SOLUTION INTRAMUSCULAR; INTRAVENOUS
Status: COMPLETED | OUTPATIENT
Start: 2023-10-30 | End: 2023-10-30

## 2023-10-30 RX ORDER — MORPHINE SULFATE 2 MG/ML
1 INJECTION, SOLUTION INTRAMUSCULAR; INTRAVENOUS EVERY 4 HOURS PRN
Status: DISCONTINUED | OUTPATIENT
Start: 2023-10-30 | End: 2023-11-08 | Stop reason: HOSPADM

## 2023-10-30 RX ORDER — ONDANSETRON 4 MG/1
4 TABLET, ORALLY DISINTEGRATING ORAL EVERY 8 HOURS PRN
Status: DISCONTINUED | OUTPATIENT
Start: 2023-10-30 | End: 2023-11-02 | Stop reason: SDUPTHER

## 2023-10-30 RX ORDER — SODIUM CHLORIDE 0.9 % (FLUSH) 0.9 %
5-40 SYRINGE (ML) INJECTION PRN
Status: DISCONTINUED | OUTPATIENT
Start: 2023-10-30 | End: 2023-11-08 | Stop reason: HOSPADM

## 2023-10-30 RX ORDER — KETOROLAC TROMETHAMINE 15 MG/ML
15 INJECTION, SOLUTION INTRAMUSCULAR; INTRAVENOUS
Status: COMPLETED | OUTPATIENT
Start: 2023-10-30 | End: 2023-10-30

## 2023-10-30 RX ORDER — ACETAMINOPHEN 325 MG/1
650 TABLET ORAL EVERY 6 HOURS PRN
Status: DISCONTINUED | OUTPATIENT
Start: 2023-10-30 | End: 2023-11-08 | Stop reason: HOSPADM

## 2023-10-30 RX ORDER — ACETAMINOPHEN 650 MG/1
650 SUPPOSITORY RECTAL EVERY 6 HOURS PRN
Status: DISCONTINUED | OUTPATIENT
Start: 2023-10-30 | End: 2023-11-08 | Stop reason: HOSPADM

## 2023-10-30 RX ORDER — ENOXAPARIN SODIUM 100 MG/ML
40 INJECTION SUBCUTANEOUS EVERY 24 HOURS
Status: DISCONTINUED | OUTPATIENT
Start: 2023-10-30 | End: 2023-11-08 | Stop reason: HOSPADM

## 2023-10-30 RX ADMIN — FENTANYL CITRATE 50 MCG: 50 INJECTION INTRAMUSCULAR; INTRAVENOUS at 16:27

## 2023-10-30 RX ADMIN — SODIUM CHLORIDE, PRESERVATIVE FREE 10 ML: 5 INJECTION INTRAVENOUS at 20:41

## 2023-10-30 RX ADMIN — FENTANYL CITRATE 50 MCG: 50 INJECTION INTRAMUSCULAR; INTRAVENOUS at 12:07

## 2023-10-30 RX ADMIN — KETOROLAC TROMETHAMINE 15 MG: 15 INJECTION, SOLUTION INTRAMUSCULAR; INTRAVENOUS at 12:03

## 2023-10-30 ASSESSMENT — PAIN DESCRIPTION - DESCRIPTORS
DESCRIPTORS: ACHING
DESCRIPTORS: ACHING

## 2023-10-30 ASSESSMENT — PAIN DESCRIPTION - ORIENTATION
ORIENTATION: LEFT

## 2023-10-30 ASSESSMENT — PAIN DESCRIPTION - LOCATION
LOCATION: HIP
LOCATION: GROIN
LOCATION: HIP

## 2023-10-30 ASSESSMENT — PAIN SCALES - GENERAL
PAINLEVEL_OUTOF10: 10
PAINLEVEL_OUTOF10: 0
PAINLEVEL_OUTOF10: 8
PAINLEVEL_OUTOF10: 8
PAINLEVEL_OUTOF10: 0

## 2023-10-30 ASSESSMENT — PAIN - FUNCTIONAL ASSESSMENT: PAIN_FUNCTIONAL_ASSESSMENT: 0-10

## 2023-10-30 NOTE — ED PROVIDER NOTES
[facility] EMERGENCY DEPT  EMERGENCY DEPARTMENT ENCOUNTER    Patient Name: Rogerio Owens  MRN: [id number]  YOB: 1945  Provider: Collette Sheikh MD  PCP: Jerson Mason MD   Time/Date of evaluation: 3:08 PM EDT on 10/30/23    History of Presenting Illness     Chief Complaint   Patient presents with    Groin Pain    Fall       History Provided By: Patient and Patient's Son     History Xin Concepcion): Rogerio Owens is a 78 y.o. female who presents to the emergency department with pain in the left hip that started on 21 October. She was seen here in the emergency department plain films of the area were negative. Patient has had continued discomfort and presents with continued pain. Patient has been able to ambulate. No new falls no new trauma no nausea vomiting diarrhea constipation headache shortness of breath or chest pain. Nursing Notes were all reviewed and agreed with or any disagreements were addressed in the HPI.     Past History     Past Medical History:  Past Medical History:   Diagnosis Date    Arthritis     Asthma     seasonal     Diabetes ([address])     diet controlled    Diabetes mellitus ([address])     Type 2    Hypertension     Macromastia     Hx of    Menopause     RA (rheumatoid arthritis) ([address])     TIA (transient ischemic attack) 01/2021    Ulcer of foot ([address])     Right foot       Past Surgical History:  Past Surgical History:   Procedure Laterality Date    BREAST REDUCTION SURGERY Bilateral 9/2012    HYSTERECTOMY (CERVIX STATUS UNKNOWN)  1970    ORTHOPEDIC SURGERY  2009    Left hand surgery    OTHER SURGICAL HISTORY      left shoulder abcess drained    PARTIAL HYSTERECTOMY (CERVIX NOT REMOVED)      SHOULDER SURGERY Right     rotator cuff repair    US I&D BREAST ABSCESS DEEP  2/10/2016    US I&D BREAST ABSCESS DEEP       Family History:  Family History   Problem Relation Age of Onset    Diabetes Mother     No Known Problems Father     Heart Disease Mother        Social History:  Social and preliminarily interpreted by the ED Provider with the below findings:     Previous plain films from 21 October were read as normal.     Interpretation per the Radiologist below, if available at the time of this note:    CT ABDOMEN PELVIS WO CONTRAST Additional Contrast? None   Final Result   1. Subacute left femoral neck fracture. 2.  3 cm infrarenal abdominal aortic aneurysm. Procedures     Unless otherwise noted below, none. Procedures    ED Course     3:08 PM EDT I Luz Bamberger) am the first provider for this patient. Initial assessment performed. I reviewed the vital signs, available nursing notes, past medical history, past surgical history, family history and social history. The patients presenting problems have been discussed, and they are in agreement with the care plan formulated and outlined with them. I have encouraged them to ask questions as they arise throughout their visit. Records Reviewed: Nursing Notes, Old Medical Records, Previous Radiology Studies, and Previous Laboratory Studies    Is this patient to be included in the SEP-1 core measure? No Exclusion criteria - the patient is NOT to be included for SEP-1 Core Measure due to: Infection is not suspected    MEDICATIONS ADMINISTERED IN THE ED:  Medications   fentaNYL (SUBLIMAZE) injection 50 mcg (has no administration in time range)   fentaNYL (SUBLIMAZE) injection 50 mcg (50 mcg IntraVENous Given 10/30/23 1207)   ketorolac (TORADOL) injection 15 mg (15 mg IntraVENous Given 10/30/23 1203)       ED Course as of 10/30/23 1622   Mon Oct 30, 2023   1453 Subacute subcapital fracture of the left femoral neck.   [AK]   1453 CT ABDOMEN PELVIS WO CONTRAST Additional Contrast? None [AK]      ED Course User Index  [AK] Mansoor Frias MD       IP CONSULT TO ORTHOPEDIC SURGERY  IP CONSULT TO HOSPITALIST    Screenings                  [facility] Assessment  BP: (!) 98/58  Pulse: 96       Medical Decision Making     DDX: Fracture, strain,

## 2023-10-30 NOTE — ED TRIAGE NOTES
Patient reports she was seen here last week for the same thing still has groin pain but reports she does not feel like it is not getting any better

## 2023-10-30 NOTE — ED NOTES
TRANSFER - OUT REPORT:    Verbal report given to [facility] RN on [employer]  being transferred to   for routine progression of patient care       Report consisted of patient's Situation, Background, Assessment and   Recommendations(SBAR). Information from the following report(s) Nurse Handoff Report, Index, ED Encounter Summary, ED SBAR, Adult Overview, Intake/Output, MAR, and Recent Results was reviewed with the receiving nurse. [place] Fall Assessment:    Presents to emergency department  because of falls (Syncope, seizure, or loss of consciousness): No  Age > 70: No  Altered Mental Status, Intoxication with alcohol or substance confusion (Disorientation, impaired judgment, poor safety awaremess, or inability to follow instructions): No  Impaired Mobility: Ambulates or transfers with assistive devices or assistance; Unable to ambulate or transer.: No  Nursing Judgement: No          Lines:   Peripheral IV 10/30/23 Left Antecubital (Active)        Opportunity for questions and clarification was provided.       Patient transported with:  Jeniffer Carlisle RN  10/30/23 9518

## 2023-10-31 ENCOUNTER — ANESTHESIA (OUTPATIENT)
Dept: INTERNAL MEDICINE UNIT | Facility: HOSPITAL | Age: 78
DRG: 522 | End: 2023-10-31
Payer: MEDICARE

## 2023-10-31 LAB
ABO + RH BLD: NORMAL
BLOOD GROUP ANTIBODIES SERPL: NORMAL
GLUCOSE BLD STRIP.AUTO-MCNC: 196 MG/DL (ref 70–110)
GLUCOSE BLD STRIP.AUTO-MCNC: 48 MG/DL (ref 70–110)
GLUCOSE BLD STRIP.AUTO-MCNC: 52 MG/DL (ref 70–110)
SPECIMEN EXP DATE BLD: NORMAL

## 2023-10-31 PROCEDURE — 86900 BLOOD TYPING SEROLOGIC ABO: CPT

## 2023-10-31 PROCEDURE — 2500000003 HC RX 250 WO HCPCS: Performed by: ANESTHESIOLOGY

## 2023-10-31 PROCEDURE — 86850 RBC ANTIBODY SCREEN: CPT

## 2023-10-31 PROCEDURE — 1100000000 HC RM PRIVATE

## 2023-10-31 PROCEDURE — 6360000002 HC RX W HCPCS: Performed by: HOSPITALIST

## 2023-10-31 PROCEDURE — 2580000003 HC RX 258: Performed by: HOSPITALIST

## 2023-10-31 PROCEDURE — 86901 BLOOD TYPING SEROLOGIC RH(D): CPT

## 2023-10-31 PROCEDURE — 36415 COLL VENOUS BLD VENIPUNCTURE: CPT

## 2023-10-31 PROCEDURE — 6360000002 HC RX W HCPCS: Performed by: ANESTHESIOLOGY

## 2023-10-31 PROCEDURE — 6370000000 HC RX 637 (ALT 250 FOR IP): Performed by: HOSPITALIST

## 2023-10-31 PROCEDURE — 76942 ECHO GUIDE FOR BIOPSY: CPT | Performed by: ANESTHESIOLOGY

## 2023-10-31 PROCEDURE — 2580000003 HC RX 258: Performed by: ANESTHESIOLOGY

## 2023-10-31 PROCEDURE — 82962 GLUCOSE BLOOD TEST: CPT

## 2023-10-31 PROCEDURE — 2580000003 HC RX 258: Performed by: ORTHOPAEDIC SURGERY

## 2023-10-31 RX ORDER — LIDOCAINE HYDROCHLORIDE 20 MG/ML
INJECTION, SOLUTION INFILTRATION; PERINEURAL
Status: COMPLETED | OUTPATIENT
Start: 2023-10-31 | End: 2023-10-31

## 2023-10-31 RX ORDER — SODIUM CHLORIDE, SODIUM LACTATE, POTASSIUM CHLORIDE, CALCIUM CHLORIDE 600; 310; 30; 20 MG/100ML; MG/100ML; MG/100ML; MG/100ML
INJECTION, SOLUTION INTRAVENOUS CONTINUOUS
Status: DISCONTINUED | OUTPATIENT
Start: 2023-10-31 | End: 2023-11-08 | Stop reason: HOSPADM

## 2023-10-31 RX ORDER — DEXTROSE MONOHYDRATE 100 MG/ML
INJECTION, SOLUTION INTRAVENOUS CONTINUOUS PRN
Status: DISCONTINUED | OUTPATIENT
Start: 2023-10-31 | End: 2023-11-08 | Stop reason: HOSPADM

## 2023-10-31 RX ORDER — MIDAZOLAM HYDROCHLORIDE 1 MG/ML
INJECTION INTRAMUSCULAR; INTRAVENOUS
Status: COMPLETED | OUTPATIENT
Start: 2023-10-31 | End: 2023-10-31

## 2023-10-31 RX ORDER — ROPIVACAINE HYDROCHLORIDE 5 MG/ML
INJECTION, SOLUTION EPIDURAL; INFILTRATION; PERINEURAL
Status: COMPLETED | OUTPATIENT
Start: 2023-10-31 | End: 2023-10-31

## 2023-10-31 RX ORDER — DOCUSATE SODIUM 100 MG/1
100 CAPSULE, LIQUID FILLED ORAL ONCE
Status: COMPLETED | OUTPATIENT
Start: 2023-10-31 | End: 2023-10-31

## 2023-10-31 RX ORDER — DEXTROSE MONOHYDRATE 100 MG/ML
INJECTION, SOLUTION INTRAVENOUS ONCE
Status: COMPLETED | OUTPATIENT
Start: 2023-10-31 | End: 2023-10-31

## 2023-10-31 RX ADMIN — DEXTROSE MONOHYDRATE: 10 INJECTION, SOLUTION INTRAVENOUS at 17:15

## 2023-10-31 RX ADMIN — ROPIVACAINE HYDROCHLORIDE 30 ML: 5 INJECTION, SOLUTION EPIDURAL; INFILTRATION; PERINEURAL at 17:30

## 2023-10-31 RX ADMIN — MIDAZOLAM 1 MG: 1 INJECTION INTRAMUSCULAR; INTRAVENOUS at 17:24

## 2023-10-31 RX ADMIN — ENOXAPARIN SODIUM 40 MG: 100 INJECTION SUBCUTANEOUS at 19:54

## 2023-10-31 RX ADMIN — LIDOCAINE HYDROCHLORIDE 10 ML: 20 INJECTION, SOLUTION INFILTRATION; PERINEURAL at 17:30

## 2023-10-31 RX ADMIN — ACETAMINOPHEN 650 MG: 325 TABLET ORAL at 13:34

## 2023-10-31 RX ADMIN — DOCUSATE SODIUM 100 MG: 100 CAPSULE, LIQUID FILLED ORAL at 09:01

## 2023-10-31 RX ADMIN — SODIUM CHLORIDE, SODIUM LACTATE, POTASSIUM CHLORIDE, AND CALCIUM CHLORIDE: 600; 310; 30; 20 INJECTION, SOLUTION INTRAVENOUS at 17:50

## 2023-10-31 RX ADMIN — SODIUM CHLORIDE, PRESERVATIVE FREE 10 ML: 5 INJECTION INTRAVENOUS at 16:36

## 2023-10-31 ASSESSMENT — PAIN SCALES - GENERAL
PAINLEVEL_OUTOF10: 0
PAINLEVEL_OUTOF10: 2
PAINLEVEL_OUTOF10: 3

## 2023-10-31 ASSESSMENT — PAIN DESCRIPTION - ORIENTATION: ORIENTATION: LEFT

## 2023-10-31 ASSESSMENT — PAIN DESCRIPTION - LOCATION: LOCATION: HIP

## 2023-10-31 ASSESSMENT — PAIN DESCRIPTION - DESCRIPTORS: DESCRIPTORS: SORE

## 2023-10-31 NOTE — PROGRESS NOTES
Bedside and Verbal shift change report given to Isauro Arredondo RN (oncoming nurse) by EDINSON Tejeda RN (offgoing nurse). Report included the following information Nurse Handoff Report, ED Encounter Summary, Adult Overview, Intake/Output, MAR, and Recent Results.

## 2023-10-31 NOTE — ANESTHESIA PRE PROCEDURE
Department of Anesthesiology  Preprocedure Note       Name:  Jade Hamman   Age:  78 y.o.  :  1945                                          MRN:  [id number]         Date:  10/31/2023      Surgeon: Ana Day):  Liliane Alan MD    Procedure: Procedure(s):  HIP TOTAL ARTHROPLASTY ANTERIOR APPROACH WITH C-ARM    Medications prior to admission:   Prior to Admission medications    Medication Sig Start Date End Date Taking? Authorizing Provider   vitamin B-12 (CYANOCOBALAMIN) 100 MCG tablet Take 0.5 tablets by mouth daily    Provider, MD Sean   acetaminophen (TYLENOL) 500 MG tablet Take 2 tablets by mouth 2 times daily as needed    Automatic Reconciliation, Ar   amLODIPine (NORVASC) 5 MG tablet 1 tablet    Automatic Reconciliation, Ar   ascorbic acid (VITAMIN C) 250 MG tablet Take by mouth daily    Automatic Reconciliation, Ar   atorvastatin (LIPITOR) 20 MG tablet Take 4 tablets by mouth 21   Automatic Reconciliation, Ar   cetirizine (ZYRTEC) 10 MG tablet cetirizine 10 mg tablet   TK 1 T PO QD UTD FOR 30 DAYS    Automatic Reconciliation, Ar   vitamin D 25 MCG (1000 UT) CAPS Take by mouth    Automatic Reconciliation, Ar   folic acid (FOLVITE) 1 MG tablet Take by mouth    Automatic Reconciliation, Ar   gabapentin (NEURONTIN) 100 MG capsule 3 capsules in the morning and at bedtime.  20   Automatic Reconciliation, Ar   hydroCHLOROthiazide (HYDRODIURIL) 25 MG tablet hydrochlorothiazide 25 mg tablet    Automatic Reconciliation, Ar   hydroxychloroquine (PLAQUENIL) 200 MG tablet 1 tablet daily    Automatic Reconciliation, Ar   ipratropium (ATROVENT) 0.06 % nasal spray as needed  Patient not taking: Reported on 2023    Automatic Reconciliation, Ar   leflunomide (ARAVA) 10 MG tablet Take by mouth    Automatic Reconciliation, Ar   losartan (COZAAR) 100 MG tablet 1 tablet    Automatic Reconciliation, Ar   oxybutynin (DITROPAN) 5 MG tablet 1 tablet 2 times daily    Automatic Reconciliation, Ar

## 2023-10-31 NOTE — PERIOP NOTE
TRANSFER - IN REPORT:    Verbal report received from Pastora diamond RN on Bebe Marcano  being received from [place] for routine progression of patient care      Report consisted of patient's Situation, Background, Assessment and   Recommendations(SBAR). Information from the following report(s) Adult Overview, Surgery Report, Intake/Output, MAR, Recent Results, and Med Rec Status was reviewed with the receiving nurse. Opportunity for questions and clarification was provided. Assessment completed upon patient's arrival to unit and care assumed.

## 2023-10-31 NOTE — CARE COORDINATION
Case Management Assessment  Initial Evaluation    Date/Time of Evaluation: 10/31/2023 4:35 PM  Assessment Completed by: Sherren Jaeger, LCSW    If patient is discharged prior to next notation, then this note serves as note for discharge by case management. Patient Name: Tk Roman                   YOB: 1945  Diagnosis: Closed fracture of left hip, initial encounter [facility]) [S72.002A]  Closed right hip fracture, initial encounter [facility]) Sandrita Mares                   Date / Time: 10/30/2023 10:59 AM    Patient Admission Status: Inpatient   Readmission Risk (Low < 19, Mod (19-27), High > 27): Readmission Risk Score: 14.9    Current PCP: Jessica Varela MD  PCP verified by CM? (P)  (The pt stated her PCP is Dr. Anjali Altman.)    Chart Reviewed: Yes      History Provided by: (P) Patient  Patient Orientation: (P) Alert and Oriented    Patient Cognition: (P) Alert    Hospitalization in the last 30 days (Readmission):  Yes    If yes, Readmission Assessment in CM Navigator will be completed. Advance Directives:      Code Status: Full Code   Patient's Primary Decision Maker is:      Primary Decision Maker: NorbertCandy jack - Child - [phone]    Secondary Decision Maker: Susana Osler - Child - [phone]    Discharge Planning:    Patient lives with: (P)  (The pt stated her son lives with her.) Type of Home: House  Primary Care Giver: (P) Self  Patient Support Systems include: (P)  (Son)   Current Financial resources: (P)  (The pt stated she is retired.)  Current community resources:    Current services prior to admission: (P) None            Current DME:              Type of Home Care services:  PT, OT    ADLS  Prior functional level: (P) Independent in ADLs/IADLs  Current functional level: (P) Independent in ADLs/IADLs    PT AM-PAC:   /24  OT AM-PAC:   /24    Family can provide assistance at DC:     Would you like Case Management to discuss the discharge plan with any other family

## 2023-10-31 NOTE — PERIOP NOTE
TRANSFER - OUT REPORT:    Verbal report given to [employer] on Curry Lobe  being transferred to [place] for routine progression of patient care       Report consisted of patient's Situation, Background, Assessment and   Recommendations(SBAR). Information from the following report(s) Adult Overview and Recent Results was reviewed with the receiving nurse. Lines:   Peripheral IV 10/31/23 Posterior;Right Forearm (Active)        Opportunity for questions and clarification was provided.       Patient transported with:  Registered Nurse

## 2023-10-31 NOTE — PROGRESS NOTES
1310  Bedside shift change report given to [facility] (oncoming nurse) by Alem Camacho (offgoing nurse). Report included the following information Nurse Handoff Report. 0820  Paged Dr. Denisa Mcmahon and Dr. Naina Robles. 1134  Patient states no BM for three weeks or gas for 1 week. Abdomen soft, non tender and not distended. Per Dr. Cantu Pillion was clear. Verbal telephone order: Colace once. 3020  Dr. Naina Robles returned call. Patient has not ate since yesterday at 8 am. Per Dr. Naina Robles, patient may eat until 9 am. Patient given crackers, pudding, applesauce and water. Tolerated well. 1610  TRANSFER - OUT REPORT:    Verbal report given to [facility] on [employer]  being transferred to pre op for ordered procedure       Report consisted of patient's Situation, Background, Assessment and   Recommendations(SBAR). Information from the following report(s) Nurse Handoff Report and MAR was reviewed with the receiving nurse. Lines:   Peripheral IV 10/31/23 Posterior;Right Forearm (Active)        Opportunity for questions and clarification was provided. Patient transported with:  Tech    1610  Per son, Umer Broderick, he and the sister Alvina Saunders) are to be the only point of contacts for updates.

## 2023-10-31 NOTE — PROGRESS NOTES
1935-Bedside report received from [place], [address]. Pt A & O X 4. Pain at 0. Pt without any issues. Call light within reach. 2042-Pt resting in bed at this time. IV site to L AC  patent and intact. Pt A & O x 4. LS clear, on RA. Drsg(band aides) to L heel and R elbow CDI. No Pressure ulcersPt does not want the heel one to be opened. Skni assessment reviewed with Alexsander Desai, RN+ CSM. Pain at 0. + BS to all 4 quadrants. 1800 Lovenox dose not given by ED, will hold sh son says they were told surgery is tonight, Pt already NPO  Pt denies nausea. Call light within raech. Pt denies any needs at this time. Pt had an uneventful shift. CHG wipes done  for pre-op. Non-ambulatory. Denies Pain remained. No issues/concerns at this time.  Call bell within reach

## 2023-10-31 NOTE — PROGRESS NOTES
Received Pt from Emergency Department. Pt is A&O x4. Family is at bedside. Vitals obtained and charted in flowsheet. Pt is NPO due to possible surgery tomorrow. Bed locked in lowest position, call bell in reach.

## 2023-10-31 NOTE — ANESTHESIA PROCEDURE NOTES
Peripheral Block    Patient location during procedure: pre-op  Reason for block: procedure for pain and post-op pain management  Start time: 10/31/2023 5:24 PM  End time: 10/31/2023 5:30 PM  Staffing  Performed: anesthesiologist   Anesthesiologist: Yenni Snigh DO  Performed by: Yenni Singh DO  Authorized by:  Yenni Singh DO    Preanesthetic Checklist  Completed: patient identified, IV checked, site marked, risks and benefits discussed, surgical/procedural consents, equipment checked, pre-op evaluation, timeout performed, anesthesia consent given, oxygen available, monitors applied/VS acknowledged, fire risk safety assessment completed and verbalized and blood product R/B/A discussed and consented  Peripheral Block   Patient position: supine  Prep: ChloraPrep  Provider prep: mask and sterile gloves  Patient monitoring: cardiac monitor, continuous pulse ox, frequent blood pressure checks, IV access, oxygen and responsive to questions  Block type: Fascia iliaca  Laterality: left  Injection technique: single-shot  Guidance: ultrasound guided    Needle   Needle type: insulated echogenic nerve stimulator needle   Needle gauge: 20 G  Needle localization: ultrasound guidance  Needle length: 8 cm  Assessment   Injection assessment: negative aspiration for heme, no paresthesia on injection, local visualized surrounding nerve on ultrasound and no intravascular symptoms  Paresthesia pain: none  Slow fractionated injection: yes  Hemodynamics: stable  Real-time US image taken/store: yes  Outcomes: uncomplicated and patient tolerated procedure well    Medications Administered  midazolam (VERSED) injection 2 mg/2mL - IntraVENous   1 mg - 10/31/2023 5:24:00 PM  ropivacaine (NAROPIN) injection 0.5% - Perineural   30 mL - 10/31/2023 5:30:00 PM  lidocaine injection 2% - Perineural   10 mL - 10/31/2023 5:30:00 PM

## 2023-10-31 NOTE — PROGRESS NOTES
TRANSFER - IN REPORT:    Verbal report received from Santi Fonseca RN on Maryanne Marrero  being received from the Emergency Department for routine post-op      Report consisted of patient's Situation, Background, Assessment and   Recommendations(SBAR). Information from the following report(s) Nurse Handoff Report, Adult Overview, Intake/Output, MAR, and Recent Results was reviewed with the receiving nurse. Opportunity for questions and clarification was provided. Assessment completed upon patient's arrival to unit and care assumed.

## 2023-11-01 ENCOUNTER — APPOINTMENT (OUTPATIENT)
Facility: HOSPITAL | Age: 78
DRG: 522 | End: 2023-11-01
Attending: HOSPITALIST
Payer: MEDICARE

## 2023-11-01 ENCOUNTER — APPOINTMENT (OUTPATIENT)
Facility: HOSPITAL | Age: 78
DRG: 522 | End: 2023-11-01
Payer: MEDICARE

## 2023-11-01 ENCOUNTER — ANESTHESIA (OUTPATIENT)
Facility: HOSPITAL | Age: 78
End: 2023-11-01
Payer: MEDICARE

## 2023-11-01 ENCOUNTER — ANESTHESIA EVENT (OUTPATIENT)
Facility: HOSPITAL | Age: 78
End: 2023-11-01
Payer: MEDICARE

## 2023-11-01 LAB
ECHO AO ROOT DIAM: 2.5 CM
ECHO AO ROOT INDEX: 1.37 CM/M2
ECHO AV AREA PEAK VELOCITY: 2 CM2
ECHO AV AREA VTI: 2.3 CM2
ECHO AV AREA/BSA PEAK VELOCITY: 1.1 CM2/M2
ECHO AV AREA/BSA VTI: 1.3 CM2/M2
ECHO AV MEAN GRADIENT: 3 MMHG
ECHO AV MEAN VELOCITY: 0.8 M/S
ECHO AV PEAK GRADIENT: 6 MMHG
ECHO AV PEAK VELOCITY: 1.2 M/S
ECHO AV VELOCITY RATIO: 0.67
ECHO AV VTI: 24 CM
ECHO BSA: 1.84 M2
ECHO LA VOL A-L A2C: 22 ML (ref 22–52)
ECHO LA VOL A-L A4C: 19 ML (ref 22–52)
ECHO LA VOL BP: 21 ML (ref 22–52)
ECHO LA VOL/BSA BIPLANE: 12 ML/M2 (ref 16–34)
ECHO LA VOLUME AREA LENGTH: 22 ML
ECHO LA VOLUME INDEX A-L A2C: 12 ML/M2 (ref 16–34)
ECHO LA VOLUME INDEX A-L A4C: 10 ML/M2 (ref 16–34)
ECHO LA VOLUME INDEX AREA LENGTH: 12 ML/M2 (ref 16–34)
ECHO LV E' LATERAL VELOCITY: 9 CM/S
ECHO LV E' SEPTAL VELOCITY: 7 CM/S
ECHO LV EDV A2C: 51 ML
ECHO LV EDV A4C: 61 ML
ECHO LV EDV BP: 57 ML (ref 56–104)
ECHO LV EDV INDEX A4C: 34 ML/M2
ECHO LV EDV INDEX BP: 31 ML/M2
ECHO LV EDV NDEX A2C: 28 ML/M2
ECHO LV EJECTION FRACTION A2C: 67 %
ECHO LV EJECTION FRACTION A4C: 66 %
ECHO LV EJECTION FRACTION BIPLANE: 67 % (ref 55–100)
ECHO LV ESV A2C: 17 ML
ECHO LV ESV A4C: 21 ML
ECHO LV ESV BP: 19 ML (ref 19–49)
ECHO LV ESV INDEX A2C: 9 ML/M2
ECHO LV ESV INDEX A4C: 12 ML/M2
ECHO LV ESV INDEX BP: 10 ML/M2
ECHO LV FRACTIONAL SHORTENING: 29 % (ref 28–44)
ECHO LV INTERNAL DIMENSION DIASTOLE INDEX: 2.69 CM/M2
ECHO LV INTERNAL DIMENSION DIASTOLIC: 4.9 CM (ref 3.9–5.3)
ECHO LV INTERNAL DIMENSION SYSTOLIC INDEX: 1.92 CM/M2
ECHO LV INTERNAL DIMENSION SYSTOLIC: 3.5 CM
ECHO LV IVSD: 0.7 CM (ref 0.6–0.9)
ECHO LV MASS 2D: 110.8 G (ref 67–162)
ECHO LV MASS INDEX 2D: 60.9 G/M2 (ref 43–95)
ECHO LV POSTERIOR WALL DIASTOLIC: 0.7 CM (ref 0.6–0.9)
ECHO LV RELATIVE WALL THICKNESS RATIO: 0.29
ECHO LVOT AREA: 3.1 CM2
ECHO LVOT AV VTI INDEX: 0.74
ECHO LVOT DIAM: 2 CM
ECHO LVOT MEAN GRADIENT: 1 MMHG
ECHO LVOT PEAK GRADIENT: 3 MMHG
ECHO LVOT PEAK VELOCITY: 0.8 M/S
ECHO LVOT STROKE VOLUME INDEX: 30.7 ML/M2
ECHO LVOT SV: 55.9 ML
ECHO LVOT VTI: 17.8 CM
ECHO MV A VELOCITY: 0.98 M/S
ECHO MV E DECELERATION TIME (DT): 182.2 MS
ECHO MV E VELOCITY: 0.73 M/S
ECHO MV E/A RATIO: 0.74
ECHO MV E/E' LATERAL: 8.11
ECHO MV E/E' RATIO (AVERAGED): 9.27
ECHO MV E/E' SEPTAL: 10.43
ECHO RV FREE WALL PEAK S': 14 CM/S
ECHO RV TAPSE: 2 CM (ref 1.7–?)
ECHO TV REGURGITANT MAX VELOCITY: 2.33 M/S
ECHO TV REGURGITANT PEAK GRADIENT: 22 MMHG
GLUCOSE BLD STRIP.AUTO-MCNC: 113 MG/DL (ref 70–110)
GLUCOSE BLD STRIP.AUTO-MCNC: 139 MG/DL (ref 70–110)
GLUCOSE BLD STRIP.AUTO-MCNC: 55 MG/DL (ref 70–110)
GLUCOSE BLD STRIP.AUTO-MCNC: 55 MG/DL (ref 70–110)

## 2023-11-01 PROCEDURE — 6360000002 HC RX W HCPCS: Performed by: HOSPITALIST

## 2023-11-01 PROCEDURE — 2500000003 HC RX 250 WO HCPCS

## 2023-11-01 PROCEDURE — 6370000000 HC RX 637 (ALT 250 FOR IP): Performed by: HOSPITALIST

## 2023-11-01 PROCEDURE — 6360000002 HC RX W HCPCS: Performed by: ORTHOPAEDIC SURGERY

## 2023-11-01 PROCEDURE — 93306 TTE W/DOPPLER COMPLETE: CPT

## 2023-11-01 PROCEDURE — C1776 JOINT DEVICE (IMPLANTABLE): HCPCS | Performed by: ORTHOPAEDIC SURGERY

## 2023-11-01 PROCEDURE — 7100000001 HC PACU RECOVERY - ADDTL 15 MIN: Performed by: ORTHOPAEDIC SURGERY

## 2023-11-01 PROCEDURE — 2720000010 HC SURG SUPPLY STERILE: Performed by: ORTHOPAEDIC SURGERY

## 2023-11-01 PROCEDURE — 2580000003 HC RX 258: Performed by: ORTHOPAEDIC SURGERY

## 2023-11-01 PROCEDURE — 1100000000 HC RM PRIVATE

## 2023-11-01 PROCEDURE — 6360000002 HC RX W HCPCS

## 2023-11-01 PROCEDURE — 2500000003 HC RX 250 WO HCPCS: Performed by: ANESTHESIOLOGY

## 2023-11-01 PROCEDURE — 3700000001 HC ADD 15 MINUTES (ANESTHESIA): Performed by: ORTHOPAEDIC SURGERY

## 2023-11-01 PROCEDURE — 2580000003 HC RX 258: Performed by: HOSPITALIST

## 2023-11-01 PROCEDURE — 7100000000 HC PACU RECOVERY - FIRST 15 MIN: Performed by: ORTHOPAEDIC SURGERY

## 2023-11-01 PROCEDURE — 3700000000 HC ANESTHESIA ATTENDED CARE: Performed by: ORTHOPAEDIC SURGERY

## 2023-11-01 PROCEDURE — 0SRB0JZ REPLACEMENT OF LEFT HIP JOINT WITH SYNTHETIC SUBSTITUTE, OPEN APPROACH: ICD-10-PCS | Performed by: ORTHOPAEDIC SURGERY

## 2023-11-01 PROCEDURE — 3600000012 HC SURGERY LEVEL 2 ADDTL 15MIN: Performed by: ORTHOPAEDIC SURGERY

## 2023-11-01 PROCEDURE — 2580000003 HC RX 258

## 2023-11-01 PROCEDURE — 82962 GLUCOSE BLOOD TEST: CPT

## 2023-11-01 PROCEDURE — 2709999900 HC NON-CHARGEABLE SUPPLY: Performed by: ORTHOPAEDIC SURGERY

## 2023-11-01 PROCEDURE — 6360000002 HC RX W HCPCS: Performed by: ANESTHESIOLOGY

## 2023-11-01 PROCEDURE — 3600000002 HC SURGERY LEVEL 2 BASE: Performed by: ORTHOPAEDIC SURGERY

## 2023-11-01 PROCEDURE — A4217 STERILE WATER/SALINE, 500 ML: HCPCS | Performed by: ORTHOPAEDIC SURGERY

## 2023-11-01 DEVICE — IMPLANTABLE DEVICE
Type: IMPLANTABLE DEVICE | Site: HIP | Status: FUNCTIONAL
Brand: VIVACIT-E®

## 2023-11-01 DEVICE — BONE SCREW 6.5X30 SELF-TAP: Type: IMPLANTABLE DEVICE | Site: HIP | Status: FUNCTIONAL

## 2023-11-01 DEVICE — IMPLANTABLE DEVICE
Type: IMPLANTABLE DEVICE | Site: HIP | Status: FUNCTIONAL
Brand: G7® DUAL MOBILITY ACETABULAR SYSTEM

## 2023-11-01 DEVICE — BIOLOX® OPTION, HEAD, S, Ø 28/-3.0, TAPER 12/14
Type: IMPLANTABLE DEVICE | Site: HIP | Status: FUNCTIONAL
Brand: BIOLOX® OPTION

## 2023-11-01 DEVICE — BONE SCREW 6.5X25 SELF-TAP: Type: IMPLANTABLE DEVICE | Site: HIP | Status: FUNCTIONAL

## 2023-11-01 DEVICE — AVENIR COMPLETE STANDARD COLLARED SIZE 4: Type: IMPLANTABLE DEVICE | Site: HIP | Status: FUNCTIONAL

## 2023-11-01 DEVICE — IMPLANTABLE DEVICE
Type: IMPLANTABLE DEVICE | Site: HIP | Status: FUNCTIONAL
Brand: G7® ACETABULAR SYSTEM

## 2023-11-01 RX ORDER — ONDANSETRON 2 MG/ML
4 INJECTION INTRAMUSCULAR; INTRAVENOUS
Status: DISCONTINUED | OUTPATIENT
Start: 2023-11-01 | End: 2023-11-01 | Stop reason: HOSPADM

## 2023-11-01 RX ORDER — DIPHENHYDRAMINE HYDROCHLORIDE 50 MG/ML
12.5 INJECTION INTRAMUSCULAR; INTRAVENOUS
Status: DISCONTINUED | OUTPATIENT
Start: 2023-11-01 | End: 2023-11-01 | Stop reason: HOSPADM

## 2023-11-01 RX ORDER — SODIUM CHLORIDE 0.9 % (FLUSH) 0.9 %
5-40 SYRINGE (ML) INJECTION EVERY 12 HOURS SCHEDULED
Status: DISCONTINUED | OUTPATIENT
Start: 2023-11-01 | End: 2023-11-01 | Stop reason: HOSPADM

## 2023-11-01 RX ORDER — AMLODIPINE BESYLATE 5 MG/1
5 TABLET ORAL DAILY
Status: DISCONTINUED | OUTPATIENT
Start: 2023-11-02 | End: 2023-11-08 | Stop reason: HOSPADM

## 2023-11-01 RX ORDER — OXYCODONE HYDROCHLORIDE 5 MG/1
5 TABLET ORAL
Status: DISCONTINUED | OUTPATIENT
Start: 2023-11-01 | End: 2023-11-01 | Stop reason: HOSPADM

## 2023-11-01 RX ORDER — PROPOFOL 10 MG/ML
INJECTION, EMULSION INTRAVENOUS PRN
Status: DISCONTINUED | OUTPATIENT
Start: 2023-11-01 | End: 2023-11-01 | Stop reason: SDUPTHER

## 2023-11-01 RX ORDER — PHENYLEPHRINE HCL IN 0.9% NACL 1 MG/10 ML
SYRINGE (ML) INTRAVENOUS PRN
Status: DISCONTINUED | OUTPATIENT
Start: 2023-11-01 | End: 2023-11-01 | Stop reason: SDUPTHER

## 2023-11-01 RX ORDER — DEXMEDETOMIDINE HYDROCHLORIDE 100 UG/ML
INJECTION, SOLUTION INTRAVENOUS PRN
Status: DISCONTINUED | OUTPATIENT
Start: 2023-11-01 | End: 2023-11-01 | Stop reason: SDUPTHER

## 2023-11-01 RX ORDER — SODIUM CHLORIDE, SODIUM LACTATE, POTASSIUM CHLORIDE, CALCIUM CHLORIDE 600; 310; 30; 20 MG/100ML; MG/100ML; MG/100ML; MG/100ML
INJECTION, SOLUTION INTRAVENOUS CONTINUOUS
Status: DISCONTINUED | OUTPATIENT
Start: 2023-11-01 | End: 2023-11-08 | Stop reason: HOSPADM

## 2023-11-01 RX ORDER — DROPERIDOL 2.5 MG/ML
0.62 INJECTION, SOLUTION INTRAMUSCULAR; INTRAVENOUS
Status: DISCONTINUED | OUTPATIENT
Start: 2023-11-01 | End: 2023-11-01 | Stop reason: HOSPADM

## 2023-11-01 RX ORDER — SODIUM CHLORIDE 9 MG/ML
INJECTION, SOLUTION INTRAVENOUS PRN
Status: DISCONTINUED | OUTPATIENT
Start: 2023-11-01 | End: 2023-11-01 | Stop reason: HOSPADM

## 2023-11-01 RX ORDER — LABETALOL HYDROCHLORIDE 5 MG/ML
10 INJECTION, SOLUTION INTRAVENOUS
Status: DISCONTINUED | OUTPATIENT
Start: 2023-11-01 | End: 2023-11-01 | Stop reason: HOSPADM

## 2023-11-01 RX ORDER — FENTANYL CITRATE 50 UG/ML
INJECTION, SOLUTION INTRAMUSCULAR; INTRAVENOUS PRN
Status: DISCONTINUED | OUTPATIENT
Start: 2023-11-01 | End: 2023-11-01 | Stop reason: SDUPTHER

## 2023-11-01 RX ORDER — SODIUM CHLORIDE 0.9 % (FLUSH) 0.9 %
5-40 SYRINGE (ML) INJECTION PRN
Status: DISCONTINUED | OUTPATIENT
Start: 2023-11-01 | End: 2023-11-01 | Stop reason: HOSPADM

## 2023-11-01 RX ORDER — ROPIVACAINE HYDROCHLORIDE 5 MG/ML
INJECTION, SOLUTION EPIDURAL; INFILTRATION; PERINEURAL PRN
Status: DISCONTINUED | OUTPATIENT
Start: 2023-11-01 | End: 2023-11-01 | Stop reason: ALTCHOICE

## 2023-11-01 RX ORDER — DEXTROSE MONOHYDRATE 25 G/50ML
25 INJECTION, SOLUTION INTRAVENOUS PRN
Status: DISCONTINUED | OUTPATIENT
Start: 2023-11-01 | End: 2023-11-08 | Stop reason: HOSPADM

## 2023-11-01 RX ORDER — FENTANYL CITRATE 50 UG/ML
25 INJECTION, SOLUTION INTRAMUSCULAR; INTRAVENOUS EVERY 5 MIN PRN
Status: DISCONTINUED | OUTPATIENT
Start: 2023-11-01 | End: 2023-11-01 | Stop reason: HOSPADM

## 2023-11-01 RX ORDER — PROCHLORPERAZINE EDISYLATE 5 MG/ML
5 INJECTION INTRAMUSCULAR; INTRAVENOUS
Status: DISCONTINUED | OUTPATIENT
Start: 2023-11-01 | End: 2023-11-01 | Stop reason: HOSPADM

## 2023-11-01 RX ORDER — HYDRALAZINE HYDROCHLORIDE 20 MG/ML
10 INJECTION INTRAMUSCULAR; INTRAVENOUS EVERY 6 HOURS PRN
Status: DISCONTINUED | OUTPATIENT
Start: 2023-11-01 | End: 2023-11-03

## 2023-11-01 RX ORDER — FENTANYL CITRATE 50 UG/ML
25 INJECTION, SOLUTION INTRAMUSCULAR; INTRAVENOUS EVERY 5 MIN PRN
Status: COMPLETED | OUTPATIENT
Start: 2023-11-01 | End: 2023-11-01

## 2023-11-01 RX ORDER — LIDOCAINE HYDROCHLORIDE 20 MG/ML
INJECTION, SOLUTION INTRAVENOUS PRN
Status: DISCONTINUED | OUTPATIENT
Start: 2023-11-01 | End: 2023-11-01 | Stop reason: SDUPTHER

## 2023-11-01 RX ORDER — DEXAMETHASONE SODIUM PHOSPHATE 4 MG/ML
INJECTION, SOLUTION INTRA-ARTICULAR; INTRALESIONAL; INTRAMUSCULAR; INTRAVENOUS; SOFT TISSUE PRN
Status: DISCONTINUED | OUTPATIENT
Start: 2023-11-01 | End: 2023-11-01 | Stop reason: SDUPTHER

## 2023-11-01 RX ORDER — LOSARTAN POTASSIUM 50 MG/1
100 TABLET ORAL DAILY
Status: DISCONTINUED | OUTPATIENT
Start: 2023-11-02 | End: 2023-11-08 | Stop reason: HOSPADM

## 2023-11-01 RX ORDER — HYDROMORPHONE HYDROCHLORIDE 1 MG/ML
0.25 INJECTION, SOLUTION INTRAMUSCULAR; INTRAVENOUS; SUBCUTANEOUS EVERY 5 MIN PRN
Status: DISCONTINUED | OUTPATIENT
Start: 2023-11-01 | End: 2023-11-01 | Stop reason: HOSPADM

## 2023-11-01 RX ORDER — LABETALOL HYDROCHLORIDE 5 MG/ML
INJECTION, SOLUTION INTRAVENOUS PRN
Status: DISCONTINUED | OUTPATIENT
Start: 2023-11-01 | End: 2023-11-01 | Stop reason: SDUPTHER

## 2023-11-01 RX ORDER — IPRATROPIUM BROMIDE AND ALBUTEROL SULFATE 2.5; .5 MG/3ML; MG/3ML
1 SOLUTION RESPIRATORY (INHALATION)
Status: DISCONTINUED | OUTPATIENT
Start: 2023-11-01 | End: 2023-11-01 | Stop reason: HOSPADM

## 2023-11-01 RX ORDER — HYDROMORPHONE HYDROCHLORIDE 1 MG/ML
0.5 INJECTION, SOLUTION INTRAMUSCULAR; INTRAVENOUS; SUBCUTANEOUS EVERY 5 MIN PRN
Status: COMPLETED | OUTPATIENT
Start: 2023-11-01 | End: 2023-11-01

## 2023-11-01 RX ADMIN — Medication 100 MCG: at 18:56

## 2023-11-01 RX ADMIN — SODIUM CHLORIDE, PRESERVATIVE FREE 10 ML: 5 INJECTION INTRAVENOUS at 23:25

## 2023-11-01 RX ADMIN — FENTANYL CITRATE 25 MCG: 50 INJECTION, SOLUTION INTRAMUSCULAR; INTRAVENOUS at 19:16

## 2023-11-01 RX ADMIN — TRANEXAMIC ACID 1 G: 100 INJECTION, SOLUTION INTRAVENOUS at 19:00

## 2023-11-01 RX ADMIN — ONDANSETRON HYDROCHLORIDE 4 MG: 2 INJECTION INTRAMUSCULAR; INTRAVENOUS at 20:02

## 2023-11-01 RX ADMIN — WATER 2000 MG: 1 INJECTION INTRAMUSCULAR; INTRAVENOUS; SUBCUTANEOUS at 18:56

## 2023-11-01 RX ADMIN — LABETALOL HYDROCHLORIDE 5 MG: 5 INJECTION, SOLUTION INTRAVENOUS at 19:20

## 2023-11-01 RX ADMIN — HYDROMORPHONE HYDROCHLORIDE 0.25 MG: 1 INJECTION, SOLUTION INTRAMUSCULAR; INTRAVENOUS; SUBCUTANEOUS at 19:18

## 2023-11-01 RX ADMIN — DEXMEDETOMIDINE HYDROCHLORIDE 4 MCG: 100 INJECTION, SOLUTION INTRAVENOUS at 19:21

## 2023-11-01 RX ADMIN — PROPOFOL 20 MG: 10 INJECTION, EMULSION INTRAVENOUS at 18:51

## 2023-11-01 RX ADMIN — HYDRALAZINE HYDROCHLORIDE 10 MG: 20 INJECTION, SOLUTION INTRAMUSCULAR; INTRAVENOUS at 13:18

## 2023-11-01 RX ADMIN — Medication 100 MCG: at 19:30

## 2023-11-01 RX ADMIN — FENTANYL CITRATE 50 MCG: 50 INJECTION, SOLUTION INTRAMUSCULAR; INTRAVENOUS at 18:48

## 2023-11-01 RX ADMIN — DEXAMETHASONE SODIUM PHOSPHATE 4 MG: 4 INJECTION, SOLUTION INTRAMUSCULAR; INTRAVENOUS at 19:04

## 2023-11-01 RX ADMIN — SODIUM CHLORIDE, SODIUM LACTATE, POTASSIUM CHLORIDE, AND CALCIUM CHLORIDE: 600; 310; 30; 20 INJECTION, SOLUTION INTRAVENOUS at 04:24

## 2023-11-01 RX ADMIN — SODIUM CHLORIDE, SODIUM LACTATE, POTASSIUM CHLORIDE, AND CALCIUM CHLORIDE: 600; 310; 30; 20 INJECTION, SOLUTION INTRAVENOUS at 18:47

## 2023-11-01 RX ADMIN — DEXTROSE MONOHYDRATE 25 G: 25 INJECTION, SOLUTION INTRAVENOUS at 18:06

## 2023-11-01 RX ADMIN — ACETAMINOPHEN 650 MG: 325 TABLET ORAL at 02:20

## 2023-11-01 RX ADMIN — HYDROMORPHONE HYDROCHLORIDE 0.5 MG: 1 INJECTION, SOLUTION INTRAMUSCULAR; INTRAVENOUS; SUBCUTANEOUS at 20:29

## 2023-11-01 RX ADMIN — MORPHINE SULFATE 1 MG: 2 INJECTION, SOLUTION INTRAMUSCULAR; INTRAVENOUS at 21:46

## 2023-11-01 RX ADMIN — LABETALOL HYDROCHLORIDE 10 MG: 5 INJECTION INTRAVENOUS at 20:32

## 2023-11-01 RX ADMIN — FENTANYL CITRATE 25 MCG: 50 INJECTION INTRAMUSCULAR; INTRAVENOUS at 20:41

## 2023-11-01 RX ADMIN — PROPOFOL 100 MG: 10 INJECTION, EMULSION INTRAVENOUS at 18:50

## 2023-11-01 RX ADMIN — Medication 200 MCG: at 18:57

## 2023-11-01 RX ADMIN — FENTANYL CITRATE 25 MCG: 50 INJECTION INTRAMUSCULAR; INTRAVENOUS at 20:51

## 2023-11-01 RX ADMIN — HYDROMORPHONE HYDROCHLORIDE 0.5 MG: 1 INJECTION, SOLUTION INTRAMUSCULAR; INTRAVENOUS; SUBCUTANEOUS at 20:35

## 2023-11-01 RX ADMIN — MORPHINE SULFATE 1 MG: 2 INJECTION, SOLUTION INTRAMUSCULAR; INTRAVENOUS at 01:23

## 2023-11-01 RX ADMIN — HYDROMORPHONE HYDROCHLORIDE 0.75 MG: 1 INJECTION, SOLUTION INTRAMUSCULAR; INTRAVENOUS; SUBCUTANEOUS at 19:19

## 2023-11-01 RX ADMIN — FENTANYL CITRATE 25 MCG: 50 INJECTION, SOLUTION INTRAMUSCULAR; INTRAVENOUS at 19:13

## 2023-11-01 RX ADMIN — ENOXAPARIN SODIUM 40 MG: 100 INJECTION SUBCUTANEOUS at 21:55

## 2023-11-01 RX ADMIN — SODIUM CHLORIDE, SODIUM LACTATE, POTASSIUM CHLORIDE, AND CALCIUM CHLORIDE: 600; 310; 30; 20 INJECTION, SOLUTION INTRAVENOUS at 23:25

## 2023-11-01 RX ADMIN — HYDRALAZINE HYDROCHLORIDE 10 MG: 20 INJECTION, SOLUTION INTRAMUSCULAR; INTRAVENOUS at 21:46

## 2023-11-01 RX ADMIN — LIDOCAINE HYDROCHLORIDE 60 MG: 20 INJECTION, SOLUTION INTRAVENOUS at 18:50

## 2023-11-01 ASSESSMENT — PAIN DESCRIPTION - ORIENTATION
ORIENTATION: LEFT

## 2023-11-01 ASSESSMENT — PAIN DESCRIPTION - LOCATION
LOCATION: HIP

## 2023-11-01 ASSESSMENT — PAIN DESCRIPTION - DESCRIPTORS
DESCRIPTORS: SORE

## 2023-11-01 ASSESSMENT — PAIN SCALES - GENERAL
PAINLEVEL_OUTOF10: 4
PAINLEVEL_OUTOF10: 10
PAINLEVEL_OUTOF10: 10
PAINLEVEL_OUTOF10: 6
PAINLEVEL_OUTOF10: 7
PAINLEVEL_OUTOF10: 0

## 2023-11-01 NOTE — PROGRESS NOTES
Verbal shift change report given to DASHAWN Soto (oncoming nurse) by Hilda Quinones RN (offgoing nurse). Report included the following information Nurse Handoff Report, Adult Overview, Intake/Output, MAR, Recent Results, and Event Log.     0704  Bedside and Verbal shift change report given to Hilda Quinones RN (oncoming nurse) by Aviva Yoder RN (offgoing nurse). Report included the following information Nurse Handoff Report, Adult Overview, Intake/Output, MAR, Recent Results, and Event Log.

## 2023-11-01 NOTE — ANESTHESIA PRE PROCEDURE
Department of Anesthesiology  Preprocedure Note       Name:  Brett Card   Age:  78 y.o.  :  1945                                          MRN:  [id number]         Date:  2023      Surgeon: Army Mooney):  Harini Lowe MD    Procedure: Procedure(s):  HIP TOTAL ARTHROPLASTY ANTERIOR APPROACH REVISION, LEFT C-ARM, HANA TABLE    Medications prior to admission:   Prior to Admission medications    Medication Sig Start Date End Date Taking? Authorizing Provider   vitamin B-12 (CYANOCOBALAMIN) 100 MCG tablet Take 0.5 tablets by mouth daily    Provider, MD Sean   acetaminophen (TYLENOL) 500 MG tablet Take 2 tablets by mouth 2 times daily as needed    Automatic Reconciliation, Ar   amLODIPine (NORVASC) 5 MG tablet 1 tablet    Automatic Reconciliation, Ar   ascorbic acid (VITAMIN C) 250 MG tablet Take by mouth daily    Automatic Reconciliation, Ar   atorvastatin (LIPITOR) 20 MG tablet Take 4 tablets by mouth 21   Automatic Reconciliation, Ar   cetirizine (ZYRTEC) 10 MG tablet cetirizine 10 mg tablet   TK 1 T PO QD UTD FOR 30 DAYS    Automatic Reconciliation, Ar   vitamin D 25 MCG (1000 UT) CAPS Take by mouth    Automatic Reconciliation, Ar   folic acid (FOLVITE) 1 MG tablet Take by mouth    Automatic Reconciliation, Ar   gabapentin (NEURONTIN) 100 MG capsule 3 capsules in the morning and at bedtime.  20   Automatic Reconciliation, Ar   hydroCHLOROthiazide (HYDRODIURIL) 25 MG tablet hydrochlorothiazide 25 mg tablet    Automatic Reconciliation, Ar   hydroxychloroquine (PLAQUENIL) 200 MG tablet 1 tablet daily    Automatic Reconciliation, Ar   ipratropium (ATROVENT) 0.06 % nasal spray as needed    Automatic Reconciliation, Ar   leflunomide (ARAVA) 10 MG tablet Take by mouth    Automatic Reconciliation, Ar   losartan (COZAAR) 100 MG tablet 1 tablet    Automatic Reconciliation, Ar   oxybutynin (DITROPAN) 5 MG tablet 1 tablet 2 times daily    Automatic Reconciliation, Ar   pregabalin (LYRICA) 75 MG

## 2023-11-01 NOTE — PROGRESS NOTES
Patient BP elevated 175/68 at shift changed. Encouraged patient to rest as I was told at bedside shift report that the patient was a bit confused and had been yelling out. Reassessed patient at 2580 and charted a lower BP of 166/67. Reassessed patient again 11:13 and patient BP now 177/63. Dr. Tony Liang paged. Waiting for a call back. Will continue to monitor. Update 12:06pm: Spoke with  at nurses station and reported BP. Verbal order for 10mg of Hydralazine.

## 2023-11-01 NOTE — PERIOP NOTE
TRANSFER - IN REPORT:    Verbal report received from [place] on [employer]  being received from [place] for routine progression of patient care      Report consisted of patient's Situation, Background, Assessment and   Recommendations(SBAR). Information from the following report(s) Adult Overview, Surgery Report, Intake/Output, MAR, Recent Results, and Med Rec Status was reviewed with the receiving nurse. Opportunity for questions and clarification was provided. Assessment completed upon patient's arrival to unit and care assumed.

## 2023-11-01 NOTE — PROGRESS NOTES
Late Entry from yesterday  Pt was added to OR schedule yesterday for 17:00 JESSE. Note from hospitalist prior day, OK to proceed. She was brought to holding area, a fascia iliac nerve block was performed for post-op pain control in anticipation of surgery. After block was done, I was informed that the case was postponed because of pending echo. No communications to me or OR staff were sent until after 17:00 and block was done. No overt heart failure, Not sure why echo was ordered in first place. The block is appropriate for pain control regardless of surgery. She is scheduled for surgery today.     Kori Sutherland DO, MAS

## 2023-11-01 NOTE — PROGRESS NOTES
Attempted to reset bed alarm. Unable to turn on alarm. Despite weight reset. 4 side rails up at this time, call light in reach and functional. Video monitor on. Bed lowered and locked    Patient frequently calls out as well as utilizes call bell.  No attempts to get up unsupervised

## 2023-11-01 NOTE — CONSULTS
Orthopaedic consultation    Patient: Jade Hamman MRN: [id number]  SSN: [id number]    YOB: 1945  Age: 78 y.o. Sex: female            Subjective:   Patient is a 78 y.o.  female who presents with history of left hip fracture after a fall. The patient was evaluated and determined the most appropriate plan of care is to proceed with surgical intervention. Patient denies chest pain, tightness, pain radiating down left arm, palpitations, dizziness, lightheadedness, fevers, chills. Patient denies shortness of breath, wheezing, diarrhea, constipation, abdominal pain. Patient denies urinary problems, dysuria, hesitancy, urgency. Denies skin breakdown, rashes, insect bites or open area.        Patient Active Problem List    Diagnosis Date Noted    Closed left hip fracture ([address]) 10/30/2023    AAA (abdominal aortic aneurysm) ([address]) 10/30/2023    GI bleed 01/31/2022    COVID-19 01/31/2022    Influenza A (H5N1) 01/31/2022    Forgetfulness 02/12/2021    Thrombocytosis 02/12/2021    History of medication noncompliance 02/12/2021    CVA (cerebral vascular accident) ([address]) 02/12/2021    Hypertension     Rheumatoid arthritis ([address])     PVD (peripheral vascular disease) ([address]) 12/24/2020    Chronic anemia 12/24/2020     Past Medical History:   Diagnosis Date    Arthritis     Asthma     seasonal     Diabetes ([address])     diet controlled    Hypertension     Macromastia     Hx of    Menopause     RA (rheumatoid arthritis) ([address])     TIA (transient ischemic attack) 01/2021    Ulcer of foot ([address])     Right foot      Past Surgical History:   Procedure Laterality Date    BREAST REDUCTION SURGERY Bilateral 9/2012    HYSTERECTOMY (CERVIX STATUS UNKNOWN)  1970    ORTHOPEDIC SURGERY  2009    Left hand surgery    OTHER SURGICAL HISTORY      left shoulder abcess drained    PARTIAL HYSTERECTOMY (CERVIX NOT REMOVED)      SHOULDER SURGERY Right     rotator cuff repair    US I&D BREAST ABSCESS DEEP  2/10/2016    US contraindications.   She is a home ambulator and agrees to proceed with a left total hip replacement    Patrick Tolliver MD

## 2023-11-01 NOTE — CONSULTS
[facility] Consult Note      Patient: Bebe Marcano MRN: [id number]  SSN: [id number]    YOB: 1945  Age: 78 y.o. Sex: female    Date of Consultation: 11/1/2023    Reason for Consultation:Preoperative cardiac clearance    HPI:  I was asked by Dr. Les Coburn to see this pleasant patient for preoperative cardiac clearance. Bebe Marcano is 78-year-old pleasant patient admitted in the hospital with hip fracture  Her past medical history is significant for rheumatoid arthritis, small infra-renal abdominal aneurysm, rheumatoid arthritis, hypertension. Patient have no known history of CAD, CHF, diabetes mellitus, renal disease. No history of recent ischemic testing.   EKG have shown nonspecific symptoms  Patient clinically did denied any symptoms of chest pain angina, shortness of breath, orthopnea PND or ankle swelling                   Past Medical History:   Diagnosis Date    Arthritis     Asthma     seasonal     Diabetes ([address])     diet controlled    Hypertension     Macromastia     Hx of    Menopause     RA (rheumatoid arthritis) ([address])     TIA (transient ischemic attack) 01/2021    Ulcer of foot ([facility])     Right foot     Past Surgical History:   Procedure Laterality Date    BREAST REDUCTION SURGERY Bilateral 9/2012    HYSTERECTOMY (CERVIX STATUS UNKNOWN)  1970    ORTHOPEDIC SURGERY  2009    Left hand surgery    OTHER SURGICAL HISTORY      left shoulder abcess drained    PARTIAL HYSTERECTOMY (CERVIX NOT REMOVED)      SHOULDER SURGERY Right     rotator cuff repair    US I&D BREAST ABSCESS DEEP  2/10/2016    US I&D BREAST ABSCESS DEEP     Current Facility-Administered Medications   Medication Dose Route Frequency    hydrALAZINE (APRESOLINE) injection 10 mg  10 mg IntraVENous Q6H PRN    perflutren lipid microspheres (DEFINITY) injection 1.5 mL  1.5 mL IntraVENous ONCE PRN    lactated ringers IV soln infusion   IntraVENous Continuous    glucose chewable tablet 16 g  4 tablet Oral PRN    dextrose bolus 10% 125 mL  125 mL

## 2023-11-01 NOTE — CARE COORDINATION
The SW followed up with the pt at the bedside in regards to the DC plan. The SW provided the pt with a list of SNF providers. The pt will review the SNF list with her family. The SW will follow up with the pt her surgery. The SW will continue to follow.

## 2023-11-02 LAB
ANION GAP SERPL CALC-SCNC: 8 MMOL/L (ref 3–18)
BUN SERPL-MCNC: 11 MG/DL (ref 7–18)
BUN/CREAT SERPL: 17 (ref 12–20)
CALCIUM SERPL-MCNC: 8.9 MG/DL (ref 8.5–10.1)
CHLORIDE SERPL-SCNC: 104 MMOL/L (ref 100–111)
CO2 SERPL-SCNC: 30 MMOL/L (ref 21–32)
CREAT SERPL-MCNC: 0.65 MG/DL (ref 0.6–1.3)
ERYTHROCYTE [DISTWIDTH] IN BLOOD BY AUTOMATED COUNT: 14.3 % (ref 11.6–14.5)
GLUCOSE BLD STRIP.AUTO-MCNC: 94 MG/DL (ref 70–110)
GLUCOSE SERPL-MCNC: 103 MG/DL (ref 74–99)
HCT VFR BLD AUTO: 31.1 % (ref 35–45)
HGB BLD-MCNC: 9.8 G/DL (ref 12–16)
MCH RBC QN AUTO: 22.3 PG (ref 24–34)
MCHC RBC AUTO-ENTMCNC: 31.5 G/DL (ref 31–37)
MCV RBC AUTO: 70.8 FL (ref 78–100)
NRBC # BLD: 0 K/UL (ref 0–0.01)
NRBC BLD-RTO: 0 PER 100 WBC
PLATELET # BLD AUTO: 508 K/UL (ref 135–420)
PMV BLD AUTO: 11.1 FL (ref 9.2–11.8)
POTASSIUM SERPL-SCNC: 3.8 MMOL/L (ref 3.5–5.5)
RBC # BLD AUTO: 4.39 M/UL (ref 4.2–5.3)
SODIUM SERPL-SCNC: 142 MMOL/L (ref 136–145)
WBC # BLD AUTO: 13.2 K/UL (ref 4.6–13.2)

## 2023-11-02 PROCEDURE — 6360000002 HC RX W HCPCS: Performed by: HOSPITALIST

## 2023-11-02 PROCEDURE — 6370000000 HC RX 637 (ALT 250 FOR IP): Performed by: HOSPITALIST

## 2023-11-02 PROCEDURE — 97116 GAIT TRAINING THERAPY: CPT

## 2023-11-02 PROCEDURE — 2580000003 HC RX 258: Performed by: ORTHOPAEDIC SURGERY

## 2023-11-02 PROCEDURE — 97161 PT EVAL LOW COMPLEX 20 MIN: CPT

## 2023-11-02 PROCEDURE — 36415 COLL VENOUS BLD VENIPUNCTURE: CPT

## 2023-11-02 PROCEDURE — 80048 BASIC METABOLIC PNL TOTAL CA: CPT

## 2023-11-02 PROCEDURE — 82962 GLUCOSE BLOOD TEST: CPT

## 2023-11-02 PROCEDURE — 1100000000 HC RM PRIVATE

## 2023-11-02 PROCEDURE — 6360000002 HC RX W HCPCS: Performed by: ORTHOPAEDIC SURGERY

## 2023-11-02 PROCEDURE — 2580000003 HC RX 258: Performed by: HOSPITALIST

## 2023-11-02 PROCEDURE — 85027 COMPLETE CBC AUTOMATED: CPT

## 2023-11-02 RX ORDER — ASPIRIN 325 MG
325 TABLET, DELAYED RELEASE (ENTERIC COATED) ORAL 2 TIMES DAILY
Status: DISCONTINUED | OUTPATIENT
Start: 2023-11-02 | End: 2023-11-02

## 2023-11-02 RX ORDER — ONDANSETRON 4 MG/1
4 TABLET, ORALLY DISINTEGRATING ORAL EVERY 8 HOURS PRN
Status: DISCONTINUED | OUTPATIENT
Start: 2023-11-02 | End: 2023-11-08 | Stop reason: HOSPADM

## 2023-11-02 RX ORDER — ONDANSETRON 2 MG/ML
4 INJECTION INTRAMUSCULAR; INTRAVENOUS EVERY 6 HOURS PRN
Status: DISCONTINUED | OUTPATIENT
Start: 2023-11-02 | End: 2023-11-08 | Stop reason: HOSPADM

## 2023-11-02 RX ADMIN — AMLODIPINE BESYLATE 5 MG: 5 TABLET ORAL at 08:49

## 2023-11-02 RX ADMIN — SODIUM CHLORIDE, PRESERVATIVE FREE 10 ML: 5 INJECTION INTRAVENOUS at 20:31

## 2023-11-02 RX ADMIN — WATER 2000 MG: 1 INJECTION INTRAMUSCULAR; INTRAVENOUS; SUBCUTANEOUS at 06:49

## 2023-11-02 RX ADMIN — MORPHINE SULFATE 1 MG: 2 INJECTION, SOLUTION INTRAMUSCULAR; INTRAVENOUS at 04:06

## 2023-11-02 RX ADMIN — MORPHINE SULFATE 1 MG: 2 INJECTION, SOLUTION INTRAMUSCULAR; INTRAVENOUS at 21:39

## 2023-11-02 RX ADMIN — LOSARTAN POTASSIUM 100 MG: 50 TABLET, FILM COATED ORAL at 08:49

## 2023-11-02 RX ADMIN — SODIUM CHLORIDE, SODIUM LACTATE, POTASSIUM CHLORIDE, AND CALCIUM CHLORIDE: 600; 310; 30; 20 INJECTION, SOLUTION INTRAVENOUS at 08:48

## 2023-11-02 RX ADMIN — ENOXAPARIN SODIUM 40 MG: 100 INJECTION SUBCUTANEOUS at 19:57

## 2023-11-02 RX ADMIN — WATER 2000 MG: 1 INJECTION INTRAMUSCULAR; INTRAVENOUS; SUBCUTANEOUS at 15:42

## 2023-11-02 RX ADMIN — MORPHINE SULFATE 1 MG: 2 INJECTION, SOLUTION INTRAMUSCULAR; INTRAVENOUS at 15:49

## 2023-11-02 RX ADMIN — MORPHINE SULFATE 1 MG: 2 INJECTION, SOLUTION INTRAMUSCULAR; INTRAVENOUS at 08:56

## 2023-11-02 ASSESSMENT — PAIN DESCRIPTION - LOCATION
LOCATION: GROIN
LOCATION: HIP

## 2023-11-02 ASSESSMENT — PAIN SCALES - GENERAL
PAINLEVEL_OUTOF10: 5
PAINLEVEL_OUTOF10: 6
PAINLEVEL_OUTOF10: 10
PAINLEVEL_OUTOF10: 1
PAINLEVEL_OUTOF10: 0
PAINLEVEL_OUTOF10: 0

## 2023-11-02 ASSESSMENT — PAIN DESCRIPTION - ORIENTATION: ORIENTATION: LEFT

## 2023-11-02 NOTE — ANESTHESIA POSTPROCEDURE EVALUATION
Department of Anesthesiology  Postprocedure Note    Patient: Aidan Gibson  MRN: [id number]  YOB: 1945  Date of evaluation: 11/1/2023      Procedure Summary     Date: 11/01/23 Room / Location: [facility] 07 / [facility] MAIN OR    Anesthesia Start: 1847 Anesthesia Stop: 2028    Procedure: HIP TOTAL ARTHROPLASTY ANTERIOR APPROACH , LEFT, C-ARM, HANA TABLE (Left: Hip) Diagnosis:       Type I or II open avulsion fracture of left hip, initial encounter ([address])      (Type I or II open avulsion fracture of left hip, initial encounter ([address]) [S72.002B])    Surgeons: Rohini Payton MD Responsible Provider: Solomon Saunders MD    Anesthesia Type: General ASA Status: 3          Anesthesia Type: General    Johnny Phase I: Johnny Score: 10    Johnny Phase II:        Anesthesia Post Evaluation    Patient location during evaluation: PACU  Patient participation: complete - patient participated  Level of consciousness: awake and alert  Pain score: 1  Airway patency: patent  Nausea & Vomiting: no nausea and no vomiting  Complications: no  Cardiovascular status: hemodynamically stable  Respiratory status: acceptable  Hydration status: stable  Multimodal analgesia pain management approach  Pain management: adequate

## 2023-11-02 NOTE — OP NOTE
[facility] [place]  OPERATIVE REPORT    Name:  Karla Layton  MR#:   [id number]  :  1945  ACCOUNT #:  [de-identified]  DATE OF SERVICE:  2023    PREOPERATIVE DIAGNOSIS:  Femoral neck fracture, left side. POSTOPERATIVE DIAGNOSIS:  Femoral neck fracture, left side. PROCEDURE PERFORMED:  Left total hip replacement. SURGEON:  Berna Hawkins MD    ASSISTANT:  nate    ANESTHESIA:  General.    COMPLICATIONS:  No complications. SPECIMENS REMOVED:  No specimens. IMPLANTS:  Daisy size 4 stem, minus 2.5 inner head ball with a multipolar head, a size 48 cup with two screws and tripolar metal liner. ESTIMATED BLOOD LOSS:  Less than 200 mL. FINDINGS:  Left femoral neck fracture. INDICATIONS:  The patient is an 33-year-old female with left femoral neck approximately two weeks old, who opted for left total hip replacement after risks and benefits were explained to her. PROCEDURE:  The patient in the OR, given general anesthetic, preoperative antibiotics, positioned, prepped and draped in the usual sterile fashion. The left lower extremity draped free. Surgical time-out was performed confirming the left side as the proper side and after all team members agreed, anterior approach was made to the left hip. Skin was incised. Dissection was carried down to the fascia. The fascia was opened up to the tensor. Anterior and posterior retractors were placed on the femoral neck. Lateral circumflex vessels were electrocauterized. Capsulotomy was performed revealing a femoral neck fracture. Neck was cut, head was removed. Femoral neck cut was checked under fluoroscopic guidance. Attention was turned to the acetabulum. Pulvinar and labrum were cleared of all soft tissue. Sequentially reamed up to a size 49, and 50 cup was then impacted with good fixation. Given her age and osteoporosis, we secured this with two screws under fluoroscopic guidance.   Once the screws were in place with good fixation and position of the cup confirmed on fluoroscopic imaging, wound was irrigated with Irrisept once again and the metal liner for the multipolar head was then impacted with good fixation of this locking mechanism. Once satisfied with this, attention was turned to femur. It was externally rotated and extended. It was sequentially broached up to a size 4, calcar planed, and we then determined the inner ball gave the best restoration of leg lengths and stability. At which point, trial was removed. Wound was irrigated. Final stem was impacted down the canal.  Final head ball was assembled on the back table and impacted on Rae taper. Hip was once again reduced and noted to be perfectly stable. Good restoration of leg lengths clinically and radiographically with overall good position and stability. Once satisfied with this, wound was irrigated, bleeders were electrocauterized. Facia was closed with #1 Vicryl. Wound was injected with 0.5% ropivacaine per Anesthesia recommendations. Fascia was then closed with #1 Vicryl. Skin was irrigated and closed with 2-0 Monocryl deep dermal and 3-0 Monocryl subcuticular. Dermabond, tape, and Mepilex dressing. The patient was then extubated and taken to the recovery room without incident. POSTOPERATIVE PLAN:  Ice and elevation, weightbear as tolerated, pain control.       Sin Boateng MD      [id number]  D:  11/01/2023 20:09  T:  11/02/2023 1:14  JOB #:  [id number]

## 2023-11-02 NOTE — BRIEF OP NOTE
Brief Postoperative Note      Patient: Dejuan Bellamy  YOB: 1945  MRN: [id number]    Date of Procedure: 11/1/2023    Pre-Op Diagnosis Codes: * Type I or II open avulsion fracture of left hip, initial encounter ([address] [facility]) [S72.002B]    Post-Op Diagnosis: Same       Procedure(s):  HIP TOTAL ARTHROPLASTY ANTERIOR APPROACH REVISION, LEFT C-ARM, HANA TABLE    Surgeon(s):  Anupama Umaña MD    Assistant:  Surgical Assistant: Judge Turcios    Anesthesia: General    Estimated Blood Loss (mL): 590     Complications: None    Specimens:   * No specimens in log *    Implants:  Implant Name Type Inv.  Item Serial No.  Lot No. LRB No. Used Action   SHELL ACET SZ D [id number] HIP PPS LIMIT H G7 - [id number]  SHELL ACET SZ D [id number] HIP PPS LIMIT H G7  [employer]- [id number] Left 1 Implanted   BONE SCREW 6.5X30 SELF-TAP - [id number]  BONE SCREW 6.5X30 SELF-TAP  [employer]- [id number] Left 1 Implanted   BONE SCREW 6.5X25 SELF-TAP - [id number]  BONE SCREW 6.5X25 SELF-TAP  [employer]- [id number] Left 1 Implanted   VIVACIT-E DM BEARING [id number] - [id number]  VIVACIT-E DM BEARING [id number]  [employer]- [id number] Left 1 Implanted   HEAD FEM 3- MM 12/14 28 MM HIP TAPR BIOLOX DELT OPT - [id number]  HEAD FEM 3- MM 12/14 28 MM HIP TAPR BIOLOX DELT OPT  [employer]- [id number] Left 1 Implanted   AVENIR COMPLETE STANDARD COLLARED SIZE 4 - [id number]  Avenir Complete Standard Collared Size 4  [employer]- [id number] Left 1 Implanted   G7 DUAL MOBILITY LINER 40MM D - [id number]  G7 DUAL MOBILITY LINER 40MM D  [employer]- [id number] Left 1 Implanted         Drains:   External Urinary Catheter (Active)   Site Assessment Clean,dry & intact 11/01/23 8968       Findings: femoral neck fracture      Electronically signed by Anupama Umaña MD on 11/1/2023 at 8:03 PM

## 2023-11-02 NOTE — CARE COORDINATION
The called and spoke with the pt daughter Nieves Sahu in regards to the DC plan. The SW made her aware the pt will be evaluated by PT and she may need SNF. The pt daughter stated she would like a copy of the SNF list emailed to her. The SW emailed the SNF list to the daughter. The SW will continue to follow.

## 2023-11-03 LAB
ANION GAP SERPL CALC-SCNC: 7 MMOL/L (ref 3–18)
BUN SERPL-MCNC: 9 MG/DL (ref 7–18)
BUN/CREAT SERPL: 17 (ref 12–20)
CALCIUM SERPL-MCNC: 8.9 MG/DL (ref 8.5–10.1)
CHLORIDE SERPL-SCNC: 101 MMOL/L (ref 100–111)
CO2 SERPL-SCNC: 31 MMOL/L (ref 21–32)
CREAT SERPL-MCNC: 0.54 MG/DL (ref 0.6–1.3)
ERYTHROCYTE [DISTWIDTH] IN BLOOD BY AUTOMATED COUNT: 13.9 % (ref 11.6–14.5)
GLUCOSE SERPL-MCNC: 83 MG/DL (ref 74–99)
HCT VFR BLD AUTO: 28.7 % (ref 35–45)
HGB BLD-MCNC: 9.2 G/DL (ref 12–16)
MCH RBC QN AUTO: 22.4 PG (ref 24–34)
MCHC RBC AUTO-ENTMCNC: 32.1 G/DL (ref 31–37)
MCV RBC AUTO: 69.8 FL (ref 78–100)
NRBC # BLD: 0 K/UL (ref 0–0.01)
NRBC BLD-RTO: 0 PER 100 WBC
PLATELET # BLD AUTO: 461 K/UL (ref 135–420)
PMV BLD AUTO: 10.9 FL (ref 9.2–11.8)
POTASSIUM SERPL-SCNC: 4.1 MMOL/L (ref 3.5–5.5)
RBC # BLD AUTO: 4.11 M/UL (ref 4.2–5.3)
SODIUM SERPL-SCNC: 139 MMOL/L (ref 136–145)
WBC # BLD AUTO: 12.7 K/UL (ref 4.6–13.2)

## 2023-11-03 PROCEDURE — 85027 COMPLETE CBC AUTOMATED: CPT

## 2023-11-03 PROCEDURE — 36415 COLL VENOUS BLD VENIPUNCTURE: CPT

## 2023-11-03 PROCEDURE — 97116 GAIT TRAINING THERAPY: CPT

## 2023-11-03 PROCEDURE — 6360000002 HC RX W HCPCS: Performed by: HOSPITALIST

## 2023-11-03 PROCEDURE — 6370000000 HC RX 637 (ALT 250 FOR IP): Performed by: HOSPITALIST

## 2023-11-03 PROCEDURE — 97530 THERAPEUTIC ACTIVITIES: CPT

## 2023-11-03 PROCEDURE — 2580000003 HC RX 258: Performed by: HOSPITALIST

## 2023-11-03 PROCEDURE — 80048 BASIC METABOLIC PNL TOTAL CA: CPT

## 2023-11-03 PROCEDURE — 97165 OT EVAL LOW COMPLEX 30 MIN: CPT

## 2023-11-03 PROCEDURE — 1100000000 HC RM PRIVATE

## 2023-11-03 PROCEDURE — 97535 SELF CARE MNGMENT TRAINING: CPT

## 2023-11-03 RX ORDER — HYDRALAZINE HYDROCHLORIDE 20 MG/ML
20 INJECTION INTRAMUSCULAR; INTRAVENOUS EVERY 6 HOURS PRN
Status: DISCONTINUED | OUTPATIENT
Start: 2023-11-03 | End: 2023-11-08 | Stop reason: HOSPADM

## 2023-11-03 RX ORDER — SENNA AND DOCUSATE SODIUM 50; 8.6 MG/1; MG/1
2 TABLET, FILM COATED ORAL DAILY
Status: DISCONTINUED | OUTPATIENT
Start: 2023-11-03 | End: 2023-11-08 | Stop reason: HOSPADM

## 2023-11-03 RX ORDER — POLYETHYLENE GLYCOL 3350 17 G/17G
17 POWDER, FOR SOLUTION ORAL DAILY
Status: DISCONTINUED | OUTPATIENT
Start: 2023-11-03 | End: 2023-11-08 | Stop reason: HOSPADM

## 2023-11-03 RX ORDER — HYDROCHLOROTHIAZIDE 25 MG/1
25 TABLET ORAL DAILY
Status: DISCONTINUED | OUTPATIENT
Start: 2023-11-03 | End: 2023-11-08 | Stop reason: HOSPADM

## 2023-11-03 RX ADMIN — HYDROCHLOROTHIAZIDE 25 MG: 25 TABLET ORAL at 12:20

## 2023-11-03 RX ADMIN — AMLODIPINE BESYLATE 5 MG: 5 TABLET ORAL at 08:11

## 2023-11-03 RX ADMIN — ACETAMINOPHEN 650 MG: 325 TABLET ORAL at 03:03

## 2023-11-03 RX ADMIN — SODIUM CHLORIDE, PRESERVATIVE FREE 10 ML: 5 INJECTION INTRAVENOUS at 21:50

## 2023-11-03 RX ADMIN — MORPHINE SULFATE 1 MG: 2 INJECTION, SOLUTION INTRAMUSCULAR; INTRAVENOUS at 17:42

## 2023-11-03 RX ADMIN — ENOXAPARIN SODIUM 40 MG: 100 INJECTION SUBCUTANEOUS at 20:18

## 2023-11-03 RX ADMIN — MORPHINE SULFATE 1 MG: 2 INJECTION, SOLUTION INTRAMUSCULAR; INTRAVENOUS at 10:31

## 2023-11-03 RX ADMIN — MORPHINE SULFATE 1 MG: 2 INJECTION, SOLUTION INTRAMUSCULAR; INTRAVENOUS at 02:19

## 2023-11-03 RX ADMIN — ACETAMINOPHEN 650 MG: 325 TABLET ORAL at 20:18

## 2023-11-03 RX ADMIN — POLYETHYLENE GLYCOL 3350 17 G: 17 POWDER, FOR SOLUTION ORAL at 16:03

## 2023-11-03 RX ADMIN — SENNOSIDES AND DOCUSATE SODIUM 2 TABLET: 50; 8.6 TABLET ORAL at 20:19

## 2023-11-03 RX ADMIN — LOSARTAN POTASSIUM 100 MG: 50 TABLET, FILM COATED ORAL at 08:11

## 2023-11-03 ASSESSMENT — PAIN DESCRIPTION - LOCATION
LOCATION: HIP

## 2023-11-03 ASSESSMENT — PAIN - FUNCTIONAL ASSESSMENT
PAIN_FUNCTIONAL_ASSESSMENT: PREVENTS OR INTERFERES SOME ACTIVE ACTIVITIES AND ADLS
PAIN_FUNCTIONAL_ASSESSMENT: PREVENTS OR INTERFERES SOME ACTIVE ACTIVITIES AND ADLS

## 2023-11-03 ASSESSMENT — PAIN DESCRIPTION - DESCRIPTORS
DESCRIPTORS: ACHING

## 2023-11-03 ASSESSMENT — PAIN SCALES - GENERAL
PAINLEVEL_OUTOF10: 6
PAINLEVEL_OUTOF10: 2
PAINLEVEL_OUTOF10: 9
PAINLEVEL_OUTOF10: 0
PAINLEVEL_OUTOF10: 9
PAINLEVEL_OUTOF10: 5
PAINLEVEL_OUTOF10: 5

## 2023-11-03 ASSESSMENT — PAIN DESCRIPTION - ORIENTATION
ORIENTATION: LEFT
ORIENTATION: LEFT

## 2023-11-03 NOTE — PROGRESS NOTES
Occupational Therapy Evaluation/Treatment Attempt    Chart reviewed. Attempted Occupational Therapy Evaluation/Treatment, however, patient unable to be seen due to:  []  Nausea/vomiting  []  Eating  []  Pain  []  Patient too lethargic  []  Off Unit for testing/procedure  []  Dialysis treatment in progress   []  Telemetry Results  [x]  Other:   Patient sleeping. Did not arouse to auditory stim  Will follow up later as patient's schedule allows.    Thank you for this referral.  Elvie Guaman OTR/L

## 2023-11-03 NOTE — PROGRESS NOTES
Occupational Therapy Goals:  Initiated 11/3/2023 to be met within 7-10 days. Short Term Goals  Time Frame for Short Term Goals: 7 days  Short Term Goal 1: Pt will complete UBB/dress with SUP. Short Term Goal 2: Pt will complete LBB/dress with CGA. Short Term Goal 3: Pt will complete bed mobility with SUP. Short Term Goal 4: Pt will complete 3 step standing grooming task at sink with min A to improve sequencing and standing tolerance/balance. OCCUPATIONAL THERAPY EVALUATION    Patient: Jed Buchanan (22 y.o. female)  Date: 11/3/2023  Primary Diagnosis: Closed fracture of left hip, initial encounter ([address]) [S72.002A]  Closed right hip fracture, initial encounter ([address]) [S72.001A]  Procedure(s) (LRB):  HIP TOTAL ARTHROPLASTY ANTERIOR APPROACH , LEFT, C-ARM, HANA TABLE (Left) 2 Days Post-Op   Precautions: Weight Bearing, Fall Risk,  , Left Lower Extremity Weight Bearing: Weight Bearing As Tolerated,  ,  ,  ,  , Hip Precautions: Anterior hip precautions  PLOF: pt reporting IND with ADLs and ambulation at home prior to admission, unsure of accuracy d/t pt poor historian     ASSESSMENT :  Pt received supine in bed with HOBE. Alert. Agreeable to mobility. Attached to IV, brief, purewick, non slip socks. Performed bed mobility to L to EOB with min A, vc's for sequencing and for safe hand use on bed rail for assist, OT assist for LLE d/t pain. Gown change on EOB with CGA for seated balance. Stood from EOB to RW with mod A, max tactile cues for safe hand placement to push up from bed, pt with posterior lean and BLE feet sliding out, benefiting from double foot block for safety in standing. Mod A for standing posture and tactile cues to hips for tucking buttocks in to promote good standing. Pt educated on kalen dressing techniques for lower body dressing, mod A to thread LLE through brief and for pull up in standing. Mod A for pericare seated EOB and in standing for posterior buttocks.  Returned to supine with min A for LLE, Recommendations: Subacute/Skilled Nursing Facility    [facility]: 15/24    This [facility] score should be considered in conjunction with interdisciplinary team recommendations to determine the most appropriate discharge setting. Patient's social support, diagnosis, medical stability, and prior level of function should also be taken into consideration. SUBJECTIVE:   Patient stated \"Okay. \"    OBJECTIVE DATA SUMMARY:     Past Medical History:   Diagnosis Date    Arthritis     Asthma     seasonal     Diabetes ([address])     diet controlled    Hypertension     Macromastia     Hx of    Menopause     RA (rheumatoid arthritis) ([address])     TIA (transient ischemic attack) 01/2021    Ulcer of foot ([address])     Right foot     Past Surgical History:   Procedure Laterality Date    BREAST REDUCTION SURGERY Bilateral 9/2012    HYSTERECTOMY (CERVIX STATUS UNKNOWN)  1970    ORTHOPEDIC SURGERY  2009    Left hand surgery    OTHER SURGICAL HISTORY      left shoulder abcess drained    PARTIAL HYSTERECTOMY (CERVIX NOT REMOVED)      SHOULDER SURGERY Right     rotator cuff repair    TOTAL HIP ARTHROPLASTY Left 11/1/2023    HIP TOTAL ARTHROPLASTY ANTERIOR APPROACH , LEFT, C-ARM, HANA TABLE performed by Marni Borrero MD at [address] [facility] I&D [address]  2/10/2016     I&D BREAST ABSCESS DEEP     Barriers to Learning/Limitations: cognition   Compensate with: visual, verbal, tactile, kinesthetic cues/model    Home Situation:   Social/Functional History  Lives With: Spouse  Type of Home: House  Home Layout: Two level  Home Access: Stairs to enter with rails  Entrance Stairs - Number of Steps: 14  Entrance Stairs - Rails: Both  Bathroom Shower/Tub: Tub/Shower unit  Bathroom Toilet: Standard  Bathroom Equipment: Grab bars in shower, Grab bars around toilet  Bathroom Accessibility: Accessible  Receives Help From: Family  ADL Assistance: [address] Independent  Homemaking Responsibilities: Yes  Ambulation Assistance:

## 2023-11-03 NOTE — PROGRESS NOTES
Patient was on Lovenox and Aspirin 325 mg at the same time. Pharmacy Flagged the Aspirin until further clarification from MD.  MD was called, Doctor Aretha Alfaro suggested to D/C the Aspirin 325 mg and continue the Lovenox for DVT prophylaxis.

## 2023-11-03 NOTE — CARE COORDINATION
The SW received a return phone call from the daughter Alvina Saunders in regards SNF referrals. The daughter stated they would like referrals made in the [facility] area. The SW will continue to follow.

## 2023-11-03 NOTE — PROGRESS NOTES
Bedside and Verbal shift change report given to Kb Fabian and Leo Carballo RN (oncoming nurse) by Maty Cardoza RN (offgoing nurse). Report included the following information Nurse Handoff Report, Adult Overview, MAR, Recent Results, Med Rec Status, and Neuro Assessment.

## 2023-11-03 NOTE — PROGRESS NOTES
Patient in bed awake, A/Ox3, speech clear, hearing intact, In bed. With pure wick in place. Patient on room air, lung sounds diminished, respirations unlabored. Skin is warm, dry,with wound to the left hip. Dressings clean, dry, and intact. Radial and pedal pulses present bilaterally, capillary refill <3 seconds to fingers and toes, ankylosis in the hands observed. Abdomen with hypoactive bowel sounds. Moderate hand  noted to bilateral upper extremities. Side rails x3 up, bed locked and in lowest position, bed alarm on, call bell and bedside table within reach, needs attended, denies any pain, SOB, or concerns at present.

## 2023-11-03 NOTE — PROGRESS NOTES
Bedside and Verbal shift change report given to Yael Corbett Dr (oncoming nurse) by Meet Santos RN (offgoing nurse). Report included the following information Nurse Handoff Report, Adult Overview, MAR, Recent Results, Med Rec Status, and Neuro Assessment.

## 2023-11-03 NOTE — PLAN OF CARE
Problem: Pain  Goal: Verbalizes/displays adequate comfort level or baseline comfort level  11/3/2023 0922 by Darline Zhong RN  Outcome: Progressing  11/2/2023 2206 by Wilman Toth RN  Outcome: Progressing     Problem: Skin/Tissue Integrity  Goal: Absence of new skin breakdown  Description: 1. Monitor for areas of redness and/or skin breakdown  2. Assess vascular access sites hourly  3. Every 4-6 hours minimum:  Change oxygen saturation probe site  4. Every 4-6 hours:  If on nasal continuous positive airway pressure, respiratory therapy assess nares and determine need for appliance change or resting period.   11/3/2023 0922 by Darline Zhong RN  Outcome: Progressing  11/2/2023 2206 by Wilman Toth RN  Outcome: Progressing     Problem: Safety - Adult  Goal: Free from fall injury  11/3/2023 0922 by Darline Zhong RN  Outcome: Progressing  11/2/2023 2206 by Wilman Toth RN  Outcome: Progressing     Problem: ABCDS Injury Assessment  Goal: Absence of physical injury  11/3/2023 0922 by Darline Zhong RN  Outcome: Progressing  11/2/2023 2206 by Wilman Toth RN  Outcome: Progressing     Problem: Chronic Conditions and Co-morbidities  Goal: Patient's chronic conditions and co-morbidity symptoms are monitored and maintained or improved  Outcome: Progressing

## 2023-11-04 LAB
ANION GAP SERPL CALC-SCNC: 6 MMOL/L (ref 3–18)
BUN SERPL-MCNC: 8 MG/DL (ref 7–18)
BUN/CREAT SERPL: 22 (ref 12–20)
CALCIUM SERPL-MCNC: 8.4 MG/DL (ref 8.5–10.1)
CHLORIDE SERPL-SCNC: 101 MMOL/L (ref 100–111)
CO2 SERPL-SCNC: 33 MMOL/L (ref 21–32)
CREAT SERPL-MCNC: 0.36 MG/DL (ref 0.6–1.3)
ERYTHROCYTE [DISTWIDTH] IN BLOOD BY AUTOMATED COUNT: 13.6 % (ref 11.6–14.5)
GLUCOSE SERPL-MCNC: 70 MG/DL (ref 74–99)
HCT VFR BLD AUTO: 24 % (ref 35–45)
HGB BLD-MCNC: 7.5 G/DL (ref 12–16)
MCH RBC QN AUTO: 21.9 PG (ref 24–34)
MCHC RBC AUTO-ENTMCNC: 31.3 G/DL (ref 31–37)
MCV RBC AUTO: 70 FL (ref 78–100)
NRBC # BLD: 0 K/UL (ref 0–0.01)
NRBC BLD-RTO: 0 PER 100 WBC
PLATELET # BLD AUTO: 386 K/UL (ref 135–420)
PMV BLD AUTO: 11.2 FL (ref 9.2–11.8)
POTASSIUM SERPL-SCNC: 3.4 MMOL/L (ref 3.5–5.5)
RBC # BLD AUTO: 3.43 M/UL (ref 4.2–5.3)
SODIUM SERPL-SCNC: 140 MMOL/L (ref 136–145)
WBC # BLD AUTO: 10.8 K/UL (ref 4.6–13.2)

## 2023-11-04 PROCEDURE — 2580000003 HC RX 258: Performed by: ORTHOPAEDIC SURGERY

## 2023-11-04 PROCEDURE — 36415 COLL VENOUS BLD VENIPUNCTURE: CPT

## 2023-11-04 PROCEDURE — 85027 COMPLETE CBC AUTOMATED: CPT

## 2023-11-04 PROCEDURE — 80048 BASIC METABOLIC PNL TOTAL CA: CPT

## 2023-11-04 PROCEDURE — 1100000000 HC RM PRIVATE

## 2023-11-04 PROCEDURE — 6360000002 HC RX W HCPCS: Performed by: HOSPITALIST

## 2023-11-04 PROCEDURE — 6370000000 HC RX 637 (ALT 250 FOR IP): Performed by: HOSPITALIST

## 2023-11-04 PROCEDURE — 2580000003 HC RX 258: Performed by: HOSPITALIST

## 2023-11-04 RX ORDER — POTASSIUM CHLORIDE 20 MEQ/1
40 TABLET, EXTENDED RELEASE ORAL ONCE
Status: COMPLETED | OUTPATIENT
Start: 2023-11-04 | End: 2023-11-04

## 2023-11-04 RX ADMIN — SODIUM CHLORIDE, PRESERVATIVE FREE 10 ML: 5 INJECTION INTRAVENOUS at 09:05

## 2023-11-04 RX ADMIN — AMLODIPINE BESYLATE 5 MG: 5 TABLET ORAL at 09:05

## 2023-11-04 RX ADMIN — LOSARTAN POTASSIUM 100 MG: 50 TABLET, FILM COATED ORAL at 09:05

## 2023-11-04 RX ADMIN — SODIUM CHLORIDE, SODIUM LACTATE, POTASSIUM CHLORIDE, AND CALCIUM CHLORIDE: 600; 310; 30; 20 INJECTION, SOLUTION INTRAVENOUS at 18:17

## 2023-11-04 RX ADMIN — POLYETHYLENE GLYCOL 3350 17 G: 17 POWDER, FOR SOLUTION ORAL at 09:04

## 2023-11-04 RX ADMIN — SODIUM CHLORIDE, SODIUM LACTATE, POTASSIUM CHLORIDE, AND CALCIUM CHLORIDE: 600; 310; 30; 20 INJECTION, SOLUTION INTRAVENOUS at 07:43

## 2023-11-04 RX ADMIN — HYDROCHLOROTHIAZIDE 25 MG: 25 TABLET ORAL at 09:05

## 2023-11-04 RX ADMIN — ENOXAPARIN SODIUM 40 MG: 100 INJECTION SUBCUTANEOUS at 20:22

## 2023-11-04 RX ADMIN — SENNOSIDES AND DOCUSATE SODIUM 2 TABLET: 50; 8.6 TABLET ORAL at 09:05

## 2023-11-04 RX ADMIN — POTASSIUM CHLORIDE 40 MEQ: 1500 TABLET, EXTENDED RELEASE ORAL at 15:01

## 2023-11-04 RX ADMIN — MORPHINE SULFATE 1 MG: 2 INJECTION, SOLUTION INTRAMUSCULAR; INTRAVENOUS at 22:09

## 2023-11-04 RX ADMIN — ACETAMINOPHEN 650 MG: 325 TABLET ORAL at 03:58

## 2023-11-04 ASSESSMENT — PAIN SCALES - GENERAL
PAINLEVEL_OUTOF10: 9
PAINLEVEL_OUTOF10: 1
PAINLEVEL_OUTOF10: 0
PAINLEVEL_OUTOF10: 6

## 2023-11-04 ASSESSMENT — PAIN DESCRIPTION - DESCRIPTORS
DESCRIPTORS: ACHING
DESCRIPTORS: ACHING

## 2023-11-04 ASSESSMENT — PAIN DESCRIPTION - ORIENTATION: ORIENTATION: LEFT

## 2023-11-04 ASSESSMENT — PAIN DESCRIPTION - LOCATION
LOCATION: HIP
LOCATION: HIP

## 2023-11-04 ASSESSMENT — PAIN - FUNCTIONAL ASSESSMENT: PAIN_FUNCTIONAL_ASSESSMENT: ACTIVITIES ARE NOT PREVENTED

## 2023-11-04 NOTE — PROGRESS NOTES
Physical Therapy       1127: Pt asking how to use the phone, educated on use. Then pt needing the bed pan, assisted on to bed pan and educated on how to use the call bell due to pt always yelling out for help.    1403: Pt asleep, will follow up as schedule permits.

## 2023-11-04 NOTE — PROGRESS NOTES
Bedside and Verbal shift change report given to Lea RN (oncoming nurse) by Marco Johnson RN (offgoing nurse). Report included the following information Nurse Handoff Report, Intake/Output, MAR, Recent Results, and Neuro Assessment.

## 2023-11-04 NOTE — PROGRESS NOTES
Patient in bed awake, A/Ox3, speech clear, hearing intact,in bed, external catheter in place . Patient on room air, lung sounds diminished, respirations unlabored. Skin is warm, dry, with incision on the left side of the hip. Scabs in the back of the right elbow and missing 3 toes on the right foot. Dressings clean, dry, and intact. Radial and pedal pulses present bilaterally, capillary refill <3 seconds to fingers and toes. Abdomen with hypoactive bowel sounds. Moderate hand  noted to bilateral upper extremities. Side rails x3 up, bed locked and in lowest position, bed alarm on, call bell and bedside table within reach, needs attended, denies any pain, SOB, or concerns at present.

## 2023-11-04 NOTE — PROGRESS NOTES
Bedside and Verbal shift change report given to [facility] DAWSON RN (oncoming nurse) by Jez Nunez RN (offgoing nurse). Report included the following information Nurse Handoff Report, Adult Overview, Recent Results, Med Rec Status, and Neuro Assessment.

## 2023-11-05 PROCEDURE — 97116 GAIT TRAINING THERAPY: CPT

## 2023-11-05 PROCEDURE — 6370000000 HC RX 637 (ALT 250 FOR IP): Performed by: HOSPITALIST

## 2023-11-05 PROCEDURE — 97530 THERAPEUTIC ACTIVITIES: CPT

## 2023-11-05 PROCEDURE — 2580000003 HC RX 258: Performed by: HOSPITALIST

## 2023-11-05 PROCEDURE — 97110 THERAPEUTIC EXERCISES: CPT

## 2023-11-05 PROCEDURE — 2580000003 HC RX 258: Performed by: ORTHOPAEDIC SURGERY

## 2023-11-05 PROCEDURE — 6360000002 HC RX W HCPCS: Performed by: HOSPITALIST

## 2023-11-05 PROCEDURE — 1100000000 HC RM PRIVATE

## 2023-11-05 RX ORDER — POTASSIUM CHLORIDE 20 MEQ/1
40 TABLET, EXTENDED RELEASE ORAL ONCE
Status: COMPLETED | OUTPATIENT
Start: 2023-11-05 | End: 2023-11-05

## 2023-11-05 RX ADMIN — SODIUM CHLORIDE, SODIUM LACTATE, POTASSIUM CHLORIDE, AND CALCIUM CHLORIDE: 600; 310; 30; 20 INJECTION, SOLUTION INTRAVENOUS at 17:55

## 2023-11-05 RX ADMIN — LOSARTAN POTASSIUM 100 MG: 50 TABLET, FILM COATED ORAL at 09:43

## 2023-11-05 RX ADMIN — AMLODIPINE BESYLATE 5 MG: 5 TABLET ORAL at 09:44

## 2023-11-05 RX ADMIN — POTASSIUM CHLORIDE 40 MEQ: 1500 TABLET, EXTENDED RELEASE ORAL at 09:43

## 2023-11-05 RX ADMIN — ENOXAPARIN SODIUM 40 MG: 100 INJECTION SUBCUTANEOUS at 20:59

## 2023-11-05 RX ADMIN — MORPHINE SULFATE 1 MG: 2 INJECTION, SOLUTION INTRAMUSCULAR; INTRAVENOUS at 09:42

## 2023-11-05 RX ADMIN — MORPHINE SULFATE 1 MG: 2 INJECTION, SOLUTION INTRAMUSCULAR; INTRAVENOUS at 01:44

## 2023-11-05 RX ADMIN — SENNOSIDES AND DOCUSATE SODIUM 2 TABLET: 50; 8.6 TABLET ORAL at 09:43

## 2023-11-05 RX ADMIN — MORPHINE SULFATE 1 MG: 2 INJECTION, SOLUTION INTRAMUSCULAR; INTRAVENOUS at 17:56

## 2023-11-05 RX ADMIN — SODIUM CHLORIDE, SODIUM LACTATE, POTASSIUM CHLORIDE, AND CALCIUM CHLORIDE: 600; 310; 30; 20 INJECTION, SOLUTION INTRAVENOUS at 01:53

## 2023-11-05 RX ADMIN — SODIUM CHLORIDE, PRESERVATIVE FREE 10 ML: 5 INJECTION INTRAVENOUS at 09:45

## 2023-11-05 RX ADMIN — POLYETHYLENE GLYCOL 3350 17 G: 17 POWDER, FOR SOLUTION ORAL at 09:44

## 2023-11-05 RX ADMIN — MORPHINE SULFATE 1 MG: 2 INJECTION, SOLUTION INTRAMUSCULAR; INTRAVENOUS at 22:34

## 2023-11-05 RX ADMIN — HYDROCHLOROTHIAZIDE 25 MG: 25 TABLET ORAL at 09:43

## 2023-11-05 ASSESSMENT — PAIN SCALES - GENERAL
PAINLEVEL_OUTOF10: 8
PAINLEVEL_OUTOF10: 9
PAINLEVEL_OUTOF10: 7
PAINLEVEL_OUTOF10: 7

## 2023-11-05 ASSESSMENT — PAIN DESCRIPTION - LOCATION
LOCATION: HIP
LOCATION: HIP
LOCATION: GROIN
LOCATION: HIP

## 2023-11-05 ASSESSMENT — PAIN DESCRIPTION - ORIENTATION
ORIENTATION: LOWER
ORIENTATION: LEFT

## 2023-11-05 ASSESSMENT — PAIN DESCRIPTION - DESCRIPTORS
DESCRIPTORS: ACHING

## 2023-11-05 ASSESSMENT — PAIN - FUNCTIONAL ASSESSMENT: PAIN_FUNCTIONAL_ASSESSMENT: ACTIVITIES ARE NOT PREVENTED

## 2023-11-05 NOTE — PROGRESS NOTES
Bedside and Verbal shift change report given to Kilo Khan RN   (oncoming nurse) by Tran Salvador RN (offgoing nurse). Report included the following information Nurse Handoff Report, Adult Overview, Intake/Output, MAR, Recent Results, and Quality Measures. 2030 Pt resting in bed, no distress noted, requesting to be repositioned frequently. Reminded to use IS, pt using independently    2140 Incontinent of urine, ilda care done, bed linens, gown and female external cath changed    2345 Bedside and Verbal shift change report given to Sona Matson RN (oncoming nurse) by Kilo Khan RN   (offgoing nurse). Report included the following information Nurse Handoff Report, Adult Overview, Intake/Output, MAR, Quality Measures, and Neuro Assessment.

## 2023-11-05 NOTE — PROGRESS NOTES
2315  Bedside and verbal shift change report given to Diego Witt RN (on coming nurse) by Lien Garcias RN (off going nurse). Report included the following information SBAR, Kardex, Intake/Output and MAR.    0101  Pt didn't get comfortable with her leg positions. Education provided for right position to prevent pressure injury. Adjusted position. Warm blanket provided. 0715  Bedside and verbal shift change report given to DASHAWN Tate (on coming nurse) by Diego Witt RN (off going nurse). Report included the following information SBAR, Kardex, Intake/Output and MAR.

## 2023-11-05 NOTE — PROGRESS NOTES
.  Physical Therapy Goals:  Initiated 11/5/2023 to be met within 7-10 days. []  Patient has met MD vikram barrett for d/c home   []  Recommend HH with 24 hour adult care   [x]  Benefit from additional acute PT session to address: independent mobility       PHYSICAL THERAPY TREATMENT    Patient: Desiree Oneil (16 y.o. female)  Date: 11/5/2023  Diagnosis: Closed fracture of left hip, initial encounter ([address]) [S72.002A]  Closed right hip fracture, initial encounter ([address]) [S72.001A] Closed left hip fracture ([address])  Procedure(s) (LRB):  HIP TOTAL ARTHROPLASTY ANTERIOR APPROACH , LEFT, C-ARM, HANA TABLE (Left) 4 Days Post-Op  Precautions: Weight Bearing, Fall Risk,  , Left Lower Extremity Weight Bearing: Weight Bearing As Tolerated,  ,  ,  ,  , Hip Precautions: Anterior hip precautions      ASSESSMENT:   Ms Gonzalo Meng is very excited to participate in PT today. Pt found to be resting supine, with R leg crossed over L. Exercises well tolerated, after AAROM to initiate. Min a to transition to EOB, due to L LE discomfort. Exercises continued. Standing trials improve with each trial (5). Ambulation requires frequent input for sequencing and to keep hands on handle (Not lower bars). With pt's improvements in pain and general mobility, she will be a great candidate for high level placement. Progression toward goals: Fair  [x]      Improving appropriately and progressing toward goals  []      Improving slowly and progressing toward goals  []      Not making progress toward goals and plan of care will be adjusted     PLAN:  Patient continues to benefit from skilled intervention to address the above impairments. Continue treatment per established plan of care. Further Equipment Recommendations for Discharge:  ,  ,    Discharge Recommendation:      AMPAC: 15    This AMPAC score should be considered in conjunction with interdisciplinary team recommendations to determine the most appropriate discharge setting.  Patient's not associated with a discharge to the patient's home setting. Based on an AM-PAC score of 15/24 (15/20 if omitting stairs) and their current functional mobility deficits, it is recommended that the patient have 3-5 sessions per week of Physical Therapy at d/c to increase the patient's independence.

## 2023-11-06 LAB
ANION GAP SERPL CALC-SCNC: 8 MMOL/L (ref 3–18)
BUN SERPL-MCNC: 5 MG/DL (ref 7–18)
BUN/CREAT SERPL: 13 (ref 12–20)
CALCIUM SERPL-MCNC: 8.7 MG/DL (ref 8.5–10.1)
CHLORIDE SERPL-SCNC: 101 MMOL/L (ref 100–111)
CO2 SERPL-SCNC: 29 MMOL/L (ref 21–32)
CREAT SERPL-MCNC: 0.4 MG/DL (ref 0.6–1.3)
ERYTHROCYTE [DISTWIDTH] IN BLOOD BY AUTOMATED COUNT: 13.3 % (ref 11.6–14.5)
GLUCOSE BLD STRIP.AUTO-MCNC: 111 MG/DL (ref 70–110)
GLUCOSE SERPL-MCNC: 83 MG/DL (ref 74–99)
HCT VFR BLD AUTO: 24.6 % (ref 35–45)
HGB BLD-MCNC: 7.9 G/DL (ref 12–16)
MCH RBC QN AUTO: 22.5 PG (ref 24–34)
MCHC RBC AUTO-ENTMCNC: 32.1 G/DL (ref 31–37)
MCV RBC AUTO: 70.1 FL (ref 78–100)
NRBC # BLD: 0 K/UL (ref 0–0.01)
NRBC BLD-RTO: 0 PER 100 WBC
PLATELET # BLD AUTO: 472 K/UL (ref 135–420)
PMV BLD AUTO: 10.5 FL (ref 9.2–11.8)
POTASSIUM SERPL-SCNC: 4.2 MMOL/L (ref 3.5–5.5)
RBC # BLD AUTO: 3.51 M/UL (ref 4.2–5.3)
SODIUM SERPL-SCNC: 138 MMOL/L (ref 136–145)
WBC # BLD AUTO: 7.1 K/UL (ref 4.6–13.2)

## 2023-11-06 PROCEDURE — 2580000003 HC RX 258: Performed by: HOSPITALIST

## 2023-11-06 PROCEDURE — 82962 GLUCOSE BLOOD TEST: CPT

## 2023-11-06 PROCEDURE — 2580000003 HC RX 258: Performed by: ORTHOPAEDIC SURGERY

## 2023-11-06 PROCEDURE — 97535 SELF CARE MNGMENT TRAINING: CPT

## 2023-11-06 PROCEDURE — 1100000000 HC RM PRIVATE

## 2023-11-06 PROCEDURE — 97161 PT EVAL LOW COMPLEX 20 MIN: CPT

## 2023-11-06 PROCEDURE — 6360000002 HC RX W HCPCS: Performed by: HOSPITALIST

## 2023-11-06 PROCEDURE — 36415 COLL VENOUS BLD VENIPUNCTURE: CPT

## 2023-11-06 PROCEDURE — 97530 THERAPEUTIC ACTIVITIES: CPT

## 2023-11-06 PROCEDURE — 97116 GAIT TRAINING THERAPY: CPT

## 2023-11-06 PROCEDURE — 85027 COMPLETE CBC AUTOMATED: CPT

## 2023-11-06 PROCEDURE — 6370000000 HC RX 637 (ALT 250 FOR IP): Performed by: HOSPITALIST

## 2023-11-06 PROCEDURE — 80048 BASIC METABOLIC PNL TOTAL CA: CPT

## 2023-11-06 RX ADMIN — MORPHINE SULFATE 1 MG: 2 INJECTION, SOLUTION INTRAMUSCULAR; INTRAVENOUS at 02:48

## 2023-11-06 RX ADMIN — SENNOSIDES AND DOCUSATE SODIUM 2 TABLET: 50; 8.6 TABLET ORAL at 09:05

## 2023-11-06 RX ADMIN — POLYETHYLENE GLYCOL 3350 17 G: 17 POWDER, FOR SOLUTION ORAL at 18:16

## 2023-11-06 RX ADMIN — SODIUM CHLORIDE, PRESERVATIVE FREE 10 ML: 5 INJECTION INTRAVENOUS at 20:58

## 2023-11-06 RX ADMIN — HYDROCHLOROTHIAZIDE 25 MG: 25 TABLET ORAL at 09:06

## 2023-11-06 RX ADMIN — ENOXAPARIN SODIUM 40 MG: 100 INJECTION SUBCUTANEOUS at 19:55

## 2023-11-06 RX ADMIN — AMLODIPINE BESYLATE 5 MG: 5 TABLET ORAL at 09:07

## 2023-11-06 RX ADMIN — SODIUM CHLORIDE, SODIUM LACTATE, POTASSIUM CHLORIDE, AND CALCIUM CHLORIDE: 600; 310; 30; 20 INJECTION, SOLUTION INTRAVENOUS at 05:45

## 2023-11-06 RX ADMIN — ACETAMINOPHEN 650 MG: 325 TABLET ORAL at 23:36

## 2023-11-06 RX ADMIN — MORPHINE SULFATE 1 MG: 2 INJECTION, SOLUTION INTRAMUSCULAR; INTRAVENOUS at 20:57

## 2023-11-06 RX ADMIN — LOSARTAN POTASSIUM 100 MG: 50 TABLET, FILM COATED ORAL at 09:06

## 2023-11-06 RX ADMIN — POLYETHYLENE GLYCOL 3350 17 G: 17 POWDER, FOR SOLUTION ORAL at 09:05

## 2023-11-06 ASSESSMENT — PAIN DESCRIPTION - DESCRIPTORS
DESCRIPTORS: ACHING

## 2023-11-06 ASSESSMENT — PAIN DESCRIPTION - LOCATION
LOCATION: GROIN

## 2023-11-06 ASSESSMENT — PAIN - FUNCTIONAL ASSESSMENT: PAIN_FUNCTIONAL_ASSESSMENT: PREVENTS OR INTERFERES WITH MANY ACTIVE NOT PASSIVE ACTIVITIES

## 2023-11-06 ASSESSMENT — PAIN SCALES - GENERAL
PAINLEVEL_OUTOF10: 8
PAINLEVEL_OUTOF10: 8
PAINLEVEL_OUTOF10: 0
PAINLEVEL_OUTOF10: 7

## 2023-11-06 ASSESSMENT — PAIN DESCRIPTION - ORIENTATION: ORIENTATION: LEFT

## 2023-11-06 NOTE — PROGRESS NOTES
Bedside and Verbal shift change report given to Jaclyn Pizarro RN   (oncoming nurse) by Davion Caputo RN(offgoing nurse). Report included the following information Nurse Handoff Report, Adult Overview, Intake/Output, MAR, Recent Results, Quality Measures, and Neuro Assessment. 2234 Incontinent of urine - ilda care done,female external cath applied. Morphine given at this time    2325 Bedside and Verbal shift change report given to Tomasa Santiago RN (oncoming nurse) by Jaclyn Pizarro RN   (offgoing nurse). Report included the following information Nurse Handoff Report, Adult Overview, Surgery Report, Intake/Output, MAR, Recent Results, Quality Measures, and Neuro Assessment.

## 2023-11-06 NOTE — PROGRESS NOTES
Care Mgmt contacted patient's family member (child) Santa Wagner via telephone at [phone] to review/discuss 2nd Important Message from [facility]. No answer, message left on voice mail to contact Care [facility] at [phone]. Copy placed in room. Original letter placed in chart.

## 2023-11-06 NOTE — CARE COORDINATION
The SW called and followed up with the pt daughter Hussein Bergeron in regards to the bed offfers. The SW made the daughter aware the pt has been accepted to [address] [place] [address] and Rehab. The daughter stated she would discuss the bed offers with the pt. The SW will follow up with daughter after she speaks to the pt. The SW will continue to follow.

## 2023-11-06 NOTE — PROGRESS NOTES
Physical Therapy Goals:   Pt goals to be met in 1-5 days:  Pt will be able to perform supine<>sit SBA for transfer ease at home. Pt will be able to perform sit<>stand SBA for increased ability to transfer at home safely. Pt will be able to participate in gait training >50' w/ RW, WBAT, GB and CGA/SBA for improved ability in home upon d/c. Pt will be educated regarding HEP per MD protocol for optimal AROM/strength outcomes. []  Patient has met MD mobilization critieria for d/c home   []  Recommend HH with 24 hour adult care   [x]  Benefit from additional acute PT session to address:  gait training, transfer training      PHYSICAL THERAPY TREATMENT    Patient: Jade Hamman (89 y.o. female)  Date: 11/6/2023  Diagnosis: Closed fracture of left hip, initial encounter ([address]) [S72.002A]  Closed right hip fracture, initial encounter ([address]) [S72.001A] Closed left hip fracture ([address])  Procedure(s) (LRB):  HIP TOTAL ARTHROPLASTY ANTERIOR APPROACH , LEFT, C-ARM, HANA TABLE (Left) 5 Days Post-Op  Precautions: Weight Bearing, Fall Risk,  , Left Lower Extremity Weight Bearing: Weight Bearing As Tolerated,  ,  ,  ,  , Hip Precautions: Anterior hip precautions    ASSESSMENT:  Assessment  Assessment: Pt supine in bed upon arrival.  Pt rating pain in L hip 9/10 using numerical pain scale. Pt able to transfer supine<>sit<>stand min A/CGA. Pt needs vc for hand placement and safe techniques. Pt able to participate in gait training using RW, WBAT, CGA/min A and GB w/ mildly antalgic gait pattern. Pt sat in chair x5'. Pt sat on [facility] for anticipated BM however pt unsuccessful. Min A required to seun/doff brief. Pt returned to supine in bed w/ all needs within reach. Encouraged HEP and participated throughout session. Nurse Leonela Celis made aware of session and outcomes. Recommend SNF rehab upon hospital d/c.   Activity Tolerance: Patient tolerated treatment well  Discharge Recommendations: Subacute/Skilled Nursing

## 2023-11-06 NOTE — PROGRESS NOTES
Occupational Therapy  Facility/Department: [facility] [address] SURGICAL/ONCOLOGY  Rehabilitation Occupational Therapy Daily Treatment Note    Date: 23  Patient Name: Tian Kendall       Room: 302/01  MRN: [id number]  Account: [de-identified]   : 1945  (78 y.o.) Gender: female       Past Medical History:  has a past medical history of Arthritis, Asthma, Diabetes ([address]), Hypertension, Macromastia, Menopause, RA (rheumatoid arthritis) ([address]), TIA (transient ischemic attack), and Ulcer of foot ([address]). Past Surgical History:   has a past surgical history that includes Partial hysterectomy; Hysterectomy (); other surgical history; Breast reduction surgery (Bilateral, 2012); orthopedic surgery (); shoulder surgery (Right); US I&D BREAST ABSCESS DEEP (2/10/2016); and Total hip arthroplasty (Left, 2023). Restrictions  Restrictions/Precautions: Weight Bearing, Fall Risk  Hip Precautions: Anterior hip precautions  Left Lower Extremity Weight Bearing: Weight Bearing As Tolerated    Subjective  Subjective: I need prune juice. Objective  Cognition  Arousal/Alertness: Appropriate responses to stimuli  Following Commands: Follows one step commands with repetition  Attention Span: Attends with cues to redirect  Memory: Decreased short term memory  Safety Judgement: Decreased awareness of need for assistance;Decreased awareness of need for safety  Orientation  Orientation Level: Oriented to person;Oriented to situation;Oriented to place     Assessment  Assessment  Assessment: Pt seen for OT follow-up treatment session. Nsg present at bedside in prep to assist pt for bsc transfer. Pt requires min-CGA for bed mobility, supine<>sit, sit<>stand transfers and bed<>bsc transfers. Pt requires frequent verbal and tactile cues for safe transfers with RW. Red min-CGA for ilda care and max A or LB dressing during this session. Pt was left supine in bed with HOBE.  Consider SNF for continued skilled OT intervention and safe

## 2023-11-06 NOTE — CARE COORDINATION
The SW called and followed up with the daughter in regards to accepting one of the bed offers. The daughter stated she reviewed both facilities and she choose [facility]. She stated it would be the closet for her brother to get to, since he currently does not have a car. The Chrissy [place] will be started for [facility]. The SW will continue to follow.

## 2023-11-06 NOTE — PLAN OF CARE
Problem: Pain  Goal: Verbalizes/displays adequate comfort level or baseline comfort level  Outcome: Progressing  Flowsheets (Taken 11/6/2023 0224)  Verbalizes/displays adequate comfort level or baseline comfort level:   Encourage patient to monitor pain and request assistance   Assess pain using appropriate pain scale   Administer analgesics based on type and severity of pain and evaluate response   Implement non-pharmacological measures as appropriate and evaluate response   Consider cultural and social influences on pain and pain management   Notify Licensed Independent Practitioner if interventions unsuccessful or patient reports new pain     Problem: Skin/Tissue Integrity  Goal: Absence of new skin breakdown  Description: 1. Monitor for areas of redness and/or skin breakdown  2. Assess vascular access sites hourly  3. Every 4-6 hours minimum:  Change oxygen saturation probe site  4. Every 4-6 hours:  If on nasal continuous positive airway pressure, respiratory therapy assess nares and determine need for appliance change or resting period.   Outcome: Progressing     Problem: Safety - Adult  Goal: Free from fall injury  Outcome: Progressing  Flowsheets (Taken 11/6/2023 0224)  Free From Fall Injury: Instruct family/caregiver on patient safety     Problem: ABCDS Injury Assessment  Goal: Absence of physical injury  Outcome: Progressing  Flowsheets (Taken 11/6/2023 0224)  Absence of Physical Injury: Implement safety measures based on patient assessment     Problem: Chronic Conditions and Co-morbidities  Goal: Patient's chronic conditions and co-morbidity symptoms are monitored and maintained or improved  Outcome: Progressing  Flowsheets (Taken 11/6/2023 0224)  Care Plan - Patient's Chronic Conditions and Co-Morbidity Symptoms are Monitored and Maintained or Improved:   Monitor and assess patient's chronic conditions and comorbid symptoms for stability, deterioration, or improvement   Collaborate with

## 2023-11-07 PROCEDURE — 2580000003 HC RX 258: Performed by: HOSPITALIST

## 2023-11-07 PROCEDURE — 97110 THERAPEUTIC EXERCISES: CPT

## 2023-11-07 PROCEDURE — 1100000000 HC RM PRIVATE

## 2023-11-07 PROCEDURE — 6360000002 HC RX W HCPCS: Performed by: HOSPITALIST

## 2023-11-07 PROCEDURE — 97116 GAIT TRAINING THERAPY: CPT

## 2023-11-07 PROCEDURE — 2580000003 HC RX 258: Performed by: ORTHOPAEDIC SURGERY

## 2023-11-07 PROCEDURE — 6370000000 HC RX 637 (ALT 250 FOR IP): Performed by: HOSPITALIST

## 2023-11-07 RX ADMIN — MORPHINE SULFATE 1 MG: 2 INJECTION, SOLUTION INTRAMUSCULAR; INTRAVENOUS at 12:48

## 2023-11-07 RX ADMIN — HYDROCHLOROTHIAZIDE 25 MG: 25 TABLET ORAL at 08:58

## 2023-11-07 RX ADMIN — SENNOSIDES AND DOCUSATE SODIUM 2 TABLET: 50; 8.6 TABLET ORAL at 08:58

## 2023-11-07 RX ADMIN — MORPHINE SULFATE 1 MG: 2 INJECTION, SOLUTION INTRAMUSCULAR; INTRAVENOUS at 21:05

## 2023-11-07 RX ADMIN — POLYETHYLENE GLYCOL 3350 17 G: 17 POWDER, FOR SOLUTION ORAL at 08:58

## 2023-11-07 RX ADMIN — ENOXAPARIN SODIUM 40 MG: 100 INJECTION SUBCUTANEOUS at 20:29

## 2023-11-07 RX ADMIN — SODIUM CHLORIDE, PRESERVATIVE FREE 10 ML: 5 INJECTION INTRAVENOUS at 21:07

## 2023-11-07 RX ADMIN — LOSARTAN POTASSIUM 100 MG: 50 TABLET, FILM COATED ORAL at 08:58

## 2023-11-07 RX ADMIN — AMLODIPINE BESYLATE 5 MG: 5 TABLET ORAL at 08:58

## 2023-11-07 RX ADMIN — SODIUM CHLORIDE, SODIUM LACTATE, POTASSIUM CHLORIDE, AND CALCIUM CHLORIDE: 600; 310; 30; 20 INJECTION, SOLUTION INTRAVENOUS at 04:52

## 2023-11-07 ASSESSMENT — PAIN DESCRIPTION - ORIENTATION
ORIENTATION: LEFT
ORIENTATION: LEFT

## 2023-11-07 ASSESSMENT — PAIN SCALES - GENERAL
PAINLEVEL_OUTOF10: 0
PAINLEVEL_OUTOF10: 8
PAINLEVEL_OUTOF10: 0
PAINLEVEL_OUTOF10: 9

## 2023-11-07 ASSESSMENT — PAIN DESCRIPTION - LOCATION
LOCATION: HIP
LOCATION: HIP

## 2023-11-07 ASSESSMENT — PAIN DESCRIPTION - DESCRIPTORS
DESCRIPTORS: STABBING
DESCRIPTORS: ACHING;SHARP

## 2023-11-07 NOTE — PROGRESS NOTES
Physical Therapy Goals:  Initiated 11/7/2023 to be met within 7-10 days. []  Patient has met MD vikram barrett for d/c home   []  Recommend HH with 24 hour adult care   [x]  Benefit from additional acute PT session to address:  rehab placement      PHYSICAL THERAPY TREATMENT    Patient: Tana Pabon (29 y.o. female)  Date: 11/7/2023  Diagnosis: Closed fracture of left hip, initial encounter ([address]) [S72.002A]  Closed right hip fracture, initial encounter ([address]) [S72.001A] Closed left hip fracture ([address])  Procedure(s) (LRB):  HIP TOTAL ARTHROPLASTY ANTERIOR APPROACH , LEFT, C-ARM, HANA TABLE (Left) 6 Days Post-Op  Precautions: Weight Bearing, Fall Risk,  , Left Lower Extremity Weight Bearing: Weight Bearing As Tolerated,  ,  ,  ,  , Hip Precautions: Anterior hip precautions  PLOF: ambulatory without AD    ASSESSMENT:  Assessment  Assessment: Pt in bed upon arrival.  Increased time and Jr to sit up EOB. Elevated bed to assist with sit to stand. Ambulated with RW,  reciprocal gt pattern, decreased stride, no LOB or path deviations. VC needed to stay within FELIX of RW. Returned to room. Performed seated ROM strengthening exercises and left sitting EOB. Provided pt with a handout of LE exercises. Activity Tolerance: Patient tolerated treatment well  Discharge Recommendations: Subacute/Skilled Nursing Facility;IP Rehab    Progression toward goals:   []      Improving appropriately and progressing toward goals  [x]      Improving slowly and progressing toward goals  []      Not making progress toward goals and plan of care will be adjusted     PLAN:  Patient continues to benefit from skilled intervention to address the above impairments. Continue treatment per established plan of care.     Further Equipment Recommendations for Discharge: RW  Discharge Recommendation: Discharge Recommendations: Subacute/Skilled Nursing Facility;IP Rehab    [facility]: 14/20    This [facility] score should be considered in

## 2023-11-07 NOTE — PROGRESS NOTES
1912  Bedside and Verbal shift change report given to ST KELIN RN (oncoming nurse) by Lidya Rivers RN (offgoing nurse). Report included the following information Nurse Handoff Report, Adult Overview, Intake/Output, MAR, Recent Results, and Event Log.     0709  Bedside and Verbal shift change report given to Chicho Azevedo RN (oncoming nurse) by ST KELIN RN (offgoing nurse). Report included the following information Nurse Handoff Report, Adult Overview, Intake/Output, MAR, Recent Results, and Event Log.

## 2023-11-07 NOTE — PROGRESS NOTES
Physician Progress Note      PATIENT:               Jignesh Maravilla  CSN #:                  [id number]  :                       1945  ADMIT DATE:       10/30/2023 10:59 AM  DISCH DATE:  RESPONDING  PROVIDER #:        Aurora Cheadle MD        QUERY TEXT:    Type of Anemia: Please provide further specificity, if known. Clinical indicators include: chronic anemia, rbc, hgb, hct  Options provided:  -- Anemia due to acute blood loss  -- Anemia due to chronic blood loss  -- Anemia due to iron deficiency  -- Anemia due to postoperative blood loss  -- Anemia due to chronic disease  -- Other - I will add my own diagnosis  -- Disagree - Not applicable / Not valid  -- Disagree - Clinically Unable to determine / Unknown        PROVIDER RESPONSE TEXT:    The patient has anemia due to acute blood loss. Electronically signed by:   Aurora Cheadle MD 2023 4:50 PM

## 2023-11-07 NOTE — CARE COORDINATION
The SW spoke with Vy at [facility] in admissions. She stated the authorization is still pending. The SW will continue to follow.

## 2023-11-07 NOTE — CARE COORDINATION
The SW received a call from the [place] with [facility] and she stated the insurance company is requesting a peer to peer. The SW called the MD to make him aware of the peer  to peer, is due by tomorrow at 14:00. The SW also provided the peer to peer phone number [phone]. The SW called and updated the pt daughter Inderjit Hope in regards to the peer to peer. The SW continue to follow.

## 2023-11-08 VITALS
BODY MASS INDEX: 28.34 KG/M2 | HEART RATE: 66 BPM | TEMPERATURE: 97.6 F | HEIGHT: 66 IN | SYSTOLIC BLOOD PRESSURE: 146 MMHG | DIASTOLIC BLOOD PRESSURE: 57 MMHG | OXYGEN SATURATION: 97 % | RESPIRATION RATE: 18 BRPM | WEIGHT: 176.37 LBS

## 2023-11-08 LAB
ANION GAP SERPL CALC-SCNC: 4 MMOL/L (ref 3–18)
BUN SERPL-MCNC: 4 MG/DL (ref 7–18)
BUN/CREAT SERPL: 10 (ref 12–20)
CALCIUM SERPL-MCNC: 8.9 MG/DL (ref 8.5–10.1)
CHLORIDE SERPL-SCNC: 105 MMOL/L (ref 100–111)
CO2 SERPL-SCNC: 32 MMOL/L (ref 21–32)
CREAT SERPL-MCNC: 0.42 MG/DL (ref 0.6–1.3)
ERYTHROCYTE [DISTWIDTH] IN BLOOD BY AUTOMATED COUNT: 13.7 % (ref 11.6–14.5)
GLUCOSE SERPL-MCNC: 80 MG/DL (ref 74–99)
HCT VFR BLD AUTO: 24.1 % (ref 35–45)
HGB BLD-MCNC: 7.5 G/DL (ref 12–16)
MCH RBC QN AUTO: 21.8 PG (ref 24–34)
MCHC RBC AUTO-ENTMCNC: 31.1 G/DL (ref 31–37)
MCV RBC AUTO: 70.1 FL (ref 78–100)
NRBC # BLD: 0 K/UL (ref 0–0.01)
NRBC BLD-RTO: 0 PER 100 WBC
PLATELET # BLD AUTO: 527 K/UL (ref 135–420)
PMV BLD AUTO: 10 FL (ref 9.2–11.8)
POTASSIUM SERPL-SCNC: 3.8 MMOL/L (ref 3.5–5.5)
RBC # BLD AUTO: 3.44 M/UL (ref 4.2–5.3)
SODIUM SERPL-SCNC: 141 MMOL/L (ref 136–145)
WBC # BLD AUTO: 6.2 K/UL (ref 4.6–13.2)

## 2023-11-08 PROCEDURE — 97110 THERAPEUTIC EXERCISES: CPT

## 2023-11-08 PROCEDURE — 6370000000 HC RX 637 (ALT 250 FOR IP): Performed by: HOSPITALIST

## 2023-11-08 PROCEDURE — 97116 GAIT TRAINING THERAPY: CPT

## 2023-11-08 PROCEDURE — 80048 BASIC METABOLIC PNL TOTAL CA: CPT

## 2023-11-08 PROCEDURE — 36415 COLL VENOUS BLD VENIPUNCTURE: CPT

## 2023-11-08 PROCEDURE — 85027 COMPLETE CBC AUTOMATED: CPT

## 2023-11-08 PROCEDURE — 2580000003 HC RX 258: Performed by: HOSPITALIST

## 2023-11-08 PROCEDURE — 6360000002 HC RX W HCPCS: Performed by: HOSPITALIST

## 2023-11-08 RX ADMIN — HYDROCHLOROTHIAZIDE 25 MG: 25 TABLET ORAL at 08:15

## 2023-11-08 RX ADMIN — POLYETHYLENE GLYCOL 3350 17 G: 17 POWDER, FOR SOLUTION ORAL at 08:14

## 2023-11-08 RX ADMIN — SODIUM CHLORIDE, PRESERVATIVE FREE 10 ML: 5 INJECTION INTRAVENOUS at 08:18

## 2023-11-08 RX ADMIN — LOSARTAN POTASSIUM 100 MG: 50 TABLET, FILM COATED ORAL at 08:15

## 2023-11-08 RX ADMIN — SENNOSIDES AND DOCUSATE SODIUM 2 TABLET: 50; 8.6 TABLET ORAL at 08:15

## 2023-11-08 RX ADMIN — MORPHINE SULFATE 1 MG: 2 INJECTION, SOLUTION INTRAMUSCULAR; INTRAVENOUS at 08:15

## 2023-11-08 RX ADMIN — AMLODIPINE BESYLATE 5 MG: 5 TABLET ORAL at 08:15

## 2023-11-08 ASSESSMENT — PAIN DESCRIPTION - DESCRIPTORS: DESCRIPTORS: ACHING

## 2023-11-08 ASSESSMENT — PAIN SCALES - GENERAL: PAINLEVEL_OUTOF10: 8

## 2023-11-08 ASSESSMENT — PAIN DESCRIPTION - LOCATION: LOCATION: HIP

## 2023-11-08 ASSESSMENT — PAIN DESCRIPTION - ORIENTATION: ORIENTATION: LEFT

## 2023-11-08 NOTE — PROGRESS NOTES
1915  Bedside and Verbal shift change report taken from Sal MENDOZA (offgoing nurse). Report included the following information Nurse Handoff Report, Index, Adult Overview, MAR, and Recent Results. Assumed patient's care. Shift assessment completed and documented in flowsheet. Patient's concerns addressed, safety measures in place. 0710  Bedside and Verbal shift change report given to Sal العراقي RN (oncoming nurse). Report included the following information SBAR, Nurse Handoff Report, Index, Adult Overview, Intake/Output, and MAR.

## 2023-11-08 NOTE — CARE COORDINATION
The peer to peer was denied. The SW called and spoke with the pt son Wilfrido Alexis in regards to the DC plan. The SW called and left a message for the pt daughter [place]. The SW made the son aware the pt is walking 40 to 80 feet and that is the reason she was denied. The pt has been secured with [address] [place] [address] nursing/PT/OT and aide. The pt son confirmed the pt has a rolling walker. The son requested a bedside commode for the pt. The son stated he  will transport the pt home around 6:30pm this evening.

## 2023-11-08 NOTE — CARE COORDINATION
The bedside commode was delivered to the room. The son was also provided with a list of private duty caregivers.

## 2023-11-08 NOTE — PROGRESS NOTES
Peer to Peer done with insurance company. Spoke with Dr. Gabriela Banda, expressed concerns that patient is not safe to go home. Informed to her that patient is not able to get out of bed by herself, she is not able to get up and go to bathroom. She has bedside commode and needs help with nurse to get up and sit on bedside commode. She refused SNF placement and stated that patient is appropriate for home with home health.

## 2023-11-08 NOTE — PROGRESS NOTES
This nurse had a message to call patient son, Ly Archer. This nurse called the patient son and he stated \"it is already past that time. I have already talked to someone\".

## 2023-11-12 ENCOUNTER — APPOINTMENT (OUTPATIENT)
Facility: HOSPITAL | Age: 78
End: 2023-11-12
Payer: MEDICARE

## 2023-11-12 ENCOUNTER — HOSPITAL ENCOUNTER (EMERGENCY)
Facility: HOSPITAL | Age: 78
Discharge: HOME | End: 2023-11-12
Attending: STUDENT IN AN ORGANIZED HEALTH CARE EDUCATION/TRAINING PROGRAM
Payer: MEDICARE

## 2023-11-12 VITALS
SYSTOLIC BLOOD PRESSURE: 145 MMHG | HEART RATE: 72 BPM | DIASTOLIC BLOOD PRESSURE: 78 MMHG | RESPIRATION RATE: 16 BRPM | OXYGEN SATURATION: 100 % | HEIGHT: 66 IN | BODY MASS INDEX: 19.29 KG/M2 | WEIGHT: 120 LBS | TEMPERATURE: 98 F

## 2023-11-12 DIAGNOSIS — R10.13 ABDOMINAL PAIN, EPIGASTRIC: Primary | ICD-10-CM

## 2023-11-12 LAB
ALBUMIN SERPL-MCNC: 3 G/DL (ref 3.4–5)
ALBUMIN/GLOB SERPL: 0.7 (ref 0.8–1.7)
ALP SERPL-CCNC: 104 U/L (ref 45–117)
ALT SERPL-CCNC: 21 U/L (ref 13–56)
ANION GAP SERPL CALC-SCNC: 8 MMOL/L (ref 3–18)
AST SERPL-CCNC: 20 U/L (ref 10–38)
BASOPHILS # BLD: 0 K/UL (ref 0–0.1)
BASOPHILS NFR BLD: 0 % (ref 0–2)
BILIRUB SERPL-MCNC: 0.4 MG/DL (ref 0.2–1)
BUN SERPL-MCNC: 16 MG/DL (ref 7–18)
BUN/CREAT SERPL: 24 (ref 12–20)
CALCIUM SERPL-MCNC: 9.4 MG/DL (ref 8.5–10.1)
CHLORIDE SERPL-SCNC: 104 MMOL/L (ref 100–111)
CO2 SERPL-SCNC: 26 MMOL/L (ref 21–32)
CREAT SERPL-MCNC: 0.67 MG/DL (ref 0.6–1.3)
DIFFERENTIAL METHOD BLD: ABNORMAL
EOSINOPHIL # BLD: 0 K/UL (ref 0–0.4)
EOSINOPHIL NFR BLD: 0 % (ref 0–5)
ERYTHROCYTE [DISTWIDTH] IN BLOOD BY AUTOMATED COUNT: 14.5 % (ref 11.6–14.5)
GLOBULIN SER CALC-MCNC: 4.1 G/DL (ref 2–4)
GLUCOSE SERPL-MCNC: 154 MG/DL (ref 74–99)
HCT VFR BLD AUTO: 26.5 % (ref 35–45)
HGB BLD-MCNC: 8.2 G/DL (ref 12–16)
IMM GRANULOCYTES # BLD AUTO: 0 K/UL (ref 0–0.04)
IMM GRANULOCYTES NFR BLD AUTO: 0 % (ref 0–0.5)
LYMPHOCYTES # BLD: 0.3 K/UL (ref 0.9–3.6)
LYMPHOCYTES NFR BLD: 3 % (ref 21–52)
MCH RBC QN AUTO: 22 PG (ref 24–34)
MCHC RBC AUTO-ENTMCNC: 30.9 G/DL (ref 31–37)
MCV RBC AUTO: 71 FL (ref 78–100)
MONOCYTES # BLD: 0.5 K/UL (ref 0.05–1.2)
MONOCYTES NFR BLD: 5 % (ref 3–10)
NEUTS SEG # BLD: 9.5 K/UL (ref 1.8–8)
NEUTS SEG NFR BLD: 92 % (ref 40–73)
NRBC # BLD: 0 K/UL (ref 0–0.01)
NRBC BLD-RTO: 0 PER 100 WBC
PLATELET # BLD AUTO: 552 K/UL (ref 135–420)
PMV BLD AUTO: 9.9 FL (ref 9.2–11.8)
POTASSIUM SERPL-SCNC: 3.2 MMOL/L (ref 3.5–5.5)
PROT SERPL-MCNC: 7.1 G/DL (ref 6.4–8.2)
RBC # BLD AUTO: 3.73 M/UL (ref 4.2–5.3)
SODIUM SERPL-SCNC: 138 MMOL/L (ref 136–145)
TROPONIN I SERPL HS-MCNC: 12 NG/L (ref 0–54)
TROPONIN I SERPL HS-MCNC: 12 NG/L (ref 0–54)
TROPONIN I SERPL HS-MCNC: 14 NG/L (ref 0–54)
WBC # BLD AUTO: 10.4 K/UL (ref 4.6–13.2)

## 2023-11-12 PROCEDURE — 80053 COMPREHEN METABOLIC PANEL: CPT

## 2023-11-12 PROCEDURE — 71275 CT ANGIOGRAPHY CHEST: CPT

## 2023-11-12 PROCEDURE — 96375 TX/PRO/DX INJ NEW DRUG ADDON: CPT

## 2023-11-12 PROCEDURE — 84484 ASSAY OF TROPONIN QUANT: CPT

## 2023-11-12 PROCEDURE — 6360000002 HC RX W HCPCS: Performed by: STUDENT IN AN ORGANIZED HEALTH CARE EDUCATION/TRAINING PROGRAM

## 2023-11-12 PROCEDURE — 94760 N-INVAS EAR/PLS OXIMETRY 1: CPT

## 2023-11-12 PROCEDURE — 6370000000 HC RX 637 (ALT 250 FOR IP): Performed by: STUDENT IN AN ORGANIZED HEALTH CARE EDUCATION/TRAINING PROGRAM

## 2023-11-12 PROCEDURE — 99285 EMERGENCY DEPT VISIT HI MDM: CPT

## 2023-11-12 PROCEDURE — 6360000004 HC RX CONTRAST MEDICATION: Performed by: STUDENT IN AN ORGANIZED HEALTH CARE EDUCATION/TRAINING PROGRAM

## 2023-11-12 PROCEDURE — 2500000003 HC RX 250 WO HCPCS: Performed by: STUDENT IN AN ORGANIZED HEALTH CARE EDUCATION/TRAINING PROGRAM

## 2023-11-12 PROCEDURE — 85025 COMPLETE CBC W/AUTO DIFF WBC: CPT

## 2023-11-12 PROCEDURE — 2580000003 HC RX 258: Performed by: STUDENT IN AN ORGANIZED HEALTH CARE EDUCATION/TRAINING PROGRAM

## 2023-11-12 PROCEDURE — 96374 THER/PROPH/DIAG INJ IV PUSH: CPT

## 2023-11-12 PROCEDURE — A4216 STERILE WATER/SALINE, 10 ML: HCPCS | Performed by: STUDENT IN AN ORGANIZED HEALTH CARE EDUCATION/TRAINING PROGRAM

## 2023-11-12 RX ORDER — PANTOPRAZOLE SODIUM 20 MG/1
20 TABLET, DELAYED RELEASE ORAL
Qty: 30 TABLET | Refills: 0 | Status: SHIPPED | OUTPATIENT
Start: 2023-11-12

## 2023-11-12 RX ORDER — ONDANSETRON 2 MG/ML
4 INJECTION INTRAMUSCULAR; INTRAVENOUS
Status: COMPLETED | OUTPATIENT
Start: 2023-11-12 | End: 2023-11-12

## 2023-11-12 RX ORDER — POTASSIUM CHLORIDE 20 MEQ/1
40 TABLET, EXTENDED RELEASE ORAL
Status: COMPLETED | OUTPATIENT
Start: 2023-11-12 | End: 2023-11-12

## 2023-11-12 RX ORDER — ONDANSETRON 4 MG/1
4 TABLET, ORALLY DISINTEGRATING ORAL 3 TIMES DAILY PRN
Qty: 21 TABLET | Refills: 0 | Status: SHIPPED | OUTPATIENT
Start: 2023-11-12

## 2023-11-12 RX ADMIN — ONDANSETRON HYDROCHLORIDE 4 MG: 2 INJECTION INTRAMUSCULAR; INTRAVENOUS at 15:40

## 2023-11-12 RX ADMIN — FAMOTIDINE 20 MG: 10 INJECTION, SOLUTION INTRAVENOUS at 15:40

## 2023-11-12 RX ADMIN — POTASSIUM CHLORIDE 40 MEQ: 20 TABLET, EXTENDED RELEASE ORAL at 15:40

## 2023-11-12 RX ADMIN — IOPAMIDOL 80 ML: 755 INJECTION, SOLUTION INTRAVENOUS at 15:27

## 2023-11-12 RX ADMIN — ALUMINUM HYDROXIDE AND MAGNESIUM HYDROXIDE 30 ML: 200; 200 SUSPENSION ORAL at 15:39

## 2023-11-12 ASSESSMENT — PAIN DESCRIPTION - LOCATION: LOCATION: CHEST

## 2023-11-12 ASSESSMENT — PAIN SCALES - GENERAL: PAINLEVEL_OUTOF10: 8

## 2023-11-12 ASSESSMENT — PAIN - FUNCTIONAL ASSESSMENT: PAIN_FUNCTIONAL_ASSESSMENT: 0-10

## 2023-11-12 ASSESSMENT — PAIN DESCRIPTION - DESCRIPTORS: DESCRIPTORS: ACHING

## 2023-11-12 NOTE — DISCHARGE INSTRUCTIONS
I suspect you likely have acid reflux and gastritis that may be causing the abdominal pain and chest pain. Your symptoms was improved with Maalox. You may obtain Maalox over-the-counter. I will prescribe you some Protonix to take at home. EKG did not show any signs of heart attack. Cardiac enzymes did not show any signs of heart attack. Your CT imaging did not show any signs of clot in the lungs.

## 2023-11-12 NOTE — ED NOTES
The following labs were labeled with appropriate pt sticker and tubed to lab:     [x] Blue     [x] Lavender   [] on ice  [x] Green/yellow  [x] Green/black [] on ice  [] [place]  [] on ice  [x] Yellow  [x] Red  [] Pink  [] Type/ Screen  [] ABG  [] VBG    [] COVID-19 swab    [] Rapid  [] PCR  [] Flu swab  [] Peds Viral Panel     [] Urine Sample  [] Fecal Sample  [] Pelvic Cultures  [] Blood Cultures  [] X 2  [] STREP Cultures       Raquel Quesada RN  11/12/23 1250

## 2023-11-12 NOTE — ED NOTES
[facility] EMERGENCY DEPT  EMERGENCY DEPARTMENT ENCOUNTER    Patient Name: Mic Millard  MRN: [id number]  YOB: 1945  Provider: Jose Murphy DO  PCP: Manuel Hernandez MD   Time/Date of evaluation: 2:23 PM EST on 11/12/23    History of Presenting Illness     History Provided by: Patient  History is limited by: Nothing     HISTORY:   Mic Millard is a 78 y.o. female history of closed left hip fracture. Recently discharged on 10/30/2023, hypertension, rheumatoid arthritis, peripheral vascular disease, AAA presented hospital today for evaluation of epigastric pain along with vomiting. Patient stated that she has chest pain in the lower chest area bilaterally. Son stated that patient has been having chest pain since her discharge. Patient had an episode of emesis today. Nonbilious nonbloody in nature. Patient denies any shortness of breath. Denies any leg swelling. Patient has been able to ambulate at home with a walker. Patient denies any fever denies any coughing. She has not taking anything for her chest pain at this time. No alleviation factor or aggravating factor at this chest pain. Nursing Notes were all reviewed and agreed with or any disagreements were addressed in the HPI.     Past History     PAST MEDICAL HISTORY:  Past Medical History:   Diagnosis Date    Arthritis     Asthma     seasonal     Diabetes ([address])     diet controlled    Hypertension     Macromastia     Hx of    Menopause     RA (rheumatoid arthritis) ([address])     TIA (transient ischemic attack) 01/2021    Ulcer of foot ([address])     Right foot       PAST SURGICAL HISTORY:  Past Surgical History:   Procedure Laterality Date    BREAST REDUCTION SURGERY Bilateral 9/2012    HYSTERECTOMY (CERVIX STATUS UNKNOWN)  1970    ORTHOPEDIC SURGERY  2009    Left hand surgery    OTHER SURGICAL HISTORY      left shoulder abcess drained    PARTIAL HYSTERECTOMY (CERVIX NOT REMOVED)      SHOULDER SURGERY Right     rotator cuff repair    TOTAL

## 2023-11-12 NOTE — ED NOTES
The following labs were labeled with appropriate pt sticker and tubed to lab:     [] Blue     [] Lavender   [] on ice  [x] Green/yellow  [] Green/black [] on ice  [] Rosendo Leech  [] on ice  [] Yellow  [] Red  [] Pink  [] Type/ Screen  [] ABG  [] VBG    [] COVID-19 swab    [] Rapid  [] PCR  [] Flu swab  [] Peds Viral Panel     [] Urine Sample  [] Fecal Sample  [] Pelvic Cultures  [] Blood Cultures  [] X 2  [] STREP Cultures       Gregg Edmondson RN  11/12/23 0059

## 2023-11-12 NOTE — ED NOTES
Patient noted stable and presenting in no acute distress. All discharge instructions and medication list reviewed (as required) with the patient. All questions and concerns were addressed at this time, and the patient expresses understanding. Patient released with vitals noted as recorded.         Onofre Carrero RN  11/12/23 [id number]

## 2023-11-16 NOTE — PROGRESS NOTES
Physician Progress Note      PATIENT:               Bright Rizo  CSN #:                  [id number]  :                       1945  ADMIT DATE:       10/30/2023 10:59 AM  DISCH DATE:        2023 7:34 PM  RESPONDING  PROVIDER #:        Jade Nicole MD          QUERY TEXT:    Pt admitted with closed left hip fracture. Pt noted to have osteoporosis. If   possible, please document in progress notes and discharge summary if you are   evaluating and/or treating any of the following: The medical record reflects the following:  Risk Factors: Advanced age, osteoporosis, falls    Clinical Indicators:  > Per Op note - \"Given her age and osteoporosis, we secured this with two   screws under fluoroscopic guidance. \"    Treatment: receiving Op- Left total hip replacement  Vitamin D prior to admission    Sameer Newman RN, BSN, [address] Ines James, [address] [place] [address] Matt [email or web address] ([employer])  Options provided:  -- Pathological left femoral neck fracture  -- Osteoporotic left femoral neck fracture  -- Osteoporotic left femoral neck fracture following fall which would not   usually break a normal, healthy bone  -- Traumatic left femoral neck fracture  -- Other - I will add my own diagnosis  -- Disagree - Not applicable / Not valid  -- Disagree - Clinically unable to determine / Unknown  -- Refer to Clinical Documentation Reviewer    PROVIDER RESPONSE TEXT:    This patient has an osteoporotic left femoral neck fracture. Query created by: Sameer Newman on 2023 8:23 AM      Electronically signed by:   Jade Nicole MD 2023 6:53 PM

## 2023-11-19 LAB
EKG ATRIAL RATE: 82 BPM
EKG DIAGNOSIS: NORMAL
EKG P AXIS: 71 DEGREES
EKG P-R INTERVAL: 162 MS
EKG Q-T INTERVAL: 398 MS
EKG QRS DURATION: 88 MS
EKG QTC CALCULATION (BAZETT): 464 MS
EKG R AXIS: 36 DEGREES
EKG T AXIS: 61 DEGREES
EKG VENTRICULAR RATE: 82 BPM

## 2024-01-01 ENCOUNTER — APPOINTMENT (OUTPATIENT)
Facility: HOSPITAL | Age: 79
DRG: 871 | End: 2024-01-01
Attending: STUDENT IN AN ORGANIZED HEALTH CARE EDUCATION/TRAINING PROGRAM
Payer: MEDICARE

## 2024-01-01 ENCOUNTER — APPOINTMENT (OUTPATIENT)
Facility: HOSPITAL | Age: 79
DRG: 871 | End: 2024-01-01
Payer: MEDICARE

## 2024-01-01 ENCOUNTER — HOSPITAL ENCOUNTER (INPATIENT)
Facility: HOSPITAL | Age: 79
LOS: 2 days | Discharge: HOSPICE | DRG: 871 | End: 2024-02-07
Attending: STUDENT IN AN ORGANIZED HEALTH CARE EDUCATION/TRAINING PROGRAM | Admitting: STUDENT IN AN ORGANIZED HEALTH CARE EDUCATION/TRAINING PROGRAM
Payer: MEDICARE

## 2024-01-01 ENCOUNTER — HOSPICE ADMISSION (OUTPATIENT)
Age: 79
End: 2024-01-01
Payer: MEDICARE

## 2024-01-01 ENCOUNTER — HOSPITAL ENCOUNTER (INPATIENT)
Facility: HOSPITAL | Age: 79
LOS: 3 days | End: 2024-02-10
Attending: FAMILY MEDICINE | Admitting: FAMILY MEDICINE
Payer: COMMERCIAL

## 2024-01-01 VITALS
SYSTOLIC BLOOD PRESSURE: 75 MMHG | TEMPERATURE: 97.4 F | OXYGEN SATURATION: 91 % | RESPIRATION RATE: 12 BRPM | HEART RATE: 96 BPM | DIASTOLIC BLOOD PRESSURE: 44 MMHG

## 2024-01-01 VITALS
HEIGHT: 65 IN | DIASTOLIC BLOOD PRESSURE: 88 MMHG | RESPIRATION RATE: 12 BRPM | BODY MASS INDEX: 14.66 KG/M2 | TEMPERATURE: 97 F | SYSTOLIC BLOOD PRESSURE: 111 MMHG | OXYGEN SATURATION: 97 % | HEART RATE: 83 BPM | WEIGHT: 88 LBS

## 2024-01-01 DIAGNOSIS — R65.20 SEVERE SEPSIS (HCC): ICD-10-CM

## 2024-01-01 DIAGNOSIS — R62.7 FAILURE TO THRIVE IN ADULT: ICD-10-CM

## 2024-01-01 DIAGNOSIS — A41.9 SEVERE SEPSIS (HCC): ICD-10-CM

## 2024-01-01 DIAGNOSIS — R79.89 ELEVATED TROPONIN I LEVEL: ICD-10-CM

## 2024-01-01 DIAGNOSIS — E16.2 HYPOGLYCEMIA: Primary | ICD-10-CM

## 2024-01-01 DIAGNOSIS — E87.20 LACTIC ACIDOSIS: ICD-10-CM

## 2024-01-01 DIAGNOSIS — N17.9 AKI (ACUTE KIDNEY INJURY) (HCC): ICD-10-CM

## 2024-01-01 LAB
ALBUMIN SERPL-MCNC: 2.3 G/DL (ref 3.5–5)
ALBUMIN SERPL-MCNC: 2.5 G/DL (ref 3.5–5)
ALBUMIN/GLOB SERPL: 0.6 (ref 1.1–2.2)
ALBUMIN/GLOB SERPL: 0.8 (ref 1.1–2.2)
ALP SERPL-CCNC: 128 U/L (ref 45–117)
ALP SERPL-CCNC: 200 U/L (ref 45–117)
ALT SERPL-CCNC: 50 U/L (ref 12–78)
ALT SERPL-CCNC: 66 U/L (ref 12–78)
ANION GAP BLD CALC-SCNC: 12 (ref 10–20)
ANION GAP SERPL CALC-SCNC: 10 MMOL/L (ref 5–15)
ANION GAP SERPL CALC-SCNC: 5 MMOL/L (ref 5–15)
ANION GAP SERPL CALC-SCNC: 6 MMOL/L (ref 5–15)
ANION GAP SERPL CALC-SCNC: 6 MMOL/L (ref 5–15)
ANION GAP SERPL CALC-SCNC: 7 MMOL/L (ref 5–15)
APPEARANCE UR: CLEAR
APTT PPP: 100.7 SEC (ref 22.1–31)
APTT PPP: 32.4 SEC (ref 22.1–31)
APTT PPP: >130 SEC (ref 22.1–31)
AST SERPL-CCNC: 44 U/L (ref 15–37)
AST SERPL-CCNC: 60 U/L (ref 15–37)
BACTERIA URNS QL MICRO: NEGATIVE /HPF
BASE EXCESS BLD CALC-SCNC: 0.3 MMOL/L
BASOPHILS # BLD: 0 K/UL (ref 0–0.1)
BASOPHILS NFR BLD: 0 % (ref 0–1)
BILIRUB DIRECT SERPL-MCNC: 0.3 MG/DL (ref 0–0.2)
BILIRUB SERPL-MCNC: 0.5 MG/DL (ref 0.2–1)
BILIRUB SERPL-MCNC: 0.9 MG/DL (ref 0.2–1)
BILIRUB UR QL: NEGATIVE
BUN SERPL-MCNC: 25 MG/DL (ref 6–20)
BUN SERPL-MCNC: 26 MG/DL (ref 6–20)
BUN SERPL-MCNC: 29 MG/DL (ref 6–20)
BUN SERPL-MCNC: 29 MG/DL (ref 6–20)
BUN SERPL-MCNC: 39 MG/DL (ref 6–20)
BUN/CREAT SERPL: 19 (ref 12–20)
BUN/CREAT SERPL: 20 (ref 12–20)
BUN/CREAT SERPL: 22 (ref 12–20)
C PEPTIDE SERPL-MCNC: 3.2 NG/ML (ref 1.1–4.4)
CA-I BLD-MCNC: 1.16 MMOL/L (ref 1.12–1.32)
CALCIUM SERPL-MCNC: 6.7 MG/DL (ref 8.5–10.1)
CALCIUM SERPL-MCNC: 7.3 MG/DL (ref 8.5–10.1)
CALCIUM SERPL-MCNC: 7.4 MG/DL (ref 8.5–10.1)
CALCIUM SERPL-MCNC: 7.4 MG/DL (ref 8.5–10.1)
CALCIUM SERPL-MCNC: 8.6 MG/DL (ref 8.5–10.1)
CHLORIDE BLD-SCNC: 115 MMOL/L (ref 100–108)
CHLORIDE SERPL-SCNC: 116 MMOL/L (ref 97–108)
CHLORIDE SERPL-SCNC: 118 MMOL/L (ref 97–108)
CHLORIDE SERPL-SCNC: 119 MMOL/L (ref 97–108)
CHLORIDE SERPL-SCNC: 119 MMOL/L (ref 97–108)
CHLORIDE SERPL-SCNC: 122 MMOL/L (ref 97–108)
CO2 BLD-SCNC: 27 MMOL/L (ref 19–24)
CO2 SERPL-SCNC: 23 MMOL/L (ref 21–32)
CO2 SERPL-SCNC: 24 MMOL/L (ref 21–32)
CO2 SERPL-SCNC: 26 MMOL/L (ref 21–32)
CO2 SERPL-SCNC: 26 MMOL/L (ref 21–32)
CO2 SERPL-SCNC: 28 MMOL/L (ref 21–32)
COLOR UR: ABNORMAL
COMMENT:: NORMAL
CREAT SERPL-MCNC: 1.26 MG/DL (ref 0.55–1.02)
CREAT SERPL-MCNC: 1.3 MG/DL (ref 0.55–1.02)
CREAT SERPL-MCNC: 1.44 MG/DL (ref 0.55–1.02)
CREAT SERPL-MCNC: 1.51 MG/DL (ref 0.55–1.02)
CREAT SERPL-MCNC: 1.81 MG/DL (ref 0.55–1.02)
CREAT UR-MCNC: 1.6 MG/DL (ref 0.6–1.3)
DIFFERENTIAL METHOD BLD: ABNORMAL
EKG ATRIAL RATE: 101 BPM
EKG DIAGNOSIS: NORMAL
EKG P AXIS: 73 DEGREES
EKG P-R INTERVAL: 148 MS
EKG Q-T INTERVAL: 380 MS
EKG QRS DURATION: 72 MS
EKG QTC CALCULATION (BAZETT): 492 MS
EKG R AXIS: 29 DEGREES
EKG T AXIS: -69 DEGREES
EKG VENTRICULAR RATE: 101 BPM
EOSINOPHIL # BLD: 0 K/UL (ref 0–0.4)
EOSINOPHIL NFR BLD: 0 % (ref 0–7)
EPITH CASTS URNS QL MICRO: ABNORMAL /LPF
ERYTHROCYTE [DISTWIDTH] IN BLOOD BY AUTOMATED COUNT: 17.2 % (ref 11.5–14.5)
ERYTHROCYTE [DISTWIDTH] IN BLOOD BY AUTOMATED COUNT: 17.6 % (ref 11.5–14.5)
EST. AVERAGE GLUCOSE BLD GHB EST-MCNC: ABNORMAL MG/DL
FLUAV AG NPH QL IA: NEGATIVE
FLUBV AG NOSE QL IA: NEGATIVE
GLOBULIN SER CALC-MCNC: 3 G/DL (ref 2–4)
GLOBULIN SER CALC-MCNC: 4.2 G/DL (ref 2–4)
GLUCOSE BLD STRIP.AUTO-MCNC: 127 MG/DL (ref 65–117)
GLUCOSE BLD STRIP.AUTO-MCNC: 147 MG/DL (ref 65–117)
GLUCOSE BLD STRIP.AUTO-MCNC: 165 MG/DL (ref 74–106)
GLUCOSE BLD STRIP.AUTO-MCNC: 17 MG/DL (ref 65–117)
GLUCOSE BLD STRIP.AUTO-MCNC: 23 MG/DL (ref 65–117)
GLUCOSE BLD STRIP.AUTO-MCNC: 31 MG/DL (ref 65–117)
GLUCOSE BLD STRIP.AUTO-MCNC: 318 MG/DL (ref 65–117)
GLUCOSE BLD STRIP.AUTO-MCNC: 340 MG/DL (ref 65–117)
GLUCOSE BLD STRIP.AUTO-MCNC: 44 MG/DL (ref 65–117)
GLUCOSE BLD STRIP.AUTO-MCNC: 55 MG/DL (ref 65–117)
GLUCOSE BLD STRIP.AUTO-MCNC: 96 MG/DL (ref 65–117)
GLUCOSE BLD STRIP.AUTO-MCNC: NORMAL MG/DL (ref 65–117)
GLUCOSE SERPL-MCNC: 136 MG/DL (ref 65–100)
GLUCOSE SERPL-MCNC: 139 MG/DL (ref 65–100)
GLUCOSE SERPL-MCNC: 144 MG/DL (ref 65–100)
GLUCOSE SERPL-MCNC: 178 MG/DL (ref 65–100)
GLUCOSE SERPL-MCNC: 98 MG/DL (ref 65–100)
GLUCOSE UR STRIP.AUTO-MCNC: >1000 MG/DL
HBA1C MFR BLD: <3.8 % (ref 4–5.6)
HCO3 BLDA-SCNC: 27 MMOL/L
HCT VFR BLD AUTO: 25.2 % (ref 35–47)
HCT VFR BLD AUTO: 30.1 % (ref 35–47)
HGB BLD-MCNC: 7.9 G/DL (ref 11.5–16)
HGB BLD-MCNC: 9.7 G/DL (ref 11.5–16)
HGB UR QL STRIP: ABNORMAL
HYALINE CASTS URNS QL MICRO: ABNORMAL /LPF (ref 0–2)
IMM GRANULOCYTES # BLD AUTO: 0 K/UL (ref 0–0.04)
IMM GRANULOCYTES NFR BLD AUTO: 0 % (ref 0–0.5)
INR PPP: 2.1 (ref 0.9–1.1)
INSULIN SERPL-ACNC: 4.7 UIU/ML (ref 2.6–24.9)
KETONES UR QL STRIP.AUTO: NEGATIVE MG/DL
LACTATE BLD-SCNC: 6.19 MMOL/L (ref 0.4–2)
LACTATE BLD-SCNC: 6.45 MMOL/L (ref 0.4–2)
LACTATE BLD-SCNC: 9.66 MMOL/L (ref 0.4–2)
LACTATE SERPL-SCNC: 4.7 MMOL/L (ref 0.4–2)
LACTATE SERPL-SCNC: 6.1 MMOL/L (ref 0.4–2)
LACTATE SERPL-SCNC: 6.7 MMOL/L (ref 0.4–2)
LEUKOCYTE ESTERASE UR QL STRIP.AUTO: NEGATIVE
LYMPHOCYTES # BLD: 0 K/UL (ref 0.8–3.5)
LYMPHOCYTES NFR BLD: 0 % (ref 12–49)
MAGNESIUM SERPL-MCNC: 1.8 MG/DL (ref 1.6–2.4)
MAGNESIUM SERPL-MCNC: 1.9 MG/DL (ref 1.6–2.4)
MAGNESIUM SERPL-MCNC: 2 MG/DL (ref 1.6–2.4)
MCH RBC QN AUTO: 22 PG (ref 26–34)
MCH RBC QN AUTO: 22.7 PG (ref 26–34)
MCHC RBC AUTO-ENTMCNC: 31.3 G/DL (ref 30–36.5)
MCHC RBC AUTO-ENTMCNC: 32.2 G/DL (ref 30–36.5)
MCV RBC AUTO: 70.2 FL (ref 80–99)
MCV RBC AUTO: 70.3 FL (ref 80–99)
MONOCYTES # BLD: 0.2 K/UL (ref 0–1)
MONOCYTES NFR BLD: 1 % (ref 5–13)
NEUTS BAND NFR BLD MANUAL: 2 %
NEUTS SEG # BLD: 17.8 K/UL (ref 1.8–8)
NEUTS SEG NFR BLD: 97 % (ref 32–75)
NITRITE UR QL STRIP.AUTO: NEGATIVE
NRBC # BLD: 0.5 K/UL (ref 0–0.01)
NRBC # BLD: 0.51 K/UL (ref 0–0.01)
NRBC BLD-RTO: 2.8 PER 100 WBC
NRBC BLD-RTO: 3 PER 100 WBC
PCO2 BLDV: 50.2 MMHG (ref 41–51)
PH BLDV: 7.34 (ref 7.32–7.42)
PH UR STRIP: 5.5 (ref 5–8)
PLATELET # BLD AUTO: 126 K/UL (ref 150–400)
PLATELET # BLD AUTO: 159 K/UL (ref 150–400)
PO2 BLDV: <27 MMHG (ref 25–40)
POTASSIUM BLD-SCNC: 3.4 MMOL/L (ref 3.5–5.5)
POTASSIUM SERPL-SCNC: 2.6 MMOL/L (ref 3.5–5.1)
POTASSIUM SERPL-SCNC: 2.7 MMOL/L (ref 3.5–5.1)
POTASSIUM SERPL-SCNC: 3.1 MMOL/L (ref 3.5–5.1)
POTASSIUM SERPL-SCNC: 3.2 MMOL/L (ref 3.5–5.1)
POTASSIUM SERPL-SCNC: 3.3 MMOL/L (ref 3.5–5.1)
PROCALCITONIN SERPL-MCNC: 0.58 NG/ML
PROT SERPL-MCNC: 5.3 G/DL (ref 6.4–8.2)
PROT SERPL-MCNC: 6.7 G/DL (ref 6.4–8.2)
PROT UR STRIP-MCNC: ABNORMAL MG/DL
PROTHROMBIN TIME: 21.2 SEC (ref 9–11.1)
RBC # BLD AUTO: 3.59 M/UL (ref 3.8–5.2)
RBC # BLD AUTO: 4.28 M/UL (ref 3.8–5.2)
RBC #/AREA URNS HPF: ABNORMAL /HPF (ref 0–5)
RBC MORPH BLD: ABNORMAL
SARS-COV-2 RDRP RESP QL NAA+PROBE: NOT DETECTED
SERVICE CMNT-IMP: ABNORMAL
SERVICE CMNT-IMP: NORMAL
SERVICE CMNT-IMP: NORMAL
SODIUM BLD-SCNC: 154 MMOL/L (ref 136–145)
SODIUM SERPL-SCNC: 148 MMOL/L (ref 136–145)
SODIUM SERPL-SCNC: 150 MMOL/L (ref 136–145)
SODIUM SERPL-SCNC: 152 MMOL/L (ref 136–145)
SODIUM SERPL-SCNC: 152 MMOL/L (ref 136–145)
SODIUM SERPL-SCNC: 153 MMOL/L (ref 136–145)
SOURCE: NORMAL
SP GR UR REFRACTOMETRY: 1.01
SPECIMEN HOLD: NORMAL
SPECIMEN SITE: ABNORMAL
THERAPEUTIC RANGE: ABNORMAL SECS (ref 58–77)
TROPONIN I SERPL HS-MCNC: 119 NG/L (ref 0–51)
TROPONIN I SERPL HS-MCNC: 124 NG/L (ref 0–51)
TROPONIN I SERPL HS-MCNC: 129 NG/L (ref 0–51)
TROPONIN I SERPL HS-MCNC: 213 NG/L (ref 0–51)
TSH SERPL DL<=0.05 MIU/L-ACNC: 1.17 UIU/ML (ref 0.36–3.74)
UFH PPP CHRO-ACNC: <0.1 IU/ML
URINE CULTURE IF INDICATED: ABNORMAL
UROBILINOGEN UR QL STRIP.AUTO: 0.2 EU/DL (ref 0.2–1)
VANCOMYCIN SERPL-MCNC: 6.9 UG/ML
WBC # BLD AUTO: 17.2 K/UL (ref 3.6–11)
WBC # BLD AUTO: 18 K/UL (ref 3.6–11)
WBC URNS QL MICRO: ABNORMAL /HPF (ref 0–4)

## 2024-01-01 PROCEDURE — 84145 PROCALCITONIN (PCT): CPT

## 2024-01-01 PROCEDURE — 6360000002 HC RX W HCPCS: Performed by: INTERNAL MEDICINE

## 2024-01-01 PROCEDURE — 1100000000 HC RM PRIVATE

## 2024-01-01 PROCEDURE — 80076 HEPATIC FUNCTION PANEL: CPT

## 2024-01-01 PROCEDURE — 0656 HSPC GENERAL INPATIENT

## 2024-01-01 PROCEDURE — 85027 COMPLETE CBC AUTOMATED: CPT

## 2024-01-01 PROCEDURE — 2580000003 HC RX 258: Performed by: STUDENT IN AN ORGANIZED HEALTH CARE EDUCATION/TRAINING PROGRAM

## 2024-01-01 PROCEDURE — 6360000002 HC RX W HCPCS: Performed by: FAMILY MEDICINE

## 2024-01-01 PROCEDURE — 2580000003 HC RX 258: Performed by: INTERNAL MEDICINE

## 2024-01-01 PROCEDURE — 74176 CT ABD & PELVIS W/O CONTRAST: CPT

## 2024-01-01 PROCEDURE — 2060000000 HC ICU INTERMEDIATE R&B

## 2024-01-01 PROCEDURE — 96365 THER/PROPH/DIAG IV INF INIT: CPT

## 2024-01-01 PROCEDURE — 94760 N-INVAS EAR/PLS OXIMETRY 1: CPT

## 2024-01-01 PROCEDURE — 85610 PROTHROMBIN TIME: CPT

## 2024-01-01 PROCEDURE — 6360000002 HC RX W HCPCS: Performed by: NURSE PRACTITIONER

## 2024-01-01 PROCEDURE — 70450 CT HEAD/BRAIN W/O DYE: CPT

## 2024-01-01 PROCEDURE — 85025 COMPLETE CBC W/AUTO DIFF WBC: CPT

## 2024-01-01 PROCEDURE — 96361 HYDRATE IV INFUSION ADD-ON: CPT

## 2024-01-01 PROCEDURE — 92610 EVALUATE SWALLOWING FUNCTION: CPT

## 2024-01-01 PROCEDURE — 36415 COLL VENOUS BLD VENIPUNCTURE: CPT

## 2024-01-01 PROCEDURE — 99285 EMERGENCY DEPT VISIT HI MDM: CPT

## 2024-01-01 PROCEDURE — 85520 HEPARIN ASSAY: CPT

## 2024-01-01 PROCEDURE — 84681 ASSAY OF C-PEPTIDE: CPT

## 2024-01-01 PROCEDURE — 83735 ASSAY OF MAGNESIUM: CPT

## 2024-01-01 PROCEDURE — 87635 SARS-COV-2 COVID-19 AMP PRB: CPT

## 2024-01-01 PROCEDURE — 82962 GLUCOSE BLOOD TEST: CPT

## 2024-01-01 PROCEDURE — 83605 ASSAY OF LACTIC ACID: CPT

## 2024-01-01 PROCEDURE — 85730 THROMBOPLASTIN TIME PARTIAL: CPT

## 2024-01-01 PROCEDURE — 71045 X-RAY EXAM CHEST 1 VIEW: CPT

## 2024-01-01 PROCEDURE — 81001 URINALYSIS AUTO W/SCOPE: CPT

## 2024-01-01 PROCEDURE — C9113 INJ PANTOPRAZOLE SODIUM, VIA: HCPCS | Performed by: INTERNAL MEDICINE

## 2024-01-01 PROCEDURE — 82330 ASSAY OF CALCIUM: CPT

## 2024-01-01 PROCEDURE — 2700000000 HC OXYGEN THERAPY PER DAY

## 2024-01-01 PROCEDURE — 6360000002 HC RX W HCPCS: Performed by: HOSPITALIST

## 2024-01-01 PROCEDURE — 6360000002 HC RX W HCPCS: Performed by: STUDENT IN AN ORGANIZED HEALTH CARE EDUCATION/TRAINING PROGRAM

## 2024-01-01 PROCEDURE — 84132 ASSAY OF SERUM POTASSIUM: CPT

## 2024-01-01 PROCEDURE — 51702 INSERT TEMP BLADDER CATH: CPT

## 2024-01-01 PROCEDURE — 2500000003 HC RX 250 WO HCPCS: Performed by: FAMILY MEDICINE

## 2024-01-01 PROCEDURE — 84295 ASSAY OF SERUM SODIUM: CPT

## 2024-01-01 PROCEDURE — 80048 BASIC METABOLIC PNL TOTAL CA: CPT

## 2024-01-01 PROCEDURE — 83036 HEMOGLOBIN GLYCOSYLATED A1C: CPT

## 2024-01-01 PROCEDURE — 2500000003 HC RX 250 WO HCPCS

## 2024-01-01 PROCEDURE — 80053 COMPREHEN METABOLIC PANEL: CPT

## 2024-01-01 PROCEDURE — 83525 ASSAY OF INSULIN: CPT

## 2024-01-01 PROCEDURE — A4216 STERILE WATER/SALINE, 10 ML: HCPCS | Performed by: INTERNAL MEDICINE

## 2024-01-01 PROCEDURE — 93005 ELECTROCARDIOGRAM TRACING: CPT | Performed by: STUDENT IN AN ORGANIZED HEALTH CARE EDUCATION/TRAINING PROGRAM

## 2024-01-01 PROCEDURE — 80202 ASSAY OF VANCOMYCIN: CPT

## 2024-01-01 PROCEDURE — 84484 ASSAY OF TROPONIN QUANT: CPT

## 2024-01-01 PROCEDURE — P9047 ALBUMIN (HUMAN), 25%, 50ML: HCPCS | Performed by: HOSPITALIST

## 2024-01-01 PROCEDURE — 82947 ASSAY GLUCOSE BLOOD QUANT: CPT

## 2024-01-01 PROCEDURE — 87040 BLOOD CULTURE FOR BACTERIA: CPT

## 2024-01-01 PROCEDURE — 82803 BLOOD GASES ANY COMBINATION: CPT

## 2024-01-01 PROCEDURE — 87804 INFLUENZA ASSAY W/OPTIC: CPT

## 2024-01-01 PROCEDURE — 2580000003 HC RX 258: Performed by: FAMILY MEDICINE

## 2024-01-01 PROCEDURE — 84443 ASSAY THYROID STIM HORMONE: CPT

## 2024-01-01 RX ORDER — HEPARIN SODIUM 1000 [USP'U]/ML
40 INJECTION, SOLUTION INTRAVENOUS; SUBCUTANEOUS PRN
Status: DISCONTINUED | OUTPATIENT
Start: 2024-01-01 | End: 2024-01-01

## 2024-01-01 RX ORDER — GLUCAGON 1 MG/ML
1 KIT INJECTION PRN
Status: DISCONTINUED | OUTPATIENT
Start: 2024-01-01 | End: 2024-01-01 | Stop reason: HOSPADM

## 2024-01-01 RX ORDER — ACETAMINOPHEN 500 MG
1000 TABLET ORAL 2 TIMES DAILY PRN
Status: DISCONTINUED | OUTPATIENT
Start: 2024-01-01 | End: 2024-01-01 | Stop reason: HOSPADM

## 2024-01-01 RX ORDER — DEXTROSE AND SODIUM CHLORIDE 5; .45 G/100ML; G/100ML
INJECTION, SOLUTION INTRAVENOUS CONTINUOUS
Status: DISCONTINUED | OUTPATIENT
Start: 2024-01-01 | End: 2024-01-01

## 2024-01-01 RX ORDER — MIDAZOLAM HYDROCHLORIDE 2 MG/2ML
2 INJECTION, SOLUTION INTRAMUSCULAR; INTRAVENOUS
Status: DISCONTINUED | OUTPATIENT
Start: 2024-01-01 | End: 2024-01-01 | Stop reason: HOSPADM

## 2024-01-01 RX ORDER — SODIUM CHLORIDE 9 MG/ML
INJECTION, SOLUTION INTRAVENOUS PRN
Status: DISCONTINUED | OUTPATIENT
Start: 2024-01-01 | End: 2024-01-01 | Stop reason: HOSPADM

## 2024-01-01 RX ORDER — INSULIN LISPRO 100 [IU]/ML
0-4 INJECTION, SOLUTION INTRAVENOUS; SUBCUTANEOUS NIGHTLY
Status: DISCONTINUED | OUTPATIENT
Start: 2024-01-01 | End: 2024-01-01 | Stop reason: HOSPADM

## 2024-01-01 RX ORDER — SODIUM CHLORIDE 0.9 % (FLUSH) 0.9 %
5-40 SYRINGE (ML) INJECTION EVERY 12 HOURS SCHEDULED
Status: DISCONTINUED | OUTPATIENT
Start: 2024-01-01 | End: 2024-01-01 | Stop reason: HOSPADM

## 2024-01-01 RX ORDER — HYDROMORPHONE HYDROCHLORIDE 1 MG/ML
1 INJECTION, SOLUTION INTRAMUSCULAR; INTRAVENOUS; SUBCUTANEOUS EVERY 4 HOURS
Status: DISCONTINUED | OUTPATIENT
Start: 2024-01-01 | End: 2024-01-01

## 2024-01-01 RX ORDER — ONDANSETRON 4 MG/1
4 TABLET, ORALLY DISINTEGRATING ORAL EVERY 8 HOURS PRN
Status: DISCONTINUED | OUTPATIENT
Start: 2024-01-01 | End: 2024-01-01 | Stop reason: HOSPADM

## 2024-01-01 RX ORDER — ATORVASTATIN CALCIUM 40 MG/1
80 TABLET, FILM COATED ORAL DAILY
Status: DISCONTINUED | OUTPATIENT
Start: 2024-01-01 | End: 2024-01-01 | Stop reason: HOSPADM

## 2024-01-01 RX ORDER — HYDROMORPHONE HYDROCHLORIDE 1 MG/ML
1 INJECTION, SOLUTION INTRAMUSCULAR; INTRAVENOUS; SUBCUTANEOUS
Status: DISCONTINUED | OUTPATIENT
Start: 2024-01-01 | End: 2024-01-01

## 2024-01-01 RX ORDER — HEPARIN SODIUM 1000 [USP'U]/ML
80 INJECTION, SOLUTION INTRAVENOUS; SUBCUTANEOUS PRN
Status: DISCONTINUED | OUTPATIENT
Start: 2024-01-01 | End: 2024-01-01

## 2024-01-01 RX ORDER — POTASSIUM CHLORIDE 7.45 MG/ML
10 INJECTION INTRAVENOUS
Status: COMPLETED | OUTPATIENT
Start: 2024-01-01 | End: 2024-01-01

## 2024-01-01 RX ORDER — 0.9 % SODIUM CHLORIDE 0.9 %
1000 INTRAVENOUS SOLUTION INTRAVENOUS ONCE
Status: COMPLETED | OUTPATIENT
Start: 2024-01-01 | End: 2024-01-01

## 2024-01-01 RX ORDER — SODIUM CHLORIDE, SODIUM LACTATE, POTASSIUM CHLORIDE, AND CALCIUM CHLORIDE .6; .31; .03; .02 G/100ML; G/100ML; G/100ML; G/100ML
500 INJECTION, SOLUTION INTRAVENOUS ONCE
Status: COMPLETED | OUTPATIENT
Start: 2024-01-01 | End: 2024-01-01

## 2024-01-01 RX ORDER — BISACODYL 10 MG
10 SUPPOSITORY, RECTAL RECTAL DAILY PRN
Status: DISCONTINUED | OUTPATIENT
Start: 2024-01-01 | End: 2024-01-01 | Stop reason: HOSPADM

## 2024-01-01 RX ORDER — DIAZEPAM 5 MG/ML
5 INJECTION, SOLUTION INTRAMUSCULAR; INTRAVENOUS EVERY 4 HOURS
Status: DISCONTINUED | OUTPATIENT
Start: 2024-01-01 | End: 2024-01-01 | Stop reason: HOSPADM

## 2024-01-01 RX ORDER — MIDODRINE HYDROCHLORIDE 5 MG/1
2.5 TABLET ORAL 3 TIMES DAILY PRN
Status: DISCONTINUED | OUTPATIENT
Start: 2024-01-01 | End: 2024-01-01 | Stop reason: HOSPADM

## 2024-01-01 RX ORDER — ENOXAPARIN SODIUM 100 MG/ML
1 INJECTION SUBCUTANEOUS 2 TIMES DAILY
Status: DISCONTINUED | OUTPATIENT
Start: 2024-01-01 | End: 2024-01-01

## 2024-01-01 RX ORDER — IPRATROPIUM BROMIDE 42 UG/1
1 SPRAY, METERED NASAL PRN
Status: DISCONTINUED | OUTPATIENT
Start: 2024-01-01 | End: 2024-01-01 | Stop reason: HOSPADM

## 2024-01-01 RX ORDER — INSULIN LISPRO 100 [IU]/ML
0-4 INJECTION, SOLUTION INTRAVENOUS; SUBCUTANEOUS
Status: DISCONTINUED | OUTPATIENT
Start: 2024-01-01 | End: 2024-01-01 | Stop reason: HOSPADM

## 2024-01-01 RX ORDER — HYDROMORPHONE HYDROCHLORIDE 2 MG/ML
2 INJECTION, SOLUTION INTRAMUSCULAR; INTRAVENOUS; SUBCUTANEOUS EVERY 4 HOURS
Status: DISCONTINUED | OUTPATIENT
Start: 2024-01-01 | End: 2024-01-01 | Stop reason: HOSPADM

## 2024-01-01 RX ORDER — PANTOPRAZOLE SODIUM 40 MG/1
40 TABLET, DELAYED RELEASE ORAL
Status: DISCONTINUED | OUTPATIENT
Start: 2024-01-01 | End: 2024-01-01

## 2024-01-01 RX ORDER — DIAZEPAM 5 MG/ML
5 INJECTION, SOLUTION INTRAMUSCULAR; INTRAVENOUS
Status: DISCONTINUED | OUTPATIENT
Start: 2024-01-01 | End: 2024-01-01 | Stop reason: HOSPADM

## 2024-01-01 RX ORDER — DEXTROSE MONOHYDRATE 100 MG/ML
INJECTION, SOLUTION INTRAVENOUS CONTINUOUS
Status: DISCONTINUED | OUTPATIENT
Start: 2024-01-01 | End: 2024-01-01

## 2024-01-01 RX ORDER — ACETAMINOPHEN 650 MG/1
650 SUPPOSITORY RECTAL EVERY 6 HOURS PRN
Status: DISCONTINUED | OUTPATIENT
Start: 2024-01-01 | End: 2024-01-01 | Stop reason: HOSPADM

## 2024-01-01 RX ORDER — ONDANSETRON 2 MG/ML
4 INJECTION INTRAMUSCULAR; INTRAVENOUS EVERY 8 HOURS PRN
Status: DISCONTINUED | OUTPATIENT
Start: 2024-01-01 | End: 2024-01-01 | Stop reason: HOSPADM

## 2024-01-01 RX ORDER — HYDROMORPHONE HYDROCHLORIDE 2 MG/ML
2 INJECTION, SOLUTION INTRAMUSCULAR; INTRAVENOUS; SUBCUTANEOUS
Status: DISCONTINUED | OUTPATIENT
Start: 2024-01-01 | End: 2024-01-01 | Stop reason: HOSPADM

## 2024-01-01 RX ORDER — HYDROMORPHONE HYDROCHLORIDE 1 MG/ML
0.5 INJECTION, SOLUTION INTRAMUSCULAR; INTRAVENOUS; SUBCUTANEOUS EVERY 4 HOURS
Status: DISCONTINUED | OUTPATIENT
Start: 2024-01-01 | End: 2024-01-01

## 2024-01-01 RX ORDER — HYDROMORPHONE HYDROCHLORIDE 1 MG/ML
0.5 INJECTION, SOLUTION INTRAMUSCULAR; INTRAVENOUS; SUBCUTANEOUS
Status: DISCONTINUED | OUTPATIENT
Start: 2024-01-01 | End: 2024-01-01

## 2024-01-01 RX ORDER — ENOXAPARIN SODIUM 100 MG/ML
1 INJECTION SUBCUTANEOUS DAILY
Status: DISCONTINUED | OUTPATIENT
Start: 2024-01-01 | End: 2024-01-01

## 2024-01-01 RX ORDER — ONDANSETRON 2 MG/ML
4 INJECTION INTRAMUSCULAR; INTRAVENOUS EVERY 6 HOURS PRN
Status: DISCONTINUED | OUTPATIENT
Start: 2024-01-01 | End: 2024-01-01 | Stop reason: HOSPADM

## 2024-01-01 RX ORDER — DEXTROSE MONOHYDRATE 50 MG/ML
INJECTION, SOLUTION INTRAVENOUS CONTINUOUS
Status: DISCONTINUED | OUTPATIENT
Start: 2024-01-01 | End: 2024-01-01

## 2024-01-01 RX ORDER — OXYBUTYNIN CHLORIDE 5 MG/1
5 TABLET ORAL 2 TIMES DAILY
Status: DISCONTINUED | OUTPATIENT
Start: 2024-01-01 | End: 2024-01-01 | Stop reason: HOSPADM

## 2024-01-01 RX ORDER — KETOROLAC TROMETHAMINE 30 MG/ML
30 INJECTION, SOLUTION INTRAMUSCULAR; INTRAVENOUS EVERY 6 HOURS PRN
Status: DISCONTINUED | OUTPATIENT
Start: 2024-01-01 | End: 2024-01-01 | Stop reason: HOSPADM

## 2024-01-01 RX ORDER — ACETAMINOPHEN 325 MG/1
650 TABLET ORAL EVERY 4 HOURS PRN
Status: DISCONTINUED | OUTPATIENT
Start: 2024-01-01 | End: 2024-01-01 | Stop reason: ALTCHOICE

## 2024-01-01 RX ORDER — ACETAMINOPHEN 325 MG/1
650 TABLET ORAL EVERY 6 HOURS PRN
Status: DISCONTINUED | OUTPATIENT
Start: 2024-01-01 | End: 2024-01-01 | Stop reason: HOSPADM

## 2024-01-01 RX ORDER — POLYETHYLENE GLYCOL 3350 17 G/17G
17 POWDER, FOR SOLUTION ORAL DAILY PRN
Status: DISCONTINUED | OUTPATIENT
Start: 2024-01-01 | End: 2024-01-01 | Stop reason: HOSPADM

## 2024-01-01 RX ORDER — MORPHINE SULFATE 2 MG/ML
1 INJECTION, SOLUTION INTRAMUSCULAR; INTRAVENOUS
Status: DISCONTINUED | OUTPATIENT
Start: 2024-01-01 | End: 2024-01-01 | Stop reason: HOSPADM

## 2024-01-01 RX ORDER — HEPARIN SODIUM 10000 [USP'U]/100ML
5-30 INJECTION, SOLUTION INTRAVENOUS CONTINUOUS
Status: DISCONTINUED | OUTPATIENT
Start: 2024-01-01 | End: 2024-01-01

## 2024-01-01 RX ORDER — ASPIRIN 81 MG/1
81 TABLET, CHEWABLE ORAL DAILY
Status: DISCONTINUED | OUTPATIENT
Start: 2024-01-01 | End: 2024-01-01 | Stop reason: HOSPADM

## 2024-01-01 RX ORDER — DEXTROSE MONOHYDRATE 100 MG/ML
INJECTION, SOLUTION INTRAVENOUS CONTINUOUS PRN
Status: DISCONTINUED | OUTPATIENT
Start: 2024-01-01 | End: 2024-01-01 | Stop reason: HOSPADM

## 2024-01-01 RX ORDER — DEXTROSE MONOHYDRATE 25 G/50ML
INJECTION, SOLUTION INTRAVENOUS
Status: COMPLETED
Start: 2024-01-01 | End: 2024-01-01

## 2024-01-01 RX ORDER — ALBUMIN (HUMAN) 12.5 G/50ML
25 SOLUTION INTRAVENOUS ONCE
Status: COMPLETED | OUTPATIENT
Start: 2024-01-01 | End: 2024-01-01

## 2024-01-01 RX ORDER — GLYCOPYRROLATE 0.2 MG/ML
0.2 INJECTION INTRAMUSCULAR; INTRAVENOUS EVERY 4 HOURS PRN
Status: DISCONTINUED | OUTPATIENT
Start: 2024-01-01 | End: 2024-01-01 | Stop reason: HOSPADM

## 2024-01-01 RX ORDER — 0.9 % SODIUM CHLORIDE 0.9 %
500 INTRAVENOUS SOLUTION INTRAVENOUS ONCE
Status: COMPLETED | OUTPATIENT
Start: 2024-01-01 | End: 2024-01-01

## 2024-01-01 RX ORDER — DIAZEPAM 5 MG/ML
5 INJECTION, SOLUTION INTRAMUSCULAR; INTRAVENOUS
Status: DISCONTINUED | OUTPATIENT
Start: 2024-01-01 | End: 2024-01-01

## 2024-01-01 RX ORDER — SODIUM CHLORIDE 0.9 % (FLUSH) 0.9 %
5-40 SYRINGE (ML) INJECTION PRN
Status: DISCONTINUED | OUTPATIENT
Start: 2024-01-01 | End: 2024-01-01 | Stop reason: HOSPADM

## 2024-01-01 RX ORDER — SODIUM CHLORIDE, SODIUM LACTATE, POTASSIUM CHLORIDE, AND CALCIUM CHLORIDE .6; .31; .03; .02 G/100ML; G/100ML; G/100ML; G/100ML
500 INJECTION, SOLUTION INTRAVENOUS ONCE
Status: DISCONTINUED | OUTPATIENT
Start: 2024-01-01 | End: 2024-01-01 | Stop reason: HOSPADM

## 2024-01-01 RX ADMIN — SODIUM CHLORIDE, PRESERVATIVE FREE 10 ML: 5 INJECTION INTRAVENOUS at 09:22

## 2024-01-01 RX ADMIN — HYDROMORPHONE HYDROCHLORIDE 0.5 MG: 1 INJECTION, SOLUTION INTRAMUSCULAR; INTRAVENOUS; SUBCUTANEOUS at 21:20

## 2024-01-01 RX ADMIN — HYDROMORPHONE HYDROCHLORIDE 2 MG: 2 INJECTION, SOLUTION INTRAMUSCULAR; INTRAVENOUS; SUBCUTANEOUS at 08:29

## 2024-01-01 RX ADMIN — HYDROMORPHONE HYDROCHLORIDE 0.5 MG: 1 INJECTION, SOLUTION INTRAMUSCULAR; INTRAVENOUS; SUBCUTANEOUS at 16:06

## 2024-01-01 RX ADMIN — SODIUM CHLORIDE, POTASSIUM CHLORIDE, SODIUM LACTATE AND CALCIUM CHLORIDE 500 ML: 600; 310; 30; 20 INJECTION, SOLUTION INTRAVENOUS at 18:45

## 2024-01-01 RX ADMIN — POTASSIUM CHLORIDE 10 MEQ: 10 INJECTION, SOLUTION INTRAVENOUS at 08:32

## 2024-01-01 RX ADMIN — DIAZEPAM 5 MG: 5 INJECTION, SOLUTION INTRAMUSCULAR; INTRAVENOUS at 09:15

## 2024-01-01 RX ADMIN — DIAZEPAM 5 MG: 5 INJECTION, SOLUTION INTRAMUSCULAR; INTRAVENOUS at 01:31

## 2024-01-01 RX ADMIN — DEXTROSE MONOHYDRATE 25 ML: 25 INJECTION, SOLUTION INTRAVENOUS at 11:00

## 2024-01-01 RX ADMIN — DIAZEPAM 5 MG: 5 INJECTION, SOLUTION INTRAMUSCULAR; INTRAVENOUS at 16:17

## 2024-01-01 RX ADMIN — HYDROMORPHONE HYDROCHLORIDE 0.5 MG: 1 INJECTION, SOLUTION INTRAMUSCULAR; INTRAVENOUS; SUBCUTANEOUS at 17:43

## 2024-01-01 RX ADMIN — DIAZEPAM 5 MG: 5 INJECTION, SOLUTION INTRAMUSCULAR; INTRAVENOUS at 05:48

## 2024-01-01 RX ADMIN — DEXTROSE MONOHYDRATE 250 ML: 100 INJECTION, SOLUTION INTRAVENOUS at 21:04

## 2024-01-01 RX ADMIN — DEXTROSE MONOHYDRATE: 100 INJECTION, SOLUTION INTRAVENOUS at 13:25

## 2024-01-01 RX ADMIN — PIPERACILLIN AND TAZOBACTAM 4500 MG: 4; .5 INJECTION, POWDER, FOR SOLUTION INTRAVENOUS at 16:23

## 2024-01-01 RX ADMIN — DIAZEPAM 5 MG: 5 INJECTION, SOLUTION INTRAMUSCULAR; INTRAVENOUS at 14:01

## 2024-01-01 RX ADMIN — POTASSIUM CHLORIDE 10 MEQ: 10 INJECTION, SOLUTION INTRAVENOUS at 05:09

## 2024-01-01 RX ADMIN — DEXTROSE MONOHYDRATE: 100 INJECTION, SOLUTION INTRAVENOUS at 21:46

## 2024-01-01 RX ADMIN — DEXTROSE MONOHYDRATE: 100 INJECTION, SOLUTION INTRAVENOUS at 02:17

## 2024-01-01 RX ADMIN — HYDROMORPHONE HYDROCHLORIDE 0.5 MG: 1 INJECTION, SOLUTION INTRAMUSCULAR; INTRAVENOUS; SUBCUTANEOUS at 09:15

## 2024-01-01 RX ADMIN — DIAZEPAM 5 MG: 5 INJECTION, SOLUTION INTRAMUSCULAR; INTRAVENOUS at 09:21

## 2024-01-01 RX ADMIN — DEXTROSE MONOHYDRATE 250 ML: 100 INJECTION, SOLUTION INTRAVENOUS at 21:20

## 2024-01-01 RX ADMIN — HYDROMORPHONE HYDROCHLORIDE 2 MG: 2 INJECTION, SOLUTION INTRAMUSCULAR; INTRAVENOUS; SUBCUTANEOUS at 16:51

## 2024-01-01 RX ADMIN — HYDROMORPHONE HYDROCHLORIDE 2 MG: 2 INJECTION, SOLUTION INTRAMUSCULAR; INTRAVENOUS; SUBCUTANEOUS at 04:29

## 2024-01-01 RX ADMIN — SODIUM CHLORIDE, PRESERVATIVE FREE 10 ML: 5 INJECTION INTRAVENOUS at 21:21

## 2024-01-01 RX ADMIN — HEPARIN SODIUM AND DEXTROSE 18 UNITS/KG/HR: 10000; 5 INJECTION INTRAVENOUS at 16:35

## 2024-01-01 RX ADMIN — MORPHINE SULFATE 1 MG: 2 INJECTION, SOLUTION INTRAMUSCULAR; INTRAVENOUS at 16:35

## 2024-01-01 RX ADMIN — DIAZEPAM 5 MG: 5 INJECTION, SOLUTION INTRAMUSCULAR; INTRAVENOUS at 21:20

## 2024-01-01 RX ADMIN — PIPERACILLIN AND TAZOBACTAM 3375 MG: 3; .375 INJECTION, POWDER, FOR SOLUTION INTRAVENOUS; PARENTERAL at 01:07

## 2024-01-01 RX ADMIN — HYDROMORPHONE HYDROCHLORIDE 1 MG: 1 INJECTION, SOLUTION INTRAMUSCULAR; INTRAVENOUS; SUBCUTANEOUS at 12:16

## 2024-01-01 RX ADMIN — HYDROMORPHONE HYDROCHLORIDE 2 MG: 2 INJECTION, SOLUTION INTRAMUSCULAR; INTRAVENOUS; SUBCUTANEOUS at 21:06

## 2024-01-01 RX ADMIN — POTASSIUM CHLORIDE 10 MEQ: 10 INJECTION, SOLUTION INTRAVENOUS at 06:08

## 2024-01-01 RX ADMIN — POTASSIUM CHLORIDE 10 MEQ: 10 INJECTION, SOLUTION INTRAVENOUS at 22:40

## 2024-01-01 RX ADMIN — POTASSIUM CHLORIDE 10 MEQ: 10 INJECTION, SOLUTION INTRAVENOUS at 19:12

## 2024-01-01 RX ADMIN — HEPARIN SODIUM AND DEXTROSE 12 UNITS/KG/HR: 10000; 5 INJECTION INTRAVENOUS at 10:11

## 2024-01-01 RX ADMIN — DIAZEPAM 5 MG: 5 INJECTION, SOLUTION INTRAMUSCULAR; INTRAVENOUS at 04:32

## 2024-01-01 RX ADMIN — HYDROMORPHONE HYDROCHLORIDE 1 MG: 1 INJECTION, SOLUTION INTRAMUSCULAR; INTRAVENOUS; SUBCUTANEOUS at 16:17

## 2024-01-01 RX ADMIN — MORPHINE SULFATE 1 MG: 2 INJECTION, SOLUTION INTRAMUSCULAR; INTRAVENOUS at 00:33

## 2024-01-01 RX ADMIN — PIPERACILLIN AND TAZOBACTAM 3375 MG: 3; .375 INJECTION, POWDER, FOR SOLUTION INTRAVENOUS; PARENTERAL at 09:05

## 2024-01-01 RX ADMIN — SODIUM CHLORIDE 500 ML: 9 INJECTION, SOLUTION INTRAVENOUS at 11:41

## 2024-01-01 RX ADMIN — DEXTROSE MONOHYDRATE: 25 INJECTION, SOLUTION INTRAVENOUS at 11:35

## 2024-01-01 RX ADMIN — MORPHINE SULFATE 1 MG: 2 INJECTION, SOLUTION INTRAMUSCULAR; INTRAVENOUS at 11:22

## 2024-01-01 RX ADMIN — DIAZEPAM 5 MG: 5 INJECTION, SOLUTION INTRAMUSCULAR; INTRAVENOUS at 12:07

## 2024-01-01 RX ADMIN — SODIUM CHLORIDE 1000 ML: 9 INJECTION, SOLUTION INTRAVENOUS at 12:25

## 2024-01-01 RX ADMIN — SODIUM CHLORIDE, PRESERVATIVE FREE 10 ML: 5 INJECTION INTRAVENOUS at 21:38

## 2024-01-01 RX ADMIN — DIAZEPAM 5 MG: 5 INJECTION, SOLUTION INTRAMUSCULAR; INTRAVENOUS at 12:17

## 2024-01-01 RX ADMIN — HYDROMORPHONE HYDROCHLORIDE 2 MG: 2 INJECTION INTRAMUSCULAR; INTRAVENOUS; SUBCUTANEOUS at 17:26

## 2024-01-01 RX ADMIN — POTASSIUM CHLORIDE 10 MEQ: 10 INJECTION, SOLUTION INTRAVENOUS at 20:05

## 2024-01-01 RX ADMIN — DEXTROSE AND SODIUM CHLORIDE 1000 ML: 5; 900 INJECTION, SOLUTION INTRAVENOUS at 11:46

## 2024-01-01 RX ADMIN — HYDROMORPHONE HYDROCHLORIDE 2 MG: 2 INJECTION, SOLUTION INTRAMUSCULAR; INTRAVENOUS; SUBCUTANEOUS at 09:21

## 2024-01-01 RX ADMIN — SODIUM CHLORIDE 1000 MG: 900 INJECTION INTRAVENOUS at 11:39

## 2024-01-01 RX ADMIN — DIAZEPAM 5 MG: 5 INJECTION, SOLUTION INTRAMUSCULAR; INTRAVENOUS at 21:06

## 2024-01-01 RX ADMIN — DIAZEPAM 5 MG: 5 INJECTION, SOLUTION INTRAMUSCULAR; INTRAVENOUS at 16:06

## 2024-01-01 RX ADMIN — HYDROMORPHONE HYDROCHLORIDE 2 MG: 2 INJECTION, SOLUTION INTRAMUSCULAR; INTRAVENOUS; SUBCUTANEOUS at 05:49

## 2024-01-01 RX ADMIN — HYDROMORPHONE HYDROCHLORIDE 2 MG: 2 INJECTION, SOLUTION INTRAMUSCULAR; INTRAVENOUS; SUBCUTANEOUS at 00:08

## 2024-01-01 RX ADMIN — HYDROMORPHONE HYDROCHLORIDE 2 MG: 2 INJECTION, SOLUTION INTRAMUSCULAR; INTRAVENOUS; SUBCUTANEOUS at 01:31

## 2024-01-01 RX ADMIN — DIAZEPAM 5 MG: 5 INJECTION, SOLUTION INTRAMUSCULAR; INTRAVENOUS at 16:51

## 2024-01-01 RX ADMIN — HYDROMORPHONE HYDROCHLORIDE 0.5 MG: 1 INJECTION, SOLUTION INTRAMUSCULAR; INTRAVENOUS; SUBCUTANEOUS at 04:32

## 2024-01-01 RX ADMIN — DIAZEPAM 5 MG: 5 INJECTION, SOLUTION INTRAMUSCULAR; INTRAVENOUS at 08:29

## 2024-01-01 RX ADMIN — MORPHINE SULFATE 1 MG: 2 INJECTION, SOLUTION INTRAMUSCULAR; INTRAVENOUS at 18:31

## 2024-01-01 RX ADMIN — HYDROMORPHONE HYDROCHLORIDE 2 MG: 2 INJECTION, SOLUTION INTRAMUSCULAR; INTRAVENOUS; SUBCUTANEOUS at 12:06

## 2024-01-01 RX ADMIN — DEXTROSE AND SODIUM CHLORIDE: 5; 450 INJECTION, SOLUTION INTRAVENOUS at 16:20

## 2024-01-01 RX ADMIN — DEXTROSE MONOHYDRATE 125 ML: 100 INJECTION, SOLUTION INTRAVENOUS at 21:39

## 2024-01-01 RX ADMIN — PANTOPRAZOLE SODIUM 40 MG: 40 INJECTION, POWDER, FOR SOLUTION INTRAVENOUS at 08:50

## 2024-01-01 RX ADMIN — DIAZEPAM 5 MG: 5 INJECTION, SOLUTION INTRAMUSCULAR; INTRAVENOUS at 04:28

## 2024-01-01 RX ADMIN — DIAZEPAM 5 MG: 5 INJECTION, SOLUTION INTRAMUSCULAR; INTRAVENOUS at 17:44

## 2024-01-01 RX ADMIN — VANCOMYCIN HYDROCHLORIDE 750 MG: 750 INJECTION, POWDER, LYOPHILIZED, FOR SOLUTION INTRAVENOUS at 22:21

## 2024-01-01 RX ADMIN — ALBUMIN (HUMAN) 25 G: 0.25 INJECTION, SOLUTION INTRAVENOUS at 00:34

## 2024-01-01 RX ADMIN — VANCOMYCIN HYDROCHLORIDE 750 MG: 750 INJECTION, POWDER, LYOPHILIZED, FOR SOLUTION INTRAVENOUS at 15:52

## 2024-01-01 RX ADMIN — DIAZEPAM 5 MG: 5 INJECTION, SOLUTION INTRAMUSCULAR; INTRAVENOUS at 00:08

## 2024-01-01 RX ADMIN — PANTOPRAZOLE SODIUM 40 MG: 40 INJECTION, POWDER, FOR SOLUTION INTRAVENOUS at 12:27

## 2024-01-01 RX ADMIN — HYDROMORPHONE HYDROCHLORIDE 2 MG: 2 INJECTION, SOLUTION INTRAMUSCULAR; INTRAVENOUS; SUBCUTANEOUS at 21:07

## 2024-01-01 ASSESSMENT — PAIN DESCRIPTION - LOCATION
LOCATION: GENERALIZED

## 2024-01-01 ASSESSMENT — PAIN SCALES - GENERAL
PAINLEVEL_OUTOF10: 10
PAINLEVEL_OUTOF10: 0
PAINLEVEL_OUTOF10: 3
PAINLEVEL_OUTOF10: 10
PAINLEVEL_OUTOF10: 0
PAINLEVEL_OUTOF10: 0
PAINLEVEL_OUTOF10: 8
PAINLEVEL_OUTOF10: 0
PAINLEVEL_OUTOF10: 10

## 2024-01-01 ASSESSMENT — PAIN - FUNCTIONAL ASSESSMENT: PAIN_FUNCTIONAL_ASSESSMENT: 0-10

## 2024-01-24 ENCOUNTER — APPOINTMENT (OUTPATIENT)
Facility: HOSPITAL | Age: 79
End: 2024-01-24
Payer: MEDICARE

## 2024-01-24 ENCOUNTER — HOSPITAL ENCOUNTER (EMERGENCY)
Facility: HOSPITAL | Age: 79
Discharge: HOME | End: 2024-01-25
Attending: STUDENT IN AN ORGANIZED HEALTH CARE EDUCATION/TRAINING PROGRAM
Payer: MEDICARE

## 2024-01-24 DIAGNOSIS — E86.0 DEHYDRATION: ICD-10-CM

## 2024-01-24 DIAGNOSIS — D72.829 LEUKOCYTOSIS, UNSPECIFIED TYPE: ICD-10-CM

## 2024-01-24 DIAGNOSIS — R53.1 GENERALIZED WEAKNESS: Primary | ICD-10-CM

## 2024-01-24 DIAGNOSIS — R65.10 SIRS (SYSTEMIC INFLAMMATORY RESPONSE SYNDROME) (HCC): ICD-10-CM

## 2024-01-24 PROCEDURE — 87636 SARSCOV2 & INF A&B AMP PRB: CPT

## 2024-01-24 PROCEDURE — 85025 COMPLETE CBC W/AUTO DIFF WBC: CPT

## 2024-01-24 PROCEDURE — 83735 ASSAY OF MAGNESIUM: CPT

## 2024-01-24 PROCEDURE — 80053 COMPREHEN METABOLIC PANEL: CPT

## 2024-01-24 PROCEDURE — 83690 ASSAY OF LIPASE: CPT

## 2024-01-24 PROCEDURE — 71045 X-RAY EXAM CHEST 1 VIEW: CPT

## 2024-01-24 PROCEDURE — 99285 EMERGENCY DEPT VISIT HI MDM: CPT

## 2024-01-24 PROCEDURE — 84100 ASSAY OF PHOSPHORUS: CPT

## 2024-01-24 PROCEDURE — 84484 ASSAY OF TROPONIN QUANT: CPT

## 2024-01-24 RX ORDER — ONDANSETRON 2 MG/ML
4 INJECTION INTRAMUSCULAR; INTRAVENOUS
Status: COMPLETED | OUTPATIENT
Start: 2024-01-24 | End: 2024-01-25

## 2024-01-24 RX ORDER — 0.9 % SODIUM CHLORIDE 0.9 %
1000 INTRAVENOUS SOLUTION INTRAVENOUS ONCE
Status: COMPLETED | OUTPATIENT
Start: 2024-01-24 | End: 2024-01-25

## 2024-01-24 ASSESSMENT — ENCOUNTER SYMPTOMS
SHORTNESS OF BREATH: 0
BACK PAIN: 0
ABDOMINAL PAIN: 1
NAUSEA: 1
DIARRHEA: 0

## 2024-01-24 ASSESSMENT — PAIN - FUNCTIONAL ASSESSMENT: PAIN_FUNCTIONAL_ASSESSMENT: NONE - DENIES PAIN

## 2024-01-25 ENCOUNTER — APPOINTMENT (OUTPATIENT)
Facility: HOSPITAL | Age: 79
End: 2024-01-25
Payer: MEDICARE

## 2024-01-25 VITALS
OXYGEN SATURATION: 100 % | WEIGHT: 80 LBS | RESPIRATION RATE: 11 BRPM | DIASTOLIC BLOOD PRESSURE: 81 MMHG | HEART RATE: 98 BPM | TEMPERATURE: 98.5 F | HEIGHT: 65 IN | SYSTOLIC BLOOD PRESSURE: 171 MMHG | BODY MASS INDEX: 13.33 KG/M2

## 2024-01-25 LAB
ALBUMIN SERPL-MCNC: 2.7 G/DL (ref 3.4–5)
ALBUMIN/GLOB SERPL: 0.6 (ref 0.8–1.7)
ALP SERPL-CCNC: 195 U/L (ref 45–117)
ALT SERPL-CCNC: 46 U/L (ref 13–56)
ANION GAP SERPL CALC-SCNC: 6 MMOL/L (ref 3–18)
APPEARANCE UR: CLEAR
AST SERPL-CCNC: 50 U/L (ref 10–38)
BACTERIA URNS QL MICRO: NEGATIVE /HPF
BASOPHILS # BLD: 0 K/UL (ref 0–0.1)
BASOPHILS NFR BLD: 0 % (ref 0–2)
BILIRUB SERPL-MCNC: 0.5 MG/DL (ref 0.2–1)
BILIRUB UR QL: NEGATIVE
BUN SERPL-MCNC: 26 MG/DL (ref 7–18)
BUN/CREAT SERPL: 21 (ref 12–20)
CALCIUM SERPL-MCNC: 8.9 MG/DL (ref 8.5–10.1)
CHLORIDE SERPL-SCNC: 113 MMOL/L (ref 100–111)
CO2 SERPL-SCNC: 26 MMOL/L (ref 21–32)
COLOR UR: YELLOW
CREAT SERPL-MCNC: 1.22 MG/DL (ref 0.6–1.3)
DIFFERENTIAL METHOD BLD: ABNORMAL
EOSINOPHIL # BLD: 0 K/UL (ref 0–0.4)
EOSINOPHIL NFR BLD: 0 % (ref 0–5)
EPITH CASTS URNS QL MICRO: NORMAL /LPF (ref 0–5)
ERYTHROCYTE [DISTWIDTH] IN BLOOD BY AUTOMATED COUNT: 16 % (ref 11.6–14.5)
FLUAV RNA SPEC QL NAA+PROBE: NOT DETECTED
FLUBV RNA SPEC QL NAA+PROBE: NOT DETECTED
GLOBULIN SER CALC-MCNC: 4.3 G/DL (ref 2–4)
GLUCOSE SERPL-MCNC: 113 MG/DL (ref 74–99)
GLUCOSE UR STRIP.AUTO-MCNC: NEGATIVE MG/DL
HCT VFR BLD AUTO: 31.5 % (ref 35–45)
HGB BLD-MCNC: 10.4 G/DL (ref 12–16)
HGB UR QL STRIP: NEGATIVE
IMM GRANULOCYTES # BLD AUTO: 0.1 K/UL (ref 0–0.04)
IMM GRANULOCYTES NFR BLD AUTO: 1 % (ref 0–0.5)
KETONES UR QL STRIP.AUTO: NEGATIVE MG/DL
LACTATE BLD-SCNC: 1.56 MMOL/L (ref 0.4–2)
LEUKOCYTE ESTERASE UR QL STRIP.AUTO: NEGATIVE
LIPASE SERPL-CCNC: 38 U/L (ref 13–75)
LYMPHOCYTES # BLD: 0.6 K/UL (ref 0.9–3.6)
LYMPHOCYTES NFR BLD: 4 % (ref 21–52)
MAGNESIUM SERPL-MCNC: 2.5 MG/DL (ref 1.6–2.6)
MCH RBC QN AUTO: 22.5 PG (ref 24–34)
MCHC RBC AUTO-ENTMCNC: 33 G/DL (ref 31–37)
MCV RBC AUTO: 68.2 FL (ref 78–100)
MONOCYTES # BLD: 0.5 K/UL (ref 0.05–1.2)
MONOCYTES NFR BLD: 3 % (ref 3–10)
NEUTS SEG # BLD: 13.8 K/UL (ref 1.8–8)
NEUTS SEG NFR BLD: 92 % (ref 40–73)
NITRITE UR QL STRIP.AUTO: NEGATIVE
NRBC # BLD: 0.02 K/UL (ref 0–0.01)
NRBC BLD-RTO: 0.1 PER 100 WBC
PH UR STRIP: 5.5 (ref 5–8)
PHOSPHATE SERPL-MCNC: 3.1 MG/DL (ref 2.5–4.9)
PLATELET # BLD AUTO: 367 K/UL (ref 135–420)
PMV BLD AUTO: 10.8 FL (ref 9.2–11.8)
POTASSIUM SERPL-SCNC: 3.6 MMOL/L (ref 3.5–5.5)
PROT SERPL-MCNC: 7 G/DL (ref 6.4–8.2)
PROT UR STRIP-MCNC: ABNORMAL MG/DL
RBC # BLD AUTO: 4.62 M/UL (ref 4.2–5.3)
RBC #/AREA URNS HPF: NEGATIVE /HPF (ref 0–5)
SARS-COV-2 RNA RESP QL NAA+PROBE: NOT DETECTED
SODIUM SERPL-SCNC: 145 MMOL/L (ref 136–145)
SP GR UR REFRACTOMETRY: >1.03 (ref 1–1.03)
TROPONIN I SERPL HS-MCNC: 44 NG/L (ref 0–54)
UROBILINOGEN UR QL STRIP.AUTO: 0.2 EU/DL (ref 0.2–1)
WBC # BLD AUTO: 15 K/UL (ref 4.6–13.2)
WBC URNS QL MICRO: NEGATIVE /HPF (ref 0–5)

## 2024-01-25 PROCEDURE — 74174 CTA ABD&PLVS W/CONTRAST: CPT

## 2024-01-25 PROCEDURE — 83605 ASSAY OF LACTIC ACID: CPT

## 2024-01-25 PROCEDURE — 96375 TX/PRO/DX INJ NEW DRUG ADDON: CPT

## 2024-01-25 PROCEDURE — 96367 TX/PROPH/DG ADDL SEQ IV INF: CPT

## 2024-01-25 PROCEDURE — 6360000004 HC RX CONTRAST MEDICATION: Performed by: STUDENT IN AN ORGANIZED HEALTH CARE EDUCATION/TRAINING PROGRAM

## 2024-01-25 PROCEDURE — 96365 THER/PROPH/DIAG IV INF INIT: CPT

## 2024-01-25 PROCEDURE — 81001 URINALYSIS AUTO W/SCOPE: CPT

## 2024-01-25 PROCEDURE — 2580000003 HC RX 258: Performed by: STUDENT IN AN ORGANIZED HEALTH CARE EDUCATION/TRAINING PROGRAM

## 2024-01-25 PROCEDURE — 6360000002 HC RX W HCPCS: Performed by: STUDENT IN AN ORGANIZED HEALTH CARE EDUCATION/TRAINING PROGRAM

## 2024-01-25 PROCEDURE — 87040 BLOOD CULTURE FOR BACTERIA: CPT

## 2024-01-25 RX ADMIN — IOPAMIDOL 75 ML: 755 INJECTION, SOLUTION INTRAVENOUS at 01:16

## 2024-01-25 RX ADMIN — ONDANSETRON 4 MG: 2 INJECTION INTRAMUSCULAR; INTRAVENOUS at 00:18

## 2024-01-25 RX ADMIN — SODIUM CHLORIDE 1000 ML: 9 INJECTION, SOLUTION INTRAVENOUS at 00:17

## 2024-01-25 RX ADMIN — CEFEPIME 2000 MG: 2 INJECTION, POWDER, FOR SOLUTION INTRAVENOUS at 01:39

## 2024-01-25 RX ADMIN — VANCOMYCIN HYDROCHLORIDE 1000 MG: 1 INJECTION, POWDER, LYOPHILIZED, FOR SOLUTION INTRAVENOUS at 02:53

## 2024-01-25 NOTE — DISCHARGE INSTRUCTIONS
You were evaluated for generalized weaknes.  Based on your work-up it was deemed that she was stable for discharge. Please follow-up with your primary care physician if you have any further concerns and go over your work-up.  If you experience any chest pain, shortness of breath, worsening abdominal pain, vomiting blood, worsening headache, seizures, or any worsening of your symptoms please return to the emergency department immediately.  If you have any pending results or any further questions please contact the emergency department at [phone]

## 2024-01-25 NOTE — ED PROVIDER NOTES
EMERGENCY DEPARTMENT HISTORY AND PHYSICAL EXAM    3:22 PM      Date: 1/24/2024  Patient Name: Ruth Medina    History of Presenting Illness     Chief Complaint   Patient presents with    Extremity Weakness     That started about an hour and a half ago       History From: Patient and Patient's Son  HPI  78-year-old female with history of close hip fracture status post hip arthroplasty, AAA, hypertension, rheumatoid arthritis, peripheral vascular disease, TIA, diabetic foot ulcer, rheumatoid arthritis who presents with generalized weakness.  As per son patient was using the restroom.  Became generally weak.  Patient's son took patient to the bedroom.  Had an episode of staring spell.  No loss of consciousness.  Patient feeling better now.  They deny changes in meds, sick contacts, or any other changes.  When assessing ROS she endorses generalized abdominal pain, mild nausea, however no shortness of breath, changes in bowel movement, rashes, or any other changes.     Nursing Notes were all reviewed and agreed with or any disagreements were addressed in the HPI.    PCP: Romain Jackson MD    No current facility-administered medications for this encounter.     Current Outpatient Medications   Medication Sig Dispense Refill    pantoprazole (PROTONIX) 20 MG tablet Take 1 tablet by mouth every morning (before breakfast) 30 tablet 0    ondansetron (ZOFRAN-ODT) 4 MG disintegrating tablet Take 1 tablet by mouth 3 times daily as needed for Nausea or Vomiting 21 tablet 0    vitamin B-12 (CYANOCOBALAMIN) 100 MCG tablet Take 0.5 tablets by mouth daily      acetaminophen (TYLENOL) 500 MG tablet Take 2 tablets by mouth 2 times daily as needed      amLODIPine (NORVASC) 5 MG tablet 1 tablet      ascorbic acid (VITAMIN C) 250 MG tablet Take by mouth daily      atorvastatin (LIPITOR) 20 MG tablet Take 4 tablets by mouth      cetirizine (ZYRTEC) 10 MG tablet cetirizine 10 mg tablet   TK 1 T PO QD UTD FOR 30 DAYS      vitamin D 25 MCG  [address] [facility]  [address]  \[facility]\"" [id number] [phone]    Go to   As needed, If symptoms worsen       Disclaimer: Sections of this note are dictated using utilizing voice recognition software.  Minor typographical errors may be present. If questions arise, please do not hesitate to contact me or call our department.          Arun Arthur MD  01/26/24 3239

## 2024-01-25 NOTE — ED TRIAGE NOTES
Patient presents via EMS with son with c/o weakness that started about an hour and a half ago. Patient son states patient went to the bathroom and it was hard for her to get back into the bed. Patient normally has weakness but the weakness is worse. Son states she is normally able to assist herself to the bathroom with her assistive device. Patient denies pain, n/v/d.

## 2024-02-05 PROBLEM — E16.2 HYPOGLYCEMIA: Status: ACTIVE | Noted: 2024-01-01

## 2024-02-05 NOTE — ED PROVIDER NOTES
affecting Dx or Tx: Patient lacks support at home or lives alone.    Records Reviewed (source and summary of external notes): Nursing Notes and Old Medical Records    CC/HPI Summary, DDx, ED Course, and Reassessment:   Mdm  78-year-old female presents for altered mental status, poor p.o. intake for the past few days.  Initial blood sugar here is low, IV access obtained upon arrival.  D10 bolus of 250 mL given upon arrival.  Will also give half of a D50 amp.  Will save the other 25 mL if she requires further IV dextrose.  Will start her on IV fluids, obtain blood work, sepsis labs, lactate, chest x-ray.  Will keep her on telemetry monitor, pulse oximetry, nasal cannula for supplementation.  Mental status began to improve after IV dextrose.  Will continue to observe her airway, monitor closely for improvement.  If still minimally responsive after IV dextrose will then talk about intubation for airway protection but will speak with family first given her advanced age we do not want to proceed unless family understands the risks of this procedure.  Since her mental status is improving with IV dextrose, we will place her on a dextrose infusion to keep her blood sugar elevated as she has not had any intake over the past few days.  She appears thin, frail, reportedly has had very poor intake.  Differential also includes sepsis, UTI, pneumonia, dehydration, GARY, medication side effect.  Will give a dose of empiric antibiotics until we obtain further workup, evaluation and have more time with the patient to evaluate her mental status.      ED Course as of 02/05/24 1720   Mon Feb 05, 2024   1107 Mental status improving now given a bolus of D10.  D10 was provided by EMS, was not administered because they could not obtain a IV.  2 IVs established on arrival.  Mental status improving after IV dextrose.  Will place on IV dextrose infusion as well as IV fluids.  Will obtain workup, will obtain sepsis evaluation and give a dose of

## 2024-02-05 NOTE — ED NOTES
This RN assumed care of pt at this time. Pt arrives via EMS from home w CC of unresponsiveness, decreased appetite, lethargy, and low BG onset x2 days. Per EMS, BG upon arrival was 50, en route to this facility was 31. PTA EMS gave 1mg glucagon IM.     Called code sepsis at 1102

## 2024-02-05 NOTE — ED NOTES
Yajaira YUN notified regarding pt's dropping rectal temperature, now 94.6 F. Pt placed on bear hugger at this time.

## 2024-02-05 NOTE — ED NOTES
Attemped to call report to floor and charge nurse two more times, no answer. RN supervisor aware at this time.

## 2024-02-05 NOTE — ED NOTES
This RN has attempted to call report x8 times to the floor and to the charge RN for the past 15 minutes, no one has answered. ED charge RN aware and RN supervisor aware.

## 2024-02-05 NOTE — ED NOTES
This RN notified Yajaira YUN at this time regarding pt's rectal temp of 95.6. Yajaira YUN recommends placing warm blankets on pt at this time. Placed 6 warm blankets on pt at this time.

## 2024-02-05 NOTE — ED NOTES
Per Aria from pharmacy, pt will start heparin infusion at 18u/kg/hr and receive no heparin bolus.

## 2024-02-05 NOTE — H&P
the morning and at bedtime. 20   Automatic Reconciliation, Ar   hydroCHLOROthiazide (HYDRODIURIL) 25 MG tablet hydrochlorothiazide 25 mg tablet    Automatic Reconciliation, Ar   hydroxychloroquine (PLAQUENIL) 200 MG tablet 1 tablet daily    Automatic Reconciliation, Ar   ipratropium (ATROVENT) 0.06 % nasal spray as needed    Automatic Reconciliation, Ar   leflunomide (ARAVA) 10 MG tablet Take by mouth    Automatic Reconciliation, Ar   losartan (COZAAR) 100 MG tablet 1 tablet    Automatic Reconciliation, Ar   oxybutynin (DITROPAN) 5 MG tablet 1 tablet 2 times daily    Automatic Reconciliation, Ar   pregabalin (LYRICA) 75 MG capsule pregabalin 75 mg capsule    Automatic Reconciliation, Ar         Objective:   VITALS:    Patient Vitals for the past 24 hrs:   BP Temp Temp src Pulse Resp SpO2 Height Weight   24 1122 -- (!) 95.6 °F (35.3 °C) Rectal -- -- -- -- --   24 1110 (!) 163/96 -- -- -- 16 -- -- --   24 1102 -- -- -- -- -- 100 % -- --   24 1059 -- -- -- (!) 102 -- -- 1.651 m (5' 5\") 39.9 kg (88 lb)       Temp (24hrs), Av.6 °F (35.3 °C), Min:95.6 °F (35.3 °C), Max:95.6 °F (35.3 °C)      O2 Flow Rate (L/min): 6 L/min O2 Device: Nasal cannula    Wt Readings from Last 12 Encounters:   24 39.9 kg (88 lb)   24 36.3 kg (80 lb)   23 54.4 kg (120 lb)   23 80 kg (176 lb 5.9 oz)   10/21/23 49.9 kg (110 lb)   03/15/21 64.4 kg (142 lb)         PHYSICAL EXAM:  General:    Alert, cooperative, appears stated age.     Lungs:   CTA b/l.  No wheezing or Rhonchi. No rales.  Chest wall:  No tenderness.  No accessory muscle use.  Heart:   Regular  rhythm,  No  Murmur.   No edema  Abdomen:   Soft, NT. ND  BS+  Extremities: No cyanosis.  No clubbing,      Skin turgor normal, Radial dial pulse 2+. Capillary refill normal  Neurologic: No facial asymmetry. No aphasia or slurred speech. Symmetrical strength, Sensation grossly intact. AAOx4.         LAB DATA REVIEWED:    Recent Results

## 2024-02-06 NOTE — WOUND CARE
Wound care nurse consult for POA wounds to BLE/feet and right posterior thigh.    Chart reviewed and patient assessed.    77 y/o AAF admitted for hypoglycemia, AMS and sepsis.  Past Medical History:   Diagnosis Date    Arthritis     Asthma     seasonal     Diabetes (HCC)     diet controlled    Hypertension     Macromastia     Hx of    Menopause     RA (rheumatoid arthritis) (HCC)     TIA (transient ischemic attack) 01/2021    Ulcer of foot (HCC)     Right foot     Past Surgical History:   Procedure Laterality Date    BREAST REDUCTION SURGERY Bilateral 9/2012    HYSTERECTOMY (CERVIX STATUS UNKNOWN)  1970    ORTHOPEDIC SURGERY  2009    Left hand surgery    OTHER SURGICAL HISTORY      left shoulder abcess drained    PARTIAL HYSTERECTOMY (CERVIX NOT REMOVED)      SHOULDER SURGERY Right     rotator cuff repair    TOTAL HIP ARTHROPLASTY Left 11/1/2023    HIP TOTAL ARTHROPLASTY ANTERIOR APPROACH , LEFT, C-ARM, HANA TABLE performed by Noble Burroughs MD at [facility] OR     I&D BREAST ABSCESS DEEP  2/10/2016     I&D BREAST ABSCESS DEEP     Patient has RA - hands are swanned  Incontinent of urine and stool  5'5\", 88 lbs - malnourished Body mass index is 14.64 kg/m².  Good score= 14  Specialty bed- patient placed on an Isoair low air loss bed  Heels floated with suspension boots    Consult placed to Dr Harmon with podiatry for right necrotic 4th toe  Patient appears to have arterial wounds in combination with pressure to bilateral feet. Non palpable pulses to dorsal pedis and posterior tibia of both feet.    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

## 2024-02-06 NOTE — CARE COORDINATION
Care Management Initial Assessment       RUR: 19% (High RUR)  Readmission? No  1st IM letter given? Yes - 02/5/24  1st  letter given: No     02/06/24 0852   Service Assessment   Patient Orientation Alert and Oriented   Cognition Alert   History Provided By Patient   Primary Caregiver Family   Accompanied By/Relationship no one   Support Systems Children   Patient's Healthcare Decision Maker is: Named in Scanned ACP Document   PCP Verified by CM Yes   Last Visit to PCP Within last 3 months   Prior Functional Level Assistance with the following:;Bathing;Dressing;Toileting;Feeding;Cooking;Housework;Shopping;Mobility   Current Functional Level Assistance with the following:;Bathing;Dressing;Toileting;Feeding;Cooking;Housework;Shopping;Mobility   Can patient return to prior living arrangement Yes   Ability to make needs known: Good   Family able to assist with home care needs: Yes   Would you like for me to discuss the discharge plan with any other family members/significant others, and if so, who? Yes  (Candy (Child)  [phone])   Financial Resources Medicare   Community Resources None   Social/Functional History   Lives With Son  (Gamaliel  Child  [phone])   Type of Home House   Home Layout Two level   Home Access Stairs to enter without rails   Entrance Stairs - Number of Steps 1 DG; 8 Steps to the second level   Bathroom Equipment Shower chair   Home Equipment Rollator;Cane  (needs hospital bed)   Active  No   Patient's  Info Gamaliel Wilkerson [phone]   Discharge Planning   Type of Residence House   Living Arrangements Spouse/Significant Other  (Gamaliel  Child  [phone])   Current Services Prior To Admission None   Potential Assistance Needed N/A   Patient expects to be discharged to: Skilled nursing facility     STEVEN unable to conduct the assessment with the patient due to her current level of alertness. CM contacted the patient's daughter, Candy (Child) [phone],

## 2024-02-06 NOTE — CARE COORDINATION
CM sent a referral to [facility]. However, a list of hospice agencies provided, emphasizing the patient/caregiver right to choose any agency/facility within network to meet their preference.    Geno Carrillo, RN  Case Management  [phone]

## 2024-02-06 NOTE — CONSULTS
[facility] PODIATRY & FOOT SURGERY     CONSULT NOTE      Subjective:         Date of Consultation: February 6, 2024     Referring Physician: Ching Sagastume MA    Patient is a 78 y.o. female who is being seen for foot wounds.   Patient has a hx of arthritis, asthma, diabetes mellitus type 2, hypertension, macromastia, menopause and PVD.  Patient presented to the emergency department yesterday due to altered mental status.  Patient is a poor historian.  Noted to have multiple lower extremity wounds, thus podiatry consulted.  No history of trauma      Patient Active Problem List    Diagnosis Date Noted    Hypoglycemia 02/05/2024    Closed left hip fracture (HCC) 10/30/2023    AAA (abdominal aortic aneurysm) (HCC) 10/30/2023    GI bleed 01/31/2022    COVID-19 01/31/2022    Influenza A (H5N1) 01/31/2022    Forgetfulness 02/12/2021    Thrombocytosis 02/12/2021    History of medication noncompliance 02/12/2021    CVA (cerebral vascular accident) ([facility]) 02/12/2021    Hypertension     Rheumatoid arthritis (HCC)     PVD (peripheral vascular disease) ([facility]) 12/24/2020    Chronic anemia 12/24/2020     Past Medical History:   Diagnosis Date    Arthritis     Asthma     seasonal     Diabetes (HCC)     diet controlled    Hypertension     Macromastia     Hx of    Menopause     RA (rheumatoid arthritis) (HCC)     TIA (transient ischemic attack) 01/2021    Ulcer of foot ([facility])     Right foot      Past Surgical History:   Procedure Laterality Date    BREAST REDUCTION SURGERY Bilateral 9/2012    HYSTERECTOMY (CERVIX STATUS UNKNOWN)  1970    ORTHOPEDIC SURGERY  2009    Left hand surgery    OTHER SURGICAL HISTORY      left shoulder abcess drained    PARTIAL HYSTERECTOMY (CERVIX NOT REMOVED)      SHOULDER SURGERY Right     rotator cuff repair    TOTAL HIP ARTHROPLASTY Left 11/1/2023    HIP TOTAL ARTHROPLASTY ANTERIOR APPROACH , LEFT, C-ARM, HANA TABLE performed by Noble Burroughs MD at [facility] OR    
living?: Not on file   Physical Activity: Not on file   Stress: Not on file   Social Connections: Not on file   Intimate Partner Violence: Not on file   Housing Stability: Low Risk  (11/3/2023)    Housing Stability Vital Sign     Unable to Pay for Housing in the Last Year: No     Number of Places Lived in the Last Year: 1     Unstable Housing in the Last Year: No     Review of Systems:  Unable to obtain    Physical Examination:  Blood pressure 115/71, pulse 81, temperature 97.4 °F (36.3 °C), temperature source Oral, resp. rate 16, height 1.651 m (5' 5\"), weight 39.9 kg (88 lb), SpO2 94 %.  General: pleasant, no distress,   HEENT: no exopthalmos, no periorbital edema,  Neck: supple, no thyromegaly, masses, lymph nodes, or carotid bruits, no supraclavicular or dorsocervical fat pads  Cardiovascular: regular, normal rate, normal S1 and S2, no murmurs/rubs/gallops, non-paplable dorsalis pedis pulses bilaterally, weak radial pulses bilaterally  Respiratory: clear to auscultation bilaterally  Gastrointestinal: soft, nontender, nondistended, no masses, no hepatosplenomegaly  Musculoskeletal: UE str 3/5  Integumentary: no rashes, no edema, 4 toe gangrene on the right foot  Neurological: wakes to voice, follows commands, no tremor      Data Reviewed:   Component      Latest Ref [facility] 2/6/2024   Sodium      136 - 145 mmol/L 148 (H)    Sodium       153 (H)    Potassium      3.5 - 5.1 mmol/L 3.2 (L)    Potassium       2.7 (LL)    Chloride      97 - 108 mmol/L 118 (H)    Chloride       119 (H)    CO2      21 - 32 mmol/L 24    CO2       28    Anion Gap      5 - 15 mmol/L 6    Anion Gap       6    Glucose, Random      65 - 100 mg/dL 144 (H)    Glucose, Random       98    BUN,BUNPL      6 - 20 MG/DL 25 (H)    BUN,BUNPL       29 (H)    Creatinine      0.55 - 1.02 MG/DL 1.26 (H)    Creatinine       1.44 (H)    Bun/Cre Ratio      12 - 20   20    Bun/Cre Ratio       20    Est, Glom Filt Rate      >60 ml/min/1.73m2 43 (L)    Est, Glom 
Glucose 96 65 - 117 mg/dL    Performed by: Gerardo Alvarado RN    Basic Metabolic Panel w/ Reflex to MG    Collection Time: 02/06/24  3:15 AM   Result Value Ref Range    Sodium 153 (H) 136 - 145 mmol/L    Potassium 2.7 (LL) 3.5 - 5.1 mmol/L    Chloride 119 (H) 97 - 108 mmol/L    CO2 28 21 - 32 mmol/L    Anion Gap 6 5 - 15 mmol/L    Glucose 98 65 - 100 mg/dL    BUN 29 (H) 6 - 20 MG/DL    Creatinine 1.44 (H) 0.55 - 1.02 MG/DL    Bun/Cre Ratio 20 12 - 20      Est, Glom Filt Rate 37 (L) >60 ml/min/1.73m2    Calcium 7.4 (L) 8.5 - 10.1 MG/DL   Hepatic Function Panel    Collection Time: 02/06/24  3:15 AM   Result Value Ref Range    Total Protein 5.3 (L) 6.4 - 8.2 g/dL    Albumin 2.3 (L) 3.5 - 5.0 g/dL    Globulin 3.0 2.0 - 4.0 g/dL    Albumin/Globulin Ratio 0.8 (L) 1.1 - 2.2      Total Bilirubin 0.5 0.2 - 1.0 MG/DL    Bilirubin, Direct 0.3 (H) 0.0 - 0.2 MG/DL    Alk Phosphatase 128 (H) 45 - 117 U/L    AST 44 (H) 15 - 37 U/L    ALT 50 12 - 78 U/L   Lactic Acid    Collection Time: 02/06/24  3:15 AM   Result Value Ref Range    Lactic Acid, Plasma 4.7 (HH) 0.4 - 2.0 MMOL/L   Magnesium    Collection Time: 02/06/24  3:15 AM   Result Value Ref Range    Magnesium 1.9 1.6 - 2.4 mg/dL   POCT Glucose    Collection Time: 02/06/24  6:28 AM   Result Value Ref Range    POC Glucose 127 (H) 65 - 117 mg/dL    Performed by: Gerardo Alvarado RN    POCT Glucose    Collection Time: 02/06/24  8:32 AM   Result Value Ref Range    POC Glucose 31 (LL) 65 - 117 mg/dL    Performed by: Mirta Jamil PCT    APTT    Collection Time: 02/06/24  8:40 AM   Result Value Ref Range    APTT >130.0 (HH) 22.1 - 31.0 sec    Therapeutic Range   58.0 - 77.0 SECS   Basic Metabolic Panel    Collection Time: 02/06/24  8:40 AM   Result Value Ref Range    Sodium 150 (H) 136 - 145 mmol/L    Potassium 3.1 (L) 3.5 - 5.1 mmol/L    Chloride 119 (H) 97 - 108 mmol/L    CO2 26 21 - 32 mmol/L    Anion Gap 5 5 - 15 mmol/L    Glucose 139 (H) 65 - 100 mg/dL    BUN 26 (H) 6 - 20 MG/DL

## 2024-02-06 NOTE — PLAN OF CARE
Speech LAnguage Pathology EVALUATION    Patient: Ruth Medina (78 y.o. female)  Date: 2/6/2024  Primary Diagnosis: Lactic acidosis [E87.20]  Hypoglycemia [E16.2]  Failure to thrive in adult [R62.7]  GARY (acute kidney injury) (HCC) [N17.9]  Elevated troponin I level [R79.89]  Severe sepsis (HCC) [A41.9, R65.20]       Precautions: aspiration precautions                     ASSESSMENT :  Based on the objective data described below, the patient presents with increased risk of aspiration d/t current mentation and reduced sustained alertness. Patient with inconsistent command following during oral mechanism. Edentulous status noted. Reduced lingual rate and strength. SLP presented trials of ice chip, thin via spoon/straw, and puree. Slowed oral manipulation of ice chip. Anterior loss of thin via spoon with delayed cough (suspect negatively impacted by lethargy, though patient opening eyes and verbalizing some throughout session). Inconsistent cough with thin liquids. No overt clinical s/s aspiration noted with puree trials. Given current mentation/lethargy, recommend NPO with the exception of ice chips following oral hygiene. If patient needing PO medications, recommend crushed in puree. Discussed with RN following session. SLP will continue to follow to determine PO vs possible instrumental readiness.    Patient will benefit from skilled intervention to address the above impairments.     PLAN :  Recommendations and Planned Interventions:  Diet: NPO with the exception of ice chips following strict oral hygiene   --Medications crushed in puree (I.e. applesauce), if approved by pharmacy   --Upright all PO intake   --Oral hygiene 2-3x/day       Acute SLP Services: Yes, SLP will continue to follow per plan of care.    Discharge Recommendations: Continue to assess pending progress     SUBJECTIVE:   Patient stated, “In a restaurant.”    OBJECTIVE:     Past Medical History:   Diagnosis Date    Arthritis     Asthma     seasonal

## 2024-02-07 PROBLEM — A41.9 SEPSIS (HCC): Status: ACTIVE | Noted: 2024-01-01

## 2024-02-07 NOTE — H&P
[facility]   Good Help to Those in Need  [phone]     Patient Name:  Ruth Medina  YOB: 1945         Date of Provider Hospice Visit: 02/07/24     Level of Care:   [x] General Inpatient (GIP)              [] Routine                   [] Respite     Current Location of Care:  [] [facility]           [] [facility]         [x] [facility]        [] [facility]           [] Hospice House ([facility])     IF [facility], patient referred from:  [] [facility]           [] [facility]         [] [facility]        [] [facility]           [] Home         [] Other:      Date of Original Hospice Admission:  2/7/2024  3:23 PM   Hospice Medical Director at time of admission: Benny Moraes MD     Principle Hospice Diagnosis:   Sepsis due to aspiration pneumonia  Diagnoses RELATED to the terminal prognosis: altered mental status; dementia; protein-calorie malnutrition; bilateral DVTs; GARY; hypoglycemia; hypernatremia  Other Diagnoses: rheumatoid arthritis, DM, CKD III, chronic microcytic anemia; HTN; hyperlipidemia     HOSPICE SUMMARY   Ruth Medina is an 78 year old woman with PMH of RA, DM, CKD III, HTN, hip fracture 10/2023 who fell and suffered a hip fracture in 10/2023 after which her family noted a slow decline. She was eating and drinking less. She frequently complained about stomach pain which was managed with antiacid medications. On 2/3/2024, she was diagnosed as an outpatient with bilateral DVTs and was started on Eliqus. She experienced chest pain and increased abdominal pain during a bowel movement on 2/5/2024 prompting her daughter to call 911. She was hospitalized at [facility]: hospital course was notable for diagnosis of sepsis in setting of aspiration pneumonia, elevated troponin attributed to type 2 MI in setting of demand ischemia, hypernatremia, hypokalemia. She was treated with broad-spectrum antibiotics without improvement in her mental status. Cardiology and Endocrinology were consulted; no additional work-up was recommended given her significant  ; Hospice IDT; Care Team      GOALS OF CARE      Patient/Medical POA stated Goal of Care: Hospice care     [x] I have reviewed and/or updated ACP information in the Advance Care Planning Navigator. This information is available in the Adv Care Plan link in the patient's chart header.       Primary Decision Maker: Candy Toussaint - Child - [phone]    Secondary Decision Maker: Gamaliel Franco - Child - [phone]    If DNR is there a Durable DNR on file? : [] Yes       [] No (If no, complete Durable DNR)     HISTORY      History obtained from: family, chart     CHIEF COMPLAINT:   The patient is:   [] Verbal  [x] Nonverbal  [] Unresponsive     HPI/SUBJECTIVE: see narrative above         REVIEW OF SYSTEMS      The following systems were: [] reviewed  [x] unable to be reviewed     Positive ROS include:  Constitutional: fatigue, weakness, in pain, short of breath  Ears/nose/mouth/throat: increased airway secretions  Respiratory:shortness of breath, wheezing  Gastrointestinal:poor appetite, nausea, vomiting, abdominal pain, constipation, diarrhea  Musculoskeletal:pain, deformities, swelling legs  Neurologic:confusion, hallucinations, weakness  Psychiatric:anxiety, feeling depressed, poor sleep  Endocrine:      Adult Non-Verbal Pain Assessment Score: 4     Face  [] 0   No particular expression or smile  [] 1   Occasional grimace, tearing, frowning, wrinkled forehead  [x] 2   Frequent grimace, tearing, frowning, wrinkled forehead     Activity (movement)  [] 0   Lying quietly, normal position  [] 1   Seeking attention through movement or slow, cautious movement  [] 2   Restless, excessive activity and/or withdrawal reflexes     Guarding  [] 0   Lying quietly, no positioning of hands over areas of body  [] 1   Splinting areas of the body, tense  [x] 2   Rigid, stiff     Physiology (vital signs)  [] 0   Stable vital signs  [] 1   Change in any of the following: SBP > 20mm Hg; HR > 20/minute  [] 2   Change

## 2024-02-07 NOTE — PROGRESS NOTES
[facility]  Good Help to Those in Need  [phone]    Inpatient Nursing Admission   Patient Name: Ruth Medina  YOB: 1945  Age: 78 y.o.       Date of Hospice Admission: 2/7/2024  Hospice Attending Elected by Patient: Benny Moraes MD  Primary Care Physician: Romain Jackson MD  Admitting RN: Sarika Tamayo RN  :      Level of Care (GIP/Routine/Respite): [facility]  Facility of Care: [facility]  Patient Room: 340/     HOSPICE SUMMARY   ER Visits/ Hospitalizations in past year: 6  Hospice Diagnosis: Sepsis (HCC) [A41.9]  Onset Date of Hospice Diagnosis: 2/7/24  Summary of Disease Progression Leading to Hospice Diagnosis:     Per Hospice MD Note:     Ruth Medina is an 78 year old woman with PMH of RA, DM, CKD III, HTN, hip fracture 10/2023 who fell and suffered a hip fracture in 10/2023 after which her family noted a slow decline. She was eating and drinking less. She frequently complained about stomach pain which was managed with antiacid medications. On 2/3/2024, she was diagnosed as an outpatient with bilateral DVTs and was started on Eliqus. She experienced chest pain and increased abdominal pain during a bowel movement on 2/5/2024 prompting her daughter to call 911. She was hospitalized at [facility]: hospital course was notable for diagnosis of sepsis in setting of aspiration pneumonia, elevated troponin attributed to type 2 MI in setting of demand ischemia, hypernatremia, hypokalemia. She was treated with broad-spectrum antibiotics without improvement in her mental status. Cardiology and Endocrinology were consulted; no additional work-up was recommended given her significant debility and poor prognosis.    Co-Morbidities:   Patient Active Problem List   Diagnosis    GI bleed    COVID-19    Forgetfulness    PVD (peripheral vascular disease) (HCC)    Chronic anemia    Thrombocytosis    History of medication noncompliance    Hypertension    Influenza A (H5N1)     Daily x 7 days /every other day x 7 days  with 5 PRN visits for symptom control. MSW - 1 visit for initial assessment/evaluation for family support and need for volunteer services..  - 1 visit for initial assessment/evaluation for spiritual support.     ADVANCE CARE PLANNING (Complete in ACP Flow Sheet)   Code Status: DNR  Durable DNR: []  Yes  [x]  No  Code Status Discussed/Confirmed: Yes  Preference for Other Life Sustaining Treatment Discussed/Confirmed: Yes  Hospitalization Preference: [facility]      2024     5:24 PM   Demographics   Marital Status         Service: [] Yes  [x]  No      [] Unknown  Appropriate for Pinning Ceremony:  [] Yes     [] No  Presybeterian: Jain   Home: TBD; per dtr pt wants cremation    DISCHARGE PLANNING     1. Discharge Plan: will  in the hospital; acuity prevents transport  2. Patient/Family teaching: EOL process, symptom management  3. Response to patient/family teaching: dtr and son and grandchildren at bedside verbalized understanding    SOCIAL/EMOTIONAL/SPIRITUAL NEEDS     Spiritual Issues Identified: none identified at this time;  to assess per protocol    Psych/ Social/ Emotional Issues Identified: family holding benitez bedside    Caregiver Support:  [x] Provided information on End of Life Care   [x] Material Provided: Gone From My Sight or [facility]     CARE COORDINATION   Dr. Souza contacted, discharge to hospice order received  Dr. Moraes contacted, agrees to serve as attending provider for hospice and provided verbal certification of terminal illness with life expectancy of 6 months or less. Orders for hospice admission, medications and plan of treatment received. Medication reconciliation completed.  MEDS: See medication list below  DME: Per hospital  Supplies: Per hospital  IDT communication to include MD, SN, SW, CH and support team    ALLERGIES AND MEDICATIONS     Allergies: No Known Allergies  Current

## 2024-02-07 NOTE — PROGRESS NOTES
.End of Shift Note    Bedside shift change report given to Emily TAMEZ  (oncoming nurse) by Melissa Meyers RN (offgoing nurse).  Report included the following information SBAR, Kardex, Intake/Output, MAR, Recent Results, and Med Rec Status    Shift worked:  [id number]     Shift summary and any significant changes:     Pt made comfortable to the unit with family at bedside. Comfort tray ordered for pt.            Melissa Meyers, RN

## 2024-02-07 NOTE — PROGRESS NOTES
TRANSFER - OUT REPORT:    Verbal report given to Rosa on Ruth Medina  being transferred to [facility] for routine progression of patient care       Report consisted of patient's Situation, Background, Assessment and   Recommendations(SBAR).     Information from the following report(s) Nurse Handoff Report, MAR, Recent Results, and Cardiac Rhythm sr  was reviewed with the receiving nurse.           Lines:   Peripheral IV 02/05/24 Left Antecubital (Active)   Site Assessment Clean, dry & intact 02/07/24 0809   Line Status Capped 02/07/24 0809   Line Care Connections checked and tightened 02/07/24 0809   Phlebitis Assessment No symptoms 02/07/24 0809   Infiltration Assessment 0 02/07/24 0809   Alcohol Cap Used Yes 02/07/24 0809   Dressing Status Clean, dry & intact 02/07/24 0809   Dressing Type Transparent 02/07/24 0809       Peripheral IV 02/05/24 Right Cephalic (Active)   Site Assessment Clean, dry & intact 02/07/24 0809   Line Status Capped 02/07/24 0809   Line Care Connections checked and tightened 02/07/24 0809   Phlebitis Assessment No symptoms 02/07/24 0809   Infiltration Assessment 0 02/07/24 0809   Alcohol Cap Used Yes 02/07/24 0809   Dressing Status Clean, dry & intact 02/07/24 0809   Dressing Type Transparent 02/07/24 0809       Peripheral IV 02/05/24 Right;Anterior Forearm (Active)   Site Assessment Clean, dry & intact 02/07/24 0809   Line Status Capped 02/07/24 0809   Line Care Connections checked and tightened 02/07/24 0809   Phlebitis Assessment No symptoms 02/07/24 0809   Infiltration Assessment 0 02/07/24 0809   Alcohol Cap Used Yes 02/07/24 0809   Dressing Status Clean, dry & intact 02/07/24 0809   Dressing Type Transparent 02/07/24 0809        Opportunity for questions and clarification was provided.      Patient transported with:  O2 @ 2lpm

## 2024-02-07 NOTE — DISCHARGE INSTRUCTIONS
HOSPITALIST DISCHARGE INSTRUCTIONS    NAME: Ruth Medina   :  1945   MRN:  [id number]     Date/Time:  2024 2:16 PM    ADMIT DATE: 2024     DISCHARGE DATE: 2024     DISCHARGE DIAGNOSIS:  Altered mental status   Sepsis due to Aspiration pneumonia  POA  Elevated lactate  Hypoglycemia POA -resolved   Hypernatremia   Hypokalemia   GARY Cr-1.81 -likely prerenal   Elevated troponin 124-->119 possible type 2 MI due to sepsis vs less likely ACS  Elevated AST  Diabetes mellitus - diet controlled  Refractory Hypoglycemia POA- likely false number on peripheral finger sticks due to poor circulation,   Right heel ulcer,  left 5th toe gangrenous   Hypertension   Rheumatoid arthritis  B/LLE DVT  Dementia     MEDICATIONS:  As per medication reconciliation  list  It is important that you take the medication exactly as they are prescribed.   Keep your medication in the bottles provided by the pharmacist and keep a list of the medication names, dosages, and times to be taken in your wallet.   Do not take other medications without consulting your doctor.     Pain Management: per above medications    What to do at Home    Recommended diet:  comfort feeding        If you have questions regarding the hospital related prescriptions or hospital related issues please call at .      Follow Up:  Hospice as arranged      Information obtained by :  I understand that if any problems occur once I am at home I am to contact my physician.    I understand and acknowledge receipt of the instructions indicated above.                                                                                                                                           Physician's or R.N.'s Signature                                                                  Date/Time                                                                                                                                              Patient or

## 2024-02-07 NOTE — DISCHARGE SUMMARY
Discharge Summary    Name: Ruth Medina  [id number]  YOB: 1945 (Age: 78 y.o.)   Date of Admission: 2/5/2024  Date of Discharge: 2/7/2024  Attending Physician: Demetri Souza MD    Discharge Diagnosis:   Altered mental status   Sepsis due to Aspiration pneumonia  POA  Elevated lactate  Hypoglycemia POA -resolved   Hypernatremia   Hypokalemia   GRAY Cr-1.81 -likely prerenal   Elevated troponin 124-->119 possible type 2 MI due to sepsis vs less likely ACS  Elevated AST  Diabetes mellitus - diet controlled  Refractory Hypoglycemia POA- likely false number on peripheral finger sticks due to poor circulation,   Right heel ulcer,  left 5th toe gangrenous   Hypertension   Rheumatoid arthritis  B/LLE DVT  Dementia   DNR      Consultations:  IP CONSULT TO CARDIOLOGY  IP WOUND CARE NURSE CONSULT TO EVAL  IP WOUND CARE NURSE CONSULT TO EVAL  IP CONSULT TO ENDOCRINOLOGY  IP CONSULT TO HOSPICE  IP CONSULT TO CASE MANAGEMENT      Brief Admission History/Reason for Admission Per Ching Sagastume MD:   \" 78 y.o. with PMH as mentioned below presented to ED due to altered mental status. As per patient's daughter at bedside- patient has been declining since her hip surgery that was performed in Oct ,2023. On 2/3 she went to vascular outpatient - performed doppler of bilateral LE and was found to have bilateral DVT. She was then started on Eliquis. Since 2/4 patient was complaining of chest and epigastric discomfort. Was not eating much as per daughter. Then while she was having Bowel movement when the patient felt unsteady hence daughter called the EMS.  During my encounter patient did not responded to pain.   ROS: unobtainable            We were asked to admit for work up and evaluation of the above problems. \"    Brief Hospital Course by Main Problems:   Altered mental status   Sepsis due to Aspiration pneumonia  POA  Elevated lactate -trending down last to 4.7   UA - unremarkable

## 2024-02-07 NOTE — PLAN OF CARE
Problem: Hospice Orientation  Goal: Demonstrate understanding of hospice philosophy, plan of care, and inpatient hospice program  Description: The patient/family/caregiver will demonstrate understanding of hospice philosophy, plan of care and the inpatient hospice program as evidenced by participation in meeting the patient's psychosocial, spiritual, medical, and physical needs inclusive of medical supplies/equipment focusing on symptoms.  Outcome: Progressing     Problem: Potential for Alteration in Skin Integrity  Goal: Monitor skin for areas of alteration in skin integrity  Description: Patient [unfilled] will remain free from alterations of or worsening skin integrity as evidenced by no changes to skin during assessment each shift during the inpatient hospice stay.  Outcome: Progressing     Problem: Risk for Falls  Goal: Fall prevention  Description: Patient  will remain free from falls as evidenced by no witnessed or reported falls each shift during the inpatient hospice stay.     Patient  and or family/caregiver will receive education on fall prevention as evidenced by verbalizing recall of using the call lights system, fall prevention devices, and asking for help during the admission process and ongoing as needed during the inpatient hospice stay.    Outcome: Progressing     Problem: Alteration in Mobility  Goal: Remain as independent as possible and remain safe in environment  Description: Patient  will perform tasks or ADLs within their capabilities as often as they are able, as evidenced by remaining free of injury during the inpatient hospice stay.     Outcome: Progressing     Problem: Pain  Goal: Control of acute pain  Description: Patient  will exhibit a decrease in pain as evidenced by a pain rating less than 2 on the Adult Nonverbal Pain scale within 1 hour of receiving pain medication during the inpatient hospice stay.    Outcome: Progressing     Problem: Anxiety/Agitation/Restlessness  Goal: Verbalize  or staff assess the ability to manage anxiety  Description: Patient  will demonstrate appropriate behavior as evidenced by less than two episodes of fidgeting, picking/pulling at devices or clothing, and yelling out each shift during the inpatient hospice stay.      Outcome: Progressing     Problem: Communication Deficit  Goal: Effectively communicate symptoms, needs, and concerns  Description: Patient  and/or family/caregiver will be able to communicate symptoms, needs, and concerns as evidenced by the use of language services during the inpatient hospice stay.    Outcome: Progressing     Problem: Pressure Injury - Risk of  Goal: Prevention of pressure injury  Description: Patient  will remain free from acquired or worsening pressure injuries as evidenced by zero acquired pressure injuries or no changes to current pressure injury during skin assessment each shift during the inpatient hospice stay.    Patient  and or family/caregiver will verbalize recall of interventions and preventions of alteration in skin integrity during the admission process and ongoing as needed during the inpatient hospice stay.    Outcome: Progressing     Problem: End of Life Process  Goal: Demonstrate understanding of end of life processes  Description: Patient  family/ caregiver will verbalize recall or return demonstration of emotional support and stress-reduction strategies and appropriate grieving on admission and ongoing as needed by the IDG members throughout the dying process during the inpatient hospice stay.    Outcome: Progressing

## 2024-02-08 PROBLEM — E11.621: Status: ACTIVE | Noted: 2024-01-01

## 2024-02-08 PROBLEM — L97.506: Status: ACTIVE | Noted: 2024-01-01

## 2024-02-08 NOTE — PROGRESS NOTES
[facility]  Good Help to Those in Need  [phone]    [facility] Daily Nursing Note   Patient Name: Ruth Medina  YOB: 1945  Age: 78 y.o.    Date of Visit: 02/08/24  Facility of Care: [facility]  Patient Room: [address] [place] [address]     Hospice Attending: Benny Moraes MD  Hospice Diagnosis: Sepsis (HCC) [A41.9]    Level of Care: [facility]    Current GIP Symptoms      1. Agitation/Restlessness  2. Unresponsive    ASSESSMENT & PLAN   Patient meets criteria for hospice admission with [facility] level of care due to risk for acute decompensation/progression of symptoms, need for parenteral medications for symptom management and need for frequent nursing assessment for efficacy of medications.  I met with patient's family who are all in accordance with transition to comfort measures with hospice support.  Increased Dilaudid to 1mg IV every 4 hours scheduled and and every 15 minutes as needed for pain  Valium 5 mg every 4 hours scheduled and every 15 minutes as needed for anxiety, agitation, restlessness  Robinul 0.2 mg IV every 4 hours as needed for excessive secretions  Comfort meds for breakthrough symptoms  Garcia catheter for comfort per hospice protocol   and SW to support family needs  Disposition: anticipate death in hospital      Spiritual Interventions: hospice and hospital  available as needed for spiritual and emotional support    Psych/ Social/ Emotional Interventions: supportive family presence    Care Coordination Needs: communication with hospital staff, hospice team and family    Care plan and New Orders discussed / approved with Dr. Dimitry MD.    Description History and Chart Review     Narrative History of last 24 hours that demonstrates care cannot be provided in another setting:  Requiring frequent nursing assessments to monitor for EOL symptoms and administration and titration of IV medications to manage those symptoms.    What has been done to control the patient's symptoms in the  particular expression or smile  [] 1   Occasional grimace, tearing, frowning, wrinkled forehead  [] 2   Frequent grimace, tearing, frowning, wrinkled forehead     Activity (movement)  [x] 0   Lying quietly, normal position  [] 1   Seeking attention through movement or slow, cautious movement  [] 2   Restless, excessive activity and/or withdrawal reflexes     Guarding  [x] 0   Lying quietly, no positioning of hands over areas of body  [] 1   Splinting areas of the body, tense  [] 2   Rigid, stiff     Physiology (vital signs)  [x] 0   Stable vital signs  [] 1   Change in any of the following: SBP > 20mm Hg; HR > 20/minute  [] 2   Change in any of the following: SBP > 30mm Hg; HR > 25/minute     Respiratory  [x] 0   Baseline RR/SpO2, compliant with ventilator  [] 1   RR > 10 above baseline, or 5% drop SpO2, mild asynchrony with ventilator  [] 2   RR > 20 above baseline, or 10% drop SpO2, asynchrony with ventilator      CLINICAL INFORMATION   Patient Vitals for the past 12 hrs:   Temp Pulse Resp BP SpO2   02/08/24 0752 97.5 °F (36.4 °C) 89 18 137/73 90 %       Currently this patient has:  [x] Supplemental O2   [x] IV    [] PICC      [] PORT   [] NG Tube    [] PEG Tube   [] Ostomy     [x] Garcia draining ___amber__ urine  [] Other:     SIGNS/PHYSICAL FINDINGS     Skin (including wound):  [] Warm, dry, supple, intact and color normal for race  [x] Warm   [] Dry   [] Cool     [] Clammy       [] Diaphoretic    Turgor   [] Normal   [x] Decreased  Color:   [] Pink   [] Pale   [] Cyanotic   [] Erythema   [] Jaundice   [x] Normal for Race  []  Wounds:    Neuro:  [] Lethargy  [] Restlessness / agitation  [] Confusion / delirium  [] Hallucinations  [] Responds to maximal stimulation  [x] Unresponsive  [] Seizures     Cardiac:  [] Dyspnea on Exertion  [] JVD  [] Murmur  [] Palpitations  [] Hypotension  [] Hypertension  [] Tachycardia  [] Bradycardia  [] Irregular HR  [] Pulses Decreased  [] Pulses Absent  [] Edema:       []

## 2024-02-08 NOTE — PROGRESS NOTES
Spiritual Care Assessment/Progress Note  [facility]    Name: Ruth Medina MRN: [id number]    Age: 78 y.o.     Sex: female   Language: English     Date: 2/8/2024            Total Time Calculated: 27 min              Spiritual Assessment begun in [facility] MEDICAL ONCOLOGY  Service Provided For:: Family  Referral/Consult From:: Rounding  Encounter Overview/Reason : Grief, Loss, and Adjustments    Spiritual beliefs:      [x] Involved in a yolanda tradition/spiritual practice:      [] Supported by a yolanda community:      [] Claims no spiritual orientation:      [] Seeking spiritual identity:           [] Adheres to an individual form of spirituality:      [] Not able to assess:                Identified resources for coping and support system:   Support System: Family members       [x] Prayer                  [] Devotional reading               [] Music                  [] Guided Imagery     [] Pet visits                                        [] Other: (COMMENT)     Specific area/focus of visit   Encounter:    Crisis:    Spiritual/Emotional needs: Type: Spiritual Support  Ritual, Rites and Sacraments:    Grief, Loss, and Adjustments: Type: Anticipatory Grief  Ethics/Mediation:    Behavioral Health:    Palliative Care:    Advance Care Planning:      Plan/Referrals: Continue Support (comment)    Narrative:   Patient was unresponsive at time of visit, family present; daughter and two grand children. Family received visit warmly and shared memories and stories about patient and family. They spoke about patient's medical journey and her yolanda, which is well grounded. They indicated that they were aware patient was at end stage of life, they have had conversations with her about that and were all at peace with current situation. Yolanad is very important for patient and family.  affirmed thoughts and feelings and inquired how he could be of help at this time. Daughter stated that they have no need for  support at this time, talking with  was helpful, and that they will reach out to  if needed. Family thanked  for the visit.     Visited by: Chaplain Teodoro Bailey M.Div., [facility].   Paging Service: [phone])

## 2024-02-08 NOTE — CARE COORDINATION
CM reviewed chart. Patient followed by hospice on this unit. Was resting in bed.   No CM intervention needed.  Carlotta Jim, RN  Care Manager  [phone]

## 2024-02-08 NOTE — PROGRESS NOTES
[facility]  Good Help to Those in Need  [phone]    Social Work Admission Note  Patient Name: Ruth Medina  YOB: 1945  Age: 78 y.o.    Date of Visit: 02/08/24  Facility of Care: [facility]  Patient Room: [address] [place] [address]     Hospice Attending: Benny Moraes MD  Hospice Diagnosis: Sepsis (HCC) [A41.9]    Level of Care:    [x]  GIP    []  Respite   []  Routine    Consents/NCD Documentation:     Consents Reviewed:   []  Yes  [x]  No, completed by other hospice staff member.    Person Reviewed/Signed with:  []  Patient   []  Pts NOK/MPOA  Name:     Right to NCD Reviewed:   []  Yes  []  No, completed by other hospice staff member.    NCD Requested:   []  Yes  []  No    Admission Nurse/Intake Notified NCD was requested:  []  Yes  []  No  []  Not requested    Planned Start of Care Date:     Hospice Witness Representative:    NARRATIVE   Pt is an 78 year old female admitted to inpatient hospice on 2/7/2024 with a hospice diagnosis of Sepsis.  Pt was admitted to hospital on 2/5/2024 for altered mental status and hypoglycemia.  Prior to hospitalization, pt lived at daughter Candy's home.  At time of visit, pt was resting in bed and peaceful.  Pt's daughter Candy and pt's grandchildren were at bedside.  Candy shared she lost her  a few years prior at [facility], so Candy is familiar with [facility].  Candy shared pt had spiritual visitors days prior and the last physical visitor to arrive was pt's grandson, who is at bedside now.  Candy asked about pt's labwork - less for pt's status and more for concern over genetic illnesses.  Candy plans to access pt's [email or web address] for answers.  No other needs at this time.  LMSW provided supportive counseling.  Pt is DNR code status.  FH is TBD, LMSW emailed cremation options.  LMSW will continue to provide support.    ADVANCE CARE PLANNING    Advance Directive:  [x]  Yes  []  No   []  Would like to complete  Primary MPOA: Candy Toussaint  ALONE  Notes:      Emotional Status: (khalida all that apply)    Patient Caregiver Response                 [x]                [x]    Mood/Affect stable and appropriate                   []                []    Angry                 []                []    Anxious                 []                []    Apprehensive                 []                []    Avoidant                 []                []    Clinging                 []                []    Depressed                 []                []    Distraught                 []                []    Fearful                 []                []    Flat Affect                 []                []    Helpless                 []                []    Hostile                 []                []    Impulsive                 []                []    Irritable                 []                []    Labile                 []                []    Manic                 []                []    Restlessness                 []                []    Sad                 []                []    Strain/Stress                 []                []    Suspicious                 []                []     Tearful                 []                 []     Withdrawn          Notes:     Coping Skills (strengths/weakness):    Patient: Coping Skills (strength/weakness): good familial support   Family/caregiver (strength/weakness): good familial support, strong sachi, sudden decline     [place] of care upon discharge (khalida all that apply):     [x]  No burden evident   []  Family must administer medications   []  Illness causing financial strain   []  Family/Support feels overwhelmed   []  Family/Support sleep disturbed with patient’s care   []  Patient’s care causes extra physical stress  of death  []  Illness causes changes in family lifestyle  []  Illness impacting family/support employment  []  Family experiencing increased time demands  []  Patient’s behavior endangers family  []  Denial of

## 2024-02-08 NOTE — PROGRESS NOTES
[facility]   Good Help to Those in Need  [phone]     Patient Name:  Ruth Medina  YOB: 1945         Date of Provider Hospice Visit: 02/08/24     Level of Care:   [x] General Inpatient (GIP)              [] Routine                   [] Respite     Current Location of Care:  [] [facility]           [] [facility]         [x] [facility]        [] [facility]           [] Hospice House ([facility])     IF [facility], patient referred from:  [] [facility]           [] [facility]         [] [facility]        [] [facility]           [] Home         [] Other:      Date of Original Hospice Admission:  2/7/2024  3:23 PM   Hospice Medical Director at time of admission: Benny Moraes MD     Principle Hospice Diagnosis:   Sepsis (HCC) [A41.9] due to aspiration pneumonia  Diagnoses RELATED to the terminal prognosis: altered mental status; dementia; protein-calorie malnutrition; bilateral DVTs; GARY; hypoglycemia; hypernatremia  Other Diagnoses: rheumatoid arthritis, DM, CKD III, chronic microcytic anemia; HTN; hyperlipidemia     HOSPICE SUMMARY   Ruth Medina is an 78 year old woman with PMH of RA, DM, CKD III, HTN, hip fracture 10/2023 who fell and suffered a hip fracture in 10/2023 after which her family noted a slow decline. She was eating and drinking less. She frequently complained about stomach pain which was managed with antiacid medications. On 2/3/2024, she was diagnosed as an outpatient with bilateral DVTs and was started on Eliqus. She experienced chest pain and increased abdominal pain during a bowel movement on 2/5/2024 prompting her daughter to call 911. She was hospitalized at [facility]: hospital course was notable for diagnosis of sepsis in setting of aspiration pneumonia, elevated troponin attributed to type 2 MI in setting of demand ischemia, hypernatremia, hypokalemia. She was treated with broad-spectrum antibiotics without improvement in her mental status. Cardiology and Endocrinology were consulted; no additional work-up was recommended given  understanding of plan.   Robinul 0.2 mg IV every 4 hours as needed for excessive secretions  Tylenol 650 mg suppository every 4 hours as needed for fever  Garcia catheter for comfort. Garcia care as per facility protocol.    and SW to support family needs  Disposition: anticipate death in hospital  Hospice Plan of care was reviewed in detail and agree with current plan of care     Prognosis estimated based on 2/7/2024 clinical assessment is:   [x] Hours to Days    [] Days to Weeks    [] Other:     Communicated plan of care with: Hospice Case Manager; Hospice IDT; Care Team      GOALS OF CARE      Patient/Medical POA stated Goal of Care: Hospice care     [x] I have reviewed and/or updated ACP information in the Advance Care Planning Navigator. This information is available in the Adv Care Plan link in the patient's chart header.       Primary Decision Maker: Candy Toussaint - Child - [phone]    Secondary Decision Maker: Gamaliel Franco - Child - [phone]    If DNR is there a Durable DNR on file? : [] Yes       [] No (If no, complete Durable DNR)     HISTORY      History obtained from: family, chart     CHIEF COMPLAINT:   The patient is:   [] Verbal  [] Nonverbal  [x] Unresponsive     HPI/SUBJECTIVE: see narrative above.     2/8: Daughter, son, and granddaughter at bedside. Spoke mostly with daughter and granddaughter. Patient appears comfortable however with exam increased muscle tone and facial grimacing noted. Respirations shallow yet non labored. Medication adjustments as above, family in agreement.          REVIEW OF SYSTEMS      The following systems were: [] reviewed  [x] unable to be reviewed     Positive ROS include:  Constitutional: fatigue, weakness, in pain, short of breath  Ears/nose/mouth/throat: increased airway secretions  Respiratory:shortness of breath, wheezing  Gastrointestinal:poor appetite, nausea, vomiting, abdominal pain, constipation, diarrhea  Musculoskeletal:pain,

## 2024-02-09 NOTE — PROGRESS NOTES
End of Shift Note    Bedside shift change report given to Brigid TAMEZ (oncoming nurse) by Jovanna Mauricio RN (offgoing nurse).  Report included the following information SBAR, Kardex, and MAR    Shift worked:  Patient tolerated care over the night. Remained drowsy the whole night. Pain meds given as scheduled.   Incontinence care completed.  Chg martinez care completed.  Patient is on oxygen 2 litres via nasal cannula. This morning at 6am blood pressure is 70/42 , Map 52. Noted to have some gasp for breath.   Shift summary and any significant changes:                Jovanna Mauricio RN

## 2024-02-09 NOTE — PROGRESS NOTES
Hospitalist Progress Note    NAME:   Ruth Medina   : 1945   MRN: [id number]     Date/Time: 2024 9:14 AM  Patient PCP: Romain Jackson MD    Estimated discharge date: ?-8  Barriers: Hospice arrangements      Assessment / Plan:    Altered mental status   Sepsis due to Aspiration pneumonia  POA  Elevated lactate -trending down last to 4.7   UA - unremarkable   CXR - no acute process   Hypoglycemia POA -resolved      Cont stepdown monitoring  Continue with Empiric antibiotic Vancomycin and Zosyn   Blood cultures - neg x 2 days  CT head -negative for acute findings   CT Abd/pelvis - New right lower lobe lung opacity with associated pleural effusion concerning for pneumonia with para pneumonic effusion, possibly aspiration.    S/p LR bolus x 1 in ER, cont IVF- s/p d10 drip- Dcd now  Speech and swallow evaluation today for dysphagia  NPO per SLP-- can relax to Comfort feeding today with hospice philosophy  IP hospice consulted-- Pt transitioned to Comfort measures only 2/6 PM after family decision        Elevated troponin 124-->119 possible type 2 MI due to sepsis vs less likely ACS  Elevated AST  TSH_1.17  EKG-  Sinus rhythm ,irregular baseline  Echo - can be cancelled   Cardio consult noted-- no aggressive management, recommends palliative consult/hospice transition  Continue with Aspirin and Atorvastatin         Hypernatremia   Hypokalemia   GARY Cr-1.81 -likely prerenal   - D5+0.45% NS  BMPq8   Replenished potassium   If no improvement in renal function consider renal consult     Chronic microcytic anemia   Hb-9.7 stable  Not in range for transfusion   Continue to trend        Diabetes mellitus - diet controlled  Refractory Hypoglycemia POA- likely false number on peripheral finger sticks due to poor circulation, Venous BS checks for now  HbA1c = <3.8    Hypoglycemia protocol   Endocrine consult appreciated  Check cpeptide & insulin levels to r/o true endogenous hypoglycemia etiology        
  Physician Progress Note      PATIENT:               MATTHIAS WHEELER  [facility] #:                  [id number]  :                       1945  ADMIT DATE:       2024 10:56 AM  DISCH DATE:        2024 2:03 PM  RESPONDING  PROVIDER #:        Demetri Souza MD          QUERY TEXT:    Pt admitted with Sepsis. Pt noted to have AMS, hypoglycemia, hypernatremia,   hypokalemia. If possible, please document in the progress notes and discharge   summary if you are evaluating and / or treating any of the following:    The medical record reflects the following:  Risk Factors: GARY, DM  Clinical Indicators: Dementia history, noted sepsis, hypoglycemia,   hypernatremia, hypokalemia.  Treatment: Rocephin, vancomycin, ivf@50cc/hr, 1.5 ltr bolus IVF, KCL   replacement  Options provided:  -- Hypertensive encephalopathy  -- Metabolic encephalopathy  -- Septic encephalopathy  -- Toxic metabolic encephalopathy  -- Delirium due to, Please specify cause.  -- Delirium  -- Other - I will add my own diagnosis  -- Disagree - Not applicable / Not valid  -- Disagree - Clinically unable to determine / Unknown  -- Refer to Clinical Documentation Reviewer    PROVIDER RESPONSE TEXT:    This patient has metabolic encephalopathy.    Query created by: Sarah Lassiter on 2024 11:26 AM      QUERY TEXT:    Pt admitted with AMS. Pt noted to have Sepsis and acute organ dysfunction of   GARY. If possible, please document in progress notes and discharge summary the   relationship, if any, between Sepsis and GARY.    The medical record reflects the following:  Risk Factors: dementia, HTN  Clinical Indicators: noted AMS, sepsis with hypothermia, hypernatremia,   hypokalemia, GARY  Treatment: ivf@50cc/hr, bolus LR 500cc, Vancomycin, Rocephin, replace   electrolytes as needed.  Options provided:  -- Acute organ dysfunction of GARY is associated with sepsis  -- Acute organ dysfunction of GARY is unrelated to sepsis  -- Other - I will add my own 
0947- PTT greater than 130. Pt glucose on finger stick is incorrect. Finger stick is 30 and venous is 130. Nurse has notified MD that accuchecks are going to be incorrect.  1100- spoke with granddaughter giving her an update.  
1426  Discharged to Hospice to be readmitted under this service.  
2056 Primary RN notified by Aishwarya OLVERA RN of pts BG was 17, she did a recheck and the BG was 23. She then proceeded to give a D10 bolus according to protocol and rechecked the BG and it was reading 55 so she rechecked and it was then reading 35.  D10 bolus x2.    2135 called the RRN to bedside for assistance and she noticed her fingertips were cool to touch, we obtained a venous sample through straight stick and her venous BG was 340.       Lactic continues to be elevated but is down trending can be drawn at 8am with ptt per MD(DIN)  since pt is a hard stick and it is downtrending.        End of Shift Note    Bedside shift change report given to DASHAWN Sandy (oncoming nurse) by Jenny Carrillo RN (offgoing nurse).  Report included the following information SBAR, Kardex, Intake/Output, MAR, and Recent Results    Shift worked:  night     Shift summary and any significant changes:    See above     Concerns for physician to address: Hypoglycemia, hard stick     Zone phone for oncoming shift:   1149       Activity:     Number times ambulated in hallways past shift: 0  Number of times OOB to chair past shift: 0    Cardiac:   Cardiac Monitoring: Yes           Access:  Current line(s): PIV     Genitourinary:   Urinary status: voiding, incontinent, and external catheter    Respiratory:      Chronic home O2 use?: NO  Incentive spirometer at bedside: NO       GI:     Current diet:  Diet NPO  Passing flatus: YES  Tolerating current diet: NO       Pain Management:   Patient states pain is manageable on current regimen: YES    Skin:     Interventions: specialty bed, float heels, increase time out of bed, foam dressing, and PT/OT consult    Patient Safety:  Fall Score:    Interventions: bed/chair alarm, assistive device (walker, cane. etc), gripper socks, and pt to call before getting OOB       Length of Stay:  Expected LOS: 3  Actual LOS: 1      Jenny Carrillo RN                                    
4 Eyes Skin Assessment     NAME:  Ruth Medina  YOB: 1945  MEDICAL RECORD NUMBER:  [id number]    The patient is being assessed for  Admission    I agree that at least one RN has performed a thorough Head to Toe Skin Assessment on the patient. ALL assessment sites listed below have been assessed.      Areas assessed by both nurses:    Head, Face, Ears, Shoulders, Back, Chest, Arms, Elbows, Hands, Sacrum. Buttock, Coccyx, Ischium, and Legs. Feet and Heels        Does the Patient have a Wound? Yes wound(s) were present on assessment. LDA wound assessment was Initiated and completed by RN Has necrotic heel on Left and necrotic toes on right       Good Prevention initiated by RN: Yes  Wound Care Orders initiated by RN: Yes    Pressure Injury (Stage 3,4, Unstageable, DTI, NWPT, and Complex wounds) if present, place Wound referral order by RN under : Yes    New Ostomies, if present place, Ostomy referral order under : No     Nurse 1 eSignature: {Esignature:[id number]}DASHAWN Wallace    **SHARE this note so that the co-signing nurse can place an eSignature**    Nurse 2 eSignature: {Esignature:[id number]}    
4208: Called to bedside by primary RN for concerns of hypoglycemia unresponsive to D10 boluses. Upon arrival to bedside, patient is alert, able to say her name and birthday, but unable to verbalize place, time, or situation. Primary RN states patient's FSBS was 23, which was treated with 250 mL D10 bolus. FSBS then came up to 55, when additional 250mL bolus was given. Recheck after 250mL bolus x 2 FSBS: 35. Patient's fingertips cool to the touch with slow capillary refill. Venous sample obtained through straight stick, venous B. Recommended to primary RN to obtain BG levels from venous samples as able to avoid falsely low BG readings in the future.      Please call with any questions or concerns  Pura Steen RN  RRT [phone]  
Comprehensive Nutrition Assessment    Type and Reason for Visit:  Initial (low BMI)    Nutrition Recommendations/Plan:   Consider decreasing D10 to 50 mL/hr to provide 10 kcals/kg bw and prevent exacerbation of refeeding syndrome   Consider NGT placement for supplemental enteral nutrition if in line with goals of care and unable to advance to PO diet given severe malnutrition      Malnutrition Assessment:  Malnutrition Status:  Severe malnutrition (02/06/24 1137)    Context:  Acute Illness     Findings of the 6 clinical characteristics of malnutrition:  Energy Intake:  50% or less of estimated energy requirements for 5 or more days  Weight Loss:  Greater than 5% over 1 month     Body Fat Loss:  Moderate body fat loss Orbital, Buccal region, Triceps   Muscle Mass Loss:  Moderate muscle mass loss Clavicles (pectoralis & deltoids), Temples (temporalis), Hand (interosseous)  Fluid Accumulation:  No significant fluid accumulation     Strength:  Not Performed    Nutrition Assessment:    Patient medically noted for lactic acidosis, hypoglycemia, FTT, and GARY. PMH HTN, CVA, RA, DM, and dementia. Patient NPO at this time; SLP following. Patient severely underweight with a reported 20-30# weight loss over the last month or two. Poor PO for ~2 weeks. Significant muscle and fat wasting noted. She typically drinks protein shakes at home. Family interested in short term NGT if unable to advance diet. Patient very talkative during visit; stating she is hungry and making other off topic comments. Currently on D10% IVF @ 125 mL/hr which is providing 25 kcals/kg bw. Given severe malnutrition she is high risk for refeeding; potassium already low. Recommend decreasing IVF as above to only meet 10 kcals/kg bw to help prevent refeeding. Will monitor progress and plan of care to provide further recommendations (TF vs ONS).     Wt Readings from Last 5 Encounters:   02/05/24 39.9 kg (88 lb)   01/24/24 36.3 kg (80 lb)   11/12/23 54.4 kg 
End of Shift Note    Bedside shift change report given to DASHAWN Sandy (oncoming nurse) by Jenny Carrillo RN (offgoing nurse).  Report included the following information SBAR, Kardex, Intake/Output, MAR, and Recent Results    Shift worked:  night     Shift summary and any significant changes:    Pt on comfort measures; morphine given x1     Concerns for physician to address: none     Zone phone for oncoming shift:       Activity:     Number times ambulated in hallways past shift: 0  Number of times OOB to chair past shift: 0    Cardiac:   Cardiac Monitoring: Yes           Access:  Current line(s): PIV     Genitourinary:   Urinary status: incontinent    Respiratory:      Chronic home O2 use?: NO  Incentive spirometer at bedside: NO       GI:     Current diet:  Diet NPO  Passing flatus: YES  Tolerating current diet: NO       Pain Management:   Patient states pain is manageable on current regimen: YES    Skin:     Interventions: specialty bed, float heels, increase time out of bed, and foam dressing    Patient Safety:  Fall Score:    Interventions: bed/chair alarm, gripper socks, and pt to call before getting OOB       Length of Stay:  Expected LOS: 4  Actual LOS: 2      Jenny Carrillo RN                            
End of Shift Note    Bedside shift change report to Isabel TAMEZ (oncoming nurse) by Liza De La Rosa RN (offgoing nurse).  Report included the following information SBAR, ED Summary, Intake/Output, MAR, Recent Results, Med Rec Status, Cardiac Rhythm Sinus rhythm, and Alarm Parameters     Shift worked:  7am-7pm     Shift summary and any significant changes:     Patient has pressure ulcer on the heel left leg  stage3,patient emaciated, necrotic toes on the right foot appears moist, monitor lactic acid,BS, Troponin,Safety because of dimentia.     Concerns for physician to address:  Continue to monitor above     Zone phone for oncoming shift:          Activity:     Number times ambulated in hallways past shift: 0  Number of times OOB to chair past shift: 0    Cardiac:   Cardiac Monitoring: Yes           Access:  Current line(s): PIV     Genitourinary:   Urinary status: external catheter    Respiratory:      Chronic home O2 use?: NO  Incentive spirometer at bedside: NO       GI:     Current diet:  Diet NPO  Passing flatus: NO  Tolerating current diet: NO       Pain Management:   Patient states pain is manageable on current regimen:     Skin:     Interventions: specialty bed, float heels, foam dressing, internal/external urinary devices, and nutritional support    Patient Safety:  Fall Score:    Interventions: bed/chair alarm, pt to call before getting OOB, and stay with me (per policy)       Length of Stay:  Expected LOS: 3  Actual LOS: 0      Liza De La Rosa RN                           
Hospice Liaison Note: pt lives [facility] for [facility]. There is a BS Hospice in the \[facility] [place] [facility]\"" that may be able to accept but we are not in direct affiliation with that market. We will have to defer for home hospice from our [facility].   
Hospice Liaison Note: pt not stable to return home on hospice, lived with her son in [place] and was fairly independent w/ADL's prior to admission. Daughter at bedside is familiar with hospice services as her  on service with us 2.5 years ago. She has had several discussions w/ her mom who has been clear she does not want life prolonging measures. Son was present for discussion via phone call as he is en-route from [place]. Daughter and son share decision making capacity and are both in agreement of admission to inpatient hospice for end of life symptom management of terminal agitation.     Verbal CTI from Dr. Benny Moraes for the hospice diagnosis of Sepsis. Received orders for IV Valium 5mg q15min PRN - nurse to administer dose now.     PS message to attending MD for verbal discharge order. Daughter signed hospice consents at bedside.     1404: call to nursing supervisor Mickie requesting new chart to admit to inpatient hospice; she is going into a meeting so I will have to wait      Full preadmission note to follow.   
Nutrition: Chart reviewed and patient discussed during PCU rounds. Patient has transitioned to comfort measures only; hospice being set up for discharge. Pureed diet ordered for comfort feedings. Nutrition will sign off but RD is available by consult if needs arise. Thanks.  Rocio Bustamante, ADI  Ext [phone]     
Pharmacy Antimicrobial Kinetic Dosing    Indication for Antimicrobials: Sepsis of Unknown Etiology     Current Regimen of Each Antimicrobial:  Vancomycin Pharmacy to Dose; Start Date ; Day # 1  Pip/Tazo 4.5g IV loading dose, 3.375g IV q8h; Start Date ; Day # 1    Previous Antimicrobial Therapy:  Ceftriaxone      Goal Level: Vancomycin random level < 20     Date Dose & Interval Measured (mcg/mL) Predicted AUC                       Significant Cultures:    Blood 1: pending   Blood 2: pending    Labs:  Recent Labs     Units 24  1110 24  1104   CREATININE MG/DL 1.81* 1.6*   BUN MG/DL 39*  --    PROCAL ng/mL 0.58  --    WBC K/uL 18.0*  --    BANDS % 2  --      Temp (24hrs), Av.1 °F (35.1 °C), Min:94.6 °F (34.8 °C), Max:95.6 °F (35.3 °C)    Conditions for Dosing Consideration:  GARY, low weight    Creatinine Clearance (mL/min): Estimated Creatinine Clearance: 16 mL/min (A) (based on SCr of 1.81 mg/dL (H)).     Impression/Plan:   GARY (baseline SCr 0.6-1) - will pulse dose vancomycin for now   Vancomycin 750mg IV loading dose ordered   Random level scheduled for  1200  BMP ordered daily   Antimicrobial stop date 7 days      Pharmacy will follow daily and adjust medications as appropriate for renal function and/or serum levels.    Thank you,  WILSON CORTEZ [facility]  
Pharmacy Antimicrobial Kinetic Dosing    Indication for Antimicrobials: Sepsis of Unknown Etiology     Current Regimen of Each Antimicrobial:  Vancomycin Pharmacy to Dose; Start Date ; Day # 2  Pip/Tazo 4.5g IV loading dose, 3.375g IV q8h; Start Date ; Day # 2    Previous Antimicrobial Therapy:  Ceftriaxone      Goal Level: Vancomycin random level < 20     Date Dose & Interval Measured (mcg/mL) Predicted AUC   24 750 mg x 1  6.9 N/A                 Significant Cultures:    Blood 1: NG   Blood 2: NG    Labs:  Recent Labs     Units 24  1147 24  0840 24  0315 24  2342 24  1630 24  1110   CREATININE MG/DL 1.26* 1.30* 1.44*   < >  --  1.81*   BUN MG/DL 25* 26* 29*   < >  --  39*   PROCAL ng/mL  --   --   --   --   --  0.58   WBC K/uL  --   --   --   --  17.2* 18.0*   BANDS %  --   --   --   --   --  2    < > = values in this interval not displayed.     Temp (24hrs), Av.5 °F (36.4 °C), Min:97.2 °F (36.2 °C), Max:97.7 °F (36.5 °C)    Conditions for Dosing Consideration:  GARY, low weight    Creatinine Clearance (mL/min): Estimated Creatinine Clearance: 22 mL/min (A) (based on SCr of 1.26 mg/dL (H)).     Impression/Plan:     Schedule vancomycin 750 mg IV Q24H for estimated AUC of 581  Random level in AM  Continue piperacillin/tazobactam 3.375 g IV Q8H  BMP ordered daily   Antimicrobial stop date 7 days      Pharmacy will follow daily and adjust medications as appropriate for renal function and/or serum levels.    Thank you,  Shawn Watson, [facility]    
Pharmacy Heparin Monitoring    Indication: DVT/PE (confirmed)    Factor Xa inhibitor/LMWH use within the past 72 hours? yes  If yes, date of last administration: 2/5  If yes, when can aPTT nomogram be changed to anti-Xa: 2/8 1400  Hypertriglyceridemia (> 414 mg/dL) or hyperbilirubinemia (> 37 mg/dL) present? NO  Recent Labs     Units 02/05/24  1110   BILITOT MG/DL 0.9     Heparin to be monitored using aPTT until 72 hours following last factor Xa inhibitor/LMWH administration then anti-Xa    Goal: aPTT 58-77.9 sec    Initial dosing Weight: 39.9 kg    Labs:  No results for input(s): \"HEPXALMWHUNF\", \"APTT\" in the last 72 hours.  Recent Labs     Units 02/05/24  1110   HGB g/dL 9.7*   PLT K/uL 159     Current rate:  0 unit/kg/hr    Impression/Plan:   New rate: 18 unit/kg/hr  PRN bolus dose: no  Infusion rate, PRN bolus dose and anti-Xa/aPTT results were discussed with the nurse: yes, DASHAWN Bangura     Thank you,  WILSON CORTEZ [facility]  
Speech Pathology Contact Note:    SLP previously following for dysphagia evaluation/treatment. Per chart review and discussion w/ RN, patient with plans to transition to comfort care/hospice. No further acute SLP services warranted at this time. SLP will sign off. Please re-consult if concerns arise in the future. Thanks!     Brenda Huff, CCC-SLP      
  02/05/24 1700 101/63 -- -- 97 16 --   02/05/24 1645 109/60 -- -- 95 16 --   02/05/24 1641 (!) 105/58 -- -- 97 13 93 %   02/05/24 1615 105/63 -- -- 93 17 --   02/05/24 1600 124/74 -- -- 93 14 --   02/05/24 1546 124/74 -- -- 93 11 --         Intake/Output Summary (Last 24 hours) at 2/6/2024 1541  Last data filed at 2/6/2024 1345  Gross per 24 hour   Intake 2734.02 ml   Output --   Net 2734.02 ml        I had a face to face encounter and independently examined this patient on 2/6/2024, as outlined below:  PHYSICAL EXAM:  General: Alert, cooperative  EENT:  EOMI. Anicteric sclerae.  Resp:  CTA bilaterally, no wheezing or rales.  No accessory muscle use  CV:  Regular  rhythm,  No edema  GI:  Soft, Non distended, Non tender.  +Bowel sounds  Neurologic:  Alert and oriented X 3, normal speech,   Psych:   Good insight. Not anxious nor agitated  Skin:  No rashes.  No jaundice    Reviewed most current lab test results and cultures  YES  Reviewed most current radiology test results   YES  Review and summation of old records today    NO  Reviewed patient's current orders and MAR    YES  PMH/SH reviewed - no change compared to H&P    Procedures: see electronic medical records for all procedures/Xrays and details which were not copied into this note but were reviewed prior to creation of Plan.      LABS:  I reviewed today's most current labs and imaging studies.  Pertinent labs include:  Recent Labs     02/05/24  1110 02/05/24  1630   WBC 18.0* 17.2*   HGB 9.7* 7.9*   HCT 30.1* 25.2*    126*     Recent Labs     02/05/24  1110 02/05/24  1630 02/05/24  2342 02/06/24  0315 02/06/24  0840 02/06/24  1147   *  --  152* 153* 150* 148*   K 3.3*  --  2.6* 2.7* 3.1* 3.2*   *  --  122* 119* 119* 118*   CO2 26  --  23 28 26 24   GLUCOSE 136*  --  178* 98 139* 144*   BUN 39*  --  29* 29* 26* 25*   CREATININE 1.81*  --  1.51* 1.44* 1.30* 1.26*   CALCIUM 8.6  --  6.7* 7.4* 7.4* 7.3*   MG  --   --  2.0 1.9  --  1.8   LABALBU

## 2024-02-09 NOTE — PROGRESS NOTES
End of Shift Note    Bedside shift change report given to DASHAWN Carlton (oncoming nurse) by HARDEEP WHITMAN RN (offgoing nurse).  Report included the following information SBAR, Kardex, and MAR    Shift worked:  7am-7pm     Shift summary and any significant changes:     Pt tolerated care fairly well. Medications were given and education was provided. Hourly rounding completed. Pt visual cues for pain were treated with scheduled pain medications (See MAR). Incontinence care completed. Oral care completed. CHG care completed to juan. Family present at bedside.            HARDEEP WHITMAN RN

## 2024-02-09 NOTE — PROGRESS NOTES
This was an initial visit to assess needs and offer support. Pt was in bed and appeared to be resting comfortably. There was no family present. Offered ministry of presence. Follow up call was made to her daughter, Candy. She notes the family is doing well supporting each other. They are [employer] and pulling on their sachi to help them cope.   Their desire is for a peaceful transition,  Thank you for the opportunity to  to this daughter.

## 2024-02-09 NOTE — PROGRESS NOTES
depressed, poor sleep  Endocrine:      Adult Non-Verbal Pain Assessment Score: 0     Face  [x] 0   No particular expression or smile  [] 1   Occasional grimace, tearing, frowning, wrinkled forehead  [] 2   Frequent grimace, tearing, frowning, wrinkled forehead     Activity (movement)  [x] 0   Lying quietly, normal position  [] 1   Seeking attention through movement or slow, cautious movement  [] 2   Restless, excessive activity and/or withdrawal reflexes     Guarding  [x] 0   Lying quietly, no positioning of hands over areas of body  [] 1   Splinting areas of the body, tense  [] 2   Rigid, stiff     Physiology (vital signs)  [x] 0   Stable vital signs  [] 1   Change in any of the following: SBP > 20mm Hg; HR > 20/minute  [] 2   Change in any of the following: SBP > 30mm Hg; HR > 25/minute     Respiratory  [x] 0   Baseline RR/SpO2, compliant with ventilator  [] 1   RR > 10 above baseline, or 5% drop SpO2, mild asynchrony with ventilator  [] 2   RR > 20 above baseline, or 10% drop SpO2, asynchrony with ventilator      FUNCTIONAL ASSESSMENT      Palliative Performance Scale (PPS): 10%      HISTORY     Past Medical History:   Diagnosis Date    Arthritis     Asthma     seasonal     Diabetes (HCC)     diet controlled    Hypertension     Macromastia     Hx of    Menopause     RA (rheumatoid arthritis) (HCC)     TIA (transient ischemic attack) 01/2021    Ulcer of foot (HCC)     Right foot          PHYSICAL EXAM      Wt Readings from Last 3 Encounters:   02/05/24 39.9 kg (88 lb)   01/24/24 36.3 kg (80 lb)   11/12/23 54.4 kg (120 lb)       BP: (!) 89/50, Pain 0-10: Pain Level: 0;     Supplemental O2         [x] Yes 2L NC                  [] NO  Last bowel movement:      Currently this patient has:  [x] Peripheral IV          [] PICC           [] PORT        [] ICD               [x] Garcia Catheter       [] NG Tube     [] PEG Tube               [] Rectal Tube           [] Drain  [] Other:      Constitutional: unresponsive;  frail, ill-appearing  Eyes: closed  ENMT: edentulous; oral mucosa dry  Cardiovascular: regular rate, no peripheral edema, distal pulses intact yet thready  Respiratory: clear to diminished anteriorly  Gastrointestinal: soft  Musculoskeletal: cachetic, ulnar deviation bilateral hands; 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   Skin: dry; right heel ulcer; gangrenous left 5th toe, mottling and discoloration of feet, hands and feet both cool to the touch   Neurologic: unresponsive   Psychiatric: unresponsive     Pertinent Lab and or Imaging Tests:  Lab Results   Component Value Date     (H) 02/06/2024    K 3.2 (L) 02/06/2024     (H) 02/06/2024    CO2 24 02/06/2024    BUN 25 (H) 02/06/2024    CREATININE 1.26 (H) 02/06/2024    GLUCOSE 144 (H) 02/06/2024    CALCIUM 7.3 (L) 02/06/2024    PROT 5.3 (L) 02/06/2024    LABALBU 2.3 (L) 02/06/2024    BILITOT 0.5 02/06/2024    ALKPHOS 128 (H) 02/06/2024    AST 44 (H) 02/06/2024    ALT 50 02/06/2024    LABGLOM 43 (L) 02/06/2024    GFRAA >60 03/28/2022    AGRATIO 0.8 (L) 02/06/2024    GLOB 3.0 02/06/2024                Homa Monterroso, APRN - NP

## 2024-02-09 NOTE — PROGRESS NOTES
[facility]  Good Help to Those in Need  [phone]    [facility] Daily Nursing Note   Patient Name: Ruth Medina  YOB: 1945  Age: 78 y.o.    Date of Visit: 02/09/24  Facility of Care: [facility]  Patient Room: [address] [place] [address]     Hospice Attending: Benny Moraes MD  Hospice Diagnosis: Sepsis (HCC) [A41.9]    Level of Care: GIP    Current GIP Symptoms      1. Nonverbal Pain  2. Agitation/restlessnes  3. Unresponsive        ASSESSMENT & PLAN   Pt is apneic this AM, BUE are now relaxed, no movement or twitching of extremities and no evidence of facial grimacing during my exam.    Continue GIP LOC to manage EOL symptoms of nonverbal pain and agitation/restlessness.   Continue Dilaudid 2mg IV every 4 hours with PRN every 15 minutes for nonverbal pain and/or dyspnea.   Continue Valium 5mg IV every 4 hours with PRN every 15 minutes for agitation/restlessness.   Comfort orders for breakthrough symptoms.   Garcia catheter for comfort and bladder decompression per hospice protocol.   Hospice SW and  to support family ongoing.       Spiritual Interventions: hospice and hospital  available to provide emotional and spiritual support as needed     Psych/ Social/ Emotional Interventions: family appreciative of care and holds benitez during day in hospice suite; hospice SW to support family ongoing    Care Coordination Needs: communication with hospital team, hospice team    Care plan and New Orders discussed / approved with Dr. Dimitry MD.    Description History and Chart Review     Narrative History of last 24 hours that demonstrates care cannot be provided in another setting:  Requiring frequent nursing assessments to monitor for EOL symptoms and administration and titration of IV medications to manage those symptoms.    What has been done to control the patient's symptoms in the last 24 hours?  Scheduled IV Dilaudid and Valium q4hrs with PRN every 15 minutes    Does the patient currently require

## 2024-02-10 NOTE — PROGRESS NOTES
1009: RN entered room and found to have no breath sounds/respiratory effort or pulse. Paged hospice to come pronounce.    1030: This RN and Natalie Cullen, RN at bedside to pronounce. Nasima, Hospice RN at bedside. Moriah Irizarry, nursing supervisor made aware and  paged.    1045: Nasima, Hospice RN to call patient's family and [employer].    Joan Vicente RN

## 2024-02-10 NOTE — PROGRESS NOTES
[facility]  Good Help to Those in Need  [phone]    Inpatient Nursing Admission   Patient Name: Ruth Medina  YOB: 1945  Age: 78 y.o.    Death Pronouncement Note:     Called to examine patient who has .   No response to verbal and tactile stimuli.   No respiratory effort.   Absent heart sounds and pulses.   Pupils fixed and dilated.   Patient pronounced dead at 10:30 by Magdalena Vicente RN and Natalie Cullen RN  Pronounced under the supervision of  Dr. Benny Moraes      Family notified. Chaplain reynolds. [employer] declined donation.      Nasima Garcia RN, [facility]  Hospice Nurse Liaison  [phone] Mobile  [phone] Office

## 2024-02-10 NOTE — PROGRESS NOTES
[facility]  Good Help to Those in Need  [phone]    Discharge/Death Nursing Note   Patient Name: Ruth Medina  YOB: 1945  Age: 78 y.o.    Date of Death: 02/10/24  Admitted Date: 2024  Time of Death: 1030    Facility of Care: [facility]  Level of Care: GIP  Patient Room: [address] [place] [address]     Hospice Attending: Benny Moraes MD  Hospice Diagnosis: Sepsis (HCC) [A41.9]    Death Pronouncement   Pronouncement of death completed by: Magdalena Vicente RN and Natalie Cullen RN.  Under the supervision of  Dr. Benny Moraes.    Agency staff was not present at the time of death    At the time of death the patient was documented as:  Called to examine patient who has .   No response to verbal and tactile stimuli.   No respiratory effort.   Absent heart sounds and pulses.   Pupils fixed and dilated.   Patient pronounced dead at 10:30.    The pt  within [facility].    The following were notified of the patient's death: MD, , nursing supvr, [employer], family,  home.    Medications were disposed of per facility protocol     Discharge Summary   Discharge Reason: Death    Summary of Care Provided:    [x] Post mortem care provided by nursing staff  [x] Notification of  home by nursing supvr  [x] Referrals/Community resources provided:   [x] Goals completed  [] Durable Medical Equipment vendor notified     Disciplines involved: [x] RN [] SW [x]  [] TAMAYO [] Vol [] PT [] OT [] ST [] BC    [x] IDT communication/notification    Attending Physician, Dr. Moraes, notified of death    Bereaved     Candy Toussaint, dtr  [phone]. Bereavement low. [facility] to serve.         2024    12:00 AM   Demographics   Marital Status

## 2024-02-11 ENCOUNTER — HOME CARE VISIT (OUTPATIENT)
Age: 79
End: 2024-02-11
Payer: MEDICARE

## 2024-02-14 NOTE — DISCHARGE SUMMARY
Hospice Discharge Summary    [facility]  Good Help to Those in Need        Date of Admission: 2/7/2024  Date of Discharge: 2/10/2024    Ruth Medina is a 78 y.o. year old who was admitted to [facility] at [facility] with a Hospice diagnosis of Sepsis (HCC) [A41.9].      The patient's care was focused on comfort and the patient passed away on 2/10/2024.

## (undated) DEVICE — KENDALL RADIOLUCENT FOAM MONITORING ELECTRODE RECTANGULAR SHAPE: Brand: KENDALL

## (undated) DEVICE — SYRINGE MED 5ML STD CLR PLAS LUERLOCK TIP N CTRL DISP

## (undated) DEVICE — OPTIFOAM GENTLE SA, POSTOP, 4X8: Brand: MEDLINE

## (undated) DEVICE — UNDERCAST PADDING: Brand: DEROYAL

## (undated) DEVICE — BIT DRL L40MM DIA3.2MM DISP FOR G7 2 MOBILITY CONSTRUCT

## (undated) DEVICE — SPONGE GZ W4XL4IN COT 12 PLY TYP VII WVN C FLD DSGN

## (undated) DEVICE — ADHESIVE SKIN CLOSURE WND 8.661X1.5 IN 22 CM LIQUIBAND SECUR

## (undated) DEVICE — SET ADMIN 16ML TBNG L100IN 2 Y INJ SITE IV PIGGY BK DISP (ORDER IN MULIPLES OF 48)

## (undated) DEVICE — BANDAGE COMPR W4INXL10YD WHITE/BEIGE E MTRX HK LOOP CLSR

## (undated) DEVICE — PAD,ABDOMINAL,5"X9",ST,LF,25/BX: Brand: MEDLINE INDUSTRIES, INC.

## (undated) DEVICE — CURITY STRETCH BANDAGE: Brand: CURITY

## (undated) DEVICE — APPLICATOR MEDICATED 26 CC SOLUTION HI LT ORNG CHLORAPREP

## (undated) DEVICE — GLOVE SURG SZ 7 L12IN FNGR THK79MIL GRN LTX FREE

## (undated) DEVICE — Device

## (undated) DEVICE — GARMENT,MEDLINE,DVT,INT,CALF,MED, GEN2: Brand: MEDLINE

## (undated) DEVICE — STERILE POLYISOPRENE POWDER-FREE SURGICAL GLOVES: Brand: PROTEXIS

## (undated) DEVICE — TUBING, SUCTION, 1/4" X 12', STRAIGHT: Brand: MEDLINE

## (undated) DEVICE — 3M™ IOBAN™ 2 ANTIMICROBIAL INCISE DRAPE 6650EZ: Brand: IOBAN™ 2

## (undated) DEVICE — SUTURE VCRL + SZ 1 L36IN ABSRB UD L36MM CT-1 1/2 CIR VCP947H

## (undated) DEVICE — NEEDLE SPNL L3.5IN PNK HUB S STL REG WALL FIT STYL W/ QNCKE

## (undated) DEVICE — BLUNTFILL: Brand: MONOJECT

## (undated) DEVICE — SYR 10ML CTRL LR LCK NSAF LF --

## (undated) DEVICE — WATERPROOF, BACTERIA PROOF DRESSING WITH ABSORBENT SEE THROUGH PAD: Brand: OPSITE POST-OP VISIBLE 20X10CM CTN 20

## (undated) DEVICE — SOLUTION IRRIG 1500ML STRL H2O AQUALITE PLAS POUR BTL

## (undated) DEVICE — SOLUTION IRRIG 3000ML 0.9% SOD CHL FLX CONT 0797208] ICU MEDICAL INC]

## (undated) DEVICE — SYR LR LCK 1ML GRAD NSAF 30ML --

## (undated) DEVICE — PACK PROCEDURE SURG ANTR HIP

## (undated) DEVICE — SYRINGE MED 3ML CLR PLAS STD N CTRL LUERLOCK TIP DISP

## (undated) DEVICE — SOLUTION IRRIG 3000ML LAC R FLX CONT

## (undated) DEVICE — ELECTRODE PT RET AD L9FT HI MOIST COND ADH HYDRGEL CORDED

## (undated) DEVICE — REM POLYHESIVE ADULT PATIENT RETURN ELECTRODE: Brand: VALLEYLAB

## (undated) DEVICE — GLOVE SURG SZ 8 L12IN FNGR THK79MIL GRN LTX FREE

## (undated) DEVICE — CANNULA CUSH AD W/ 14FT TBG

## (undated) DEVICE — NEEDLE HYPO 25GA L1.5IN BLU POLYPR HUB S STL REG BVL STR

## (undated) DEVICE — SUTURE MCRYL SZ 3-0 L27IN ABSRB UD PS-2 3/8 CIR REV CUT NDL MCP427H

## (undated) DEVICE — SYRINGE MED 30ML STD CLR PLAS LUERLOCK TIP N CTRL DISP

## (undated) DEVICE — GOWN,AURORA,NONRNF,XL,30/CS: Brand: MEDLINE

## (undated) DEVICE — SOLUTION IRRIG 500ML 0.9% SOD CHLO USP POUR PLAS BTL

## (undated) DEVICE — SUTURE MCRYL SZ 2-0 L36IN ABSRB UD L36MM CT-1 1/2 CIR Y945H

## (undated) DEVICE — DRESSING,GAUZE,XEROFORM,CURAD,5"X9",ST: Brand: CURAD

## (undated) DEVICE — PACK PROCEDURE SURG EXTREMITY CUST

## (undated) DEVICE — CATHETER IV 22GA L1IN BLU POLYUR STR HUB RADPQ PROTCT +

## (undated) DEVICE — ERBE NESSY®PLATE 170 SPLIT; 168CM²; CABLE 3M: Brand: ERBE

## (undated) DEVICE — ADAPTER CIRC OD22XID15MM FOR MON VENT PARAMETER

## (undated) DEVICE — HANDPIECE SET WITH HIGH FLOW TIP AND SUCTION TUBE: Brand: INTERPULSE

## (undated) DEVICE — MOUTHPIECE ENDOSCP 20X27MM --

## (undated) DEVICE — CATHETER PH SUCT 14FR

## (undated) DEVICE — SINGLE PORT MANIFOLD: Brand: NEPTUNE 2

## (undated) DEVICE — MAJ-1414 SINGLE USE ADPATER BIOPSY VALV: Brand: SINGLE USE ADAPTOR BIOPSY VALVE

## (undated) DEVICE — TOWEL,OR,DSP,ST,BLUE,STD,4/PK,20PK/CS: Brand: MEDLINE

## (undated) DEVICE — BNDG,ELSTC,MATRIX,STRL,6"X5YD,LF,HOOK&LP: Brand: MEDLINE

## (undated) DEVICE — WRISTBAND ID AD W2.5XL9.5CM RED VYN ADH CLSR UNI-PRINT

## (undated) DEVICE — SOLUTION IV 1000ML 20MEQ K CHL IN 0.9% SOD CHL FLX CONT

## (undated) DEVICE — TOURNIQUET PHLEB W1XL18IN BLU FLAT RL AND BND REUSE FOR IV

## (undated) DEVICE — TRAP SPEC COLL POLYP POLYSTYR --

## (undated) DEVICE — GLOVE ORANGE PI 7 1/2   MSG9075

## (undated) DEVICE — STRYKER PERFORMANCE SERIES SAGITTAL BLADE: Brand: STRYKER PERFORMANCE SERIES

## (undated) DEVICE — SYRINGE 50ML E/T

## (undated) DEVICE — SOL IRRIGATION INJ NACL 0.9% 500ML BTL